# Patient Record
Sex: FEMALE | Race: BLACK OR AFRICAN AMERICAN | NOT HISPANIC OR LATINO | Employment: OTHER
[De-identification: names, ages, dates, MRNs, and addresses within clinical notes are randomized per-mention and may not be internally consistent; named-entity substitution may affect disease eponyms.]

---

## 2018-03-21 ENCOUNTER — SURGERY (OUTPATIENT)
Age: 68
End: 2018-03-21

## 2018-03-21 ENCOUNTER — HOSPITAL ENCOUNTER (OUTPATIENT)
Facility: OTHER | Age: 68
Discharge: HOME | End: 2018-03-21
Attending: INTERNAL MEDICINE | Admitting: INTERNAL MEDICINE
Payer: MEDICARE

## 2018-03-21 VITALS
BODY MASS INDEX: 34.38 KG/M2 | HEART RATE: 66 BPM | SYSTOLIC BLOOD PRESSURE: 152 MMHG | TEMPERATURE: 98 F | HEIGHT: 63 IN | DIASTOLIC BLOOD PRESSURE: 85 MMHG | RESPIRATION RATE: 16 BRPM | WEIGHT: 194 LBS | OXYGEN SATURATION: 100 %

## 2018-03-21 DIAGNOSIS — T82.590A MECHANICAL COMPLICATION OF ARTERIOVENOUS FISTULA SURGICALLY CREATED: ICD-10-CM

## 2018-03-21 DIAGNOSIS — Z49.01 ENCOUNTER FOR FITTING AND ADJUSTMENT OF DIALYSIS CATHETER: Primary | ICD-10-CM

## 2018-03-21 PROCEDURE — 25000003 PHARM REV CODE 250: Performed by: INTERNAL MEDICINE

## 2018-03-21 PROCEDURE — 63600175 PHARM REV CODE 636 W HCPCS

## 2018-03-21 PROCEDURE — 63600175 PHARM REV CODE 636 W HCPCS: Performed by: INTERNAL MEDICINE

## 2018-03-21 PROCEDURE — 27201059 HC S ENCORE INFLATION DEVICE: Performed by: INTERNAL MEDICINE

## 2018-03-21 PROCEDURE — 25000003 PHARM REV CODE 250

## 2018-03-21 PROCEDURE — 36581 REPLACE TUNNELED CV CATH: CPT | Performed by: INTERNAL MEDICINE

## 2018-03-21 PROCEDURE — 25500020 PHARM REV CODE 255

## 2018-03-21 PROCEDURE — C1752 CATH,HEMODIALYSIS,SHORT-TERM: HCPCS | Performed by: INTERNAL MEDICINE

## 2018-03-21 PROCEDURE — C1769 GUIDE WIRE: HCPCS | Performed by: INTERNAL MEDICINE

## 2018-03-21 PROCEDURE — C1725 CATH, TRANSLUMIN NON-LASER: HCPCS | Performed by: INTERNAL MEDICINE

## 2018-03-21 RX ORDER — HEPARIN SODIUM 1000 [USP'U]/ML
INJECTION, SOLUTION INTRAVENOUS; SUBCUTANEOUS
Status: DISCONTINUED | OUTPATIENT
Start: 2018-03-21 | End: 2018-03-21 | Stop reason: HOSPADM

## 2018-03-21 RX ORDER — LOSARTAN POTASSIUM 100 MG/1
100 TABLET ORAL DAILY
Status: ON HOLD | COMMUNITY
End: 2018-06-11 | Stop reason: HOSPADM

## 2018-03-21 RX ORDER — CLONIDINE 0.1 MG/24H
1 PATCH, EXTENDED RELEASE TRANSDERMAL
COMMUNITY
End: 2018-06-20

## 2018-03-21 RX ORDER — HEPARIN SOD,PORCINE/0.9 % NACL 1000/500ML
INTRAVENOUS SOLUTION INTRAVENOUS
Status: DISCONTINUED | OUTPATIENT
Start: 2018-03-21 | End: 2018-03-21 | Stop reason: HOSPADM

## 2018-03-21 RX ORDER — AMLODIPINE BESYLATE 10 MG/1
10 TABLET ORAL DAILY
COMMUNITY
End: 2018-06-20

## 2018-03-21 RX ORDER — OMEPRAZOLE 20 MG/1
20 CAPSULE, DELAYED RELEASE ORAL DAILY
Status: ON HOLD | COMMUNITY
End: 2018-06-11 | Stop reason: HOSPADM

## 2018-03-21 RX ORDER — MUPIROCIN 20 MG/G
OINTMENT TOPICAL
Status: DISCONTINUED | OUTPATIENT
Start: 2018-03-21 | End: 2018-03-21 | Stop reason: HOSPADM

## 2018-03-21 RX ORDER — LIDOCAINE HYDROCHLORIDE 10 MG/ML
1 INJECTION, SOLUTION EPIDURAL; INFILTRATION; INTRACAUDAL; PERINEURAL ONCE
Status: COMPLETED | OUTPATIENT
Start: 2018-03-21 | End: 2018-03-21

## 2018-03-21 RX ORDER — DOCUSATE SODIUM 100 MG/1
100 CAPSULE, LIQUID FILLED ORAL 2 TIMES DAILY PRN
Status: ON HOLD | COMMUNITY
End: 2019-04-09 | Stop reason: HOSPADM

## 2018-03-21 RX ORDER — CALCIUM ACETATE 667 MG/1
1334 CAPSULE ORAL
Status: ON HOLD | COMMUNITY
End: 2019-04-15 | Stop reason: HOSPADM

## 2018-03-21 RX ORDER — ZOLPIDEM TARTRATE 10 MG/1
10 TABLET ORAL NIGHTLY
COMMUNITY
End: 2019-04-16 | Stop reason: ALTCHOICE

## 2018-03-21 RX ORDER — CYCLOBENZAPRINE HCL 10 MG
10 TABLET ORAL 3 TIMES DAILY PRN
Status: ON HOLD | COMMUNITY
End: 2019-04-15 | Stop reason: HOSPADM

## 2018-03-21 RX ORDER — CODEINE PHOSPHATE AND GUAIFENESIN 10; 100 MG/5ML; MG/5ML
10 SOLUTION ORAL EVERY 6 HOURS PRN
COMMUNITY
End: 2018-06-20

## 2018-03-21 RX ADMIN — HEPARIN SODIUM IN SODIUM CHLORIDE 500 ML: 200 INJECTION INTRAVENOUS at 12:03

## 2018-03-21 RX ADMIN — HEPARIN SODIUM 4000 UNITS: 1000 INJECTION, SOLUTION INTRAVENOUS; SUBCUTANEOUS at 12:03

## 2018-03-21 RX ADMIN — LIDOCAINE HYDROCHLORIDE 16 ML: 10 INJECTION, SOLUTION EPIDURAL; INFILTRATION; INTRACAUDAL; PERINEURAL at 12:03

## 2018-03-21 NOTE — H&P
[facility]  History & Physical    Subjective:      Chief Complaint/Reason for Admission: Catheter rewire    Paige Summers is a 67 y.o. female with ESRD (TTS at [facility] with Dr. Carias) who presents today with clotted R internal jugular tunneled dialysis catheter.  She states they made multiple attempts on the last two dialysis sessions to maintain good blood flow rates during HD, but they were often unsuccessful.    Past Medical History:   Diagnosis Date    Hypertension      Past Surgical History:   Procedure Laterality Date    HYSTERECTOMY       History reviewed. No pertinent family history.  Social History   Substance Use Topics    Smoking status: Never Smoker    Smokeless tobacco: Never Used    Alcohol use No       PTA Medications   Medication Sig    amLODIPine (NORVASC) 10 MG tablet Take 10 mg by mouth once daily.    calcium acetate (PHOSLO) 667 mg capsule Take 1,334 mg by mouth 3 (three) times daily with meals.    cloNIDine 0.1 mg/24 hr td ptwk (CATAPRES) 0.1 mg/24 hr Place 1 patch onto the skin every 7 days. Every Sat    docusate sodium (COLACE) 100 MG capsule Take 100 mg by mouth 2 (two) times daily as needed for Constipation.    guaifenesin-codeine 100-10 mg/5 ml (TUSSI-ORGANIDIN NR)  mg/5 mL syrup Take 10 mLs by mouth every 6 (six) hours as needed for Cough.    linaclotide (LINZESS) 72 mcg Cap Take by mouth. Take one po qd before 1st meal of the day on empty stomach    losartan (COZAAR) 100 MG tablet Take 100 mg by mouth once daily.    omeprazole (PRILOSEC) 20 MG capsule Take 20 mg by mouth once daily. One po bod 30 minutes before meals    zolpidem (AMBIEN) 10 mg Tab Take 10 mg by mouth nightly as needed.    cyclobenzaprine (FLEXERIL) 10 MG tablet Take 10 mg by mouth 3 (three) times daily as needed for Muscle spasms.     Review of patient's allergies indicates:  No Known Allergies     Review of Systems   Constitutional: Negative.    Respiratory: Negative.     Cardiovascular: Negative.        Objective:      Vital Signs (Most Recent)  Temp: 98 °F (36.7 °C) (03/21/18 1127)  Pulse: 70 (03/21/18 1127)  Resp: 18 (03/21/18 1127)  BP: 139/79 (03/21/18 1127)  SpO2: 98 % (03/21/18 1127)    Vital Signs Range (Last 24H):  Temp:  [98 °F (36.7 °C)]   Pulse:  [70]   Resp:  [18]   BP: (139)/(79)   SpO2:  [98 %]     Physical Exam   Constitutional: She is oriented to person, place, and time. She appears well-developed and well-nourished. No distress.   HENT:   Head: Normocephalic and atraumatic.   Neck: Neck supple.   Cardiovascular:   R internal jugular tunneled dialysis catheter, clean and non-erythematous with no drainage   Pulmonary/Chest: Effort normal. No respiratory distress.   Neurological: She is alert and oriented to person, place, and time.   Skin: Skin is warm and dry. She is not diaphoretic.   Psychiatric: She has a normal mood and affect. Her behavior is normal.       Assessment:      Active Hospital Problems    Diagnosis  POA    Mechanical complication of arteriovenous fistula surgically created [T82.590A]  Yes      Resolved Hospital Problems    Diagnosis Date Resolved POA   No resolved problems to display.       Plan:      TDC rewire today.

## 2018-03-21 NOTE — BRIEF OP NOTE
[facility]  Brief Operative Note     SUMMARY     Surgery Date: 3/21/2018     Surgeon(s) and Role:     * Yuriy Gibson MD - Primary    Assisting Surgeon: None    Pre-op Diagnosis:  Mechanical complication of dialysis catheter [T82.49XA]    Post-op Diagnosis:  Post-Op Diagnosis Codes:     * Mechanical complication of dialysis catheter [T82.49XA]    Procedure(s) (LRB):  PERMCATH REWIRE- TUNNELED CATH REWIRE (N/A)    Anesthesia: Local    Description of the findings of the procedure: Patient was prepped and draped in a sterile fashion.  This was a successful rewire of her R internal jugular tunneled dialysis catheter with obliteration of a fibrin sheath performed in the SVC.  Patient tolerated the procedure well and no immediate complications noted.    Findings/Key Components: Patient was prepped and draped in a sterile fashion.  This was a successful rewire of her R internal jugular tunneled dialysis catheter with obliteration of a fibrin sheath performed in the SVC.  Patient tolerated the procedure well and no immediate complications noted.      Estimated Blood Loss: 10 mL         Specimens:   Specimen (12h ago through future)    None          Discharge Note    SUMMARY     Admit Date: 3/21/2018    Discharge Date and Time:  03/21/2018 1:04 PM    Hospital Course (synopsis of major diagnoses, care, treatment, and services provided during the course of the hospital stay): Patient was prepped and draped in a sterile fashion.  This was a successful rewire of her R internal jugular tunneled dialysis catheter with obliteration of a fibrin sheath performed in the SVC.  Patient tolerated the procedure well and no immediate complications noted.       Final Diagnosis: Post-Op Diagnosis Codes:     * Mechanical complication of dialysis catheter [T82.49XA]    Disposition: Home or Self Care    Follow Up/Patient Instructions:     Medications:  Reconciled Home Medications:   Current Discharge Medication List       CONTINUE these medications which have NOT CHANGED    Details   amLODIPine (NORVASC) 10 MG tablet Take 10 mg by mouth once daily.      calcium acetate (PHOSLO) 667 mg capsule Take 1,334 mg by mouth 3 (three) times daily with meals.      cloNIDine 0.1 mg/24 hr td ptwk (CATAPRES) 0.1 mg/24 hr Place 1 patch onto the skin every 7 days. Every Sat      docusate sodium (COLACE) 100 MG capsule Take 100 mg by mouth 2 (two) times daily as needed for Constipation.      guaifenesin-codeine 100-10 mg/5 ml (TUSSI-ORGANIDIN NR)  mg/5 mL syrup Take 10 mLs by mouth every 6 (six) hours as needed for Cough.      linaclotide (LINZESS) 72 mcg Cap Take by mouth. Take one po qd before 1st meal of the day on empty stomach      losartan (COZAAR) 100 MG tablet Take 100 mg by mouth once daily.      omeprazole (PRILOSEC) 20 MG capsule Take 20 mg by mouth once daily. One po bod 30 minutes before meals      zolpidem (AMBIEN) 10 mg Tab Take 10 mg by mouth nightly as needed.      cyclobenzaprine (FLEXERIL) 10 MG tablet Take 10 mg by mouth 3 (three) times daily as needed for Muscle spasms.             Discharge Procedure Orders  Activity as tolerated     Sponge bath only until clinic visit     Call MD for:  temperature >100.4     Call MD for:  severe uncontrolled pain     Call MD for:  redness, tenderness, or signs of infection (pain, swelling, redness, odor or green/yellow discharge around incision site)     Call MD for:   Order Comments: Bleeding from the catheter site.       Follow-up Information     [facility] In 1 day.    Why:  for dialysis  Contact information:  [address] [place] [address] [phone]

## 2018-03-21 NOTE — PROCEDURES
[facility] Interventional Nephrology Service    Date Of Procedure: 03/21/2018    Procedure: Tunneled Dialysis Central Catheter Exchange    Indication: Nonfunctioning dialysis catheter    Primary Surgeon: Yuriy Gibson MD  Assist: none    Referring Provider: Dr. Carias and [facility]    Blood Loss: 10 mL  Contrast Used: 9 mL    Procedure in Detail:  Patient prepped and draped in usual sterile fashion.  1% lidocaine was used for local sedation to anesthetize soft tissues.  Blunt hemostat was used to dissect exit site and fibrin sheath around catheter and its cuff.  Catheter (24 cm) location was visualized under fluoroscopy and appeared to be in a good position.  Once the catheter and the cuff were freed, a guidewire was inserted through distal port into the central venous circulation (CVC).  This was done under fluoroscopic guidance.  Once wire was positioned to inferior vena cava, the old catheter was pulled while maintaining hemostasis as well as position of the wire in the CVC under fluoroscopy.  Before pulling catheter, 3 mL of contrast was injected to evaluate for a fibrin sheath.  A sheath was noted, so using a 10mm by 40mm [place] PTA balloon and endeflator, the sheath was obliterated after 2 attempts.  Post PTA film did not reveal any extravascular extravasation of contrast and resolution of the fibrin sheath.  A new 23 cm catheter was then placed over the guidewire and was felt to be too long, so this was exchanged for a 19 cm catheter, which was then confirmed in correct positioning under fluoroscopy.  Both ports flushed well.  The catheter was packed with 1000 units/mL heparin.  Suture was used to secure the catheter at the exit site and to anchor to patient's skin.      A total of 3 sutures were placed    Patient tolerated the procedure well and there were no complications      Yuriy Gibson MD  [phone]

## 2018-04-23 ENCOUNTER — HOSPITAL ENCOUNTER (OUTPATIENT)
Facility: OTHER | Age: 68
Discharge: HOME | End: 2018-04-23
Attending: INTERNAL MEDICINE | Admitting: INTERNAL MEDICINE
Payer: MEDICARE

## 2018-04-23 ENCOUNTER — SURGERY (OUTPATIENT)
Age: 68
End: 2018-04-23

## 2018-04-23 PROCEDURE — 36589 REMOVAL TUNNELED CV CATH: CPT | Performed by: INTERNAL MEDICINE

## 2018-04-23 NOTE — NURSING
Upon arrival to procedure area, hemodialysis cath noted to be completely dislodged. No bleeding noted. Pressure held for 10 minutes. Dr. Gibson at bedside. Cath intact. Pressure dsg placed and pt discharged from facility.

## 2018-04-23 NOTE — PROCEDURES
[facility] Interventional Nephrology Note    04/23/2018 at 12:30PM    The patient was referred here for removal of her R internal jugular tunneled dialysis catheter.  She recently had an LUE upper arm straight arm AVG and they have been using it, have accessed it twice without any problems or complications.      While the nurse was preparing the patient for our tunneled catheter removal, the catheter became dislodged on its own and came out.  She held pressure for 10 minutes, and no bleeding was noted.  Upon my evaluating the patient, the exit site looked clean without any bleeding, the venotomy site was closed and had no evidence of swelling or hematoma.  The exit site was dressed and she was notified to contact us if any changes were noted.      Yuriy Gibson MD

## 2018-05-17 ENCOUNTER — TELEPHONE (OUTPATIENT)
Dept: TRANSPLANT | Facility: CLINIC | Age: 68
End: 2018-05-17

## 2018-06-04 DIAGNOSIS — Z76.82 ORGAN TRANSPLANT CANDIDATE: ICD-10-CM

## 2018-06-11 ENCOUNTER — HOSPITAL ENCOUNTER (OUTPATIENT)
Facility: OTHER | Age: 68
Discharge: HOME | End: 2018-06-11
Attending: INTERNAL MEDICINE | Admitting: INTERNAL MEDICINE
Payer: MEDICARE

## 2018-06-11 VITALS
OXYGEN SATURATION: 100 % | BODY MASS INDEX: 34.73 KG/M2 | TEMPERATURE: 98 F | HEART RATE: 73 BPM | HEIGHT: 63 IN | RESPIRATION RATE: 16 BRPM | DIASTOLIC BLOOD PRESSURE: 98 MMHG | SYSTOLIC BLOOD PRESSURE: 166 MMHG | WEIGHT: 196 LBS

## 2018-06-11 DIAGNOSIS — T82.590A MECHANICAL COMPLICATION OF ARTERIOVENOUS FISTULA SURGICALLY CREATED: ICD-10-CM

## 2018-06-11 DIAGNOSIS — T82.868A THROMBOSIS OF KIDNEY DIALYSIS ARTERIOVENOUS GRAFT, INITIAL ENCOUNTER: Primary | ICD-10-CM

## 2018-06-11 PROCEDURE — 25000003 PHARM REV CODE 250: Mod: TXP

## 2018-06-11 PROCEDURE — 27201224 HC CATH ANGIOGRAM: Mod: NTX | Performed by: INTERNAL MEDICINE

## 2018-06-11 PROCEDURE — 25500020 PHARM REV CODE 255: Mod: NTX

## 2018-06-11 PROCEDURE — C1769 GUIDE WIRE: HCPCS | Mod: TXP | Performed by: INTERNAL MEDICINE

## 2018-06-11 PROCEDURE — C1757 CATH, THROMBECTOMY/EMBOLECT: HCPCS | Mod: NTX | Performed by: INTERNAL MEDICINE

## 2018-06-11 PROCEDURE — 25000003 PHARM REV CODE 250: Mod: NTX | Performed by: INTERNAL MEDICINE

## 2018-06-11 PROCEDURE — 36905 THRMBC/NFS DIALYSIS CIRCUIT: CPT | Mod: TXP | Performed by: INTERNAL MEDICINE

## 2018-06-11 PROCEDURE — 99152 MOD SED SAME PHYS/QHP 5/>YRS: CPT | Mod: TXP | Performed by: INTERNAL MEDICINE

## 2018-06-11 PROCEDURE — 27201059 HC S ENCORE INFLATION DEVICE: Mod: TXP | Performed by: INTERNAL MEDICINE

## 2018-06-11 PROCEDURE — 99153 MOD SED SAME PHYS/QHP EA: CPT | Mod: NTX | Performed by: INTERNAL MEDICINE

## 2018-06-11 PROCEDURE — 63600175 PHARM REV CODE 636 W HCPCS: Mod: JG,NTX

## 2018-06-11 PROCEDURE — C1894 INTRO/SHEATH, NON-LASER: HCPCS | Mod: TXP | Performed by: INTERNAL MEDICINE

## 2018-06-11 PROCEDURE — C1725 CATH, TRANSLUMIN NON-LASER: HCPCS | Mod: TXP | Performed by: INTERNAL MEDICINE

## 2018-06-11 PROCEDURE — 63600175 PHARM REV CODE 636 W HCPCS: Mod: NTX | Performed by: INTERNAL MEDICINE

## 2018-06-11 RX ORDER — CLOPIDOGREL BISULFATE 75 MG/1
75 TABLET ORAL EVERY OTHER DAY
Status: ON HOLD | COMMUNITY
End: 2019-04-15 | Stop reason: HOSPADM

## 2018-06-11 RX ORDER — GABAPENTIN 100 MG/1
100 CAPSULE ORAL NIGHTLY
Status: ON HOLD | COMMUNITY
End: 2018-09-06

## 2018-06-11 RX ORDER — HEPARIN SODIUM 1000 [USP'U]/ML
INJECTION, SOLUTION INTRAVENOUS; SUBCUTANEOUS
Status: DISCONTINUED | OUTPATIENT
Start: 2018-06-11 | End: 2018-06-11 | Stop reason: HOSPADM

## 2018-06-11 RX ORDER — MIDAZOLAM HYDROCHLORIDE 1 MG/ML
INJECTION, SOLUTION INTRAMUSCULAR; INTRAVENOUS
Status: DISCONTINUED | OUTPATIENT
Start: 2018-06-11 | End: 2018-06-11 | Stop reason: HOSPADM

## 2018-06-11 RX ORDER — OXYCODONE AND ACETAMINOPHEN 5; 325 MG/1; MG/1
1 TABLET ORAL ONCE
Status: COMPLETED | OUTPATIENT
Start: 2018-06-11 | End: 2018-06-11

## 2018-06-11 RX ORDER — LIDOCAINE HYDROCHLORIDE 10 MG/ML
INJECTION INFILTRATION; PERINEURAL
Status: DISCONTINUED | OUTPATIENT
Start: 2018-06-11 | End: 2018-06-11 | Stop reason: HOSPADM

## 2018-06-11 RX ORDER — CARVEDILOL 6.25 MG/1
6.25 TABLET ORAL 2 TIMES DAILY WITH MEALS
Status: ON HOLD | COMMUNITY
End: 2019-04-15 | Stop reason: HOSPADM

## 2018-06-11 RX ORDER — HYDROXYZINE PAMOATE 25 MG/1
25 CAPSULE ORAL 3 TIMES DAILY
Status: ON HOLD | COMMUNITY
End: 2019-04-15 | Stop reason: HOSPADM

## 2018-06-11 RX ORDER — FENTANYL CITRATE 50 UG/ML
INJECTION, SOLUTION INTRAMUSCULAR; INTRAVENOUS
Status: DISCONTINUED | OUTPATIENT
Start: 2018-06-11 | End: 2018-06-11 | Stop reason: HOSPADM

## 2018-06-11 RX ORDER — HEPARIN SOD,PORCINE/0.9 % NACL 1000/500ML
INTRAVENOUS SOLUTION INTRAVENOUS
Status: DISCONTINUED | OUTPATIENT
Start: 2018-06-11 | End: 2018-06-11 | Stop reason: HOSPADM

## 2018-06-11 RX ADMIN — OXYCODONE AND ACETAMINOPHEN 1 TABLET: 5; 325 TABLET ORAL at 05:06

## 2018-06-11 NOTE — PROCEDURES
[facility] Interventional Nephrology Procedure Report    Date of Procedure:  06/11/2018 3:01 PM     :  Yuriy Gibson MD  Assistant: Irineo Devlin MD     Indication for Procedure:  Thrombosed LUE straight arm AVG      Referring Provider:  [facility] and Dr. Carias     Procedures Performed:  1.  Access cannulation  2.  Second cannulation  3.  Venous administration of TPA  4.  Venous angioplasty  5.  Percutaneous thrombectomy  6.  Arteriogram  7.  Moderate conscious sedation     Medications Administered:  1.  2 mg of tPA IV  2.  Heparin 3000 units IV  3.  Versed 1 mg IV  4.  Fentanyl 50 mcg IV     Blood Loss:  Approximately 20 mL     Contrast Used:  Approximately 15 mL     Procedure in Detail:    After informed consent was obtained, the patient was prepped and draped in the usual sterile fashion.  Her LUE straight arm AV graft was cannulated in the venous facing direction with a micropuncture set.  The microwire was confirmed to be in correct location under fluoroscopy and a 4-Mosotho microsheath was established.  A glidewire was advanced easily to the central circulation and the microsheath was removed.  A 6-Mosotho sheath was established.  Then, an over-the-wire Berenstein guiding catheter was advanced to the central circulation and a central venogram was performed, which revealed patent central vasculature.  After the patient was evaluated with the physician, moderate sedation was then administered.  A pullback venogram was performed, which revealed stenosis beginning at the distal portion of the graft, at the level of the venous anastomosis.  So, the wire was reinserted and the Berenstein catheter was removed.  Then, 2 mg of TPA was injected into the venous facing sheath with pressure was held at the arterial anastomosis.  Following this, a 7 x 80 mm conquest PTA balloon was advanced to the distal/venous anastomosis of the graft and inflated to full effacement with waist seen on the  balloon prior to full effacement.  Angioplasty was repeated back within the graft to macerate the thrombus all the while holding pressure at the arterial anastomosis.       The balloon was then removed and a second cannulation was made in the arterial facing direction with the micropuncture set.  Again, the microwire was confirmed to be in the correct location under fluoroscopy.  Microsheath was established and a glidewire was advanced across the arterial anastomosis and up into the brachial artery and then the micro sheath was removed and a 6-Hungarian sheath was established.  Then using an over-the-wire Obie catheter, we readvanced across the arterial anastomosis, inflated and a pullback maneuver was performed with significant thrombus aspirated through the arterial facing sheath.  This procedure was repeated approximately 2 times after which there was noted to be moderate pulsatility of the AV fistula.  We then took the Obie catheter on the venous facing wire and swept towards the central circulation to sweep out any residual thrombus and there was improved pulsatility flow felt up the AV fistula.  So at this point, the Obie catheter was advanced across the arterial anastomosis and an arteriogram was performed.  At least 6 cm of brachial artery was seen passing down into the forearm and passed the bifurcation into the radial and ulnar arteries and no distal emboli were seen.       The arterial facing guidewire and sheath were then removed and hemostasis was achieved using a #2 Ethilon suture.  We then administered 5000 units of IV heparin and a repeat fistulogram was performed, which revealed brisk flow through the lower portion of the AV fistula without residual significant stenosis seen.  Repeat angiogram following the angioplasty revealed improvement in the outflow stenosis and no extravasation of contrast seen.  Hemostasis was achieved using a #2 Ethilon suture.        IMPRESSION AND PLAN:  This is a  successful percutaneous thrombectomy using mechanical and pharmacologic thrombolysis.  We will have the patient return in 1 week for suture removal and a followup ultrasound.        Yuriy Gibson MD  [phone]

## 2018-06-11 NOTE — DISCHARGE INSTRUCTIONS
After the procedure:    · Your arm and hand will be checked to make sure blood is flowing through the fistula properly. The feeling of blood rushing through the fistula is called a thrill. It is somewhat similar to the purring of a cat. Youll be taught how to check for this feeling each day to make sure there are no problems with your fistula.   · Watch for bleeding-If bleeding occurs place light pressure on the area and call your physician.    Recovering at Home  Once at home, follow all of the instructions youve been given. Be sure to:  · Take all medications as directed.  · Care for your incision as instructed.  · Check for signs of infection at the incision site   · Avoid heavy lifting and strenuous activities as directed.  · Monitor and care for your fistula as instructed    PLEASE FOLLOW ANY ADDITIONAL INSTRUCTIONS GIVEN TO YOU  BY DR OVERTON!

## 2018-06-11 NOTE — BRIEF OP NOTE
[facility]  Brief Operative Note     SUMMARY     Surgery Date: 6/11/2018     Surgeon(s) and Role:     * Yuriy Gibson MD - Primary    Assisting Surgeon: Irineo Devlin MD    Pre-op Diagnosis:  ESRD (end stage renal disease) on dialysis [N18.6, Z99.2]  Complication of vascular access for dialysis, subsequent encounter [T82.9XXD]    Post-op Diagnosis:  Post-Op Diagnosis Codes:     * ESRD (end stage renal disease) on dialysis [N18.6, Z99.2]     * Complication of vascular access for dialysis, subsequent encounter [T82.9XXD]    Procedure(s) (LRB):  THROMBECTOMY, HEMODIALYSIS GRAFT OR FISTULA (Left)    Anesthesia: 1% lidocaine, 1 mg versed, 50 mcg fentanyl    Description of the findings of the procedure: Patient was prepped and draped in a sterile fashion.  This was a successful declot of her LUE straight arm AV graft with angioplasty performed in the outflow/venous anastomosis portion of the graft.  Patient tolerated the procedure well and no immediate complications noted.    Findings/Key Components: Patient was prepped and draped in a sterile fashion.  This was a successful declot of her LUE straight arm AV graft with angioplasty performed in the outflow/venous anastomosis portion of the graft.  Patient tolerated the procedure well and no immediate complications noted.      Estimated Blood Loss: 20 mL         Specimens:   Specimen (12h ago through future)    None          Discharge Note    SUMMARY     Admit Date: 6/11/2018    Discharge Date and Time:  06/11/2018 2:59 PM    Hospital Course (synopsis of major diagnoses, care, treatment, and services provided during the course of the hospital stay): Patient was prepped and draped in a sterile fashion.  This was a successful declot of her LUE straight arm AV graft with angioplasty performed in the outflow/venous anastomosis portion of the graft.  Patient tolerated the procedure well and no immediate complications noted.       Final Diagnosis: Post-Op  Diagnosis Codes:     * ESRD (end stage renal disease) on dialysis [N18.6, Z99.2]     * Complication of vascular access for dialysis, subsequent encounter [T82.9XXD]    Disposition: Home or Self Care    Follow Up/Patient Instructions:     Medications:  Reconciled Home Medications:      Medication List      CONTINUE taking these medications    amLODIPine 10 MG tablet  Commonly known as:  NORVASC  Take 10 mg by mouth once daily.     calcium acetate 667 mg capsule  Commonly known as:  PHOSLO  Take 1,334 mg by mouth 3 (three) times daily with meals.     carvedilol 6.25 MG tablet  Commonly known as:  COREG  Take 6.25 mg by mouth 2 (two) times daily with meals.     cloNIDine 0.1 mg/24 hr td ptwk 0.1 mg/24 hr  Commonly known as:  CATAPRES  Place 1 patch onto the skin every 7 days. Every Sat     clopidogrel 75 mg tablet  Commonly known as:  PLAVIX  Take 75 mg by mouth once daily.     cyclobenzaprine 10 MG tablet  Commonly known as:  FLEXERIL  Take 10 mg by mouth 3 (three) times daily as needed for Muscle spasms.     docusate sodium 100 MG capsule  Commonly known as:  COLACE  Take 100 mg by mouth 2 (two) times daily as needed for Constipation.     gabapentin 100 MG capsule  Commonly known as:  NEURONTIN  Take 100 mg by mouth every evening.     guaifenesin-codeine 100-10 mg/5 ml  mg/5 mL syrup  Commonly known as:  TUSSI-ORGANIDIN NR  Take 10 mLs by mouth every 6 (six) hours as needed for Cough.     hydrOXYzine pamoate 25 MG Cap  Commonly known as:  VISTARIL  Take 25 mg by mouth 3 (three) times daily.     LINZESS 72 mcg Cap  Generic drug:  linaclotide  Take by mouth. Take one po qd before 1st meal of the day on empty stomach     VIRT-CAPS 1 mg Cap  Generic drug:  vitamin renal formula (B-complex-vitamin c-folic acid)  Take 1 capsule by mouth once daily.     zolpidem 10 mg Tab  Commonly known as:  AMBIEN  Take 10 mg by mouth nightly as needed.        STOP taking these medications    losartan 100 MG tablet  Commonly known  as:  COZAAR     omeprazole 20 MG capsule  Commonly known as:  PRILOSEC            Discharge Procedure Orders  Activity as tolerated     Lifting restrictions   Order Comments: No heavy lifting for 48 hours.     Call MD for:  temperature >100.4     Call MD for:  severe uncontrolled pain     Call MD for:  redness, tenderness, or signs of infection (pain, swelling, redness, odor or green/yellow discharge around incision site)     Call MD for:   Order Comments: Bleeding from the access site.       Follow-up Information     [facility] In 1 day.    Why:  for dialysis  Contact information:  [address] [place] [address] [phone]

## 2018-06-11 NOTE — PLAN OF CARE
Paige Andre Summers has met all discharge criteria from Phase II. Vital Signs are stable, ambulating  without difficulty.Pain is now under control and tolerable for the pt. Pain score 4 at this time.  Discharge instructions given, patient verbalized understanding. Discharged from facility via wheelchair in stable condition.

## 2018-06-11 NOTE — H&P
[facility]  History & Physical    Subjective:      Chief Complaint/Reason for Admission: Here for declot    Paige Summers is a 67 y.o. female with ESRD (TTS at [facility] with Dr. Carias) who presents today with clotted LUE AVG.  She had good dialysis on Tue and Thur of last week, with no signs of imminent thrombosis.  Saturday, she showed up and her graft was evaluated by the nurse who noted no flow.  Of note, this graft was placed approximately 6 months ago.    Past Medical History:   Diagnosis Date    Chronic renal failure 01/10/2018    Hypertension      Past Surgical History:   Procedure Laterality Date    DIALYSIS FISTULA CREATION Left 02/2018    HYSTERECTOMY       History reviewed. No pertinent family history.  Social History   Substance Use Topics    Smoking status: Never Smoker    Smokeless tobacco: Never Used    Alcohol use No       PTA Medications   Medication Sig    amLODIPine (NORVASC) 10 MG tablet Take 10 mg by mouth once daily.    calcium acetate (PHOSLO) 667 mg capsule Take 1,334 mg by mouth 3 (three) times daily with meals.    carvedilol (COREG) 6.25 MG tablet Take 6.25 mg by mouth 2 (two) times daily with meals.    clopidogrel (PLAVIX) 75 mg tablet Take 75 mg by mouth once daily.    cyclobenzaprine (FLEXERIL) 10 MG tablet Take 10 mg by mouth 3 (three) times daily as needed for Muscle spasms.    docusate sodium (COLACE) 100 MG capsule Take 100 mg by mouth 2 (two) times daily as needed for Constipation.    gabapentin (NEURONTIN) 100 MG capsule Take 100 mg by mouth every evening.    hydrOXYzine pamoate (VISTARIL) 25 MG Cap Take 25 mg by mouth 3 (three) times daily.    linaclotide (LINZESS) 72 mcg Cap Take by mouth. Take one po qd before 1st meal of the day on empty stomach    vitamin renal formula, B-complex-vitamin c-folic acid, (VIRT-CAPS) 1 mg Cap Take 1 capsule by mouth once daily.    zolpidem (AMBIEN) 10 mg Tab Take 10 mg by mouth nightly as needed.     cloNIDine 0.1 mg/24 hr td ptwk (CATAPRES) 0.1 mg/24 hr Place 1 patch onto the skin every 7 days. Every Sat    guaifenesin-codeine 100-10 mg/5 ml (TUSSI-ORGANIDIN NR)  mg/5 mL syrup Take 10 mLs by mouth every 6 (six) hours as needed for Cough.    losartan (COZAAR) 100 MG tablet Take 100 mg by mouth once daily.    omeprazole (PRILOSEC) 20 MG capsule Take 20 mg by mouth once daily. One po bod 30 minutes before meals     Review of patient's allergies indicates:  No Known Allergies     Review of Systems   Constitutional: Negative.    Respiratory: Negative.    Cardiovascular: Negative.        Objective:      Vital Signs (Most Recent)       Vital Signs Range (Last 24H):       Physical Exam   Constitutional: She is oriented to person, place, and time. She appears well-developed and well-nourished. No distress.   HENT:   Head: Normocephalic and atraumatic.   Eyes: EOM are normal.   Neck: Neck supple.   Pulmonary/Chest: Effort normal. No respiratory distress.   Musculoskeletal:   LUE straight arm AVG with no thrill.   Neurological: She is alert and oriented to person, place, and time.   Skin: Skin is warm and dry. She is not diaphoretic.   Psychiatric: She has a normal mood and affect. Her behavior is normal.       Assessment:      Active Hospital Problems    Diagnosis  POA    Mechanical complication of arteriovenous fistula surgically created [T82.590A]  Yes      Resolved Hospital Problems    Diagnosis Date Resolved POA   No resolved problems to display.       Plan:      Declot today.  Risks explained, consent signed.

## 2018-06-11 NOTE — OR NURSING
Small amount  of bleeding noted from L upper arm.  Pressure held, light pressure dressing applied

## 2018-06-11 NOTE — OR NURSING
No further bleeding from upper arm site. Pt c/o pain in upper arm 6/10.  Dr Gibson notified, order for Percocet received.

## 2018-06-13 ENCOUNTER — SURGERY (OUTPATIENT)
Age: 68
End: 2018-06-13

## 2018-06-13 ENCOUNTER — HOSPITAL ENCOUNTER (OUTPATIENT)
Facility: OTHER | Age: 68
Discharge: HOME | End: 2018-06-13
Attending: INTERNAL MEDICINE | Admitting: INTERNAL MEDICINE
Payer: MEDICARE

## 2018-06-13 VITALS
SYSTOLIC BLOOD PRESSURE: 154 MMHG | DIASTOLIC BLOOD PRESSURE: 69 MMHG | BODY MASS INDEX: 34.73 KG/M2 | OXYGEN SATURATION: 100 % | HEART RATE: 82 BPM | TEMPERATURE: 98 F | RESPIRATION RATE: 18 BRPM | WEIGHT: 196 LBS | HEIGHT: 63 IN

## 2018-06-13 DIAGNOSIS — T82.599A MECHANICAL COMPLICATION OF VASCULAR DEVICE: ICD-10-CM

## 2018-06-13 DIAGNOSIS — T82.868D THROMBOSIS OF KIDNEY DIALYSIS ARTERIOVENOUS GRAFT, SUBSEQUENT ENCOUNTER: Primary | ICD-10-CM

## 2018-06-13 PROBLEM — T82.868A THROMBOSIS OF RENAL DIALYSIS ARTERIOVENOUS GRAFT: Status: RESOLVED | Noted: 2018-06-11 | Resolved: 2018-06-13

## 2018-06-13 PROCEDURE — C1769 GUIDE WIRE: HCPCS | Mod: TXP | Performed by: INTERNAL MEDICINE

## 2018-06-13 PROCEDURE — C1725 CATH, TRANSLUMIN NON-LASER: HCPCS | Mod: NTX | Performed by: INTERNAL MEDICINE

## 2018-06-13 PROCEDURE — C1757 CATH, THROMBECTOMY/EMBOLECT: HCPCS | Mod: NTX | Performed by: INTERNAL MEDICINE

## 2018-06-13 PROCEDURE — 27201224 HC CATH ANGIOGRAM: Mod: TXP | Performed by: INTERNAL MEDICINE

## 2018-06-13 PROCEDURE — 27201059 HC S ENCORE INFLATION DEVICE: Mod: TXP | Performed by: INTERNAL MEDICINE

## 2018-06-13 PROCEDURE — C2623 CATH, TRANSLUMIN, DRUG-COAT: HCPCS | Mod: TXP | Performed by: INTERNAL MEDICINE

## 2018-06-13 PROCEDURE — C1725 CATH, TRANSLUMIN NON-LASER: HCPCS | Mod: TXP | Performed by: INTERNAL MEDICINE

## 2018-06-13 PROCEDURE — 36905 THRMBC/NFS DIALYSIS CIRCUIT: CPT | Mod: NTX | Performed by: INTERNAL MEDICINE

## 2018-06-13 PROCEDURE — 25000003 PHARM REV CODE 250: Mod: TXP | Performed by: INTERNAL MEDICINE

## 2018-06-13 PROCEDURE — 63600175 PHARM REV CODE 636 W HCPCS: Mod: TXP

## 2018-06-13 PROCEDURE — C1894 INTRO/SHEATH, NON-LASER: HCPCS | Mod: TXP | Performed by: INTERNAL MEDICINE

## 2018-06-13 PROCEDURE — 99153 MOD SED SAME PHYS/QHP EA: CPT | Mod: TXP | Performed by: INTERNAL MEDICINE

## 2018-06-13 PROCEDURE — 63600175 PHARM REV CODE 636 W HCPCS: Mod: TXP | Performed by: INTERNAL MEDICINE

## 2018-06-13 PROCEDURE — 25500020 PHARM REV CODE 255: Mod: TXP

## 2018-06-13 PROCEDURE — 99152 MOD SED SAME PHYS/QHP 5/>YRS: CPT | Mod: TXP | Performed by: INTERNAL MEDICINE

## 2018-06-13 PROCEDURE — 25000003 PHARM REV CODE 250: Mod: TXP

## 2018-06-13 RX ORDER — HEPARIN SOD,PORCINE/0.9 % NACL 1000/500ML
INTRAVENOUS SOLUTION INTRAVENOUS
Status: DISCONTINUED | OUTPATIENT
Start: 2018-06-13 | End: 2018-06-13 | Stop reason: HOSPADM

## 2018-06-13 RX ORDER — ALPRAZOLAM 0.5 MG/1
TABLET, ORALLY DISINTEGRATING ORAL
Status: DISCONTINUED | OUTPATIENT
Start: 2018-06-13 | End: 2018-06-13 | Stop reason: HOSPADM

## 2018-06-13 RX ORDER — FENTANYL CITRATE 50 UG/ML
INJECTION, SOLUTION INTRAMUSCULAR; INTRAVENOUS
Status: DISCONTINUED | OUTPATIENT
Start: 2018-06-13 | End: 2018-06-13 | Stop reason: HOSPADM

## 2018-06-13 RX ORDER — MIDAZOLAM HYDROCHLORIDE 1 MG/ML
INJECTION, SOLUTION INTRAMUSCULAR; INTRAVENOUS
Status: DISCONTINUED | OUTPATIENT
Start: 2018-06-13 | End: 2018-06-13 | Stop reason: HOSPADM

## 2018-06-13 RX ORDER — HEPARIN SODIUM 1000 [USP'U]/ML
INJECTION, SOLUTION INTRAVENOUS; SUBCUTANEOUS
Status: DISCONTINUED | OUTPATIENT
Start: 2018-06-13 | End: 2018-06-13 | Stop reason: HOSPADM

## 2018-06-13 RX ORDER — LIDOCAINE HYDROCHLORIDE 10 MG/ML
INJECTION INFILTRATION; PERINEURAL
Status: DISCONTINUED | OUTPATIENT
Start: 2018-06-13 | End: 2018-06-13 | Stop reason: HOSPADM

## 2018-06-13 RX ADMIN — FENTANYL CITRATE 50 MCG: 50 INJECTION, SOLUTION INTRAMUSCULAR; INTRAVENOUS at 10:06

## 2018-06-13 RX ADMIN — HEPARIN SODIUM 4000 UNITS: 1000 INJECTION, SOLUTION INTRAVENOUS; SUBCUTANEOUS at 10:06

## 2018-06-13 RX ADMIN — ALTEPLASE 2 MG: 2.2 INJECTION, POWDER, LYOPHILIZED, FOR SOLUTION INTRAVENOUS at 10:06

## 2018-06-13 RX ADMIN — MIDAZOLAM 1 MG: 1 INJECTION INTRAMUSCULAR; INTRAVENOUS at 09:06

## 2018-06-13 RX ADMIN — FENTANYL CITRATE 50 MCG: 50 INJECTION, SOLUTION INTRAMUSCULAR; INTRAVENOUS at 09:06

## 2018-06-13 RX ADMIN — MIDAZOLAM 1 MG: 1 INJECTION INTRAMUSCULAR; INTRAVENOUS at 10:06

## 2018-06-13 RX ADMIN — Medication 500 ML: at 09:06

## 2018-06-13 RX ADMIN — ALPRAZOLAM 0.5 MG: 0.5 TABLET, ORALLY DISINTEGRATING ORAL at 09:06

## 2018-06-13 RX ADMIN — LIDOCAINE HYDROCHLORIDE 4 ML: 10 INJECTION INFILTRATION; PERINEURAL at 10:06

## 2018-06-13 NOTE — BRIEF OP NOTE
[facility]  Brief Operative Note     SUMMARY     Surgery Date: 6/13/2018     Surgeon(s) and Role:     * Yuriy Gibson MD - Primary    Assisting Surgeon: Irineo Devlin MD    Pre-op Diagnosis:  ESRD (end stage renal disease) on dialysis [N18.6, Z99.2]  Complication of vascular access for dialysis, subsequent encounter [T82.9XXD]    Post-op Diagnosis:  Post-Op Diagnosis Codes:     * ESRD (end stage renal disease) on dialysis [N18.6, Z99.2]     * Complication of vascular access for dialysis, subsequent encounter [T82.9XXD]    Procedure(s) (LRB):  THROMBECTOMY, HEMODIALYSIS GRAFT OR FISTULA (Left)    Anesthesia: 1% lidocaine, 2 mg versed, 100 mcg fentanyl    Description of the findings of the procedure: Patient was prepped and draped in a sterile fashion.  This was a successful declot of her LUE straight arm AV graft with angioplasty performed in the arterial anastomosis and the venous/outflow anastomosis portion of the fistula, including DCB.  Patient tolerated the procedure well and no immediate complications noted.    Findings/Key Components: Patient was prepped and draped in a sterile fashion.  This was a successful declot of her LUE straight arm AV graft with angioplasty performed in the arterial anastomosis and the venous/outflow anastomosis portion of the fistula, including DCB.  Patient tolerated the procedure well and no immediate complications noted.      Estimated Blood Loss: 20 mL         Specimens:   Specimen (12h ago through future)    None          Discharge Note    SUMMARY     Admit Date: 6/13/2018    Discharge Date and Time:  06/13/2018 10:55 AM    Hospital Course (synopsis of major diagnoses, care, treatment, and services provided during the course of the hospital stay): Patient was prepped and draped in a sterile fashion.  This was a successful declot of her LUE straight arm AV graft with angioplasty performed in the arterial anastomosis and the venous/outflow anastomosis  portion of the fistula, including DCB.  Patient tolerated the procedure well and no immediate complications noted.       Final Diagnosis: Post-Op Diagnosis Codes:     * ESRD (end stage renal disease) on dialysis [N18.6, Z99.2]     * Complication of vascular access for dialysis, subsequent encounter [T82.9XXD]    Disposition: Home or Self Care    Follow Up/Patient Instructions:     Medications:  Reconciled Home Medications:      Medication List      CONTINUE taking these medications    amLODIPine 10 MG tablet  Commonly known as:  NORVASC  Take 10 mg by mouth once daily.     calcium acetate 667 mg capsule  Commonly known as:  PHOSLO  Take 1,334 mg by mouth 3 (three) times daily with meals.     carvedilol 6.25 MG tablet  Commonly known as:  COREG  Take 6.25 mg by mouth 2 (two) times daily with meals.     cloNIDine 0.1 mg/24 hr td ptwk 0.1 mg/24 hr  Commonly known as:  CATAPRES  Place 1 patch onto the skin every 7 days. Every Sat     clopidogrel 75 mg tablet  Commonly known as:  PLAVIX  Take 75 mg by mouth once daily.     cyclobenzaprine 10 MG tablet  Commonly known as:  FLEXERIL  Take 10 mg by mouth 3 (three) times daily as needed for Muscle spasms.     docusate sodium 100 MG capsule  Commonly known as:  COLACE  Take 100 mg by mouth 2 (two) times daily as needed for Constipation.     gabapentin 100 MG capsule  Commonly known as:  NEURONTIN  Take 100 mg by mouth every evening.     guaifenesin-codeine 100-10 mg/5 ml  mg/5 mL syrup  Commonly known as:  TUSSI-ORGANIDIN NR  Take 10 mLs by mouth every 6 (six) hours as needed for Cough.     hydrOXYzine pamoate 25 MG Cap  Commonly known as:  VISTARIL  Take 25 mg by mouth 3 (three) times daily.     LINZESS 72 mcg Cap  Generic drug:  linaclotide  Take by mouth. Take one po qd before 1st meal of the day on empty stomach     VIRT-CAPS 1 mg Cap  Generic drug:  vitamin renal formula (B-complex-vitamin c-folic acid)  Take 1 capsule by mouth once daily.     zolpidem 10 mg  Tab  Commonly known as:  AMBIEN  Take 10 mg by mouth nightly as needed.            Discharge Procedure Orders  Activity as tolerated     Lifting restrictions   Order Comments: No heavy lifting for 48 hours.     Call MD for:  temperature >100.4     Call MD for:  severe uncontrolled pain     Call MD for:  redness, tenderness, or signs of infection (pain, swelling, redness, odor or green/yellow discharge around incision site)     Call MD for:   Order Comments: Bleeding from the access site.       Follow-up Information     [facility].    Why:  for dialysis  Contact information:  [address] [place] [address] [phone]

## 2018-06-13 NOTE — H&P
[facility]  History & Physical    Subjective:      Chief Complaint/Reason for Admission: Here for declot    Paige Summers is a 67 y.o. female with ESRD (TTS at [facility] with Dr. Carias) who presents today for declot after she presented to her dialysis unit yesterday with clotted access.  We performed thrombectomy on Monday with success, she left with a great thrill, and was relatively easy to reopen.      Past Medical History:   Diagnosis Date    Chronic renal failure 01/10/2018    Hypertension      Past Surgical History:   Procedure Laterality Date    DIALYSIS FISTULA CREATION Left 02/2018    HYSTERECTOMY      THROMBECTOMY OF HEMODIALYSIS ACCESS SITE Left 6/11/2018    Procedure: THROMBECTOMY, HEMODIALYSIS GRAFT OR FISTULA;  Surgeon: Yuriy Gibson MD;  Location: [facility] [place] CATH LAB;  Service: Nephrology;  Laterality: Left;     History reviewed. No pertinent family history.  Social History   Substance Use Topics    Smoking status: Never Smoker    Smokeless tobacco: Never Used    Alcohol use No       PTA Medications   Medication Sig    amLODIPine (NORVASC) 10 MG tablet Take 10 mg by mouth once daily.    calcium acetate (PHOSLO) 667 mg capsule Take 1,334 mg by mouth 3 (three) times daily with meals.    carvedilol (COREG) 6.25 MG tablet Take 6.25 mg by mouth 2 (two) times daily with meals.    cloNIDine 0.1 mg/24 hr td ptwk (CATAPRES) 0.1 mg/24 hr Place 1 patch onto the skin every 7 days. Every Sat    clopidogrel (PLAVIX) 75 mg tablet Take 75 mg by mouth once daily.    cyclobenzaprine (FLEXERIL) 10 MG tablet Take 10 mg by mouth 3 (three) times daily as needed for Muscle spasms.    docusate sodium (COLACE) 100 MG capsule Take 100 mg by mouth 2 (two) times daily as needed for Constipation.    gabapentin (NEURONTIN) 100 MG capsule Take 100 mg by mouth every evening.    hydrOXYzine pamoate (VISTARIL) 25 MG Cap Take 25 mg by mouth 3 (three) times daily.    linaclotide (LINZESS) 72  mcg Cap Take by mouth. Take one po qd before 1st meal of the day on empty stomach    vitamin renal formula, B-complex-vitamin c-folic acid, (VIRT-CAPS) 1 mg Cap Take 1 capsule by mouth once daily.    zolpidem (AMBIEN) 10 mg Tab Take 10 mg by mouth nightly as needed.    guaifenesin-codeine 100-10 mg/5 ml (TUSSI-ORGANIDIN NR)  mg/5 mL syrup Take 10 mLs by mouth every 6 (six) hours as needed for Cough.     Review of patient's allergies indicates:  No Known Allergies     Review of Systems   Constitutional: Negative.    Respiratory:        + orthopnea   Cardiovascular: Negative.        Objective:      Vital Signs (Most Recent)  Temp: 98.2 °F (36.8 °C) (06/13/18 0817)  Pulse: 86 (06/13/18 0817)  Resp: 18 (06/13/18 0817)  BP: (!) 165/97 (06/13/18 0817)  SpO2: 100 % (06/13/18 0817)    Vital Signs Range (Last 24H):  Temp:  [98.2 °F (36.8 °C)]   Pulse:  [86]   Resp:  [18]   BP: (165)/(97)   SpO2:  [100 %]     Physical Exam   Constitutional: She is oriented to person, place, and time. She appears well-developed and well-nourished. No distress.   HENT:   Head: Normocephalic.   Eyes: EOM are normal.   Neck: Neck supple.   Pulmonary/Chest: Effort normal. No respiratory distress.   Musculoskeletal:   LUE straight arm AVG with no thrill or bruit.  Two sutures still in place from thrombectomy on Monday.   Neurological: She is alert and oriented to person, place, and time.   Skin: Skin is warm and dry. She is not diaphoretic.   Psychiatric: She has a normal mood and affect. Her behavior is normal.       Assessment:      Active Hospital Problems    Diagnosis  POA    Mechanical complication of vascular device [T82.966W]  Yes      Resolved Hospital Problems    Diagnosis Date Resolved POA   No resolved problems to display.       Plan:      Declot today, risks explained, consent signed.

## 2018-06-13 NOTE — PROCEDURES
hospitals Interventional Nephrology Procedure Report    Date of Procedure:  06/13/2018 10:56 AM     :  Yuriy Gibson MD  Assistant: Irineo Devlin MD     Indication for Procedure:  Thrombosed LUE straight arm AVG      [facility] Provider[facility]  [facility]     Procedures Performed:  1.  Access cannulation  2.  Second cannulation  3.  Venous administration of TPA  4.  Venous angioplasty  5.  Percutaneous thrombectomy  6.  Arteriogram  7.  Moderate conscious sedation     Medications Administered:  1.  2 mg of tPA IV  2.  Heparin 5000 units IV  3.  Versed 2 mg IV  4.  Fentanyl 100 mcg IV     Blood Loss:  Approximately 20 mL     Contrast Used:  Approximately 42 mL     Procedure in Detail:    After informed consent was obtained, the patient was prepped and draped in the usual sterile fashion.  Her LUE straight arm AV graft was cannulated in the venous facing direction with a micropuncture set.  The microwire was confirmed to be in correct location under fluoroscopy and a 4-Gibraltarian microsheath was established.  A glidewire was advanced easily to the central circulation and the microsheath was removed.  A 6-Gibraltarian sheath was established.  Then, an over-the-wire Berenstein guiding catheter was advanced to the central circulation and a central venogram was performed, which revealed patent central vasculature.  After the patient was evaluated with the physician, moderate sedation was then administered.  A pullback venogram was performed, which revealed stenosis beginning at the outflow portion of the graft, near the venous anastomosis.  So, the wire was reinserted and the Berenstein catheter was removed.  Then, 2 mg of TPA was injected into the venous facing sheath with pressure was held at the arterial anastomosis.  Following this, a 7 x 40 mm conquest PTA balloon was advanced to the region just past the venous anastomosis and inflated to full effacement with waist seen on the balloon  prior to full effacement.  Angioplasty was repeated back within the graft to macerate the thrombus all the while holding pressure at the arterial anastomosis.       The balloon was then removed and a second cannulation was made in the arterial facing direction with the micropuncture set.  Again, the microwire was confirmed to be in the correct location under fluoroscopy.  Microsheath was established and a glidewire was advanced across the arterial anastomosis and down into the brachial artery and then the micro sheath was removed and a 6-Zambian sheath was established.  Then using an over-the-wire Obie catheter, we readvanced across the arterial anastomosis, inflated and a pullback maneuver was performed with significant thrombus aspirated through the arterial facing sheath.  This procedure was repeated approximately 1 time after which there was noted to be moderate pulsatility of the AV graft.  We then took the Obie catheter on the venous facing wire and swept towards the central circulation to sweep out any residual thrombus and there was even improved pulsatility flow felt up the AV graft.  At this point, we reevaluated the patient again as she was having some pain and an additional 1 mg of Versed and 50 mcg of fentanyl were administered.   So at this point, the Obie catheter was advanced across the arterial anastomosis and an arteriogram was performed.  At least 6 cm of brachial artery was seen passing down into the forearm and passed the bifurcation into the radial and ulnar arteries and no distal emboli were seen.  However, there was a likely stenosis (approximately 50%) at the arterial anastomosis, so we took a 5 x 40 mm ultraverse pta balloon and advanced it across the arterial anastomosis and inflated to full effacement with waist seen prior to full effacement.  A repeat reflux venogram showed improvement in the anastomosis stenosis with no extravasation.      The arterial facing guidewire and  sheath were then removed and hemostasis was achieved using a #2 Ethilon suture.  A repeat fistulogram was performed, which revealed residual stenosis at the venous anastomosis, so we decided to focus on this, as it was felt to be the culprit lesion that led to such rapid rethrombosis.  We took a 8 x 40 mm conquest pta balloon and advanced to the venous anastomosis, and inflated to full effacement, with again waist seen prior to full effacement.  Given the rapidity of recoil, we opted to use a 8 x 60 mm lutonix drug-coated balloon, advanced to the venous anastomosis, and inflated the balloon to full effacement with waist seen on the balloon prior to full effacement.  This was held fully effaced for 3 minutes, after which repeat angiogram following the angioplasty revealed improvement in the outflow stenosis and no extravasation of contrast seen.  We then administered 4000 units of IV heparin and hemostasis was achieved using a #2 Ethilon suture once the venous facing sheath was removed.        IMPRESSION AND PLAN:  This is a successful percutaneous thrombectomy using mechanical and pharmacologic thrombolysis.  We will have the patient return in 1 week for suture removal and a followup ultrasound.        Yuriy Gibson MD  [phone]

## 2018-06-13 NOTE — OR NURSING
Patient w/ hard and tender area on of fistula left upper arm. Dr. Gibson  Assessed patient states its OK. Gave patient prescription for percocet.

## 2018-06-18 ENCOUNTER — SURGERY (OUTPATIENT)
Age: 68
End: 2018-06-18

## 2018-06-20 ENCOUNTER — HOSPITAL ENCOUNTER (OUTPATIENT)
Dept: RADIOLOGY | Facility: HOSPITAL | Age: 68
Discharge: HOME | End: 2018-06-20
Attending: NURSE PRACTITIONER
Payer: MEDICARE

## 2018-06-20 ENCOUNTER — DOCUMENTATION ONLY (OUTPATIENT)
Dept: TRANSPLANT | Facility: CLINIC | Age: 68
End: 2018-06-20

## 2018-06-20 ENCOUNTER — OFFICE VISIT (OUTPATIENT)
Dept: TRANSPLANT | Facility: CLINIC | Age: 68
End: 2018-06-20
Payer: MEDICARE

## 2018-06-20 ENCOUNTER — OFFICE VISIT (OUTPATIENT)
Dept: CARDIOLOGY | Facility: CLINIC | Age: 68
End: 2018-06-20
Payer: MEDICARE

## 2018-06-20 VITALS
HEIGHT: 62 IN | HEART RATE: 100 BPM | WEIGHT: 184.94 LBS | DIASTOLIC BLOOD PRESSURE: 72 MMHG | SYSTOLIC BLOOD PRESSURE: 149 MMHG | BODY MASS INDEX: 34.03 KG/M2

## 2018-06-20 VITALS
SYSTOLIC BLOOD PRESSURE: 129 MMHG | DIASTOLIC BLOOD PRESSURE: 71 MMHG | HEART RATE: 92 BPM | RESPIRATION RATE: 18 BRPM | OXYGEN SATURATION: 99 % | WEIGHT: 181.88 LBS | TEMPERATURE: 98 F | BODY MASS INDEX: 32.23 KG/M2 | HEIGHT: 63 IN

## 2018-06-20 DIAGNOSIS — I12.0 BENIGN HYPERTENSION WITH ESRD (END-STAGE RENAL DISEASE): ICD-10-CM

## 2018-06-20 DIAGNOSIS — E66.9 CLASS 1 OBESITY WITH SERIOUS COMORBIDITY AND BODY MASS INDEX (BMI) OF 32.0 TO 32.9 IN ADULT, UNSPECIFIED OBESITY TYPE: ICD-10-CM

## 2018-06-20 DIAGNOSIS — Z99.2 ESRD ON HEMODIALYSIS: ICD-10-CM

## 2018-06-20 DIAGNOSIS — Z76.82 ORGAN TRANSPLANT CANDIDATE: ICD-10-CM

## 2018-06-20 DIAGNOSIS — N18.6 BENIGN HYPERTENSION WITH ESRD (END-STAGE RENAL DISEASE): ICD-10-CM

## 2018-06-20 DIAGNOSIS — Z79.01 CURRENT USE OF LONG TERM ANTICOAGULATION: ICD-10-CM

## 2018-06-20 DIAGNOSIS — Z01.818 PRE-TRANSPLANT EVALUATION FOR CHRONIC KIDNEY DISEASE: Primary | ICD-10-CM

## 2018-06-20 DIAGNOSIS — N18.6 ANEMIA IN ESRD (END-STAGE RENAL DISEASE): ICD-10-CM

## 2018-06-20 DIAGNOSIS — D63.1 ANEMIA IN ESRD (END-STAGE RENAL DISEASE): ICD-10-CM

## 2018-06-20 DIAGNOSIS — Z01.818 PREOPERATIVE CLEARANCE: ICD-10-CM

## 2018-06-20 DIAGNOSIS — N18.6 ESRD ON HEMODIALYSIS: ICD-10-CM

## 2018-06-20 PROCEDURE — 99999 PR PBB SHADOW E&M-EST. PATIENT-LVL IV: CPT | Mod: PBBFAC,TXP,, | Performed by: INTERNAL MEDICINE

## 2018-06-20 PROCEDURE — 99214 OFFICE O/P EST MOD 30 MIN: CPT | Mod: PBBFAC,TXP,25 | Performed by: INTERNAL MEDICINE

## 2018-06-20 PROCEDURE — 71046 X-RAY EXAM CHEST 2 VIEWS: CPT | Mod: 26,TXP,, | Performed by: RADIOLOGY

## 2018-06-20 PROCEDURE — 76770 US EXAM ABDO BACK WALL COMP: CPT | Mod: TC,TXP

## 2018-06-20 PROCEDURE — 99204 OFFICE O/P NEW MOD 45 MIN: CPT | Mod: S$PBB,TXP,, | Performed by: INTERNAL MEDICINE

## 2018-06-20 PROCEDURE — 99204 OFFICE O/P NEW MOD 45 MIN: CPT | Mod: S$PBB,TXP,, | Performed by: NURSE PRACTITIONER

## 2018-06-20 PROCEDURE — 99999 PR PBB SHADOW E&M-EST. PATIENT-LVL IV: CPT | Mod: PBBFAC,TXP,,

## 2018-06-20 PROCEDURE — 76770 US EXAM ABDO BACK WALL COMP: CPT | Mod: 26,TXP,, | Performed by: RADIOLOGY

## 2018-06-20 PROCEDURE — 99205 OFFICE O/P NEW HI 60 MIN: CPT | Mod: S$PBB,TXP,, | Performed by: NURSE PRACTITIONER

## 2018-06-20 PROCEDURE — 71046 X-RAY EXAM CHEST 2 VIEWS: CPT | Mod: TC,FY,TXP

## 2018-06-20 PROCEDURE — 99214 OFFICE O/P EST MOD 30 MIN: CPT | Mod: PBBFAC,25,27,TXP

## 2018-06-20 PROCEDURE — 72170 X-RAY EXAM OF PELVIS: CPT | Mod: 26,TXP,, | Performed by: RADIOLOGY

## 2018-06-20 PROCEDURE — 72170 X-RAY EXAM OF PELVIS: CPT | Mod: TC,FY,TXP

## 2018-06-20 RX ORDER — OXYCODONE AND ACETAMINOPHEN 10; 325 MG/1; MG/1
1 TABLET ORAL EVERY 6 HOURS PRN
Status: ON HOLD | COMMUNITY
End: 2018-09-06

## 2018-06-20 RX ORDER — CLONIDINE 0.1 MG/24H
1 PATCH, EXTENDED RELEASE TRANSDERMAL
Status: ON HOLD | COMMUNITY
End: 2019-04-15 | Stop reason: HOSPADM

## 2018-06-20 RX ORDER — AMLODIPINE AND VALSARTAN 10; 320 MG/1; MG/1
1 TABLET ORAL DAILY
COMMUNITY
End: 2019-03-26 | Stop reason: ALTCHOICE

## 2018-06-20 RX ORDER — PANTOPRAZOLE SODIUM 40 MG/1
40 TABLET, DELAYED RELEASE ORAL DAILY
COMMUNITY
End: 2019-01-11

## 2018-06-20 RX ORDER — OXYCODONE AND ACETAMINOPHEN 5; 325 MG/1; MG/1
TABLET ORAL
Refills: 0 | Status: ON HOLD | COMMUNITY
Start: 2018-06-14 | End: 2018-09-06

## 2018-06-20 NOTE — PROGRESS NOTES
INITIAL PATIENT EDUCATION NOTE    Ms. Paige Summers was seen in pre-kidney transplant clinic for evaluation for kidney, kidney/pancreas or pancreas only transplant.  The patient attended a group education session that discussed/reviewed the following aspects of transplantation: evaluation and selection committee process, UNOS waitlist management/multiple listings, types of organs offered (KDPI < 85%, KDPI > 85%, PHS increased risk, DCD), financial aspects, surgical procedures, dietary instruction pre- and post-transplant, health maintenance pre- and post-transplant, post-transplant hospitalization and outpatient follow-up, potential to participate in a research protocol, and medication management and side effects.  A question and answer session was provided after the presentation.    The patient was seen by all members of the multi-disciplinary team to include: Nephrologist/PA, Surgeon, , Transplant Coordinator, , Pharmacist and Dietician (if applicable).    The patient reviewed and signed all consents for evaluation which were witnessed and sent to scanning into the EPIC chart.    The patient was given an education book and plan for further evaluation based on her individual assessment.      The patient was encouraged to call with any questions or concerns.

## 2018-06-20 NOTE — LETTER
June 20, 2018      Celestino Fitzgerald MD  [address] [address]           [facility]  [address]  [facility] [address]  Phone: [phone]          Patient: Paige Summers   MR Number: [id number]   YOB: 1950   Date of Visit: 6/20/2018       Dear Dr. Celestino Fitzgerald:    Thank you for referring Paige Summers to me for evaluation. Attached you will find relevant portions of my assessment and plan of care.    If you have questions, please do not hesitate to call me. I look forward to following Paige Summers along with you.    Sincerely,    Domenico Boss MD    Enclosure  CC:  No Recipients    If you would like to receive this communication electronically, please contact [email or web address] or [phone] to request more information on [employer] access.    For providers and/or their staff who would like to refer a patient to [facility], please contact us through our one-stop-shop provider referral line, [facility] , at [phone].    If you feel you have received this communication in error or would no longer like to receive these types of communications, please e-mail [email or web address]

## 2018-06-20 NOTE — ASSESSMENT & PLAN NOTE
Patient has no hx of nor symptoms suggestive of myocardial ischemia, heart failure, arrhythmia and CVA. Pt is not diabetic. Functional capacity is above 4 METS.    The estimated risk for perioperative adverse cardiac outcome is low (0.9%)    Echo and Nuclear Stress Tests pending. I may modify my assessment if the results are positive.

## 2018-06-20 NOTE — PROGRESS NOTES
Cardiology Progress Note:    Patient ID:  Paige Summers is a 67 y.o. female who presents for preoperative evaluation prior to kidney transplant    History of Present Illness:  Patient has no hx of nor symptoms suggestive of myocardial ischemia, heart failure, arrhythmia and CVA.    Echo and Nuclear Stress Test ordered    Past Medical History Includes: hypertension since 1968; ESRD    Social History Includes:  No cigarettes and no alcohol     Full Medication List Includes:  Outpatient Encounter Prescriptions as of 6/20/2018   Medication Sig Dispense Refill    amlodipine-valsartan (EXFORGE)  mg per tablet Take 1 tablet by mouth once daily.      calcium acetate (PHOSLO) 667 mg capsule Take 1,334 mg by mouth 3 (three) times daily with meals.      carvedilol (COREG) 6.25 MG tablet Take 6.25 mg by mouth 2 (two) times daily with meals.      cloNIDine 0.1 mg/24 hr td ptwk (CATAPRES) 0.1 mg/24 hr Place 1 patch onto the skin every 7 days.      clopidogrel (PLAVIX) 75 mg tablet Take 75 mg by mouth every other day.       cyclobenzaprine (FLEXERIL) 10 MG tablet Take 10 mg by mouth 3 (three) times daily as needed for Muscle spasms.      docusate sodium (COLACE) 100 MG capsule Take 100 mg by mouth 2 (two) times daily as needed for Constipation.      gabapentin (NEURONTIN) 100 MG capsule Take 100 mg by mouth every evening.      hydrOXYzine pamoate (VISTARIL) 25 MG Cap Take 25 mg by mouth 3 (three) times daily.      linaclotide (LINZESS) 145 mcg Cap capsule Take 145 mcg by mouth once daily. Take one po qd before 1st meal of the day on empty stomach       oxyCODONE-acetaminophen (PERCOCET)  mg per tablet Take 1 tablet by mouth every 6 (six) hours as needed for Pain.      pantoprazole (PROTONIX) 40 MG tablet Take 40 mg by mouth once daily.      vitamin renal formula, B-complex-vitamin c-folic acid, (VIRT-CAPS) 1 mg Cap Take 1 capsule by mouth once daily.      zolpidem (AMBIEN) 10 mg Tab Take 10 mg by  mouth nightly as needed.      oxyCODONE-acetaminophen (PERCOCET) 5-325 mg per tablet TK 1 T PO Q 6 H PRN P  0    [DISCONTINUED] amLODIPine (NORVASC) 10 MG tablet Take 10 mg by mouth once daily.      [DISCONTINUED] cloNIDine 0.1 mg/24 hr td ptwk (CATAPRES) 0.1 mg/24 hr Place 1 patch onto the skin every 7 days. Every Sat      [DISCONTINUED] guaifenesin-codeine 100-10 mg/5 ml (TUSSI-ORGANIDIN NR)  mg/5 mL syrup Take 10 mLs by mouth every 6 (six) hours as needed for Cough.       No facility-administered encounter medications on file as of 6/20/2018.        Review of Systems:  Review of Systems   Constitution: Negative for decreased appetite, diaphoresis, fever, weakness, malaise/fatigue, weight gain and weight loss.   HENT: Negative for congestion, ear discharge, ear pain and nosebleeds.    Eyes: Negative for blurred vision, double vision and visual disturbance.   Cardiovascular: Negative for chest pain, claudication, cyanosis, dyspnea on exertion, irregular heartbeat, leg swelling, near-syncope, orthopnea, palpitations, paroxysmal nocturnal dyspnea and syncope.   Respiratory: Negative for cough, hemoptysis, shortness of breath, sleep disturbances due to breathing, snoring, sputum production and wheezing.    Endocrine: Negative for polydipsia, polyphagia and polyuria.   Hematologic/Lymphatic: Negative for adenopathy and bleeding problem. Does not bruise/bleed easily.   Skin: Negative for color change, nail changes, poor wound healing and rash.   Musculoskeletal: Negative for muscle cramps and muscle weakness.   Gastrointestinal: Negative for abdominal pain, anorexia, change in bowel habit, hematochezia, nausea and vomiting.   Genitourinary: Negative for dysuria, frequency, hematuria, menorrhagia and missed menses.   Neurological: Negative for brief paralysis, difficulty with concentration, excessive daytime sleepiness, dizziness, focal weakness, headaches, light-headedness, loss of balance, seizures and  "vertigo.   Psychiatric/Behavioral: Negative for altered mental status and depression.   Allergic/Immunologic: Negative for persistent infections.       Physical Exam:  BP (!) 149/72 (BP Location: Left arm, Patient Position: Sitting, BP Method: Large (Automatic))   Pulse 100   Ht 5' 2" (1.575 m)   Wt 83.9 kg (184 lb 15.5 oz)   BMI 33.83 kg/m²   Physical Exam   Cardiovascular:   Pulses:       Carotid pulses are 2+ on the right side, and 2+ on the left side.       Radial pulses are 2+ on the right side, and 1+ on the left side.        Dorsalis pedis pulses are 2+ on the right side, and 2+ on the left side.        Posterior tibial pulses are 2+ on the right side, and 2+ on the left side.        Blood Tests:  Lab Results   Component Value Date     06/20/2018    K 4.1 06/20/2018    CL 95 06/20/2018    CO2 34 (H) 06/20/2018    BUN 33 (H) 06/20/2018    CREATININE 7.1 (H) 06/20/2018    GLU 86 06/20/2018    AST 16 06/20/2018    ALT 9 (L) 06/20/2018    ALBUMIN 3.7 06/20/2018    PROT 7.8 06/20/2018    BILITOT 0.4 06/20/2018    WBC 9.53 06/20/2018    HGB 10.9 (L) 06/20/2018    HCT 34.2 (L) 06/20/2018    MCV 93 06/20/2018     06/20/2018    INR 1.0 06/20/2018       Lab Results   Component Value Date    CHOL 139 06/20/2018    HDL 44 06/20/2018    TRIG 77 06/20/2018       Lab Results   Component Value Date    LDLCALC 79.6 06/20/2018       Urine Tests:  No results found for: COLORU, APPEARANCEUA, PHUR, SPECGRAV, PROTEINUA, GLUCUA, KETONESU, BILIRUBINUA, OCCULTUA, NITRITE, UROBILINOGEN, LEUKOCYTESUR, PROTEINURINE, CREATRANDUR, UTPCR    I have reviewed the following:     Details / Date    []   Labs     []   Imaging     []   Cardiology Procedures     []   Other      Assessment and Plan:     1. Preoperative clearance         Preoperative clearance  Patient has no hx of nor symptoms suggestive of myocardial ischemia, heart failure, arrhythmia and CVA. Pt is not diabetic. Functional capacity is above 4 METS.    The " estimated risk for perioperative adverse cardiac outcome is low (0.9%)    Echo and Nuclear Stress Tests pending. I may modify my assessment if the results are positive.      No Follow-up on file.        Domenico Boss MD

## 2018-06-20 NOTE — PROGRESS NOTES
Transplant Surgery  Kidney Transplant Recipient Evaluation    Referring Physician: Jd Carias  Current Nephrologist: Jd Carias    Subjective:     Reason for Visit: evaluate transplant candidacy    History of Present Illness: Paige Summers is a 67 y.o. year old female undergoing transplant evaluation.    Dialysis History: Paige is on hemodialysis.      Transplant History: N/A    Etiology of Renal Disease: Hypertensive Nephrosclerosis (based on medical records from referral).    Review of Systems   Constitutional: Negative for activity change and appetite change.   Respiratory: Negative for chest tightness and shortness of breath.    Cardiovascular: Negative for leg swelling.   Gastrointestinal: Negative for abdominal distention.   All other systems reviewed and are negative.      Objective:     Physical Exam:  Constitutional:   Vitals reviewed: yes   Well-nourished and well-groomed: yes  Eyes:   Sclerae icteric: no   Extraocular movements intact: yes  GI:    Bowel sounds normal: yes   Tenderness: no    If yes, quadrant/location: not applicable   Palpable masses: no    If yes, quadrant/location: not applicable   Hepatosplenomegaly: no   Ascites: no   Hernia: no    If yes, type/location: not applicable   Surgical scars: yes    If yes, type/location: Pfannenstiel  Resp:   Effort normal: yes   Breath sounds normal: yes    CV:   Regular rate and rhythm: yes   Heart sounds normal: yes   Femoral pulses normal: yes   Extremities edematous: no  Skin:   Rashes or lesions present: no    If yes, describe:not applicable   Jaundice:: no    Musculoskeletal:   Gait normal: yes   Strength normal: yes  Psych:   Oriented to person, place, and time: yes   Affect and mood normal: yes    Additional comments: not applicable    Counseling: We provided Paige Summers with a group education session today.  We discussed kidney transplantation at length with her, including risks, potential complications, and alternatives in the  management of her renal failure.  The discussion included complications related to anesthesia, bleeding, infection, primary nonfunction, and ATN.  I discussed the typical postoperative course, length of hospitalization, the need for long-term immunosuppression, and the need for long-term routine follow-up.  I discussed living-donor and -donor transplantation and the relative advantages and disadvantages of each.  I also discussed average waiting times for both living donation and  donation.  I discussed national and center-specific survival rates.  I also mentioned the potential benefit of multicenter listing to candidates listed with centers within more than one organ procurement organization.  All questions were answered.    Final determination of transplant candidacy will be made once evaluation is complete and reviewed by the Kidney & Kidney/Pancreas Selection Committee.         Transplant Surgery - Candidacy   Assessment/Plan:   Paige Summers has end stage renal disease (ESRD) on dialysis. I see no surgical contraindication to placing a kidney transplant. Based on available information, Paige Summers is a suitable kidney transplant candidate.     Fan Gil MD

## 2018-06-20 NOTE — PROGRESS NOTES
Transplant Nephrology  Kidney Transplant Recipient Evaluation    Referring Physician: Jd Carias  Current Nephrologist: Jd Carias    Subjective:   CC:  Initial evaluation of kidney transplant candidacy.    HPI:  Ms. Summers is a 67 y.o. year old Black or  female who has presented to be evaluated as a potential kidney transplant recipient.  She has ESRD secondary to HTN.  Patient is currently on hemodialysis started on 2018. Patient is dialyzing on TTS schedule.  Patient reports that she is tolerating dialysis well..Dialyzes for 3 hours and 25 minutes.  She has a LUE AV fistula for dialysis access.     Previous Transplant: no    Past Medical and Surgical History: Ms. Summers  has a past medical history of Anemia; Anxiety; Chronic renal failure; Depression; Gout; H pylori ulcer; Hyperlipidemia; Hypertension; and Obesity.  She has a past surgical history that includes Hysterectomy; Dialysis fistula creation (Left, 2018); thrombectomy of hemodialysis access site (Left, 2018); thrombectomy of hemodialysis access site (Left, 2018); and diagnostic ultrasound (Left, 2018).    Past Social and Family History: Ms. Summers reports that she has never smoked. She has never used smokeless tobacco. She reports that she does not drink alcohol or use drugs. Her family history includes Heart disease in her brother; Heart failure in her brother and brother; Hypertension in her sister.    HTN since she was pregnant with her first child ~   A0    Plavix -on anticoagulation d/t clotting problems with the dialysis fistula  Reports having 6 procedures d/t clotting     Kidney BX--no    H. Pylori --completed antibiotic tx in 2018  currently taking  Protonix 40 mg QD    Reports getting numbness to her hands bilaterally and is taking gabapentin     Fx assessment  Does not exercise but can do all ADLs independently. She has no problems with house work.      Donors--may have a  "donor      Past Medical History:   Diagnosis Date    Anemia     Anxiety     Chronic renal failure 01/10/2018    Depression     Gout     H pylori ulcer     Hyperlipidemia     Hypertension     Obesity        Review of Systems   Constitutional: Positive for fatigue. Negative for activity change, appetite change, chills, fever and unexpected weight change.   HENT: Negative for congestion, facial swelling, postnasal drip, rhinorrhea, sinus pressure, sore throat and trouble swallowing.    Eyes: Positive for visual disturbance. Negative for pain and redness.   Respiratory: Negative for cough, chest tightness, shortness of breath and wheezing.    Cardiovascular: Positive for leg swelling. Negative for chest pain and palpitations.   Gastrointestinal: Positive for constipation. Negative for abdominal pain, diarrhea, nausea and vomiting.        GERD   Genitourinary: Positive for decreased urine volume. Negative for dysuria, flank pain and urgency.        Urinates ~ 3x per day , a small amount      Musculoskeletal: Positive for arthralgias. Negative for gait problem, neck pain and neck stiffness.        Bilateral knee   Skin: Negative for rash.   Allergic/Immunologic: Negative for environmental allergies, food allergies and immunocompromised state.   Neurological: Negative for dizziness, weakness, light-headedness and headaches.   Hematological: Bruises/bleeds easily.        Plavix   Psychiatric/Behavioral: Positive for sleep disturbance. Negative for agitation and confusion. The patient is not nervous/anxious.         Depression--was on Cymbalta , but is no longer on this med. Reports feeling good.   She takes hydroxyzine for anxiety at night.        Objective:   Blood pressure 129/71, pulse 92, temperature 98.1 °F (36.7 °C), temperature source Oral, resp. rate 18, height 5' 2.8" (1.595 m), weight 82.5 kg (181 lb 14.1 oz), SpO2 99 %.body mass index is 32.43 kg/m².    Physical Exam   Constitutional: She is oriented to " person, place, and time. She appears well-developed and well-nourished.   HENT:   Head: Normocephalic.   Mouth/Throat: Oropharynx is clear and moist. No oropharyngeal exudate.   Eyes: Conjunctivae and EOM are normal. Pupils are equal, round, and reactive to light. No scleral icterus.   Neck: Normal range of motion. Neck supple.   Cardiovascular: Normal rate, regular rhythm and normal heart sounds.    Pulmonary/Chest: Effort normal and breath sounds normal.   Abdominal: Soft. Normal appearance and bowel sounds are normal. She exhibits no distension and no mass. There is no splenomegaly or hepatomegaly. There is no tenderness. There is no rebound, no guarding, no CVA tenderness, no tenderness at McBurney's point and negative Smith's sign.   Musculoskeletal: Normal range of motion. She exhibits no edema.        Arms:  Lymphadenopathy:     She has no cervical adenopathy.   Neurological: She is alert and oriented to person, place, and time. She exhibits normal muscle tone. Coordination normal.   Skin: Skin is warm and dry.   Psychiatric: She has a normal mood and affect. Her behavior is normal.   Vitals reviewed.      Labs:  Lab Results   Component Value Date    WBC 9.53 06/20/2018    HGB 10.9 (L) 06/20/2018    HCT 34.2 (L) 06/20/2018     06/20/2018    K 4.1 06/20/2018    CL 95 06/20/2018    CO2 34 (H) 06/20/2018    BUN 33 (H) 06/20/2018    CREATININE 7.1 (H) 06/20/2018    EGFRNONAA 5.4 (A) 06/20/2018    CALCIUM 10.7 (H) 06/20/2018    PHOS 4.6 (H) 06/20/2018    ALBUMIN 3.7 06/20/2018    AST 16 06/20/2018    ALT 9 (L) 06/20/2018    .0 (H) 06/20/2018       No results found for: PREALBUMIN, BILIRUBINUA, GGT, AMYLASE, LIPASE, PROTEINUA, NITRITE, RBCUA, WBCUA    No results found for: HLAABCTYPE    Labs were reviewed with the patient.    Assessment:     1. Pre-transplant evaluation for chronic kidney disease    2. ESRD on hemodialysis    3. Benign hypertension with ESRD (end-stage renal disease)    4. Anemia in  ESRD (end-stage renal disease)    5. Current use of long term anticoagulation--plavix    6. Class 1 obesity with serious comorbidity and body mass index (BMI) of 32.0 to 32.9 in adult, unspecified obesity type        Plan:       Kidney allocation scheme was also discussed with the patient.  Patient was advised that the average wait time for a kidney in [place] is 3-5 years     Kidney donor profile index (KPDI) and estimated post-transplant survival scores (EPTS) were reviewed. The benefits and risks of accepting a kidney with KDPI > 85% were explained to the patient. Patient verbalized understanding and consented to accept a kidney with KDPI > 85%       The side effects of immunosuppressants were reviewed that include but are not limited to the risk of infection, the risks of cancer (skin and lymphoma being most common), the risk of diabetes associated with prednisone use, acceleration of heart disease and diarrhea( this being more common post transplant).     Reviewed the hospital stay.  Reviewed the post transplant follow up.  Reviewed the importance of maintaining a good weight.  Reviewed the importance of controlling BP.  Reviewed the importance of following the renal diet.      Transplant Candidacy:   Based on available information, Ms. Summers is a suitable kidney transplant candidate.  Final determination of transplant candidacy will be made once workup is complete and reviewed by the selection committee.    Tamanna Kemp, MAMADOU       UNOS Patient Status  Functional Status: 60% - Requires occasional assistance but is able to care for needs  Physical Capacity: No Limitations

## 2018-06-20 NOTE — PROGRESS NOTES
PHARM.D. PRE-TRANSPLANT NOTE:    This patient's medication therapy was evaluated as part of her pre-transplant evaluation.    The following pharmacologic concerns were noted: Patient currently on zolpidem, cyclobenzarine and oxycodone/apap for recent surgery for declotting. Patient also on clopidigrel for clotting of fistula.      Current Outpatient Prescriptions   Medication Sig Dispense Refill    amlodipine-valsartan (EXFORGE)  mg per tablet Take 1 tablet by mouth once daily.      calcium acetate (PHOSLO) 667 mg capsule Take 1,334 mg by mouth 3 (three) times daily with meals.      carvedilol (COREG) 6.25 MG tablet Take 6.25 mg by mouth 2 (two) times daily with meals.      clopidogrel (PLAVIX) 75 mg tablet Take 75 mg by mouth every other day.       cyclobenzaprine (FLEXERIL) 10 MG tablet Take 10 mg by mouth 3 (three) times daily as needed for Muscle spasms.      docusate sodium (COLACE) 100 MG capsule Take 100 mg by mouth 2 (two) times daily as needed for Constipation.      gabapentin (NEURONTIN) 100 MG capsule Take 100 mg by mouth every evening.      hydrOXYzine pamoate (VISTARIL) 25 MG Cap Take 25 mg by mouth 3 (three) times daily.      linaclotide (LINZESS) 145 mcg Cap capsule Take 145 mcg by mouth once daily. Take one po qd before 1st meal of the day on empty stomach       oxyCODONE-acetaminophen (PERCOCET)  mg per tablet Take 1 tablet by mouth every 6 (six) hours as needed for Pain.      vitamin renal formula, B-complex-vitamin c-folic acid, (VIRT-CAPS) 1 mg Cap Take 1 capsule by mouth once daily.      zolpidem (AMBIEN) 10 mg Tab Take 10 mg by mouth nightly as needed.       No current facility-administered medications for this visit.          Currently the patient is responsible for preparing / administering this patient's medications on a daily basis.  I am available for consultation and can be contacted, as needed by the other members of the Kidney Transplant team.

## 2018-06-20 NOTE — NURSING
Reviewed pt transplant labs.  Pt to continue to follow-up with dialysis unit dietitians recommendations.      Tamanna Junior MS RD LDN

## 2018-06-20 NOTE — PROGRESS NOTES
Pre Transplant Infectious Diseases Consult  Kidney Transplant Recipient Evaluation    Requesting Physician: Tamanna Kemp    Reason for Visit:  No chief complaint on file.    History of Present Illness  Paige Summers is a 67 y.o. year old Black or  female with advanced Kidney disease currently being evaluated for Kidney transplant.  Patient currently on HD.  Tolerating well, though makes her anxious.   Patient denies any recent fever, chills, or infective illnesses.    History of pneumonia in January.       1) Do you have a history of:   YES NO   Diabetes      [] [x]     Diabetic Foot Infection/Bone Infection  []        [x]     Surgical Removal of Spleen   []  [x]                  2) Have you had recurrent infections involving:             YES NO  Sinus infections  []         [x]   Sore Throat   []         [x]                 Prostate Infections  []         [x]              Bladder Infections  []         [x]                     Kidney Infections  []         [x]                               Intestinal Infections  []         [x]      Skin Infections   []         [x]       Reproductive Infections          []  [x]   Periodontal Disease  []         [x]  Top teeth pulled       3)Have you ever had: YES     NO UNKNOWN      Chicken Pox   []         []  [x]   Shingles   []         [x]  []   Orolabial Herpes             []  [x]  []   Genital Herpes  []         [x]  []   Cytomegalovirus  []         [x]  []   Yaniv-Barr Virus  []         [x]  []   Genital Warts   []         [x]  []   Hepatitis A   []         [x]  []   Hepatitis B   []         [x]  []   Hepatitis C   []         [x]  []   Syphilis   []         [x]  []   Gonorrhea   []         [x]  []   Pelvic Inflammatory   Disease   []         [x]  []   Chlamydia Infection  []         [x]  []   Intestinal parasites   or worms   []         [x]  []   Fungal Infections  []         [x]  []   Blood Infections  []         [x]  []       Comment:     Recently treated for  H pylori     4) Have you ever been exposed   YES NO  To someone with tuberculosis?  []   [x]   If yes, what treatment did you receive:     5) What states have you lived in? [place], [place]     6) What countries have you visited for more than 2 weeks?  No foreign travel                       YES NO  7) Did you have any associated infections?  []  []  N/a       8) Are you planning to travel outside the    []  [x]   [place] after your transplant?    9) Household                   YES NO  Do you have pets living in your house?    [x]         []   If yes, describe: fully vaccinated     Do you spend time or live on a farm or     []         [x]   have livestock or other farm animals?  If yes, which ones:    Do you have a fish tank?          []  [x]       Do you have a litter box?      []         [x]     Do you fish or hunt?       []         [x]     Do you clean or skin fish or animals?    []         [x]     Do you consume raw or undercooked    []         [x]   meat, fish, or shellfish?      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    [x]         []   13)Do you hike, camp, or spend     time in wooded areas?   []         [x]        14) The patient's immunization history was reviewed.    Have you ever received:  YES NO UNKNOWN DATES   Routine Childhood vaccines  [x]         []  []      Influenza vaccine   []  [x]  []    Pneumovax    [x]  []  [x] [facility] 2/2018    Tetanus-diptheria   [x]         []  [] 2005   Hepatitis A vaccine series       []  []  [x] Links shows one dose 2005   Hepatitis B vaccine series         [x]  []  [] started series in dialysis.    Meningitis vaccine   []         [x]  []    Varicella vaccine   []         [x]  []        Based on the patients immunization history and serologies, immunizations were ordered:         Ordered  Not  Ordered  Influenza Vaccine     []    [x]   Hepatitis A series at 0,  6 months   [x]    []   Hepatitis B seriesat 0, 1, and 6 months  []    [x]   Hepatitis B High Dose 0,1, and 6 months  []    [x]   Prevnar      []    [x]   Pneumovax      []    [x]    TDap       [x]    []    Zoster       []    [x]   Menactra      []    [x]            The patient was encouraged to contact us about any problems that may develop after immunization and possible side effects were reviewed.      Previous Transplant: no    Etiology of Kidney Disease: HTN    Allergies: Patient has no known allergies.    There is no immunization history on file for this patient.  Past Medical History:   Diagnosis Date    Chronic renal failure 01/10/2018    Hypertension      Past Surgical History:   Procedure Laterality Date    DIAGNOSTIC ULTRASOUND Left 6/18/2018    Procedure: ULTRASOUND, DIAGNOSTIC;  Surgeon: Yuriy Gibson MD;  Location: [facility] CATH LAB;  Service: Nephrology;  Laterality: Left;    DIALYSIS FISTULA CREATION Left 02/2018    HYSTERECTOMY      THROMBECTOMY OF HEMODIALYSIS ACCESS SITE Left 6/11/2018    Procedure: THROMBECTOMY, HEMODIALYSIS GRAFT OR FISTULA;  Surgeon: Yuriy Gibson MD;  Location: [facility] CATH LAB;  Service: Nephrology;  Laterality: Left;    THROMBECTOMY OF HEMODIALYSIS ACCESS SITE Left 6/13/2018    Procedure: THROMBECTOMY, HEMODIALYSIS GRAFT OR FISTULA;  Surgeon: Yuriy Gibson MD;  Location: [facility] CATH LAB;  Service: Nephrology;  Laterality: Left;      Social History     Social History    Marital status:      Spouse name: N/A    Number of children: N/A    Years of education: N/A     Occupational History    Not on file.     Social History Main Topics    Smoking status: Never Smoker    Smokeless tobacco: Never Used    Alcohol use No    Drug use: No    Sexual activity: Not on file     Other Topics Concern    Not on file     Social History Narrative    No narrative on file       Review of Systems   Constitution:  Positive for decreased appetite, weakness, malaise/fatigue and weight loss. Negative for chills, fever, night sweats and weight gain.   HENT: Negative for congestion, ear pain, hearing loss, hoarse voice, sore throat and tinnitus.    Eyes: Positive for redness and visual disturbance (reading glasses ). Negative for blurred vision.   Cardiovascular: Negative for chest pain, leg swelling and palpitations.   Respiratory: Negative for cough, hemoptysis, shortness of breath, sputum production and wheezing.    Endocrine: Negative for cold intolerance and heat intolerance.   Hematologic/Lymphatic: Negative for adenopathy. Bruises/bleeds easily.   Skin: Positive for dry skin and itching. Negative for rash and suspicious lesions.   Musculoskeletal: Positive for joint pain. Negative for back pain, myalgias and neck pain.   Gastrointestinal: Positive for abdominal pain (recently treated for H Pylori, stomach upset from iron? ) and constipation. Negative for diarrhea, heartburn, nausea and vomiting.   Genitourinary: Negative for dysuria, flank pain, frequency, hematuria, hesitancy and urgency.        Decreased urine volume      Neurological: Negative for dizziness, headaches, numbness (bilateral  hands since starting dialysis ) and paresthesias.   Psychiatric/Behavioral: Negative for depression and memory loss. The patient has insomnia and is nervous/anxious.    Allergic/Immunologic: Negative for persistent infections.     Physical Exam   Constitutional: She is oriented to person, place, and time. She appears well-developed and well-nourished. No distress.       HENT:   Head: Normocephalic and atraumatic.   Mouth/Throat: Uvula is midline, oropharynx is clear and moist and mucous membranes are normal. She does not have dentures. No oral lesions. Abnormal dentition. No dental abscesses, lacerations or dental caries.   All upper teeth removed.      Eyes: EOM are normal. Pupils are equal, round, and reactive to light. No scleral  icterus.   Cardiovascular: Normal rate, regular rhythm and normal heart sounds.  Exam reveals no gallop and no friction rub.    No murmur heard.  Pulmonary/Chest: Effort normal and breath sounds normal. No respiratory distress. She has no wheezes. She has no rales.   Abdominal: Soft. Bowel sounds are normal. She exhibits no distension and no mass. There is no hepatosplenomegaly. There is no tenderness. There is no rebound and no guarding.   Musculoskeletal: She exhibits no edema.   Lymphadenopathy:        Head (right side): No submental, no submandibular, no tonsillar, no preauricular, no posterior auricular and no occipital adenopathy present.        Head (left side): No submental, no submandibular, no tonsillar, no preauricular, no posterior auricular and no occipital adenopathy present.     She has no cervical adenopathy.     She has no axillary adenopathy.        Right: No inguinal and no supraclavicular adenopathy present.        Left: No inguinal and no supraclavicular adenopathy present.   Neurological: She is alert and oriented to person, place, and time.   Skin: Skin is warm, dry and intact. No rash noted.   Psychiatric: She has a normal mood and affect. Her behavior is normal.   Vitals reviewed.    Diagnostics: No results found for: RPR  No results found for: CMVANTIBODIE  No results found for: HIV1X2  No results found for: HTLVIIIANTIB  No results found for: HEPBSAG  No results found for: HEPBCAB  No results found for: HEPCAB  No results found for: TOXOIGG  No components found for: TOXOIGGINTER  No results found for: RLQ3CEN  No results found for: EYC0NKT  No results found for: VARICELLAZOS  No results found for: VARICELLAINT  No results found for: STRONGANTIGG  No results found for: EPSTEINBARRV  No results found for: HEPBSAB  No results found for: QUANTIFERON  No results found for: HEPAIGM  No results found for: PPD           Transplant Infectious Diseases - Candidacy    Assessment/Plan:     Transplant  Candidacy: Based on available information, there are no identified significant barriers to transplantation from an infectious disease standpoint pending acceptable serologies.        1.  Vaccines today: Tdap, Hepatitis A.  ? Pneumovax at [facility] on 2/2018.  Recommend Prevnar 2/2019.   Recommend Shingrix vaccine series when available. The patient was encouraged to contact us about any problems that may develop after immunization and possible side effects were reviewed.   2.  Quantiferon Gold is pending.  If positive, please consult ID. If  Indeterminate, please draw T spot.  If T spot positive, please consult ID.    3.  RPR, strongyloides, HIV pending.  If positive, please consult ID  4.  Varicella IgG pending.  Please consult ID for vaccine recommendations if negative.       Final determination of transplant candidacy will be made once evaluation is complete and reviewed by the Transplant Selection Committee.    Hermila Zazueta, APRN, ANP         Counseling:I discussed with Paige the risk for increased susceptibility to infections following transplantation including increased risk for infection right after transplant and if rejection should occur.  The patient has been counseled on the importance of vaccinations including but not limited to a yearly flu vaccine.  Specific guidance has been provided to the patient regarding the patients occupation, hobbies and activities to avoid future infectious complications including but not limited to avoiding undercooked meats and seafood, proper hygiene, and contact with animals.

## 2018-06-22 ENCOUNTER — CLINICAL SUPPORT (OUTPATIENT)
Dept: INFECTIOUS DISEASES | Facility: CLINIC | Age: 68
End: 2018-06-22
Payer: MEDICARE

## 2018-06-22 ENCOUNTER — TELEPHONE (OUTPATIENT)
Dept: TRANSPLANT | Facility: CLINIC | Age: 68
End: 2018-06-22

## 2018-06-22 PROCEDURE — 90715 TDAP VACCINE 7 YRS/> IM: CPT | Mod: PBBFAC,TXP

## 2018-06-22 PROCEDURE — 90471 IMMUNIZATION ADMIN: CPT | Mod: PBBFAC,TXP

## 2018-06-22 PROCEDURE — 90472 IMMUNIZATION ADMIN EACH ADD: CPT | Mod: PBBFAC,TXP

## 2018-06-22 NOTE — PROGRESS NOTES
Pt received the Hepatitis A and Tdap vaccinations. Pt tolerated the injections well. Pt left the unit in NAD.

## 2018-06-22 NOTE — PROGRESS NOTES
Transplant Recipient Adult Psychosocial Assessment    Paige Summers  [address] [address]    Telephone Information:   Mobile [phone]   Home  [phone] (home)  Work  There is no work phone number on file.  E-mail  No e-mail address on record    Sex: female  YOB: 1950  Age: 67 y.o.    Encounter Date: 2018  [place] Citizen: yes  Primary Language: English   Needed: no    Emergency Contact:  Name: Mayela Beckham  Relationship: daughter  Address: [address] [place] [address] [place]; [place] [address]; [phone]  Phone Numbers:  n/a (home), n/a (work), [phone] (mobile)    Family/Social Support:   Number of dependents/: none  Marital history:  twice;  from first ,  second; two adult dtrs living, two children .  A son was murdered at age 23 and eldest dtr  of an anurysm.    Other family dynamics: Pt's parents are .  She has one sister who lives in [place], [place].  Pt provides care to Ismael boogie, 6 y/o [phone].      Household Composition:  Pt's 7 y/o great grndtr lives with her:  Fidelbenignodalton JESSICA Beckham.      Do you and your caregivers have access to reliable transportation? yes  PRIMARY CAREGIVER: Lisa Burns will be primary caregiver, phone number [phone]. Lisa is a friend who lives in [place].  She plans to catch the first [employer] out of [place] when pt is called and can be in town within 24 hrs.  Lisa came from [place] to accompany pt to her appointments today.     provided in-depth information to patient and caregiver regarding pre- and post-transplant caregiver role.   strongly encourages patient and caregiver to have concrete plan regarding post-transplant care giving, including back-up caregiver(s) to ensure care giving needs are met as needed.    Patient and Caregiver states understanding all aspects of caregiver role/commitment and is able/willing/committed to  being caregiver to the fullest extent necessary.    Patient and Caregiver verbalizes understanding of the education provided today and caregiver responsibilities.         remains available. Patient and Caregiver agree to contact  in a timely manner if concerns arise.      Able to take time off work without financial concerns: yes.     Additional Significant Others who will Assist with Transplant:  Name: Avril Trinidad  Age: 55  City: [place]: [place]  Relationship: friend  Does person drive? yes    Name: Marlene Mir [phone]  Age: 69  City: [place] State: [place]  Relationship: /friend  Does person drive? yes     Name: Maxine Sun  [phone] c; [phone] w  Age: not provided  City: [facility] State: [place]  Relationship: neice  Does person drive? yes    Name: Paige Powell [phone]  Age: not provided  City: [place] State: [place]  Relationship: sister  Does person drive? yes    Damaris Powell [phone]  Lisbet Rouseer [phone]  Maddi Trinidad [phone]  Lilly Cowart [phone]  Brittneyarianna Carterry [phone]  Christy Lopez (grndt) [phone]    Living Will: yes  Healthcare Power of : yes  Advance Directives on file: <<no information> per medical record.  Verbally reviewed LW/HCPA information.   provided patient with copy of LW/HCPA documents and provided education on completion of forms.    Living Donors: No. Education and resource information given to patient.    Highest Education Level: High School (9-12) or GED  Reading Ability: 12th grade  Reports difficulty with: N/A wears glasses  Learns Best By:  multisensory     Status: no  VA Benefits: no     Working for Income: No  If no, reason not working: Disability  Patient is disabled.  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    Spouse/Significant Other Employment: friend Lisa is retired    Disabled: yes: date disability began: 2008, due to:  Arthritis, HTN and gout.    Monthly Income:   Disability: $1083.00  Able to afford all costs now and if transplanted, including medications: yes  Patient and Caregiver verbalizes understanding of personal responsibilities related to transplant costs and the importance of having a financial plan to ensure that patients transplant costs are fully covered.       provided fundraising information/education. Patient and Caregiververbalizes understanding.   remains available.    Insurance:   Payor/Plan Subscr  Sex Relation Sub. Ins. ID Effective Group Num   1. [place] - [place]* BECKY DORSEY* 1950 Female  [id number]                                    [address]     Primary Insurance (for UNOS reporting): Public Insurance - Medicare FFS (Fee For Service)  Secondary Insurance (for UNOS reporting): None; encouraged pt to consider supplemental insurance  Patient and Caregiver verbalizes clear understanding that patient may experience difficulty obtaining and/or be denied insurance coverage post-surgery. This includes and is not limited to disability insurance, life insurance, health insurance, burial insurance, long term care insurance, and other insurances.      Patient and Caregiver also reports understanding that future health concerns related to or unrelated to transplantation may not be covered by patient's insurance.  Resources and information provided and reviewed.     Patient and Caregiver provides verbal permission to release any necessary information to outside resources for patient care and discharge planning.  Resources and information provided are reviewed.      Dialysis Adherence: Patient reports good compliance with dialysis.  Dialysis compliance report request letter sent to unit.     Infusion Service: patient utilizing? no  Home Health: patient utilizing? yes  Nurse weekly for vitals and check in  DME: yes walker, cane and bp cuff  Pulmonary/Cardiac Rehab: none    ADLS:  Pt states ability to independently accomplish all adls.    Adherence:   Pt states current and expected compliance with all healthcare recommendations..  Adherence education and counseling provided.     Per History Section:  Past Medical History:   Diagnosis Date    Anemia     Anxiety     Chronic renal failure 01/10/2018    Depression     GERD (gastroesophageal reflux disease)     Gout     H pylori ulcer     Hyperlipidemia     Hypertension     Obesity      Social History   Substance Use Topics    Smoking status: Never Smoker    Smokeless tobacco: Never Used    Alcohol use No     History   Drug Use No     History   Sexual Activity    Sexual activity: Not on file       Per Today's Psychosocial:  Tobacco: none, patient denies any use.  Alcohol: none, patient denies any use.  Illicit Drugs/Non-prescribed Medications: none, patient denies any use.    Patient and Caregiver states clear understanding of the potential impact of substance use as it relates to transplant candidacy and is aware of possible random substance screening.  Substance abstinence/cessation counseling, education and resources provided and reviewed.     Arrests/DWI/Treatment/Rehab: patient denies    Psychiatric History:    Mental Health: pt denies mental health concerns  Psychiatrist/Counselor: none  Medications:  none  Suicide/Homicide Issues: Pt denies current or past suicidal/homicidal ideation.   Safety at home: Pt denies any home safety concerns.    Knowledge: Patient and Caregiver states having clear understanding and realistic expectations regarding the potential risks and potential benefits of organ transplantation and organ donation and agrees to discuss with health care team members and support system members, as well as to utilize available resources and express questions and/or concerns in order to further facilitate the pt informed decision-making.  Resources and information provided and reviewed.    Patient and  Caregiver is aware of [facility]'s affiliation and/or partnership with agencies in home health care, LTAC, SNF, [facility], and other hospitals and clinics.    Understanding: Patient and Caregiver reports having a clear understanding of the many lifetime commitments involved with being a transplant recipient, including costs, compliance, medications, lab work, procedures, appointments, concrete and financial planning, preparedness, timely and appropriate communication of concerns, abstinence (ETOH, tobacco, illicit non-prescribed drugs), adherence to all health care team recommendations, support system and caregiver involvement, appropriate and timely resource utilization and follow-through, mental health counseling as needed/recommended, and patient and caregiver responsibilities.  Social Service Handbook, resources and detailed educational information provided and reviewed.  Educational information provided.    Patient and Caregiver also reports current and expected compliance with health care regime and states having a clear understanding of the importance of compliance.      Patient and Caregiver reports a clear understanding that risks and benefits may be involved with organ transplantation and with organ donation.       Patient and Caregiver also reports clear understanding that psychosocial risk factors may affect patient, and include but are not limited to feelings of depression, generalized anxiety, anxiety regarding dependence on others, post traumatic stress disorder, feelings of guilt and other emotional and/or mental concerns, and/or exacerbation of existing mental health concerns.  Detailed resources provided and discussed.      Patient and Caregiver agrees to access appropriate resources in a timely manner as needed and/or as recommended, and to communicate concerns appropriately.  Patient and Caregiver also reports a clear understanding of treatment options available.     Patient and Caregiver received  education in a group setting.   reviewed education, provided additional information, and answered questions.    Feelings or Concerns: Pt stated her main concern is financial.    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  She prays.    Goals: Get off dialysis.  Patient referred to Vocational Rehabilitation.    Interview Behavior: Patient and Caregiver presents as alert and oriented x 4, pleasant, good eye contact, well groomed, recall good, concentration/judgement good, average intelligence, calm, communicative, cooperative and asking and answering questions appropriately. She was accompanied by her friend, Lisa who presented as supportive of pt's pursuit of transplant.       Transplant Social Work - Candidacy  Assessment/Plan:     Psychosocial Suitability: Patient presents as a suitable candidate for kidney transplant at this time. Based on psychosocial risk factors, patient presents as medium risk, due to concerns about finances, lack of Medicare supplement.    Recommendations/Additional Comments: strongly encouraged fundraising.  Pt lives locally so will return to her own home post transplant.     Ksenia Stephens LCSW

## 2018-06-22 NOTE — TELEPHONE ENCOUNTER
Compliance check:  Dialysis unit reports in the past three months pt has had 1 no shows (access issue), 0 AMAs , labs , no concerns, transportation no concerns and family support no concerns.    Reported by Bianka, via fax back sheet

## 2018-07-03 ENCOUNTER — TELEPHONE (OUTPATIENT)
Dept: TRANSPLANT | Facility: CLINIC | Age: 68
End: 2018-07-03

## 2018-07-03 NOTE — TELEPHONE ENCOUNTER
Nutritional assessment from dialysis unit received and reviewed--no nutritional changes to plan needed at this time.  Pt to continue to follow-up with renal dietitians recommendations.      Tamanna Junior MS RD LDN

## 2018-07-09 ENCOUNTER — SURGERY (OUTPATIENT)
Age: 68
End: 2018-07-09

## 2018-07-11 ENCOUNTER — CLINICAL SUPPORT (OUTPATIENT)
Dept: CARDIOLOGY | Facility: CLINIC | Age: 68
End: 2018-07-11
Attending: NURSE PRACTITIONER
Payer: MEDICARE

## 2018-07-11 ENCOUNTER — HOSPITAL ENCOUNTER (OUTPATIENT)
Dept: CARDIOLOGY | Facility: CLINIC | Age: 68
Discharge: HOME | End: 2018-07-11
Attending: NURSE PRACTITIONER
Payer: MEDICARE

## 2018-07-11 DIAGNOSIS — I51.7 CARDIOMEGALY: ICD-10-CM

## 2018-07-11 DIAGNOSIS — Z76.82 ORGAN TRANSPLANT CANDIDATE: ICD-10-CM

## 2018-07-11 LAB
DIASTOLIC DYSFUNCTION: NO
ESTIMATED PA SYSTOLIC PRESSURE: 15.25
RETIRED EF AND QEF - SEE NOTES: 60 (ref 55–65)
TRICUSPID VALVE REGURGITATION: NORMAL

## 2018-07-11 PROCEDURE — 93017 CV STRESS TEST TRACING ONLY: CPT | Mod: PBBFAC,TXP | Performed by: INTERNAL MEDICINE

## 2018-07-11 PROCEDURE — 93018 CV STRESS TEST I&R ONLY: CPT | Mod: S$PBB,TXP,, | Performed by: INTERNAL MEDICINE

## 2018-07-11 PROCEDURE — 78452 HT MUSCLE IMAGE SPECT MULT: CPT | Mod: 26,S$PBB,TXP, | Performed by: INTERNAL MEDICINE

## 2018-07-11 PROCEDURE — 93306 TTE W/DOPPLER COMPLETE: CPT | Mod: PBBFAC,TXP | Performed by: INTERNAL MEDICINE

## 2018-07-11 PROCEDURE — 93016 CV STRESS TEST SUPVJ ONLY: CPT | Mod: S$PBB,TXP,, | Performed by: INTERNAL MEDICINE

## 2018-07-16 DIAGNOSIS — Z01.818 PRE-TRANSPLANT EVALUATION FOR KIDNEY TRANSPLANT: Primary | ICD-10-CM

## 2018-07-16 DIAGNOSIS — N28.89 RENAL MASS: ICD-10-CM

## 2018-07-17 DIAGNOSIS — Z76.82 ORGAN TRANSPLANT CANDIDATE: Primary | ICD-10-CM

## 2018-07-20 ENCOUNTER — LAB VISIT (OUTPATIENT)
Dept: LAB | Facility: HOSPITAL | Age: 68
End: 2018-07-20
Payer: MEDICARE

## 2018-07-20 DIAGNOSIS — Z76.82 ORGAN TRANSPLANT CANDIDATE: Primary | ICD-10-CM

## 2018-07-20 DIAGNOSIS — Z76.82 ORGAN TRANSPLANT CANDIDATE: ICD-10-CM

## 2018-07-20 LAB
B CELL RESULTS - XM ALLO: NEGATIVE
B MCS AVERAGE - XM ALLO: 33.5
DONOR MRN: NORMAL
FXMAL TESTING DATE: NORMAL
HLA FLOW CROSSMATCH (ALLO) INTERPRETATION: NORMAL
SERUM COLLECTION DT - XM ALLO: NORMAL
T CELL RESULTS - XM ALLO: NEGATIVE
T MCS AVERAGE - XM ALLO: 15

## 2018-07-20 PROCEDURE — 86825 HLA X-MATH NON-CYTOTOXIC: CPT | Mod: 91,PO,TXP

## 2018-07-20 PROCEDURE — 86825 HLA X-MATH NON-CYTOTOXIC: CPT | Mod: PO,TXP

## 2018-08-06 ENCOUNTER — SURGERY (OUTPATIENT)
Age: 68
End: 2018-08-06

## 2018-08-08 ENCOUNTER — HOSPITAL ENCOUNTER (OUTPATIENT)
Dept: RADIOLOGY | Facility: HOSPITAL | Age: 68
Discharge: HOME | End: 2018-08-08
Attending: NURSE PRACTITIONER
Payer: MEDICARE

## 2018-08-08 ENCOUNTER — OFFICE VISIT (OUTPATIENT)
Dept: OBSTETRICS AND GYNECOLOGY | Facility: CLINIC | Age: 68
End: 2018-08-08
Payer: MEDICARE

## 2018-08-08 VITALS
HEIGHT: 62 IN | BODY MASS INDEX: 33.07 KG/M2 | DIASTOLIC BLOOD PRESSURE: 80 MMHG | WEIGHT: 179.69 LBS | SYSTOLIC BLOOD PRESSURE: 150 MMHG

## 2018-08-08 DIAGNOSIS — Z01.419 ENCOUNTER FOR GYNECOLOGICAL EXAMINATION WITHOUT ABNORMAL FINDING: Primary | ICD-10-CM

## 2018-08-08 DIAGNOSIS — Z76.82 ORGAN TRANSPLANT CANDIDATE: ICD-10-CM

## 2018-08-08 DIAGNOSIS — N28.89 RENAL MASS: ICD-10-CM

## 2018-08-08 DIAGNOSIS — Z01.818 PRE-TRANSPLANT EVALUATION FOR KIDNEY TRANSPLANT: ICD-10-CM

## 2018-08-08 DIAGNOSIS — Z12.31 SCREENING MAMMOGRAM, ENCOUNTER FOR: ICD-10-CM

## 2018-08-08 PROCEDURE — G0101 CA SCREEN;PELVIC/BREAST EXAM: HCPCS | Mod: S$PBB,,, | Performed by: OBSTETRICS & GYNECOLOGY

## 2018-08-08 PROCEDURE — 99214 OFFICE O/P EST MOD 30 MIN: CPT | Mod: PBBFAC,25,TXP | Performed by: OBSTETRICS & GYNECOLOGY

## 2018-08-08 PROCEDURE — 74178 CT ABD&PLV WO CNTR FLWD CNTR: CPT | Mod: TC,TXP

## 2018-08-08 PROCEDURE — 77067 SCR MAMMO BI INCL CAD: CPT | Mod: 26,TXP,, | Performed by: RADIOLOGY

## 2018-08-08 PROCEDURE — G0101 CA SCREEN;PELVIC/BREAST EXAM: HCPCS | Mod: PBBFAC,TXP | Performed by: OBSTETRICS & GYNECOLOGY

## 2018-08-08 PROCEDURE — 74178 CT ABD&PLV WO CNTR FLWD CNTR: CPT | Mod: 26,TXP,, | Performed by: RADIOLOGY

## 2018-08-08 PROCEDURE — 25500020 PHARM REV CODE 255: Mod: TXP | Performed by: NURSE PRACTITIONER

## 2018-08-08 PROCEDURE — 77067 SCR MAMMO BI INCL CAD: CPT | Mod: TC,TXP

## 2018-08-08 PROCEDURE — 99999 PR PBB SHADOW E&M-EST. PATIENT-LVL IV: CPT | Mod: PBBFAC,,, | Performed by: OBSTETRICS & GYNECOLOGY

## 2018-08-08 RX ORDER — ASPIRIN 325 MG
TABLET, DELAYED RELEASE (ENTERIC COATED) ORAL
Refills: 4 | Status: ON HOLD | COMMUNITY
Start: 2018-07-13 | End: 2018-09-06

## 2018-08-08 RX ORDER — DICLOFENAC SODIUM 10 MG/G
GEL TOPICAL
Refills: 3 | Status: ON HOLD | COMMUNITY
Start: 2018-07-10 | End: 2019-04-15 | Stop reason: HOSPADM

## 2018-08-08 RX ORDER — METRONIDAZOLE 500 MG/1
TABLET ORAL
Refills: 0 | Status: ON HOLD | COMMUNITY
Start: 2018-07-26 | End: 2018-12-13 | Stop reason: CLARIF

## 2018-08-08 RX ORDER — FLUCONAZOLE 150 MG/1
TABLET ORAL
Refills: 2 | Status: ON HOLD | COMMUNITY
Start: 2018-07-13 | End: 2018-12-13 | Stop reason: CLARIF

## 2018-08-08 RX ORDER — CIPROFLOXACIN 500 MG/1
TABLET ORAL
Refills: 0 | Status: ON HOLD | COMMUNITY
Start: 2018-07-26 | End: 2018-09-06 | Stop reason: ALTCHOICE

## 2018-08-08 RX ADMIN — IOHEXOL 100 ML: 350 INJECTION, SOLUTION INTRAVENOUS at 01:08

## 2018-08-08 NOTE — LETTER
August 8, 2018      Mickie Love MD  [address] [facility] [address]  [facility] [address]           [facility] 5th Floor  [address]  [facility] [address]  Phone: [phone]          Patient: Paige Summers   MR Number: [id number]   YOB: 1950   Date of Visit: 8/8/2018       Dear Dr. Mickie Love:    Thank you for referring Paige Summers to me for evaluation. Attached you will find relevant portions of my assessment and plan of care.    If you have questions, please do not hesitate to call me. I look forward to following Paige Summers along with you.    Sincerely,    Sophy Nuñez MD    Enclosure  CC:  No Recipients    If you would like to receive this communication electronically, please contact [email or web address] or [phone] to request more information on [employer] access.    For providers and/or their staff who would like to refer a patient to [facility], please contact us through our one-stop-shop provider referral line, [facility], at [phone].    If you feel you have received this communication in error or would no longer like to receive these types of communications, please e-mail [email or web address]

## 2018-09-06 ENCOUNTER — HOSPITAL ENCOUNTER (OUTPATIENT)
Facility: OTHER | Age: 68
Discharge: HOME | End: 2018-09-06
Attending: INTERNAL MEDICINE | Admitting: INTERNAL MEDICINE
Payer: MEDICARE

## 2018-09-06 VITALS
DIASTOLIC BLOOD PRESSURE: 71 MMHG | OXYGEN SATURATION: 96 % | HEART RATE: 85 BPM | BODY MASS INDEX: 33.26 KG/M2 | WEIGHT: 180.75 LBS | RESPIRATION RATE: 16 BRPM | HEIGHT: 62 IN | TEMPERATURE: 99 F | SYSTOLIC BLOOD PRESSURE: 142 MMHG

## 2018-09-06 DIAGNOSIS — T82.868A THROMBOSIS OF KIDNEY DIALYSIS ARTERIOVENOUS GRAFT, INITIAL ENCOUNTER: Primary | ICD-10-CM

## 2018-09-06 DIAGNOSIS — T82.599A MECHANICAL COMPLICATION OF VASCULAR DEVICE: ICD-10-CM

## 2018-09-06 PROCEDURE — 63600175 PHARM REV CODE 636 W HCPCS: Mod: NTX

## 2018-09-06 PROCEDURE — 36904 THRMBC/NFS DIALYSIS CIRCUIT: CPT | Performed by: INTERNAL MEDICINE

## 2018-09-06 PROCEDURE — 63600175 PHARM REV CODE 636 W HCPCS: Mod: JG,TXP | Performed by: INTERNAL MEDICINE

## 2018-09-06 PROCEDURE — 25000003 PHARM REV CODE 250: Mod: TXP

## 2018-09-06 PROCEDURE — C1757 CATH, THROMBECTOMY/EMBOLECT: HCPCS | Mod: NTX | Performed by: INTERNAL MEDICINE

## 2018-09-06 PROCEDURE — C1725 CATH, TRANSLUMIN NON-LASER: HCPCS | Mod: TXP | Performed by: INTERNAL MEDICINE

## 2018-09-06 PROCEDURE — 25500020 PHARM REV CODE 255: Mod: TXP

## 2018-09-06 PROCEDURE — 36905 THRMBC/NFS DIALYSIS CIRCUIT: CPT | Mod: TXP | Performed by: INTERNAL MEDICINE

## 2018-09-06 PROCEDURE — 27201059 HC S ENCORE INFLATION DEVICE: Mod: TXP | Performed by: INTERNAL MEDICINE

## 2018-09-06 PROCEDURE — C1894 INTRO/SHEATH, NON-LASER: HCPCS | Mod: NTX | Performed by: INTERNAL MEDICINE

## 2018-09-06 PROCEDURE — 99152 MOD SED SAME PHYS/QHP 5/>YRS: CPT | Mod: NTX | Performed by: INTERNAL MEDICINE

## 2018-09-06 PROCEDURE — 27201224 HC CATH ANGIOGRAM: Mod: NTX | Performed by: INTERNAL MEDICINE

## 2018-09-06 PROCEDURE — 25000003 PHARM REV CODE 250: Mod: NTX | Performed by: INTERNAL MEDICINE

## 2018-09-06 PROCEDURE — C1769 GUIDE WIRE: HCPCS | Mod: TXP | Performed by: INTERNAL MEDICINE

## 2018-09-06 RX ORDER — FENTANYL CITRATE 50 UG/ML
INJECTION, SOLUTION INTRAMUSCULAR; INTRAVENOUS
Status: DISCONTINUED | OUTPATIENT
Start: 2018-09-06 | End: 2018-09-06 | Stop reason: HOSPADM

## 2018-09-06 RX ORDER — HEPARIN SOD,PORCINE/0.9 % NACL 1000/500ML
INTRAVENOUS SOLUTION INTRAVENOUS
Status: DISCONTINUED | OUTPATIENT
Start: 2018-09-06 | End: 2018-09-06 | Stop reason: HOSPADM

## 2018-09-06 RX ORDER — MIDAZOLAM HYDROCHLORIDE 1 MG/ML
INJECTION, SOLUTION INTRAMUSCULAR; INTRAVENOUS
Status: DISCONTINUED | OUTPATIENT
Start: 2018-09-06 | End: 2018-09-06 | Stop reason: HOSPADM

## 2018-09-06 RX ORDER — LIDOCAINE HYDROCHLORIDE 10 MG/ML
1 INJECTION, SOLUTION EPIDURAL; INFILTRATION; INTRACAUDAL; PERINEURAL ONCE
Status: COMPLETED | OUTPATIENT
Start: 2018-09-06 | End: 2018-09-06

## 2018-09-06 RX ORDER — HEPARIN SODIUM 1000 [USP'U]/ML
INJECTION, SOLUTION INTRAVENOUS; SUBCUTANEOUS
Status: DISCONTINUED | OUTPATIENT
Start: 2018-09-06 | End: 2018-09-06 | Stop reason: HOSPADM

## 2018-09-06 NOTE — H&P
[facility]  History & Physical    Subjective:      Chief Complaint/Reason for Admission: Here for declot    Paige Summers is a 67 y.o. female with ESRD (TTS at [facility] with Dr. Carias) who presents today for thrombectomy after she showed up to dialysis earlier today with no thrill.  Of note, she had 2 thrombectomies done back to back in June, but had had no recent issues with her access, as far as she knows.    Past Medical History:   Diagnosis Date    Anemia     Anxiety     Chronic renal failure 01/10/2018    Depression     GERD (gastroesophageal reflux disease)     Gout     H pylori ulcer     Hyperlipidemia     Hypertension     Obesity      Past Surgical History:   Procedure Laterality Date    DIALYSIS FISTULA CREATION Left 02/2018    HYSTERECTOMY  1971    TVH     Family History   Problem Relation Age of Onset    Alcohol abuse Mother     No Known Problems Father     Hypertension Sister     Arthritis Sister     Heart disease Brother     Heart failure Brother     Heart failure Brother     Diabetes Cousin     Breast cancer Neg Hx     Colon cancer Neg Hx     Ovarian cancer Neg Hx      Social History     Tobacco Use    Smoking status: Never Smoker    Smokeless tobacco: Never Used   Substance Use Topics    Alcohol use: No    Drug use: No       PTA Medications   Medication Sig    amlodipine-valsartan (EXFORGE)  mg per tablet Take 1 tablet by mouth once daily.    calcium acetate (PHOSLO) 667 mg capsule Take 1,334 mg by mouth 3 (three) times daily with meals.    carvedilol (COREG) 6.25 MG tablet Take 6.25 mg by mouth 2 (two) times daily with meals.    clopidogrel (PLAVIX) 75 mg tablet Take 75 mg by mouth every other day.     cyclobenzaprine (FLEXERIL) 10 MG tablet Take 10 mg by mouth 3 (three) times daily as needed for Muscle spasms.    docusate sodium (COLACE) 100 MG capsule Take 100 mg by mouth 2 (two) times daily as needed for Constipation.     hydrOXYzine pamoate (VISTARIL) 25 MG Cap Take 25 mg by mouth 3 (three) times daily.    linaclotide (LINZESS) 145 mcg Cap capsule Take 145 mcg by mouth once daily. Take one po qd before 1st meal of the day on empty stomach     pantoprazole (PROTONIX) 40 MG tablet Take 40 mg by mouth once daily.    VOLTAREN 1 % Gel APPLY 2 GRAMS TO AFFECTED AREA QID    zolpidem (AMBIEN) 10 mg Tab Take 10 mg by mouth nightly as needed.    cloNIDine 0.1 mg/24 hr td ptwk (CATAPRES) 0.1 mg/24 hr Place 1 patch onto the skin every 7 days.    fluconazole (DIFLUCAN) 150 MG Tab     metroNIDAZOLE (FLAGYL) 500 MG tablet TK 1 T PO  BID     Review of patient's allergies indicates:  No Known Allergies     Review of Systems   Constitutional: Negative.    Respiratory: Negative.    Cardiovascular: Negative.        Objective:      Vital Signs (Most Recent)  Temp: 98.4 °F (36.9 °C) (09/06/18 1135)  Pulse: 91 (09/06/18 1135)  Resp: 16 (09/06/18 1135)  BP: (!) 155/103 (09/06/18 1135)  SpO2: 95 % (09/06/18 1135)    Vital Signs Range (Last 24H):  Temp:  [98.4 °F (36.9 °C)]   Pulse:  [91]   Resp:  [16]   BP: (155)/(103)   SpO2:  [95 %]     Physical Exam   Constitutional: She is oriented to person, place, and time. She appears well-developed and well-nourished. No distress.   HENT:   Head: Normocephalic and atraumatic.   Eyes: EOM are normal.   Neck: Neck supple.   Pulmonary/Chest: Effort normal. No respiratory distress.   Musculoskeletal:   LUE straight arm AVG with no thrill.   Neurological: She is alert and oriented to person, place, and time.   Skin: Skin is warm and dry. She is not diaphoretic.   Psychiatric: She has a normal mood and affect. Her behavior is normal.       Assessment:      Active Hospital Problems    Diagnosis  POA    Mechanical complication of vascular device [T82.229Q]  Yes      Resolved Hospital Problems   No resolved problems to display.       Plan:      Declot today.  Risks explained, consent signed.

## 2018-09-06 NOTE — PLAN OF CARE
Patient prefers to have grand daughter Christy [phone] or grandson Rehan Trinidad [phone] present for discharge teaching.    Patient states that she drove herself here but will not have an issue finding transportation home.

## 2018-09-06 NOTE — PROCEDURES
[facility] Interventional Nephrology Procedure Report    Date of Procedure:  09/06/2018 1:35 PM     :  Yuriy Gibson MD  Assistant: none     Indication for Procedure:  Thrombosed LUE straight arm AVG      Referring Provider:  [facility] and Dr. Carias     Procedures Performed:  1.  Access cannulation  2.  Second cannulation  3.  Venous administration of TPA  4.  Venous angioplasty  5.  Percutaneous thrombectomy  6.  Arteriogram  7.  Moderate conscious sedation     Medications Administered:  1.  2 mg of tPA IV  2.  Heparin 5000 units IV  3.  Versed 1 mg IV  4.  Fentanyl 50 mcg IV     Blood Loss:  Approximately 15 mL     Contrast Used:  Approximately 24 mL     Procedure in Detail:    After informed consent was obtained, the patient was prepped and draped in the usual sterile fashion.  Her LUE straight arm AV graft was cannulated in the venous facing direction with a micropuncture set.  The microwire was confirmed to be in correct location under fluoroscopy and a 4-Cypriot microsheath was established.  A glidewire was advanced easily to the central circulation and the microsheath was removed.  A 6-Cypriot sheath was established.  Then, an over-the-wire Berenstein guiding catheter was advanced to the central circulation and a central venogram was performed, which revealed patent central vasculature.  After the patient was evaluated with the physician, moderate sedation was then administered.  A pullback venogram was performed, which revealed stenosis beginning at the distal end of the venous anastomosis/outflow portion of the AVG.  So, the wire was reinserted and the Berenstein catheter was removed.  Then, 2 mg of TPA was injected through the berenstein catheter from the stenosis back to the venous-facing sheath.  Following this, a 7 x 40 mm conquest PTA balloon was advanced to the outflow stenosis and inflated to full effacement with waist seen on the balloon prior to full effacement.   Angioplasty was repeated back within the graft to macerate the thrombus all the while holding pressure at the arterial anastomosis.       The balloon was then removed and a second cannulation was made in the arterial facing direction with the micropuncture set.  Again, the microwire was confirmed to be in the correct location under fluoroscopy.  Microsheath was established and a glidewire was advanced across the arterial anastomosis and up into the brachial artery and then the micro sheath was removed and a 6-English sheath was established.  Then using an over-the-wire Obie catheter, we readvanced across the arterial anastomosis, inflated and a pullback maneuver was performed with significant thrombus aspirated through the arterial facing sheath.  This procedure was repeated approximately 1 time after which there was noted to be mild pulsatility of the AV graft.  We then took the Obie catheter on the venous facing wire and swept towards the central circulation to sweep out any residual thrombus and there was improved pulsatility flow felt up the AV graft.  So at this point, the Obie catheter was advanced across the arterial anastomosis and an arteriogram was performed.  At least 6 cm of brachial artery was seen passing down into the forearm and passed the bifurcation into the radial and ulnar arteries and no distal emboli were seen.  There was uptake of contrast up the graft with no juxta-anastomosis stenosis.     The arterial facing guidewire and sheath were then removed and hemostasis was achieved using a #2 Ethilon suture.  We then administered 5000 units of IV heparin and a repeat fistulogram was performed, which revealed brisk flow through the lower portion of the AV fistula without residual significant stenosis seen.  Repeat angiogram following the angioplasty revealed improvement in the outflow stenosis and no extravasation of contrast seen.  Hemostasis was achieved using a #2 Ethilon suture.         IMPRESSION AND PLAN:  This is a successful percutaneous thrombectomy using mechanical and pharmacologic thrombolysis.  We will have the patient return in 1 week for suture removal and a followup ultrasound.         Yuriy Gibson MD  [phone]

## 2018-09-06 NOTE — BRIEF OP NOTE
[facility]  Brief Operative Note     SUMMARY     Surgery Date: 9/6/2018     Surgeon(s) and Role:     * Yuriy Gibson MD - Primary    Assisting Surgeon: None    Pre-op Diagnosis:  ESRD (end stage renal disease) [N18.6]    Post-op Diagnosis:  Post-Op Diagnosis Codes:     * ESRD (end stage renal disease) [N18.6]    Procedure(s) (LRB):  THROMBECTOMY, HEMODIALYSIS GRAFT OR FISTULA (N/A)    Anesthesia: 1% lidocaine, 1 mg versed, 50 mcg fentanyl    Description of the findings of the procedure: Patient was prepped and draped in a sterile fashion.  This was a successful declot of her LUE straight arm AV graft with angioplasty performed in the outflow/venous outflow portion of the graft.  Patient tolerated the procedure well and no immediate complications noted.    Findings/Key Components:  Patient was prepped and draped in a sterile fashion.  This was a successful declot of her LUE straight arm AV graft with angioplasty performed in the outflow/venous outflow portion of the graft.  Patient tolerated the procedure well and no immediate complications noted.      Estimated Blood Loss: 15 mL         Specimens:   Specimen (12h ago, onward)    None          Discharge Note    SUMMARY     Admit Date: 9/6/2018    Discharge Date and Time:  09/06/2018 1:29 PM    Hospital Course (synopsis of major diagnoses, care, treatment, and services provided during the course of the hospital stay):  Patient was prepped and draped in a sterile fashion.  This was a successful declot of her LUE straight arm AV graft with angioplasty performed in the outflow/venous outflow portion of the graft.  Patient tolerated the procedure well and no immediate complications noted.       Final Diagnosis: Post-Op Diagnosis Codes:     * ESRD (end stage renal disease) [N18.6]    Disposition: Home or Self Care    Follow Up/Patient Instructions:     Medications:  Reconciled Home Medications:      Medication List      CONTINUE taking these  medications    amlodipine-valsartan  mg per tablet  Commonly known as:  EXFORGE  Take 1 tablet by mouth once daily.     calcium acetate 667 mg capsule  Commonly known as:  PHOSLO  Take 1,334 mg by mouth 3 (three) times daily with meals.     carvedilol 6.25 MG tablet  Commonly known as:  COREG  Take 6.25 mg by mouth 2 (two) times daily with meals.     cloNIDine 0.1 mg/24 hr td ptwk 0.1 mg/24 hr  Commonly known as:  CATAPRES  Place 1 patch onto the skin every 7 days.     clopidogrel 75 mg tablet  Commonly known as:  PLAVIX  Take 75 mg by mouth every other day.     cyclobenzaprine 10 MG tablet  Commonly known as:  FLEXERIL  Take 10 mg by mouth 3 (three) times daily as needed for Muscle spasms.     docusate sodium 100 MG capsule  Commonly known as:  COLACE  Take 100 mg by mouth 2 (two) times daily as needed for Constipation.     fluconazole 150 MG Tab  Commonly known as:  DIFLUCAN     hydrOXYzine pamoate 25 MG Cap  Commonly known as:  VISTARIL  Take 25 mg by mouth 3 (three) times daily.     LINZESS 145 mcg Cap capsule  Generic drug:  linaclotide  Take 145 mcg by mouth once daily. Take one po qd before 1st meal of the day on empty stomach     metroNIDAZOLE 500 MG tablet  Commonly known as:  FLAGYL  TK 1 T PO  BID     pantoprazole 40 MG tablet  Commonly known as:  PROTONIX  Take 40 mg by mouth once daily.     VOLTAREN 1 % Gel  Generic drug:  diclofenac sodium  APPLY 2 GRAMS TO AFFECTED AREA QID     zolpidem 10 mg Tab  Commonly known as:  AMBIEN  Take 10 mg by mouth nightly as needed.          Discharge Procedure Orders   Lifting restrictions   Order Comments: No heavy lifting for 24 hours.     Call MD for:  temperature >100.4     Call MD for:  severe uncontrolled pain     Call MD for:  redness, tenderness, or signs of infection (pain, swelling, redness, odor or green/yellow discharge around incision site)     Call MD for:   Order Comments: Bleeding from the access site.     Activity as tolerated     Follow-up  Information     [facility]. Go in 1 day.    Why:  at 615 am  Contact information:  [address] [place] [id number] [phone]           [facility].    Why:  for dialysis  Contact information:  [address] [place] [id number] [phone]

## 2018-09-06 NOTE — DISCHARGE INSTRUCTIONS
After the procedure:    · Your arm and hand will be checked to make sure blood is flowing through the fistula properly. The feeling of blood rushing through the fistula is called a thrill. It is somewhat similar to the purring of a cat. Youll be taught how to check for this feeling each day to make sure there are no problems with your fistula.   · Watch for bleeding-If bleeding occurs place light pressure on the area and call your physician.    Recovering at Home  Once at home, follow all of the instructions youve been given. Be sure to:  · Take all medications as directed.  · Care for your incision as instructed.  · Check for signs of infection at the incision site (see below).  · Avoid heavy lifting and strenuous activities as directed.  · Monitor and care for your fistula as instructed      Anesthesia: Monitored Anesthesia Care (MAC)    Anesthesia Safety  · Have an adult family member or friend drive you home after the procedure.  · For the first 24 hours after your surgery:  ¨ Do not drive or use heavy equipment.  ¨ Do not make important decisions or sign documents.  ¨ Avoid alcohol.  ¨ Have someone stay with you, if possible. They can watch for problems and help keep you safe.    PLEASE FOLLOW ANY OTHER INSTRUCTIONS PROVIDED TO YOU BY DR. OVERTON!

## 2018-09-12 ENCOUNTER — HOSPITAL ENCOUNTER (OUTPATIENT)
Facility: OTHER | Age: 68
Discharge: HOME | End: 2018-09-12
Attending: INTERNAL MEDICINE | Admitting: INTERNAL MEDICINE
Payer: MEDICARE

## 2018-09-12 VITALS
OXYGEN SATURATION: 96 % | WEIGHT: 178 LBS | TEMPERATURE: 98 F | DIASTOLIC BLOOD PRESSURE: 83 MMHG | BODY MASS INDEX: 32.76 KG/M2 | HEART RATE: 76 BPM | HEIGHT: 62 IN | SYSTOLIC BLOOD PRESSURE: 160 MMHG | RESPIRATION RATE: 16 BRPM

## 2018-09-12 DIAGNOSIS — T82.868D THROMBOSIS OF KIDNEY DIALYSIS ARTERIOVENOUS GRAFT, SUBSEQUENT ENCOUNTER: Primary | ICD-10-CM

## 2018-09-12 DIAGNOSIS — T82.599A MECHANICAL COMPLICATION OF VASCULAR DEVICE: ICD-10-CM

## 2018-09-12 PROBLEM — T82.868A THROMBOSIS OF RENAL DIALYSIS ARTERIOVENOUS GRAFT: Status: RESOLVED | Noted: 2018-06-11 | Resolved: 2018-09-12

## 2018-09-12 PROCEDURE — 25000003 PHARM REV CODE 250: Mod: TXP

## 2018-09-12 PROCEDURE — 99153 MOD SED SAME PHYS/QHP EA: CPT | Mod: TXP | Performed by: INTERNAL MEDICINE

## 2018-09-12 PROCEDURE — 27201224 HC CATH ANGIOGRAM: Mod: NTX | Performed by: INTERNAL MEDICINE

## 2018-09-12 PROCEDURE — 27201059 HC S ENCORE INFLATION DEVICE: Mod: TXP | Performed by: INTERNAL MEDICINE

## 2018-09-12 PROCEDURE — C1725 CATH, TRANSLUMIN NON-LASER: HCPCS | Mod: NTX | Performed by: INTERNAL MEDICINE

## 2018-09-12 PROCEDURE — C1894 INTRO/SHEATH, NON-LASER: HCPCS | Mod: TXP | Performed by: INTERNAL MEDICINE

## 2018-09-12 PROCEDURE — 36905 THRMBC/NFS DIALYSIS CIRCUIT: CPT | Mod: TXP | Performed by: INTERNAL MEDICINE

## 2018-09-12 PROCEDURE — 99152 MOD SED SAME PHYS/QHP 5/>YRS: CPT | Mod: TXP | Performed by: INTERNAL MEDICINE

## 2018-09-12 PROCEDURE — 63600175 PHARM REV CODE 636 W HCPCS: Mod: NTX | Performed by: INTERNAL MEDICINE

## 2018-09-12 PROCEDURE — C1769 GUIDE WIRE: HCPCS | Mod: TXP | Performed by: INTERNAL MEDICINE

## 2018-09-12 PROCEDURE — C2623 CATH, TRANSLUMIN, DRUG-COAT: HCPCS | Mod: NTX | Performed by: INTERNAL MEDICINE

## 2018-09-12 PROCEDURE — 63600175 PHARM REV CODE 636 W HCPCS: Mod: TXP

## 2018-09-12 PROCEDURE — 25000003 PHARM REV CODE 250: Mod: NTX | Performed by: INTERNAL MEDICINE

## 2018-09-12 PROCEDURE — C1757 CATH, THROMBECTOMY/EMBOLECT: HCPCS | Mod: TXP | Performed by: INTERNAL MEDICINE

## 2018-09-12 PROCEDURE — 25500020 PHARM REV CODE 255: Mod: TXP

## 2018-09-12 RX ORDER — FENTANYL CITRATE 50 UG/ML
INJECTION, SOLUTION INTRAMUSCULAR; INTRAVENOUS
Status: DISCONTINUED | OUTPATIENT
Start: 2018-09-12 | End: 2018-09-12 | Stop reason: HOSPADM

## 2018-09-12 RX ORDER — SODIUM CHLORIDE 0.9 % (FLUSH) 0.9 %
5 SYRINGE (ML) INJECTION
Status: DISCONTINUED | OUTPATIENT
Start: 2018-09-12 | End: 2018-09-12 | Stop reason: HOSPADM

## 2018-09-12 RX ORDER — HEPARIN SOD,PORCINE/0.9 % NACL 1000/500ML
INTRAVENOUS SOLUTION INTRAVENOUS
Status: DISCONTINUED | OUTPATIENT
Start: 2018-09-12 | End: 2018-09-12 | Stop reason: HOSPADM

## 2018-09-12 RX ORDER — HEPARIN SODIUM 1000 [USP'U]/ML
INJECTION, SOLUTION INTRAVENOUS; SUBCUTANEOUS
Status: DISCONTINUED | OUTPATIENT
Start: 2018-09-12 | End: 2018-09-12 | Stop reason: HOSPADM

## 2018-09-12 RX ORDER — LIDOCAINE HYDROCHLORIDE 10 MG/ML
1 INJECTION, SOLUTION EPIDURAL; INFILTRATION; INTRACAUDAL; PERINEURAL ONCE
Status: COMPLETED | OUTPATIENT
Start: 2018-09-12 | End: 2018-09-12

## 2018-09-12 RX ORDER — MIDAZOLAM HYDROCHLORIDE 1 MG/ML
INJECTION, SOLUTION INTRAMUSCULAR; INTRAVENOUS
Status: DISCONTINUED | OUTPATIENT
Start: 2018-09-12 | End: 2018-09-12 | Stop reason: HOSPADM

## 2018-09-12 NOTE — PROCEDURES
\[facility]\"" Interventional Nephrology Procedure Report    Date of Procedure:  09/12/2018 11:50 AM     :  Yuriy Gibson MD  Assistant: none     Indication for Procedure:  Thrombosed LUE straight arm AVG      Referring Provider:  [facility] and Dr. Carias     Procedures Performed:  1.  Access cannulation  2.  Second cannulation  3.  Venous administration of TPA  4.  Venous angioplasty  5.  Percutaneous thrombectomy  6.  Arteriogram  7.  Moderate conscious sedation     Medications Administered:  1.  2 mg of tPA IV  2.  Heparin 5000 units IV  3.  Versed 1 mg IV  4.  Fentanyl 50 mcg IV     Blood Loss:  Approximately 15 mL     Contrast Used:  Approximately 30 mL     Procedure in Detail:    After informed consent was obtained, the patient was prepped and draped in the usual sterile fashion.  Her LUE straight arm AV graft was cannulated in the venous facing direction with a micropuncture set.  The microwire was confirmed to be in correct location under fluoroscopy and a 4-Portuguese microsheath was established.  A glidewire was advanced easily to the central circulation and the microsheath was removed.  A 6-Portuguese sheath was established.  Then, an over-the-wire Berenstein guiding catheter was advanced to the central circulation and a central venogram was performed, which revealed patent central vasculature.  After the patient was evaluated with the physician, moderate sedation was then administered.  A pullback venogram was performed, which revealed stenosis beginning at the outflow portion of the graft, just distal to the venous anastomosis.  So, the wire was reinserted and the Berenstein catheter was removed.  Then, 2 mg of TPA was infused within the graft, as pullback of Berenstein was performed.  Following this, a 7 x 80 mm conquest PTA balloon was advanced to the outflow stenosis and inflated to full effacement with waist seen on the balloon prior to full effacement.  Angioplasty was repeated back  within the graft to macerate the thrombus all the while holding pressure at the arterial anastomosis.       The balloon was then removed and a second cannulation was made in the arterial facing direction with the micropuncture set.  Again, the microwire was confirmed to be in the correct location under fluoroscopy.  Microsheath was established and a glidewire was advanced across the arterial anastomosis and up into the brachial artery and then the micro sheath was removed and a 6-Maori sheath was established.  Then using an over-the-wire Obie catheter, we readvanced across the arterial anastomosis, inflated and a pullback maneuver was performed with significant thrombus aspirated through the arterial facing sheath.  This procedure was repeated approximately 1 time after which there was noted to be moderate pulsatility of the AV graft.  So at this point, the Obie catheter was advanced across the arterial anastomosis and an arteriogram was performed.  At least 6 cm of brachial artery was seen passing down into the forearm and passed the bifurcation into the radial and ulnar arteries and no distal emboli were seen.  There was good uptake of contrast within the graft with no juxta-arterial anastomosis stenosis.      We then took the same 7 x 80 mm conquest pta balloon and placed it on the venous facing wire and inflated to half inflation and swept towards the central circulation to sweep out any residual thrombus and there was improved pulsatility flow felt up the AV graft.      The arterial facing guidewire and sheath were then removed and hemostasis was achieved using a #2 Ethilon suture.  A repeat fistulogram was performed, which revealed brisk flow through the lower portion of the AV fistula with residual significant stenosis seen in the same outflow portion of the graft, distal to the venous anastomosis, so a 8 x 80 mm conquest pta balloon was advanced to this region and was inflated to full effacement with  waist seen prior to full effacement.  This was held fully effaced for 3 minutes, after which repeat venogram showed much improvement in the stenosis with mild residual stenosis.  So we then took a 8 x 60 mm lutonix drug-coated balloon, advanced it to the same outflow stenosis and inflated to nominal pressure (with minimal waist seen) and held this inflated for 3 minutes.  Repeat angiogram following the angioplasty revealed improvement in the outflow stenosis and no extravasation of contrast seen.  We then administered 5000 units of IV heparin and hemostasis was achieved using a #2 Ethilon suture.        IMPRESSION AND PLAN:  This is a successful percutaneous thrombectomy using mechanical and pharmacologic thrombolysis as well as angioplasty (including DCB).  We will have the patient return in 1 week for suture removal and a followup ultrasound.  I would recommend not allowing her to become hypotensive during dialysis as this may have been one of the causes of her thrombosing.  I would also advise expert cannulators for now.  If there is evidence of stenosis seen on next ultrasound, may consider following up with fistulogram to consider stent placement.       Yuriy Gibson MD  [phone]

## 2018-09-12 NOTE — PLAN OF CARE
Paige Andre Summers has met all discharge criteria from Phase II. Vital Signs are stable, ambulating  without difficulty. Discharge instructions given, patient verbalized understanding. Discharged from facility via wheelchair in stable condition.

## 2018-09-12 NOTE — BRIEF OP NOTE
[facility]  Brief Operative Note     SUMMARY     Surgery Date: 9/12/2018     Surgeon(s) and Role:     * Yuriy Gibson MD - Primary    Assisting Surgeon: None    Pre-op Diagnosis:  ESRD (end stage renal disease) [N18.6]  Complication of vascular access for dialysis, subsequent encounter [T82.9XXD]    Post-op Diagnosis:  Post-Op Diagnosis Codes:     * ESRD (end stage renal disease) [N18.6]     * Complication of vascular access for dialysis, subsequent encounter [T82.9XXD]    Procedure(s) (LRB):  THROMBECTOMY, HEMODIALYSIS GRAFT OR FISTULA (Left)    Anesthesia: 1% lidocaine, 1 mg versed, 50 mcg fentanyl    Description of the findings of the procedure: Patient was prepped and draped in a sterile fashion.  This was a successful declot of her LUE straight arm AV graft with angioplasty (and DCB) performed in the outflow portion of the graft, past the venous anastomosis.  Patient tolerated the procedure well and no immediate complications noted.    Findings/Key Components: Patient was prepped and draped in a sterile fashion.  This was a successful declot of her LUE straight arm AV graft with angioplasty (and DCB) performed in the outflow portion of the graft, past the venous anastomosis.  Patient tolerated the procedure well and no immediate complications noted.      Estimated Blood Loss: 15 mL         Specimens:   Specimen (12h ago, onward)    None          Discharge Note    SUMMARY     Admit Date: 9/12/2018    Discharge Date and Time:  09/12/2018 11:49 AM    Hospital Course (synopsis of major diagnoses, care, treatment, and services provided during the course of the hospital stay): Patient was prepped and draped in a sterile fashion.  This was a successful declot of her LUE straight arm AV graft with angioplasty (and DCB) performed in the outflow portion of the graft, past the venous anastomosis.  Patient tolerated the procedure well and no immediate complications noted.       Final Diagnosis: Post-Op  Diagnosis Codes:     * ESRD (end stage renal disease) [N18.6]     * Complication of vascular access for dialysis, subsequent encounter [T82.9XXD]    Disposition: Home or Self Care    Follow Up/Patient Instructions:     Medications:  Reconciled Home Medications:      Medication List      CONTINUE taking these medications    amlodipine-valsartan  mg per tablet  Commonly known as:  EXFORGE  Take 1 tablet by mouth once daily.     calcium acetate 667 mg capsule  Commonly known as:  PHOSLO  Take 1,334 mg by mouth 3 (three) times daily with meals.     carvedilol 6.25 MG tablet  Commonly known as:  COREG  Take 6.25 mg by mouth 2 (two) times daily with meals.     cloNIDine 0.1 mg/24 hr td ptwk 0.1 mg/24 hr  Commonly known as:  CATAPRES  Place 1 patch onto the skin every 7 days.     clopidogrel 75 mg tablet  Commonly known as:  PLAVIX  Take 75 mg by mouth every other day.     cyclobenzaprine 10 MG tablet  Commonly known as:  FLEXERIL  Take 10 mg by mouth 3 (three) times daily as needed for Muscle spasms.     docusate sodium 100 MG capsule  Commonly known as:  COLACE  Take 100 mg by mouth 2 (two) times daily as needed for Constipation.     fluconazole 150 MG Tab  Commonly known as:  DIFLUCAN     hydrOXYzine pamoate 25 MG Cap  Commonly known as:  VISTARIL  Take 25 mg by mouth 3 (three) times daily.     LINZESS 145 mcg Cap capsule  Generic drug:  linaclotide  Take 145 mcg by mouth once daily. Take one po qd before 1st meal of the day on empty stomach     metroNIDAZOLE 500 MG tablet  Commonly known as:  FLAGYL  TK 1 T PO  BID     pantoprazole 40 MG tablet  Commonly known as:  PROTONIX  Take 40 mg by mouth once daily.     VOLTAREN 1 % Gel  Generic drug:  diclofenac sodium  APPLY 2 GRAMS TO AFFECTED AREA QID     zolpidem 10 mg Tab  Commonly known as:  AMBIEN  Take 10 mg by mouth nightly as needed.          Discharge Procedure Orders   Lifting restrictions   Order Comments: No heavy lifting for 48 hours.     Call MD for:   temperature >100.4     Call MD for:  severe uncontrolled pain     Call MD for:   Order Comments: Bleeding from the access site.     Call MD for:  redness, tenderness, or signs of infection (pain, swelling, redness, odor or green/yellow discharge around incision site)     Activity as tolerated     Follow-up Information     [facility].    Why:  for dialysis  Contact information:  [address] [place] [id number] [phone]

## 2018-09-28 ENCOUNTER — COMMITTEE REVIEW (OUTPATIENT)
Dept: TRANSPLANT | Facility: CLINIC | Age: 68
End: 2018-09-28

## 2018-09-28 DIAGNOSIS — Z76.82 ORGAN TRANSPLANT CANDIDATE: Primary | ICD-10-CM

## 2018-09-28 NOTE — COMMITTEE REVIEW
Native Organ Dx: Hypertensive Nephrosclerosis      SELECTION COMMITTEE NOTE    Pagie Summers was presented at selection committee on 9/28/2018.  Patient met selection criteria for kidney transplant related to ESRD due to   Hypertensive Nephrosclerosis.  No absolute contraindications to transplant at this time. Patient will be placed on the cadaveric wait list pending urology referral for hyperechoic midleft kidney lesion, repeat anticoagulation labs and final financial approval from insurance company.  Patient will return to clinic for routine appointment in 1 year(s). Patient does not meet criteria for High KDPI kidney offer due to weight greater than 75 kgs.      Patient informed of transplant committee's decision. All questions were answered and patient verbalized understanding.     Note written by Eliane Roberson    ===============================================          I was present at the meeting and attest to the decision of the committee.    Celestino Fitzgerald  09/28/2018

## 2018-09-28 NOTE — LETTER
September 28, 2018    Jd Carias MD  [address]   [address]  [facility] [address]       Dear Dr. Carias:    Patient: Paige Summers   MR Number: [id number]   YOB: 1950     Your patient, Paige Summers, was recently discussed at the [facility] Selection Committee.  I am happy to inform you that Paige has been approved for transplantation pending urology referral for left kidney lesion and repeat labs.   She has met selection criteria for a kidney transplant related to ESRD secondary to primary diagnosis of Hypertensive Nephrosclerosis. Your patient will be placed on the cadaveric wait list pending final financial approval from insurance company.     We appreciate your confidence in allowing us to participate in your patients care.      If you have any questions or concerns, please do not hesitate to contact me.    Sincerely,      Mickie Ravi MD  Medical Director, Kidney & Kidney/Pancreas Transplantation    CSC/lc    Cc: Ms. Paige Summers         [facility]

## 2018-10-17 ENCOUNTER — OFFICE VISIT (OUTPATIENT)
Dept: UROLOGY | Facility: CLINIC | Age: 68
End: 2018-10-17
Payer: MEDICARE

## 2018-10-17 VITALS
DIASTOLIC BLOOD PRESSURE: 78 MMHG | SYSTOLIC BLOOD PRESSURE: 126 MMHG | BODY MASS INDEX: 33.06 KG/M2 | WEIGHT: 180.75 LBS | HEART RATE: 100 BPM

## 2018-10-17 DIAGNOSIS — N28.9 KIDNEY LESION: Primary | ICD-10-CM

## 2018-10-17 PROCEDURE — 99213 OFFICE O/P EST LOW 20 MIN: CPT | Mod: PBBFAC,TXP | Performed by: NURSE PRACTITIONER

## 2018-10-17 PROCEDURE — 99203 OFFICE O/P NEW LOW 30 MIN: CPT | Mod: S$PBB,TXP,, | Performed by: NURSE PRACTITIONER

## 2018-10-17 PROCEDURE — 99999 PR PBB SHADOW E&M-EST. PATIENT-LVL III: CPT | Mod: PBBFAC,TXP,, | Performed by: NURSE PRACTITIONER

## 2018-10-17 NOTE — PROGRESS NOTES
Subjective:       Patient ID: Paige Summers is a 67 y.o. female.    Chief Complaint: Other (kidney lesion. awaiting kidney transplant)      HPI: Paige Summers is a 67 y.o. Black or  female who presents today for evaluation and management of kidney lesion. This is her initial clinic visit.    Pt is undergoing kidney transplant evaluation. Pt has ESRD d/t HTN and on HD (T, Th, Sat) since January. She has LUE AVG.     She had renal US 6/20/18 that resulted hyperechoic midleft kidney lesion measuring 1.2 x 1.3 x 1.1 cm.  CT abd pelvis w wo contrast was completed 8/8/18 and resulted the kidneys are atrophic and demonstrate cortical thinning without evidence of renal stones or hydronephrosis.  Previously described mass in the mid left kidney too small to fully characterize on today's study.  She was referred to Urology for clearance.    She reports she voids 2 x per day. Denies gross hematuria, dysuria or flank pain. Denies recurrent UTI's, last UTI was many years ago. She is on fluid restriction of 32 oz per day. Denies f/c/n/v. Denies urinary complaints.    Review of patient's allergies indicates:  No Known Allergies    Current Outpatient Medications   Medication Sig Dispense Refill    amlodipine-valsartan (EXFORGE)  mg per tablet Take 1 tablet by mouth once daily.      calcium acetate (PHOSLO) 667 mg capsule Take 1,334 mg by mouth 3 (three) times daily with meals.      carvedilol (COREG) 6.25 MG tablet Take 6.25 mg by mouth 2 (two) times daily with meals.      cloNIDine 0.1 mg/24 hr td ptwk (CATAPRES) 0.1 mg/24 hr Place 1 patch onto the skin every 7 days.      clopidogrel (PLAVIX) 75 mg tablet Take 75 mg by mouth every other day.       cyclobenzaprine (FLEXERIL) 10 MG tablet Take 10 mg by mouth 3 (three) times daily as needed for Muscle spasms.      docusate sodium (COLACE) 100 MG capsule Take 100 mg by mouth 2 (two) times daily as needed for Constipation.      fluconazole  (DIFLUCAN) 150 MG Tab   2    hydrOXYzine pamoate (VISTARIL) 25 MG Cap Take 25 mg by mouth 3 (three) times daily.      linaclotide (LINZESS) 145 mcg Cap capsule Take 145 mcg by mouth once daily. Take one po qd before 1st meal of the day on empty stomach       metroNIDAZOLE (FLAGYL) 500 MG tablet TK 1 T PO  BID  0    pantoprazole (PROTONIX) 40 MG tablet Take 40 mg by mouth once daily.      VOLTAREN 1 % Gel APPLY 2 GRAMS TO AFFECTED AREA QID  3    zolpidem (AMBIEN) 10 mg Tab Take 10 mg by mouth nightly as needed.       No current facility-administered medications for this visit.        Past Medical History:   Diagnosis Date    Anemia     Anxiety     Chronic renal failure 01/10/2018    Depression     GERD (gastroesophageal reflux disease)     Gout     H pylori ulcer     Hyperlipidemia     Hypertension     Obesity        Past Surgical History:   Procedure Laterality Date    DIAGNOSTIC ULTRASOUND Left 6/18/2018    Procedure: ULTRASOUND, DIAGNOSTIC;  Surgeon: Yuriy Gibson MD;  Location: [facility], [place], operated by [facility] CATH LAB;  Service: Nephrology;  Laterality: Left;    DIAGNOSTIC ULTRASOUND Left 7/9/2018    Procedure: ULTRASOUND, DIAGNOSTIC;  Surgeon: Yuriy Gibson MD;  Location: [facility], [place], operated by [facility] CATH LAB;  Service: Nephrology;  Laterality: Left;    DIAGNOSTIC ULTRASOUND Left 8/6/2018    Procedure: ULTRASOUND, DIAGNOSTIC;  Surgeon: Yuriy Gibson MD;  Location: [facility], [place], operated by [facility] CATH LAB;  Service: Nephrology;  Laterality: Left;    DIAGNOSTIC ULTRASOUND Left 8/20/2018    Procedure: ULTRASOUND, DIAGNOSTIC;  Surgeon: Yuriy Gibson MD;  Location: [facility] [place], operated by [facility] CATH LAB;  Service: Nephrology;  Laterality: Left;    DIAGNOSTIC ULTRASOUND Left 9/19/2018    Procedure: ULTRASOUND, DIAGNOSTIC;  Surgeon: Yuriy Gibson MD;  Location: [facility] [place], operated by [facility] CATH LAB;  Service: Nephrology;  Laterality: Left;  suture removal. coming for 10AM    DIAGNOSTIC ULTRASOUND Left 10/10/2018    Procedure: ULTRASOUND, DIAGNOSTIC;  Surgeon: Yuriy Gibson MD;  Location: [facility] [place], operated by [facility] CATH LAB;  Service:  Nephrology;  Laterality: Left;    DIALYSIS FISTULA CREATION Left 02/2018    [facility]ST[facility]CT[facility]Y  1971    [facility]    PERMCATH REMOVAL-TUNNELED CVC REMOVAL N/A 4/23/2018    Performed [facility] C[facility]    PERMCATH REWIRE- TUNNELED CATH REWIRE N/A 3/21/2018    Performed by [facility] at [facility] CATH LAB    THROMBECTOMY OF HEMODIALYSIS ACCESS SITE Left 6/11/2018    Procedure: THROMBECTOMY, HEMODIALYSIS GRAFT OR FISTULA;  Surgeon: Yuriy Gibson MD;  Location: [facility] CATH LAB;  Service: Nephrology;  Laterality: Left;    THROMBECTOMY OF HEMODIALYSIS ACCESS SITE Left 6/13/2018    Procedure: THROMBECTOMY, HEMODIALYSIS GRAFT OR FISTULA;  Surgeon: Yuriy Gibson MD;  Location: [facility] CATH LAB;  Service: Nephrology;  Laterality: Left;    THROMBECTOMY OF HEMODIALYSIS ACCESS SITE N/A 9/6/2018    Procedure: THROMBECTOMY, HEMODIALYSIS GRAFT OR FISTULA;  Surgeon: Yuriy Gibson MD;  Location: [facility] CATH LAB;  Service: Nephrology;  Laterality: N/A;  PT coming in for noon says shes NPO    THROMBECTOMY OF HEMODIALYSIS ACCESS SITE Left 9/12/2018    Procedure: THROMBECTOMY, HEMODIALYSIS GRAFT OR FISTULA;  Surgeon: Yuriy Gibson MD;  Location: [facility] CATH LAB;  Service: Nephrology;  Laterality: Left;    THROMBECTOMY, HEMODIALYSIS GRAFT OR FISTULA Left 9/12/2018    Performed by Y[facility]y Gibson [facility] at [facility] CATH LAB    THROMBECTOMY, HEMODIALYSIS GRAFT OR FISTULA N/A 9/6/2018    Performed by Y[facility] Gibson [facility] at [facility] CATH LAB    THROMBECTOMY, HEMODIALYSIS GRAFT OR FISTULA Left 6/13/2018    Performed by [facility] Gibson [facility] at [facility] CATH LAB    THROMBECTOMY, HEMODIALYSIS GRAFT OR FISTULA Left 6/11/2018    Performed by [facility] Gibson [facility] at [facility] CATH LAB    ULTRASOUND, DIAGNOSTIC Left 10/10/2018    Performed by Y[facility] Gibson [facility] at [facility] CATH LAB    ULTRASOUND, DIAGNOSTIC Left 9/19/2018    Performed by Y[facility]y Gibson [facility] at [facility] CATH LAB    ULTRASOUND, DIAGNOSTIC Left 8/20/2018    Performed by [facility] Gibson [facility] at [facility] CATH LAB     ULTRASOUND, DIAGNOSTIC Left 8/6/2018    [facility] at [facility] CATH LAB    ULTRASOUND, DIAGNOSTIC Left 7/9/2018    Performed by [facility] Gibson [facility] at [facility] CATH LAB    ULTRASOUND, DIAGNOSTIC Left 6/18/2018    Performed by [facility] Gibson [facility] at [facility] CATH LAB       Family History   Problem Relation Age of Onset    Alcohol abuse Mother     No Known Problems Father     Hypertension Sister     Arthritis Sister     Heart disease Brother     Heart failure Brother     Heart failure Brother     Diabetes Cousin     Breast cancer Neg Hx     Colon cancer Neg Hx     Ovarian cancer Neg Hx        Review of Systems   Constitutional: Negative for chills, diaphoresis and fever.   HENT: Negative for congestion.    Eyes: Negative for discharge.   Respiratory: Negative for chest tightness and shortness of breath.    Cardiovascular: Negative for chest pain and palpitations.   Gastrointestinal: Negative for nausea and vomiting.   Genitourinary: Positive for decreased urine volume. Negative for dysuria, flank pain, hematuria and urgency.        See HPI   Musculoskeletal: Negative for gait problem.   Skin: Negative for rash.   Allergic/Immunologic: Negative for immunocompromised state.   Neurological: Negative for seizures and headaches.   Hematological: Negative for adenopathy.   Psychiatric/Behavioral: Negative for confusion.         All other systems were reviewed and were negative.    Objective:     Vitals:    10/17/18 1101   BP: 126/78   Pulse: 100        Physical Exam   Nursing note and vitals reviewed.  Constitutional: She is oriented to person, place, and time. She appears well-developed and well-nourished. No distress.   HENT:   Head: Normocephalic.   Eyes: Right eye exhibits no discharge. Left eye exhibits no discharge.   Neck: Normal range of motion.   Cardiovascular: Normal rate and regular rhythm.    Pulmonary/Chest: Effort normal. No respiratory distress.   Abdominal: Soft. She exhibits no distension.  There is no CVA tenderness.   Musculoskeletal: Normal range of motion.   Neurological: She is alert and oriented to person, place, and time.   Skin: Skin is warm and dry.     LUE AVG   Psychiatric: She has a normal mood and affect. Her behavior is normal. Judgment and thought content normal.         Lab Results   Component Value Date    CREATININE 7.1 (H) 06/20/2018     Lab Results   Component Value Date    EGFRNONAA 5.4 (A) 06/20/2018     Lab Results   Component Value Date    ESTGFRAFRICA 6.3 (A) 06/20/2018     Unable to void in clinic    Assessment:       1. Kidney lesion        Plan:     Paige was seen today for other.    Diagnoses and all orders for this visit:    Kidney lesion    -Message sent to Dr. Holland to review CT abd pelvis and renal US. Imaging reviewed by Dr. Holland.  Pt cleared for transplant.  RTC as needed     I spent 35 minutes with the patient of which more than half was spent in coordinating the patient's care as well as in direct consultation with the patient in regards to our treatment and plan.

## 2018-10-17 NOTE — LETTER
October 17, 2018      Tamanna Kemp NP  [address]  [facility]  1st Floor Clinic  [facility] [address]           [facility] - Urology 4th Floor  [address] [facility] [address]  Phone: [phone]          Patient: Paige Summers   MR Number: [id number]   YOB: 1950   Date of Visit: 10/17/2018       Dear Tamanna Kemp:    Thank you for referring Paige Summers to me for evaluation. Attached you will find relevant portions of my assessment and plan of care.    If you have questions, please do not hesitate to call me. I look forward to following Paige Summers along with you.    Sincerely,    Jess Velez NP    Enclosure  CC:  No Recipients    If you would like to receive this communication electronically, please contact [email or web address] or [phone] to request more information on [employer] access.    For providers and/or their staff who would like to refer a patient to [facility], please contact us through our one-stop-shop provider referral line, [facility], at [phone].    If you feel you have received this communication in error or would no longer like to receive these types of communications, please e-mail [email or web address]

## 2018-10-31 ENCOUNTER — OFFICE VISIT (OUTPATIENT)
Dept: NEPHROLOGY | Facility: CLINIC | Age: 68
End: 2018-10-31
Payer: MEDICARE

## 2018-10-31 VITALS
HEIGHT: 62 IN | WEIGHT: 179.88 LBS | DIASTOLIC BLOOD PRESSURE: 75 MMHG | RESPIRATION RATE: 16 BRPM | OXYGEN SATURATION: 97 % | SYSTOLIC BLOOD PRESSURE: 134 MMHG | HEART RATE: 98 BPM | TEMPERATURE: 99 F | BODY MASS INDEX: 33.1 KG/M2

## 2018-10-31 DIAGNOSIS — N18.6 ESRD (END STAGE RENAL DISEASE): ICD-10-CM

## 2018-10-31 PROCEDURE — 99213 OFFICE O/P EST LOW 20 MIN: CPT | Mod: PBBFAC

## 2018-10-31 PROCEDURE — 99999 PR PBB SHADOW E&M-EST. PATIENT-LVL III: CPT | Mod: PBBFAC,,,

## 2018-10-31 RX ORDER — ACETAMINOPHEN AND CODEINE PHOSPHATE 300; 30 MG/1; MG/1
TABLET ORAL
Refills: 0 | Status: ON HOLD | COMMUNITY
Start: 2018-09-13 | End: 2018-12-13 | Stop reason: CLARIF

## 2018-10-31 NOTE — PROGRESS NOTES
U Interventional Nephrology Follow-up Exam     Date:  10/31/2018  2:03 PM     HPI:  67 year old woman with ESRD (TTS at [facility] with Dr. Carias), well known to the service, has required extensive interventions in the recent past to maintain patency of her LUE straight arm AV graft.  Last underwent declot on 9/12/18, during which we performed angioplasty (including DCB) of the outflow/venous anastomosis.  Here for follow up exam and ultrasound.  She has had no recent issues with prolonged bleeding, difficulty in cannulation or increased frequency of pressure alarms while on dialysis.  She has also had no recent significant hypotension while on dialysis or at home.       Exam:   Vitals:    10/31/18 1330   BP: 134/75   Pulse: 98   Resp: 16   Temp: 98.6 °F (37 °C)     ACCESS:  LUE straight arm AV graft with soft thrill and minimal pulsatility, appropriate pulse augmentation and partial collapse with arm elevation.       Ultrasound:  - arterial anastomosis patent without stenosis, anastomosis measured at 4 mm  - mid-AVG patent with no stenosis seen, vessel diameter measured at 6 mm  - AVF outflow tract patent with no stenosis, the venous anastomosis is measured at 4.5 mm  - BFV approximately 700-1000 ml/min     Impression/Plan:  Patent AV graft following recent thrombectomy with venous angioplasty (with DCB).  Plan follow up exam in 4 weeks.  Please do refer her sooner if she develops signs of stenosis or impending thrombosis.  She was also given a Rx for fish oil, 4g by mouth daily.        Yuriy Gibson MD

## 2018-11-09 ENCOUNTER — HOSPITAL ENCOUNTER (OUTPATIENT)
Facility: OTHER | Age: 68
Discharge: HOME | End: 2018-11-09
Attending: INTERNAL MEDICINE | Admitting: INTERNAL MEDICINE
Payer: MEDICARE

## 2018-11-09 VITALS
SYSTOLIC BLOOD PRESSURE: 128 MMHG | TEMPERATURE: 98 F | RESPIRATION RATE: 18 BRPM | BODY MASS INDEX: 32.94 KG/M2 | HEART RATE: 90 BPM | WEIGHT: 179 LBS | OXYGEN SATURATION: 97 % | HEIGHT: 62 IN | DIASTOLIC BLOOD PRESSURE: 70 MMHG

## 2018-11-09 DIAGNOSIS — T82.868A THROMBOSIS OF KIDNEY DIALYSIS ARTERIOVENOUS GRAFT, INITIAL ENCOUNTER: Primary | ICD-10-CM

## 2018-11-09 DIAGNOSIS — T82.49XA CLOTTED DIALYSIS ACCESS, INITIAL ENCOUNTER: ICD-10-CM

## 2018-11-09 DIAGNOSIS — N18.6 ESRD (END STAGE RENAL DISEASE): ICD-10-CM

## 2018-11-09 PROCEDURE — 27201423 OPTIME MED/SURG SUP & DEVICES STERILE SUPPLY: Mod: NTX | Performed by: INTERNAL MEDICINE

## 2018-11-09 PROCEDURE — C1769 GUIDE WIRE: HCPCS | Mod: NTX | Performed by: INTERNAL MEDICINE

## 2018-11-09 PROCEDURE — 36905 THRMBC/NFS DIALYSIS CIRCUIT: CPT | Mod: NTX | Performed by: INTERNAL MEDICINE

## 2018-11-09 PROCEDURE — 99152 MOD SED SAME PHYS/QHP 5/>YRS: CPT | Mod: TXP | Performed by: INTERNAL MEDICINE

## 2018-11-09 PROCEDURE — 25000003 PHARM REV CODE 250: Mod: NTX | Performed by: INTERNAL MEDICINE

## 2018-11-09 PROCEDURE — 25500020 PHARM REV CODE 255: Mod: NTX | Performed by: INTERNAL MEDICINE

## 2018-11-09 PROCEDURE — 36904 THRMBC/NFS DIALYSIS CIRCUIT: CPT | Mod: TXP | Performed by: INTERNAL MEDICINE

## 2018-11-09 PROCEDURE — C1894 INTRO/SHEATH, NON-LASER: HCPCS | Mod: NTX | Performed by: INTERNAL MEDICINE

## 2018-11-09 PROCEDURE — 63600175 PHARM REV CODE 636 W HCPCS: Mod: TXP | Performed by: INTERNAL MEDICINE

## 2018-11-09 PROCEDURE — 99153 MOD SED SAME PHYS/QHP EA: CPT | Mod: TXP | Performed by: INTERNAL MEDICINE

## 2018-11-09 PROCEDURE — C1725 CATH, TRANSLUMIN NON-LASER: HCPCS | Mod: TXP | Performed by: INTERNAL MEDICINE

## 2018-11-09 PROCEDURE — C1757 CATH, THROMBECTOMY/EMBOLECT: HCPCS | Mod: NTX | Performed by: INTERNAL MEDICINE

## 2018-11-09 RX ORDER — LIDOCAINE HYDROCHLORIDE 10 MG/ML
1 INJECTION, SOLUTION EPIDURAL; INFILTRATION; INTRACAUDAL; PERINEURAL ONCE
Status: COMPLETED | OUTPATIENT
Start: 2018-11-09 | End: 2018-11-09

## 2018-11-09 RX ORDER — SODIUM CHLORIDE 0.9 % (FLUSH) 0.9 %
5 SYRINGE (ML) INJECTION
Status: DISCONTINUED | OUTPATIENT
Start: 2018-11-09 | End: 2018-11-09 | Stop reason: HOSPADM

## 2018-11-09 RX ORDER — HEPARIN SOD,PORCINE/0.9 % NACL 1000/500ML
INTRAVENOUS SOLUTION INTRAVENOUS
Status: DISCONTINUED | OUTPATIENT
Start: 2018-11-09 | End: 2018-11-09 | Stop reason: HOSPADM

## 2018-11-09 RX ORDER — OXYCODONE AND ACETAMINOPHEN 5; 325 MG/1; MG/1
1 TABLET ORAL ONCE
Status: COMPLETED | OUTPATIENT
Start: 2018-11-09 | End: 2018-11-09

## 2018-11-09 RX ORDER — HEPARIN SODIUM 1000 [USP'U]/ML
INJECTION, SOLUTION INTRAVENOUS; SUBCUTANEOUS
Status: DISCONTINUED | OUTPATIENT
Start: 2018-11-09 | End: 2018-11-09 | Stop reason: HOSPADM

## 2018-11-09 RX ORDER — AMOXICILLIN 500 MG
2 CAPSULE ORAL
Status: ON HOLD | COMMUNITY
End: 2019-04-15 | Stop reason: HOSPADM

## 2018-11-09 RX ORDER — FENTANYL CITRATE 50 UG/ML
INJECTION, SOLUTION INTRAMUSCULAR; INTRAVENOUS
Status: DISCONTINUED | OUTPATIENT
Start: 2018-11-09 | End: 2018-11-09 | Stop reason: HOSPADM

## 2018-11-09 RX ORDER — MIDAZOLAM HYDROCHLORIDE 1 MG/ML
INJECTION, SOLUTION INTRAMUSCULAR; INTRAVENOUS
Status: DISCONTINUED | OUTPATIENT
Start: 2018-11-09 | End: 2018-11-09 | Stop reason: HOSPADM

## 2018-11-09 RX ADMIN — OXYCODONE AND ACETAMINOPHEN 1 TABLET: 5; 325 TABLET ORAL at 01:11

## 2018-11-09 NOTE — Clinical Note
29 ml injected throughout the case. 21 mL total wasted during the case. 50 mL total used in the case.

## 2018-11-09 NOTE — PROCEDURES
[facility] Interventional Nephrology Procedure Report    Date of Procedure:  11/09/2018 12:41 PM     :  Yuriy Gibson MD  Assistant: Nirmal Kurtz MD     Indication for Procedure:  Thrombosed LUE straight arm AVG      Referring Provider:  [facility] and Dr. Carias     Procedures Performed:  1.  Access cannulation  2.  Second cannulation  3.  Venous administration of TPA  4.  Venous angioplasty  5.  Percutaneous thrombectomy  6.  Arteriogram  7.  Moderate conscious sedation     Medications Administered:  1.  2 mg of tPA IV  2.  Heparin 5000 units IV  3.  Versed 2 mg IV  4.  Fentanyl 100 mcg IV     Blood Loss:  Approximately 25 mL     Contrast Used:  Approximately 29 mL     Procedure in Detail:    After informed consent was obtained, the patient was prepped and draped in the usual sterile fashion.  Her LUE straight arm AV graft was cannulated in the venous facing direction with a micropuncture set.  The microwire was confirmed to be in correct location under fluoroscopy and a 4-Amharic microsheath was established.  A glidewire was advanced easily to the central circulation and the microsheath was removed.  A 6-Amharic sheath was established.  Then, an over-the-wire Berenstein guiding catheter was advanced to the central circulation and a central venogram was performed, which revealed patent central vasculature.  After the patient was evaluated with the physician, moderate sedation was then administered.  A pullback venogram was performed, which revealed stenosis beginning at the outflow portion of the graft, distal to the venous anastomosis.  So, the wire was reinserted and the Berenstein catheter was removed.  Then, 2 mg of TPA was injected into the venous facing sheath with pressure was held at the arterial anastomosis.  Following this, a 7 x 80 mm conquest PTA balloon was advanced to the outflow stenosis and inflated to full effacement with significant waist seen on the balloon prior to  full effacement.  Angioplasty was repeated back within the graft to macerate the thrombus all the while holding pressure at the arterial anastomosis.       The balloon was then removed and a second cannulation was made in the arterial facing direction with the micropuncture set.  Again, the microwire was confirmed to be in the correct location under fluoroscopy.  Microsheath was established and a glidewire was advanced across the arterial anastomosis and up into the brachial artery and then the micro sheath was removed and a 6-Jamaican sheath was established.  Then using an over-the-wire Obie catheter, we readvanced across the arterial anastomosis, inflated and a pullback maneuver was performed with significant thrombus aspirated through the arterial facing sheath.  This procedure was repeated approximately 2 times after which there was noted to be moderate pulsatility of the AV graft.  We then took the Obie catheter on the venous facing wire and swept towards the central circulation to sweep out any residual thrombus and there was improved pulsatility flow felt up the AV graft.  At this point, we reevaluated the patient again as she was having some pain and an additional 1 mg of Versed and 50 mcg of fentanyl were administered.   So at this point, the Obie catheter was advanced across the arterial anastomosis and an arteriogram was performed.  At least 6 cm of brachial artery was seen passing down into the forearm no distal emboli were seen.       The arterial facing guidewire and sheath were then removed and hemostasis was achieved using a #2 Ethilon suture.  We then administered 5000 units of IV heparin and a repeat fistulogram was performed, which revealed brisk flow through the lower portion of the AV fistula with residual stenosis seen at the venous anastomosis and outflow, so we again took the same 7 x 80 mm conquest balloon and advanced to this region and inflated to full effacement with waist seen  prior to full effacement.  This was held fully effaced for 2 minutes, after which repeat angiogram following the angioplasty revealed improvement in the outflow stenosis and no extravasation of contrast seen.  Hemostasis was achieved using a #2 Ethilon suture.        IMPRESSION AND PLAN:  This is a successful percutaneous thrombectomy using mechanical and pharmacologic thrombolysis.  We will have the patient return in 1 week for suture removal and a followup ultrasound.  We will, at that time, consider follow up fistulogram to determine whether her outflow would require stenting, given the frequency in which her access thrombosis.         Yuriy Gibson MD  [phone]

## 2018-11-09 NOTE — H&P
[facility]  History & Physical    Subjective:      Chief Complaint/Reason for Admission: Here for declot    Paige Summers is a 67 y.o. female with ESRD (TTS at [facility] with Dr. Carias) who presents today for declot after she recently noted clotted access yesterday when she presented to the dialysis unit.  Of note, she had prolonged bleeding after her dialysis session on Tuesday.  She last had thrombectomy approximately 2 months ago, and has been doing fine since then.  She denies recent hypotensive episodes.  I saw her about a week and a half ago and measured her venous anastomosis at 4.5 mm.       Past Medical History:   Diagnosis Date    Anemia     Anxiety     Chronic renal failure 01/10/2018    Depression     GERD (gastroesophageal reflux disease)     Gout     H pylori ulcer     Hyperlipidemia     Hypertension     Obesity      Past Surgical History:   Procedure Laterality Date    DIAGNOSTIC ULTRASOUND Left 6/18/2018    Procedure: ULTRASOUND, DIAGNOSTIC;  Surgeon: Yuriy Gibson MD;  Location: [facility] [place] CATH LAB;  Service: Nephrology;  Laterality: Left;    DIAGNOSTIC ULTRASOUND Left 7/9/2018    Procedure: ULTRASOUND, DIAGNOSTIC;  Surgeon: Yuriy Gibson MD;  Location: [facility] [place] CATH LAB;  Service: Nephrology;  Laterality: Left;    DIAGNOSTIC ULTRASOUND Left 8/6/2018    Procedure: ULTRASOUND, DIAGNOSTIC;  Surgeon: Yuriy Gibson MD;  Location: [facility] [place] CATH LAB;  Service: Nephrology;  Laterality: Left;    DIAGNOSTIC ULTRASOUND Left 8/20/2018    Procedure: ULTRASOUND, DIAGNOSTIC;  Surgeon: Yuriy Gibson MD;  Location: [facility] [place] CATH LAB;  Service: Nephrology;  Laterality: Left;    DIAGNOSTIC ULTRASOUND Left 9/19/2018    Procedure: ULTRASOUND, DIAGNOSTIC;  Surgeon: Yuriy Gibson MD;  Location: [facility] [place] CATH LAB;  Service: Nephrology;  Laterality: Left;  suture removal. coming for 10AM    DIAGNOSTIC ULTRASOUND Left 10/10/2018    Procedure: ULTRASOUND, DIAGNOSTIC;  Surgeon: Yuriy Gibson MD;   Location: [facility] CATH LAB;  Service: Nephrology;  Laterality: Left;    DIALYSIS FISTULA CREATION Left 02/2018    [facility]ST[facility]CT[facility]Y  1971    [facility]    PERMCATH REMOVAL-TUNNELED CVC REMOVAL N/A 4/23/2018    Performed by Yuriy Gibson MD at [facility] CATH LAB    PERMCATH REWIRE- TUNNELED CATH REWIRE N/A 3/21/2018    Performed by Yuriy Gibson MD at [facility] CATH LAB    THROMBECTOMY OF HEMODIALYSIS ACCESS SITE Left 6/11/2018    Procedure: THROMBECTOMY, HEMODIALYSIS GRAFT OR FISTULA;  Surgeon: Yuriy Gibson MD;  Location: [facility] CATH LAB;  Service: Nephrology;  Laterality: Left;    THROMBECTOMY OF HEMODIALYSIS ACCESS SITE Left 6/13/2018    Procedure: THROMBECTOMY, HEMODIALYSIS GRAFT OR FISTULA;  Surgeon: Yuriy Gibson MD;  Location: [facility] CATH LAB;  Service: Nephrology;  Laterality: Left;    THROMBECTOMY OF HEMODIALYSIS ACCESS SITE N/A 9/6/2018    Procedure: THROMBECTOMY, HEMODIALYSIS GRAFT OR FISTULA;  Surgeon: Yuriy Gibson MD;  Location: [facility] CATH LAB;  Service: Nephrology;  Laterality: N/A;  PT coming in for noon says shes NPO    THROMBECTOMY OF HEMODIALYSIS ACCESS SITE Left 9/12/2018    Procedure: THROMBECTOMY, HEMODIALYSIS GRAFT OR FISTULA;  Surgeon: Yuriy Gibson MD;  Location: [facility] CATH LAB;  Service: Nephrology;  Laterality: Left;    THROMBECTOMY, HEMODIALYSIS GRAFT OR FISTULA Left 9/12/2018    Performed by Yuriy Gibson MD at [facility] CATH LAB    THROMBECTOMY, HEMODIALYSIS GRAFT OR FISTULA N/A 9/6/2018    Performed by Yuriy Gibson MD at [facility] CATH LAB    THROMBECTOMY, HEMODIALYSIS GRAFT OR FISTULA Left 6/13/2018    Performed by Yuriy Gibson MD at [facility] CATH LAB    THROMBECTOMY, HEMODIALYSIS GRAFT OR FISTULA Left 6/11/2018    Performed by Yuriy Gibson MD at [facility] CATH LAB    ULTRASOUND, DIAGNOSTIC Left 10/10/2018    Performed by Yuriy Gibson MD at [facility] CATH LAB    ULTRASOUND, DIAGNOSTIC Left 9/19/2018    Performed by Yuriy Gibson MD at [facility] CATH LAB    ULTRASOUND, DIAGNOSTIC Left 8/20/2018    Performed by  [facility]y Gibson MD at [facility] CATH LAB    ULTRASOUND, DIAGNOSTIC Left 8/6/2018    Performed by Yuriy Gibson MD at [facility] CATH LAB    ULTRASOUND, DIAGNOSTIC Left 7/9/2018    Performed by [facility] Gibson MD at [facility] CATH LAB    ULTRASOUND, DIAGNOSTIC Left 6/18/2018    Performed by Yuriy Gibson MD at [facility] CATH LAB     Family History   Problem Relation Age of Onset    Alcohol abuse Mother     No Known Problems Father     Hypertension Sister     Arthritis Sister     Heart disease Brother     Heart failure Brother     Heart failure Brother     Diabetes Cousin     Breast cancer Neg Hx     Colon cancer Neg Hx     Ovarian cancer Neg Hx      Social History     Tobacco Use    Smoking status: Never Smoker    Smokeless tobacco: Never Used   Substance Use Topics    Alcohol use: No    Drug use: No       PTA Medications   Medication Sig    amlodipine-valsartan (EXFORGE)  mg per tablet Take 1 tablet by mouth once daily.    calcium acetate (PHOSLO) 667 mg capsule Take 1,334 mg by mouth 3 (three) times daily with meals.    carvedilol (COREG) 6.25 MG tablet Take 6.25 mg by mouth 2 (two) times daily with meals.    cyclobenzaprine (FLEXERIL) 10 MG tablet Take 10 mg by mouth 3 (three) times daily as needed for Muscle spasms.    docusate sodium (COLACE) 100 MG capsule Take 100 mg by mouth 2 (two) times daily as needed for Constipation.    fish oil-omega-3 fatty acids 300-1,000 mg capsule Take 2 capsules by mouth 2 (two) times daily before meals.    fluconazole (DIFLUCAN) 150 MG Tab     hydrOXYzine pamoate (VISTARIL) 25 MG Cap Take 25 mg by mouth 3 (three) times daily.    linaclotide (LINZESS) 145 mcg Cap capsule Take 145 mcg by mouth once daily. Take one po qd before 1st meal of the day on empty stomach     pantoprazole (PROTONIX) 40 MG tablet Take 40 mg by mouth once daily.    VOLTAREN 1 % Gel APPLY 2 GRAMS TO AFFECTED AREA QID    zolpidem (AMBIEN) 10 mg Tab Take 10 mg by mouth nightly as needed.     acetaminophen-codeine 300-30mg (TYLENOL #3) 300-30 mg Tab TK 1 T PO  Q 6 TO 8 H PRN FOR POST OP PAIN    cloNIDine 0.1 mg/24 hr td ptwk (CATAPRES) 0.1 mg/24 hr Place 1 patch onto the skin every 7 days.    clopidogrel (PLAVIX) 75 mg tablet Take 75 mg by mouth every other day.     metroNIDAZOLE (FLAGYL) 500 MG tablet TK 1 T PO  BID     Review of patient's allergies indicates:  No Known Allergies     Review of Systems   Constitutional: Negative.    Respiratory: Negative.    Cardiovascular: Negative.        Objective:      Vital Signs (Most Recent)  Temp: 97.9 °F (36.6 °C) (11/09/18 0909)  Pulse: 81 (11/09/18 0909)  Resp: 16 (11/09/18 0909)  BP: 137/76 (11/09/18 0909)  SpO2: 97 % (11/09/18 0909)    Vital Signs Range (Last 24H):  Temp:  [97.9 °F (36.6 °C)]   Pulse:  [81]   Resp:  [16]   BP: (137)/(76)   SpO2:  [97 %]     Physical Exam   Constitutional: She is oriented to person, place, and time. She appears well-developed and well-nourished. No distress.   HENT:   Head: Normocephalic and atraumatic.   Eyes: EOM are normal.   Neck: Neck supple.   Pulmonary/Chest: Effort normal. No respiratory distress.   Musculoskeletal:   LUE straight arm AVG with no thrill.   Neurological: She is alert and oriented to person, place, and time.   Skin: Skin is warm and dry. She is not diaphoretic.   Psychiatric: She has a normal mood and affect. Her behavior is normal.       Assessment:      Active Hospital Problems    Diagnosis  POA    ESRD (end stage renal disease) [N18.6]  Yes      Resolved Hospital Problems   No resolved problems to display.       Plan:      Declot today.  Risks explained, consent signed.

## 2018-11-09 NOTE — BRIEF OP NOTE
[facility], [place], operated by [facility]  Brief Operative Note     SUMMARY     Surgery Date: 11/9/2018     Surgeon(s) and Role:     * Yuriy Gibson MD - Primary    Assisting Surgeon: Nirmal Kurtz MD    Pre-op Diagnosis:  ESRD (end stage renal disease) [N18.6]  Clotted dialysis access, initial encounter [T82.49XA]    Post-op Diagnosis:  Post-Op Diagnosis Codes:     * ESRD (end stage renal disease) [N18.6]     * Clotted dialysis access, initial encounter [T82.49XA]    Procedure(s) (LRB):  THROMBECTOMY, HEMODIALYSIS GRAFT OR FISTULA (Left)    Anesthesia: 1% lidocaine, 2 mg versed, 100 mcg fentanyl    Description of the findings of the procedure: Patient was prepped and draped in a sterile fashion.  This was a successful thrombectomy of her LUE straight arm AV graft with angioplasty performed in the venous anastomosis/outflow portion of the graft.  Patient tolerated the procedure well and no immediate complications noted.    Findings/Key Components:  Patient was prepped and draped in a sterile fashion.  This was a successful thrombectomy of her LUE straight arm AV graft with angioplasty performed in the venous anastomosis/outflow portion of the graft.  Patient tolerated the procedure well and no immediate complications noted.      Estimated Blood Loss: 25 mL         Specimens:   Specimen (12h ago, onward)    None          Discharge Note    SUMMARY     Admit Date: 11/9/2018    Discharge Date and Time:  11/09/2018 12:40 PM    Hospital Course (synopsis of major diagnoses, care, treatment, and services provided during the course of the hospital stay):  Patient was prepped and draped in a sterile fashion.  This was a successful thrombectomy of her LUE straight arm AV graft with angioplasty performed in the venous anastomosis/outflow portion of the graft.  Patient tolerated the procedure well and no immediate complications noted.       Final Diagnosis: Post-Op Diagnosis Codes:     * ESRD (end stage renal disease) [N18.6]     *  Clotted dialysis access, initial encounter [T82.49XA]    Disposition: Home or Self Care    Follow Up/Patient Instructions:     Medications:  Reconciled Home Medications:      Medication List      CONTINUE taking these medications    acetaminophen-codeine 300-30mg 300-30 mg Tab  Commonly known as:  TYLENOL #3  TK 1 T PO  Q 6 TO 8 H PRN FOR POST OP PAIN     amlodipine-valsartan  mg per tablet  Commonly known as:  EXFORGE  Take 1 tablet by mouth once daily.     calcium acetate 667 mg capsule  Commonly known as:  PHOSLO  Take 1,334 mg by mouth 3 (three) times daily with meals.     carvedilol 6.25 MG tablet  Commonly known as:  COREG  Take 6.25 mg by mouth 2 (two) times daily with meals.     cloNIDine 0.1 mg/24 hr td ptwk 0.1 mg/24 hr  Commonly known as:  CATAPRES  Place 1 patch onto the skin every 7 days.     clopidogrel 75 mg tablet  Commonly known as:  PLAVIX  Take 75 mg by mouth every other day.     cyclobenzaprine 10 MG tablet  Commonly known as:  FLEXERIL  Take 10 mg by mouth 3 (three) times daily as needed for Muscle spasms.     docusate sodium 100 MG capsule  Commonly known as:  COLACE  Take 100 mg by mouth 2 (two) times daily as needed for Constipation.     fish oil-omega-3 fatty acids 300-1,000 mg capsule  Take 2 capsules by mouth 2 (two) times daily before meals.     fluconazole 150 MG Tab  Commonly known as:  DIFLUCAN     hydrOXYzine pamoate 25 MG Cap  Commonly known as:  VISTARIL  Take 25 mg by mouth 3 (three) times daily.     LINZESS 145 mcg Cap capsule  Generic drug:  linaclotide  Take 145 mcg by mouth once daily. Take one po qd before 1st meal of the day on empty stomach     metroNIDAZOLE 500 MG tablet  Commonly known as:  FLAGYL  TK 1 T PO  BID     pantoprazole 40 MG tablet  Commonly known as:  PROTONIX  Take 40 mg by mouth once daily.     VOLTAREN 1 % Gel  Generic drug:  diclofenac sodium  APPLY 2 GRAMS TO AFFECTED AREA QID     zolpidem 10 mg Tab  Commonly known as:  AMBIEN  Take 10 mg by mouth  nightly as needed.          Discharge Procedure Orders   Lifting restrictions   Order Comments: No heavy lifting for 48 hours.     Call MD for:  temperature >100.4     Call MD for:  severe uncontrolled pain     Call MD for:  redness, tenderness, or signs of infection (pain, swelling, redness, odor or green/yellow discharge around incision site)     Call MD for:   Order Comments: Bleeding from the access site.     Activity as tolerated     Follow-up Information     Loy Patton In 1 day.    Why:  for dialysis  Contact information:  [address] [place] [address] [phone]

## 2018-11-14 ENCOUNTER — OFFICE VISIT (OUTPATIENT)
Dept: NEPHROLOGY | Facility: CLINIC | Age: 68
End: 2018-11-14
Payer: MEDICARE

## 2018-11-14 DIAGNOSIS — T82.590D MECHANICAL COMPLICATION OF ARTERIOVENOUS FISTULA SURGICALLY CREATED, SUBSEQUENT ENCOUNTER: Primary | ICD-10-CM

## 2018-11-14 NOTE — PROGRESS NOTES
\[facility] [place] [facility]\"" Interventional Nephrology Follow-up Exam     Date:   10/10/2018   9:56 AM     HPI:  67 year old woman with ESRD (TTS at [facility] with Dr. Carias), who presented to us with a clotted LUE AVG in 9/2018, moreover, she underwent three declot procedures in the last 4 months.  She is here today for follow up exam and LUE AVG ultrasound.   she does has moderate size hematoma at the upper portion of the LUE AV graft.      Exam:   ACCESS:  LUE straight arm AV graft with soft thrill and moderate pulsatility, appropriate pulse augmentation and partial collapse with arm elevation.      Ultrasound  - arterial anastomosis patent with mild stenosis (transitions from 7-8 mm in diameter)  - mid-AV graft patent with no stenosis seen. Previous vessel diameter noted on old ultrasounds, measured at 7-8 mm through out.   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 Patient presents for LUE AVG U/S.   - no signs or symptoms of re-stenosis  - graft is patent through out  - repeat U/S in 2 months or call us if there has been any changes in her dialysis such as increase bleeding times, failing to reach acceptable Kt/V, increase clots, increase venous/arterial pressures during hemodialysis     Irineo Devlin MD  [facility]   [phone]

## 2018-11-20 ENCOUNTER — TELEPHONE (OUTPATIENT)
Dept: TRANSPLANT | Facility: CLINIC | Age: 68
End: 2018-11-20

## 2018-11-20 DIAGNOSIS — Z76.82 ORGAN TRANSPLANT CANDIDATE: Primary | ICD-10-CM

## 2018-11-20 NOTE — TELEPHONE ENCOUNTER
----- Message from Celestino Fitzgerald MD sent at 11/19/2018  8:36 AM CST -----  Please refer to hematology for clearance. Thanks    J  ----- Message -----  From: Eliane Roberson  Sent: 11/15/2018  10:26 AM  To: Celestino Fitzgerald MD, Filomena Thompson, #    Hey,     This patient was approved pending urology referral for hyperechoic midleft kidney lesion and repeat anticoagulation labs.   Patient cleared from a urological standpoint.   Can one of you review her repeated anticoagulations and let me know if anything else is needed or is it ok to list her.    Eliane

## 2018-12-05 ENCOUNTER — INITIAL CONSULT (OUTPATIENT)
Dept: HEMATOLOGY/ONCOLOGY | Facility: CLINIC | Age: 68
End: 2018-12-05
Payer: MEDICARE

## 2018-12-05 ENCOUNTER — LAB VISIT (OUTPATIENT)
Dept: LAB | Facility: HOSPITAL | Age: 68
End: 2018-12-05
Payer: MEDICARE

## 2018-12-05 VITALS
DIASTOLIC BLOOD PRESSURE: 86 MMHG | BODY MASS INDEX: 32.94 KG/M2 | WEIGHT: 179 LBS | RESPIRATION RATE: 16 BRPM | SYSTOLIC BLOOD PRESSURE: 164 MMHG | HEIGHT: 62 IN | HEART RATE: 89 BPM | TEMPERATURE: 98 F | OXYGEN SATURATION: 100 %

## 2018-12-05 DIAGNOSIS — R76.0 ANTIPHOSPHOLIPID ANTIBODY POSITIVE: ICD-10-CM

## 2018-12-05 DIAGNOSIS — Z76.82 ORGAN TRANSPLANT CANDIDATE: ICD-10-CM

## 2018-12-05 DIAGNOSIS — T82.868D THROMBOSIS OF ARTERIOVENOUS GRAFT, SUBSEQUENT ENCOUNTER: ICD-10-CM

## 2018-12-05 DIAGNOSIS — N18.6 ESRD (END STAGE RENAL DISEASE): Primary | ICD-10-CM

## 2018-12-05 PROCEDURE — 99213 OFFICE O/P EST LOW 20 MIN: CPT | Mod: PBBFAC,NTX | Performed by: INTERNAL MEDICINE

## 2018-12-05 PROCEDURE — 99204 OFFICE O/P NEW MOD 45 MIN: CPT | Mod: S$PBB,GC,, | Performed by: INTERNAL MEDICINE

## 2018-12-05 PROCEDURE — 99999 PR PBB SHADOW E&M-EST. PATIENT-LVL III: CPT | Mod: PBBFAC,,, | Performed by: INTERNAL MEDICINE

## 2018-12-05 PROCEDURE — 99001 SPECIMEN HANDLING PT-LAB: CPT | Mod: PO,TXP

## 2018-12-05 RX ORDER — HYDROCORTISONE 1 %
CREAM (GRAM) TOPICAL
Refills: 0 | Status: ON HOLD | COMMUNITY
Start: 2018-11-26 | End: 2018-12-13 | Stop reason: CLARIF

## 2018-12-05 RX ORDER — CIPROFLOXACIN AND DEXAMETHASONE 3; 1 MG/ML; MG/ML
SUSPENSION/ DROPS AURICULAR (OTIC)
Refills: 1 | Status: ON HOLD | COMMUNITY
Start: 2018-11-26 | End: 2018-12-13 | Stop reason: CLARIF

## 2018-12-05 RX ORDER — GLUCOSAM/CHONDRO/HERB 149/HYAL 750-100 MG
TABLET ORAL
Refills: 5 | Status: ON HOLD | COMMUNITY
Start: 2018-11-16 | End: 2018-12-13 | Stop reason: CLARIF

## 2018-12-05 NOTE — PROGRESS NOTES
Subjective:       Patient ID: Paige Summers is a 67 y.o. female.    Chief Complaint: No chief complaint on file.    HPI     Paige Summers is a 67 y.o. lady with ESRD (T, Th, S HD) and essential hypertension who presents to Hematology clinic per referral from Nephrology for hypercoagulability testing.  Patient states she was initiated on HD starting on 1/2018 and has been getting HD through her L graft throughout this year.  During this time, she noted that she had required 8 separate occurrences where she needed to have her AV graft de-clotted.  She had a PT and aPTT performed on 6/2018, of which were normal. She had further testing for hypercoagulable states, where she was positive for her hexagonal phospholipid neutralization testing on 8/2018 and low antithrombin levels; all other tests were normal at the time.  Additionally, she was started on Plavix, but still had recurrent clotting events in her graft.   She had repeat testing on 10/2018 which also revealed similar testing.  She otherwise denies any history of other clotting events prior to having HD, specifically no history of deep vein thromboses, superficial thromboses, or pulmonary embolisms. She denies having any miscarriages, noting giving 4 term births.  Cannot recall any family history of clotting disorders, but does not believe that there are any.      Notes she was diagnosed with ESRD secondary to hypertensive disease.  Initially diagnosed with hypertension in 1968 after her first child, has been adherent to therapy since.  Denies any history of diabetes.  Noted to have recent colonoscopy on 2016 with noted 10 year follow up.  Has had recent mammogram in 2018 with no evidence.  Denied smoking and EtOH use.      Review of Systems   Constitutional: Negative for chills, fatigue and fever.   HENT: Positive for ear pain. Negative for facial swelling, hearing loss, sinus pressure and sinus pain.    Eyes: Negative for redness and visual  "disturbance.   Respiratory: Negative for cough, shortness of breath and wheezing.    Cardiovascular: Negative for chest pain, palpitations and leg swelling.   Gastrointestinal: Negative for abdominal distention, abdominal pain, constipation, diarrhea, nausea and vomiting.   Endocrine: Negative for polydipsia and polyphagia.   Genitourinary: Negative for dysuria and frequency.   Musculoskeletal: Negative for arthralgias and myalgias.   Skin: Negative for pallor and rash.   Neurological: Positive for numbness. Negative for weakness, light-headedness and headaches.   Hematological: Negative for adenopathy.   Psychiatric/Behavioral: Negative for behavioral problems and confusion.       Objective:       Vitals:    12/05/18 0927   BP: (!) 164/86   BP Location: Right arm   Patient Position: Sitting   Pulse: 89   Resp: 16   Temp: 98.1 °F (36.7 °C)   SpO2: 100%   Weight: 81.2 kg (179 lb 0.2 oz)   Height: 5' 2" (1.575 m)       Physical Exam   Constitutional: She appears well-developed and well-nourished. No distress.   Pleasant, in no apparent distress   HENT:   Head: Normocephalic and atraumatic.   Right Ear: External ear normal.   R external ear with scab, no adenopathy noted   Eyes: Pupils are equal, round, and reactive to light.   Neck: Normal range of motion.   Cardiovascular: Normal rate, regular rhythm, normal heart sounds and intact distal pulses.   No murmur heard.  Pulses equal on upper extremities    Pulmonary/Chest: Effort normal and breath sounds normal. No respiratory distress. She has no wheezes. She has no rales.   Abdominal: Soft. Bowel sounds are normal. She exhibits no distension. There is no tenderness.   Musculoskeletal: Normal range of motion. She exhibits no edema or tenderness.   Noted blister on L 3rd finger.  Distal tip of 3rd finger also with ulceration.     Lymphadenopathy:     She has no cervical adenopathy.   Neurological: She is alert. No cranial nerve deficit. Coordination normal.   Skin: Skin " is warm and dry. No rash noted. She is not diaphoretic. No erythema.   Psychiatric: She has a normal mood and affect. Her behavior is normal.   Vitals reviewed.      Assessment:       1. Antiphospholipid antibody positive        Plan:       Diagnoses and all orders for this visit:    Antiphospholipid antibody positive  Paige Summers is a 67 y.o. with ESRD 2/2 to hypertensive disease who presents for evaluation for possible hypercoagulable process given underlying graft thromboses.  Overall, patient without a history of any clotting events prior to initiation of HD.  Otherwise, patient's PT and aPTT were normal when initially tested.  Noted hypercoagulable work up was negative, but Hexagonal Phospholipid Neutralization test was positive, which could be explained as the patient has had heparin products during hemodialysis.  Overall, patient unlikely to have an underlying process causing graft thrombosis.  She is otherwise not at an increased risk for clotting events for transplant.      We thank you for allowing us to participate in the care of Ms. Summers.  She can return to Hematology clinic as needed.    Mir Etienne MD  Internal Medicine PGY-3  [phone]    Attending addendum:    I have seen and examined Ms. Summers with Dr. Etienne. I agree with his documentation above.    Ms. Summers is 67 and has ESRD (secondary to HTN) and is being considered for renal transplantation. She has had recurren AV graft thrombosis, and she is referred for evaluation of thrombotic risk factors in light of a positive hexagonal phospholipid neutralization test.     Ms. Summers has no other history of venous thromboembolic disease or early uterine demise. She has no other laboratory criteria that would be considered positive for antiphospholipid syndrome. It appears unlikely that she may have this disorder. There are no other risk factors identified that would elevate her thrombotic risk.    Therefore, I suspect that she does not  have an elevated risk of thrombosis as it pertains to renal transplantation. I would advise she be treated with guideline-appropriate VTE prophylaxis throughout the duration of any hospitalization.     Macario Rivera MD  Hematology/Oncology and Stem Cell Transplant

## 2018-12-05 NOTE — LETTER
December 5, 2018      Tamanna Kemp NP  [address]  [facility]  1st Floor Clinic  [facility] [address]           [facility]  [address]  [facility] [address]  Phone: [phone]          Patient: Paige Summers   MR Number: [id number]   YOB: 1950   Date of Visit: 12/5/2018       Dear Tamanna Kemp:    Thank you for referring Paige Summers to me for evaluation. Attached you will find relevant portions of my assessment and plan of care.    If you have questions, please do not hesitate to call me. I look forward to following Paige Summers along with you.    Sincerely,    Macario Rivera MD    Enclosure  CC:  No Recipients    If you would like to receive this communication electronically, please contact [email or web address] or [phone] to request more information on [employer] access.    For providers and/or their staff who would like to refer a patient to [facility], please contact us through our one-stop-shop provider referral line, [facility], at [phone].    If you feel you have received this communication in error or would no longer like to receive these types of communications, please e-mail [email or web address]

## 2018-12-08 LAB
HLA FREEZE AND HOLD INTERPRETATION: NORMAL
HLAFH COLLECTION DATE: NORMAL
HPRA INTERPRETATION: NORMAL

## 2018-12-13 ENCOUNTER — HOSPITAL ENCOUNTER (OUTPATIENT)
Facility: OTHER | Age: 68
Discharge: HOME | End: 2018-12-13
Attending: INTERNAL MEDICINE | Admitting: INTERNAL MEDICINE
Payer: MEDICARE

## 2018-12-13 VITALS
DIASTOLIC BLOOD PRESSURE: 92 MMHG | OXYGEN SATURATION: 98 % | HEART RATE: 85 BPM | WEIGHT: 176.38 LBS | BODY MASS INDEX: 31.25 KG/M2 | TEMPERATURE: 98 F | RESPIRATION RATE: 18 BRPM | HEIGHT: 63 IN | SYSTOLIC BLOOD PRESSURE: 184 MMHG

## 2018-12-13 DIAGNOSIS — T82.49XA CLOTTED DIALYSIS ACCESS: ICD-10-CM

## 2018-12-13 DIAGNOSIS — T82.49XA CLOTTED DIALYSIS ACCESS, INITIAL ENCOUNTER: ICD-10-CM

## 2018-12-13 DIAGNOSIS — N18.6 ESRD (END STAGE RENAL DISEASE): ICD-10-CM

## 2018-12-13 PROCEDURE — 27201423 OPTIME MED/SURG SUP & DEVICES STERILE SUPPLY: Mod: TXP | Performed by: INTERNAL MEDICINE

## 2018-12-13 PROCEDURE — C1757 CATH, THROMBECTOMY/EMBOLECT: HCPCS | Mod: NTX | Performed by: INTERNAL MEDICINE

## 2018-12-13 PROCEDURE — 25000003 PHARM REV CODE 250: Mod: TXP | Performed by: INTERNAL MEDICINE

## 2018-12-13 PROCEDURE — C1769 GUIDE WIRE: HCPCS | Mod: NTX | Performed by: INTERNAL MEDICINE

## 2018-12-13 PROCEDURE — 36905 THRMBC/NFS DIALYSIS CIRCUIT: CPT | Mod: NTX | Performed by: INTERNAL MEDICINE

## 2018-12-13 PROCEDURE — 25500020 PHARM REV CODE 255: Mod: NTX | Performed by: INTERNAL MEDICINE

## 2018-12-13 PROCEDURE — 99153 MOD SED SAME PHYS/QHP EA: CPT | Mod: NTX | Performed by: INTERNAL MEDICINE

## 2018-12-13 PROCEDURE — 63600175 PHARM REV CODE 636 W HCPCS: Mod: NTX | Performed by: INTERNAL MEDICINE

## 2018-12-13 PROCEDURE — C1725 CATH, TRANSLUMIN NON-LASER: HCPCS | Mod: NTX | Performed by: INTERNAL MEDICINE

## 2018-12-13 PROCEDURE — C1894 INTRO/SHEATH, NON-LASER: HCPCS | Mod: NTX | Performed by: INTERNAL MEDICINE

## 2018-12-13 PROCEDURE — 99152 MOD SED SAME PHYS/QHP 5/>YRS: CPT | Mod: TXP | Performed by: INTERNAL MEDICINE

## 2018-12-13 RX ORDER — MIDAZOLAM HYDROCHLORIDE 1 MG/ML
INJECTION, SOLUTION INTRAMUSCULAR; INTRAVENOUS
Status: DISCONTINUED | OUTPATIENT
Start: 2018-12-13 | End: 2018-12-13 | Stop reason: HOSPADM

## 2018-12-13 RX ORDER — ACETAMINOPHEN AND CODEINE PHOSPHATE 300; 30 MG/1; MG/1
1 TABLET ORAL EVERY 6 HOURS PRN
Qty: 10 TABLET
Start: 2018-12-13 | End: 2019-01-11

## 2018-12-13 RX ORDER — FENTANYL CITRATE 50 UG/ML
INJECTION, SOLUTION INTRAMUSCULAR; INTRAVENOUS
Status: DISCONTINUED | OUTPATIENT
Start: 2018-12-13 | End: 2018-12-13 | Stop reason: HOSPADM

## 2018-12-13 RX ORDER — LIDOCAINE HYDROCHLORIDE 10 MG/ML
INJECTION INFILTRATION; PERINEURAL
Status: DISCONTINUED | OUTPATIENT
Start: 2018-12-13 | End: 2018-12-13 | Stop reason: HOSPADM

## 2018-12-13 RX ORDER — HEPARIN SOD,PORCINE/0.9 % NACL 1000/500ML
INTRAVENOUS SOLUTION INTRAVENOUS
Status: DISCONTINUED | OUTPATIENT
Start: 2018-12-13 | End: 2018-12-13 | Stop reason: HOSPADM

## 2018-12-13 RX ORDER — HEPARIN SODIUM 1000 [USP'U]/ML
INJECTION, SOLUTION INTRAVENOUS; SUBCUTANEOUS
Status: DISCONTINUED | OUTPATIENT
Start: 2018-12-13 | End: 2018-12-13 | Stop reason: HOSPADM

## 2018-12-13 NOTE — H&P
[facility]  History & Physical    Subjective:      Chief Complaint/Reason for Admission: declot    Paige Summers is a 67 y.o. female. ESRD on HD TTS at [facility] with Dr. Carias who presents with clotted access.  Patient reports she had this LUE AVG placed in January, and it has been clotted 8 times since then.  Most recent declot was 11/2018.  She had two declots in 9/2018.  She went to HD on Tuesday without issues. She reports no hypotensive epsiodes.  She takes omega 3s, and takes clopidogrel on her non dialysis days.     Past Medical History:   Diagnosis Date    Anemia     Anxiety     Chronic renal failure 01/10/2018    Depression     GERD (gastroesophageal reflux disease)     Gout     H pylori ulcer     Hyperlipidemia     Hypertension     Obesity      Past Surgical History:   Procedure Laterality Date    DIAGNOSTIC ULTRASOUND Left 6/18/2018    Procedure: ULTRASOUND, DIAGNOSTIC;  Surgeon: Yuriy Gibson MD;  Location: [facility], [place] CATH LAB;  Service: Nephrology;  Laterality: Left;    DIAGNOSTIC ULTRASOUND Left 7/9/2018    Procedure: ULTRASOUND, DIAGNOSTIC;  Surgeon: Yuriy Gibson MD;  Location: [facility], [place] CATH LAB;  Service: Nephrology;  Laterality: Left;    DIAGNOSTIC ULTRASOUND Left 8/6/2018    Procedure: ULTRASOUND, DIAGNOSTIC;  Surgeon: Yuriy Gibson MD;  Location: [facility], [place] CATH LAB;  Service: Nephrology;  Laterality: Left;    DIAGNOSTIC ULTRASOUND Left 8/20/2018    Procedure: ULTRASOUND, DIAGNOSTIC;  Surgeon: Yuriy Gibson MD;  Location: [facility], [place] CATH LAB;  Service: Nephrology;  Laterality: Left;    DIAGNOSTIC ULTRASOUND Left 9/19/2018    Procedure: ULTRASOUND, DIAGNOSTIC;  Surgeon: Yuriy Gibson MD;  Location: [facility], [place] CATH LAB;  Service: Nephrology;  Laterality: Left;  suture removal. coming for 10AM    DIAGNOSTIC ULTRASOUND Left 10/10/2018    Procedure: ULTRASOUND, DIAGNOSTIC;  Surgeon: Yuriy Gibson MD;  Location: [facility], [place] CATH LAB;  Service: Nephrology;  Laterality: Left;     DIALYSIS FISTULA CREATION Left 02/2018    [facility]Y  [facility]    [facility]    PERMCATH REMOVAL-TUNNELED CVC REMOVAL N/A 4/23/2018    Performed by Y[facility]y Gibson MD at [facility] CATH LAB    PERMCATH REWIRE- TUNNELED CATH REWIRE N/A 3/21/2018    Performed by Y[facility]y Gibson MD at [facility] CATH LAB    THROMBECTOMY OF HEMODIALYSIS ACCESS SITE Left 6/11/2018    Procedure: THROMBECTOMY, HEMODIALYSIS GRAFT OR FISTULA;  Surgeon: Yuriy Gibson MD;  Location: [facility] CATH LAB;  Service: Nephrology;  Laterality: Left;    THROMBECTOMY OF HEMODIALYSIS ACCESS SITE Left 6/13/2018    Procedure: THROMBECTOMY, HEMODIALYSIS GRAFT OR FISTULA;  Surgeon: Yuriy Gibson MD;  Location: [facility] CATH LAB;  Service: Nephrology;  Laterality: Left;    THROMBECTOMY OF HEMODIALYSIS ACCESS SITE N/A 9/6/2018    Procedure: THROMBECTOMY, HEMODIALYSIS GRAFT OR FISTULA;  Surgeon: Yuriy Gibson MD;  Location: [facility] CATH LAB;  Service: Nephrology;  Laterality: N/A;  PT coming in for noon says shes NPO    THROMBECTOMY OF HEMODIALYSIS ACCESS SITE Left 9/12/2018    Procedure: THROMBECTOMY, HEMODIALYSIS GRAFT OR FISTULA;  Surgeon: Yuriy Gibson MD;  Location: [facility] CATH LAB;  Service: Nephrology;  Laterality: Left;    THROMBECTOMY OF HEMODIALYSIS ACCESS SITE Left 11/9/2018    Procedure: THROMBECTOMY, HEMODIALYSIS GRAFT OR FISTULA;  Surgeon: Yuriy Gibson MD;  Location: [facility] CATH LAB;  Service: Nephrology;  Laterality: Left;    THROMBECTOMY, HEMODIALYSIS GRAFT OR FISTULA Left 11/9/2018    Performed by Yuriy Gibson MD at [facility] CATH LAB    THROMBECTOMY, HEMODIALYSIS GRAFT OR FISTULA Left 9/12/2018    Performed by Yuriy Gibson MD at [facility] CATH LAB    THROMBECTOMY, HEMODIALYSIS GRAFT OR FISTULA N/A 9/6/2018    Performed by Yuriy Gibson MD at [facility] CATH LAB    THROMBECTOMY, HEMODIALYSIS GRAFT OR FISTULA Left 6/13/2018    Performed by Yuriy Gibson MD at [facility] CATH LAB    THROMBECTOMY, HEMODIALYSIS GRAFT OR FISTULA Left 6/11/2018    Performed by Yuriy Gibson MD at  [facility] CATH LAB    ULTRASOUND, DIAGNOSTIC Left 10/10/2018    Performed by Yuriy Gibson MD at [facility] CATH LAB    ULTRASOUND, DIAGNOSTIC Left 9/19/2018    Performed by Yuriy Gibson MD at [facility] CATH LAB    ULTRASOUND, DIAGNOSTIC Left 8/20/2018    Performed by Yuriy Gibson MD at [facility] CATH LAB    ULTRASOUND, DIAGNOSTIC Left 8/6/2018    Performed by Yuriy Gibson MD at [facility] CATH LAB    ULTRASOUND, DIAGNOSTIC Left 7/9/2018    Performed by Yuriy Gibson MD at [facility] CATH LAB    ULTRASOUND, DIAGNOSTIC Left 6/18/2018    Performed by Yuriy Gibson MD at [facility] CATH LAB     Family History   Problem Relation Age of Onset    Alcohol abuse Mother     No Known Problems Father     Hypertension Sister     Arthritis Sister     Heart disease Brother     Heart failure Brother     Heart failure Brother     Diabetes Cousin     Breast cancer Neg Hx     Colon cancer Neg Hx     Ovarian cancer Neg Hx      Social History     Tobacco Use    Smoking status: Never Smoker    Smokeless tobacco: Never Used   Substance Use Topics    Alcohol use: No    Drug use: No       PTA Medications   Medication Sig    calcium acetate (PHOSLO) 667 mg capsule Take 1,334 mg by mouth 3 (three) times daily with meals.    carvedilol (COREG) 6.25 MG tablet Take 6.25 mg by mouth 2 (two) times daily with meals.    clopidogrel (PLAVIX) 75 mg tablet Take 75 mg by mouth every other day.     cyclobenzaprine (FLEXERIL) 10 MG tablet Take 10 mg by mouth 3 (three) times daily as needed for Muscle spasms.    docusate sodium (COLACE) 100 MG capsule Take 100 mg by mouth 2 (two) times daily as needed for Constipation.    fish oil-omega-3 fatty acids 300-1,000 mg capsule Take 2 capsules by mouth 2 (two) times daily before meals.    hydrOXYzine pamoate (VISTARIL) 25 MG Cap Take 25 mg by mouth 3 (three) times daily.    linaclotide (LINZESS) 145 mcg Cap capsule Take 145 mcg by mouth once daily. Take one po qd before 1st meal of the day on empty stomach      pantoprazole (PROTONIX) 40 MG tablet Take 40 mg by mouth once daily.    VOLTAREN 1 % Gel APPLY 2 GRAMS TO AFFECTED AREA QID    zolpidem (AMBIEN) 10 mg Tab Take 10 mg by mouth nightly as needed.    amlodipine-valsartan (EXFORGE)  mg per tablet Take 1 tablet by mouth once daily.    cloNIDine 0.1 mg/24 hr td ptwk (CATAPRES) 0.1 mg/24 hr Place 1 patch onto the skin every 7 days.     Review of patient's allergies indicates:  No Known Allergies     Review of Systems   Constitutional: Negative for chills and fever.   Respiratory: Negative for shortness of breath.    Cardiovascular: Negative for chest pain.       Objective:      Vital Signs (Most Recent)  Temp: 98.5 °F (36.9 °C) (12/13/18 0948)  Pulse: 79 (12/13/18 0948)  Resp: 18 (12/13/18 0948)  BP: (!) 164/91 (12/13/18 0948)  SpO2: (!) 94 % (12/13/18 0948)    Vital Signs Range (Last 24H):  Temp:  [98.5 °F (36.9 °C)]   Pulse:  [79]   Resp:  [18]   BP: (164)/(91)   SpO2:  [94 %]     Physical Exam   Constitutional: She is oriented to person, place, and time. She appears well-developed and well-nourished. No distress.   HENT:   Head: Normocephalic and atraumatic.   Cardiovascular: Normal rate.   Pulmonary/Chest: Effort normal. No respiratory distress.   Musculoskeletal:   LUE upper arm straight AVG - no thrill or bruit noted.  2+ radial pulse present   Neurological: She is alert and oriented to person, place, and time.   Psychiatric: She has a normal mood and affect. Her behavior is normal.         Assessment:      Active Hospital Problems    Diagnosis  POA    ESRD (end stage renal disease) [N18.6]  Yes      Resolved Hospital Problems   No resolved problems to display.   Thrombosis of AVG    Plan:    Percutaneous thrombectomy.  Risks and benefits explained.  Consent signed.    Javed Lopez MD  [facility]

## 2018-12-13 NOTE — BRIEF OP NOTE
[facility]  Brief Operative Note     SUMMARY     Surgery Date: 12/13/2018     Surgeon(s) and Role:     * Javed Lopez MD - Primary    Assisting Surgeon: None    Pre-op Diagnosis:  ESRD (end stage renal disease) [N18.6]  Clotted dialysis access [T82.49XA]    Post-op Diagnosis:  Post-Op Diagnosis Codes:     * ESRD (end stage renal disease) [N18.6]     * Clotted dialysis access [T82.49XA]    Procedure(s) (LRB):  THROMBECTOMY, HEMODIALYSIS GRAFT OR FISTULA (Left)    Anesthesia: 1% Lidocaine, Versed, Fentanyl    Description of the findings of the procedure: sterile prep and drape of left upper arm straight AV graft.  Successful percutaneous thrombectomy.  No immediate complications. Please see procedure report for details.     Findings/Key Components: sterile prep and drape of left upper arm straight AV graft.  Successful percutaneous thrombectomy.  No immediate complications. Please see procedure report for details.     Estimated Blood Loss: < 20 mL         Specimens:   Specimen (12h ago, onward)    None          Discharge Note    SUMMARY     Admit Date: 12/13/2018    Discharge Date and Time:  12/13/2018 10:55 AM    Hospital Course (synopsis of major diagnoses, care, treatment, and services provided during the course of the hospital stay): sterile prep and drape of left upper arm straight AV graft.  Successful percutaneous thrombectomy.  No immediate complications. Please see procedure report for details.      Final Diagnosis: Post-Op Diagnosis Codes:     * ESRD (end stage renal disease) [N18.6]     * Clotted dialysis access [T82.49XA]    Disposition: Home or Self Care    Follow Up/Patient Instructions:     Medications:  Reconciled Home Medications:      Medication List      CONTINUE taking these medications    amlodipine-valsartan  mg per tablet  Commonly known as:  EXFORGE  Take 1 tablet by mouth once daily.     calcium acetate 667 mg capsule  Commonly known as:  PHOSLO  Take 1,334 mg by  mouth 3 (three) times daily with meals.     carvedilol 6.25 MG tablet  Commonly known as:  COREG  Take 6.25 mg by mouth 2 (two) times daily with meals.     cloNIDine 0.1 mg/24 hr td ptwk 0.1 mg/24 hr  Commonly known as:  CATAPRES  Place 1 patch onto the skin every 7 days.     clopidogrel 75 mg tablet  Commonly known as:  PLAVIX  Take 75 mg by mouth every other day.     cyclobenzaprine 10 MG tablet  Commonly known as:  FLEXERIL  Take 10 mg by mouth 3 (three) times daily as needed for Muscle spasms.     docusate sodium 100 MG capsule  Commonly known as:  COLACE  Take 100 mg by mouth 2 (two) times daily as needed for Constipation.     fish oil-omega-3 fatty acids 300-1,000 mg capsule  Take 2 capsules by mouth 2 (two) times daily before meals.     hydrOXYzine pamoate 25 MG Cap  Commonly known as:  VISTARIL  Take 25 mg by mouth 3 (three) times daily.     LINZESS 145 mcg Cap capsule  Generic drug:  linaclotide  Take 145 mcg by mouth once daily. Take one po qd before 1st meal of the day on empty stomach     pantoprazole 40 MG tablet  Commonly known as:  PROTONIX  Take 40 mg by mouth once daily.     VOLTAREN 1 % Gel  Generic drug:  diclofenac sodium  APPLY 2 GRAMS TO AFFECTED AREA QID     zolpidem 10 mg Tab  Commonly known as:  AMBIEN  Take 10 mg by mouth nightly as needed.          Discharge Procedure Orders   Lifting restrictions   Scheduling Instructions: No heavy lifting x 48 hours     Call MD for:  redness, tenderness, or signs of infection (pain, swelling, redness, odor or green/yellow discharge around incision site)     Call MD for:  temperature >100.4     Call MD for:  severe uncontrolled pain     Call MD for:   Scheduling Instructions: Bleeding     Activity as tolerated

## 2018-12-13 NOTE — PROCEDURES
Date of Procedure:  12/13/2018     :  Javed Lopez MD     Indication for Procedure:  Thrombosed LUE straight arm AVG      Referring Provider:  [facility] and Dr. Carias     Procedures Performed:  1.  Access cannulation  2.  Second cannulation  3.  Venous administration of TPA  4.  Venous angioplasty  5.  Percutaneous thrombectomy  6.  Arteriogram  7.  Moderate conscious sedation     Medications Administered:  1.  2 mg of tPA IV  2.  Heparin 4000 units IV  3.  Versed 1.5 mg IV  4.  Fentanyl 75 mcg IV     Blood Loss:  Approximately 30 mL     Contrast Used:  Approximately 25 mL     Procedure in Detail:    After informed consent was obtained, the patient was prepped and draped in the usual sterile fashion.  Her LUE straight arm AV graft was cannulated in the venous facing direction with a micropuncture set.  The microwire was confirmed to be in correct location under fluoroscopy and a 4-[place] microsheath was established.  A glidewire was advanced easily to the central circulation and the microsheath was removed.  A 6-[place] sheath was established.  Then, an over-the-wire Berenstein guiding catheter was advanced to the central circulation and a central venogram was performed, which revealed patent central vasculature.  After the patient was evaluated with the physician, moderate sedation was then administered.  A pullback venogram was performed, which revealed stenosis beginning at the outflow portion of the graft, distal to the venous anastomosis.  So, the wire was reinserted and the Berenstein catheter was removed.  Then, 2 mg of TPA was injected into the venous facing sheath with pressure was held at the arterial anastomosis.  Following this, a 7 x 80 mm conquest PTA balloon was advanced to the outflow stenosis and inflated to full effacement with significant waist seen on the balloon prior to full effacement.  Angioplasty was repeated back within the graft to macerate the thrombus  all the while holding pressure at the arterial anastomosis.        The balloon was then removed and a second cannulation was made in the arterial facing direction with the micropuncture set.  Again, the microwire was confirmed to be in the correct location under fluoroscopy.  Microsheath was established and a glidewire was advanced across the arterial anastomosis and up into the brachial artery and then the micro sheath was removed and a 6-[place]an sheath was established.  At this point, there was flow noted within the access. Then using an over-the-wire Eladio catheter, we readvanced across the arterial anastomosis, and performed an arteriogram which showed widely patent arterial anastomosis and brisk uptake of contrast into the AVG, and > 6 cm of the brachial/radial/ulnar arteries were seen and it was patent.         The arterial facing guidewire and sheath were then removed and hemostasis was achieved using a #2 Ethilon suture.  Then, using the eladio, we performed a venous sweep through the outflow of the graft.  A fistulogram was then performed which showed 80% stenosis just beyond the outflow of the graft, and a 60% stenosis at the outflow anastomosis. I then evaluated the patient   I then advanced a 8 mm x 40 mm Conquest PTA balloon to the stenosis just beyond the anastomosis, inflated to full effacement with significant waist seen prior to full effacement.  Once fully effaced, no waist was observed.  We then inflated the balloon at the outflow anastomosis, full effacement with waist seen prior to effacement. No waist was observed.  Follow up fistulogram showed no residual stenosis and no evidence of extravasation.  We then administered 4000 units of IV heparin.  The sheath was removed and Hemostasis was achieved using a #2 Ethilon suture.         IMPRESSION AND PLAN:  This is a successful percutaneous thrombectomy using mechanical and pharmacologic thrombolysis.  We will have the patient return in 1 week for  fistulogram to stent that outflow lesion given the frequency in which her access thrombosis. Please refer sooner if issues with prolonged bleeding or elevated venous pressures.       Javed Lopez MD  [facility]

## 2018-12-14 ENCOUNTER — TELEPHONE (OUTPATIENT)
Dept: TRANSPLANT | Facility: CLINIC | Age: 68
End: 2018-12-14

## 2018-12-19 ENCOUNTER — HOSPITAL ENCOUNTER (OUTPATIENT)
Facility: OTHER | Age: 68
Discharge: HOME | End: 2018-12-19
Attending: INTERNAL MEDICINE | Admitting: INTERNAL MEDICINE
Payer: MEDICARE

## 2018-12-19 VITALS
HEART RATE: 80 BPM | DIASTOLIC BLOOD PRESSURE: 69 MMHG | RESPIRATION RATE: 16 BRPM | TEMPERATURE: 98 F | OXYGEN SATURATION: 98 % | SYSTOLIC BLOOD PRESSURE: 153 MMHG

## 2018-12-19 DIAGNOSIS — N18.6 ESRD (END STAGE RENAL DISEASE) ON DIALYSIS: ICD-10-CM

## 2018-12-19 DIAGNOSIS — N18.6 ESRD (END STAGE RENAL DISEASE): ICD-10-CM

## 2018-12-19 DIAGNOSIS — T82.599A MECHANICAL COMPLICATION OF VASCULAR DEVICE, INITIAL ENCOUNTER: Primary | ICD-10-CM

## 2018-12-19 DIAGNOSIS — Z99.2 ESRD (END STAGE RENAL DISEASE) ON DIALYSIS: ICD-10-CM

## 2018-12-19 DIAGNOSIS — T82.9XXD COMPLICATION OF VASCULAR ACCESS FOR DIALYSIS, SUBSEQUENT ENCOUNTER: ICD-10-CM

## 2018-12-19 PROCEDURE — 63600175 PHARM REV CODE 636 W HCPCS: Mod: TXP | Performed by: INTERNAL MEDICINE

## 2018-12-19 PROCEDURE — 36901 INTRO CATH DIALYSIS CIRCUIT: CPT | Mod: NTX | Performed by: INTERNAL MEDICINE

## 2018-12-19 PROCEDURE — 25500020 PHARM REV CODE 255: Mod: TXP | Performed by: INTERNAL MEDICINE

## 2018-12-19 PROCEDURE — C1894 INTRO/SHEATH, NON-LASER: HCPCS | Mod: TXP | Performed by: INTERNAL MEDICINE

## 2018-12-19 RX ORDER — FENTANYL CITRATE 50 UG/ML
25 INJECTION, SOLUTION INTRAMUSCULAR; INTRAVENOUS ONCE
Status: CANCELLED | OUTPATIENT
Start: 2018-12-19 | End: 2018-12-19

## 2018-12-19 RX ORDER — HEPARIN SOD,PORCINE/0.9 % NACL 1000/500ML
INTRAVENOUS SOLUTION INTRAVENOUS
Status: DISCONTINUED | OUTPATIENT
Start: 2018-12-19 | End: 2018-12-19 | Stop reason: HOSPADM

## 2018-12-19 RX ORDER — MIDAZOLAM HYDROCHLORIDE 2 MG/2ML
1 INJECTION, SOLUTION INTRAMUSCULAR; INTRAVENOUS ONCE
Status: CANCELLED | OUTPATIENT
Start: 2018-12-19 | End: 2018-12-19

## 2018-12-19 NOTE — OR NURSING
There was no sedation used for this procedure.  Patient is awake, alert, and oriented.  VS are WNL.

## 2018-12-19 NOTE — PROCEDURES
[facility] Interventional Nephrology Service     Fistulogram Procedure Report     Date of Procedure:  12/19/2018 10:30 AM     Procedures Performed: fistulogram with no intervention     Indication: high venous pressures     Referring Provider: Dr. Carias     Primary Surgeon: Irineo Devlin MD     Assist: dr. Yuriy Gibson     Medications Administered:   Lidocaine 1%     Procedure Report in Detail:   After informed consent was obtained the patient was prepped and draped in the usual sterile fashion. Her left upper straight  AVG was cannulated in the venous facing direction with a micropuncture system, confirmed in correct placement under fluroscopy, and a fistulogram was performed using iodinated contrast. Fistulogram revealed patent vasculature with no significant stenosis. Central vasculature was then evaluated with contrasted film revealing patent. Reflux arteriogram was not done. The micropuncture sheath was cannulated with a glidewire which was advanced to the central circulation, and the micropuncture sheath was removed. The sheath was pulled, and direct pressure was used to achieve hemostasis. No complications occurred during the procedure.     Estimated blood loss: < 10 ml     Estimated contrast used: 12 ml     Impression/Plan:   This was a successful fistulogram with no intervention performed in the LUE straight AVG. We will have the patient return in 1 week for suture removal and follow up exam. Please do refer the patient back if there are any further signs of stenosis (increased venous pressure alarms, prolonged bleeding or worsening adequacy).     Irineo Devlin MD  [phone]

## 2018-12-19 NOTE — BRIEF OP NOTE
[facility]  Brief Operative Note     SUMMARY     Surgery Date: 12/19/2018     Surgeon(s) and Role:    Irineo Devlin MD - Primary    Assisting Surgeon: Yuriy Gibson MD    Pre-op Diagnosis:  ESRD (end stage renal disease) on dialysis [N18.6, Z99.2]  Complication of vascular access for dialysis, subsequent encounter [T82.9XXD]    Post-op Diagnosis:  Post-Op Diagnosis Codes:     * ESRD (end stage renal disease) on dialysis [N18.6, Z99.2]     * Complication of vascular access for dialysis, subsequent encounter [T82.9XXD]    Procedure(s) (LRB):  FISTULOGRAM (Left)    Anesthesia: RN IV Sedation    Description of the findings of the procedure: The patient was prepped and draped in a sterile fashion.  This was a successful fistulogram of the left upper ext straight AV graft.  No complications occurred during the procedure and the patient was transferred to the post-operative nursing floor for recovery.    Findings/Key Components:  The patient was prepped and draped in a sterile fashion.  This was a successful fistulogram of the left upper ext straight AV graft.  No complications occurred during the procedure and the patient was transferred to the post-operative nursing floor for recovery.      Estimated Blood Loss: * No values recorded between 12/19/2018  9:41 AM and 12/19/2018  9:48 AM *         Specimens:   Specimen (12h ago, onward)    None          Discharge Note    SUMMARY     Admit Date: 12/19/2018    Discharge Date and Time:  12/19/2018 10:29 AM    Hospital Course (synopsis of major diagnoses, care, treatment, and services provided during the course of the hospital stay): The patient was prepped and draped in a sterile fashion.  This was a successful fistulogram of the left upper ext straight AV graft.  No complications occurred during the procedure and the patient was transferred to the post-operative nursing floor for recovery.        Final Diagnosis: Post-Op Diagnosis Codes:     * ESRD (end  stage renal disease) on dialysis [N18.6, Z99.2]     * Complication of vascular access for dialysis, subsequent encounter [T82.9XXD]    Disposition: Home or Self Care    Follow Up/Patient Instructions:     Medications:  Reconciled Home Medications:      Medication List      CONTINUE taking these medications    acetaminophen-codeine 300-30mg 300-30 mg Tab  Commonly known as:  TYLENOL #3  Take 1 tablet by mouth every 6 (six) hours as needed (pain).     amlodipine-valsartan  mg per tablet  Commonly known as:  EXFORGE  Take 1 tablet by mouth once daily.     calcium acetate 667 mg capsule  Commonly known as:  PHOSLO  Take 1,334 mg by mouth 3 (three) times daily with meals.     carvedilol 6.25 MG tablet  Commonly known as:  COREG  Take 6.25 mg by mouth 2 (two) times daily with meals.     cloNIDine 0.1 mg/24 hr td ptwk 0.1 mg/24 hr  Commonly known as:  CATAPRES  Place 1 patch onto the skin every 7 days.     clopidogrel 75 mg tablet  Commonly known as:  PLAVIX  Take 75 mg by mouth every other day.     cyclobenzaprine 10 MG tablet  Commonly known as:  FLEXERIL  Take 10 mg by mouth 3 (three) times daily as needed for Muscle spasms.     docusate sodium 100 MG capsule  Commonly known as:  COLACE  Take 100 mg by mouth 2 (two) times daily as needed for Constipation.     fish oil-omega-3 fatty acids 300-1,000 mg capsule  Take 2 capsules by mouth 2 (two) times daily before meals.     hydrOXYzine pamoate 25 MG Cap  Commonly known as:  VISTARIL  Take 25 mg by mouth 3 (three) times daily.     LINZESS 145 mcg Cap capsule  Generic drug:  linaclotide  Take 145 mcg by mouth once daily. Take one po qd before 1st meal of the day on empty stomach     pantoprazole 40 MG tablet  Commonly known as:  PROTONIX  Take 40 mg by mouth once daily.     VOLTAREN 1 % Gel  Generic drug:  diclofenac sodium  APPLY 2 GRAMS TO AFFECTED AREA QID     zolpidem 10 mg Tab  Commonly known as:  AMBIEN  Take 10 mg by mouth nightly as needed.          Discharge  Procedure Orders   Call MD for:  temperature >100.4     Call MD for:  severe uncontrolled pain     Call MD for:  redness, tenderness, or signs of infection (pain, swelling, redness, odor or green/yellow discharge around incision site)     Call MD for:   Order Comments: bleeding

## 2018-12-19 NOTE — H&P
[facility]  History & Physical    Subjective:      Chief Complaint/Reason for Admission: fistulogram and possible stenting    Paige Summers is a 67 y.o. female. ESRD on HD TTS at [facility] with Dr. Carias who presents with clotted access.  Patient reports she had this LUE AVG placed in January, and it has been clotted 8 times since then.  Most recent declot was 11/2018 and 12/13/18.  Patient underwent a declotting on 12/13/18, but there was concerns about restenosis of venous outflow. Patient is having high venous pressures on HD since the declot. She is here for fistulogram and possible stenting of venous outflow.     Past Medical History:   Diagnosis Date    Anemia     Anxiety     Chronic renal failure 01/10/2018    Depression     GERD (gastroesophageal reflux disease)     Gout     H pylori ulcer     Hyperlipidemia     Hypertension     Obesity      Past Surgical History:   Procedure Laterality Date    DIAGNOSTIC ULTRASOUND Left 6/18/2018    Procedure: ULTRASOUND, DIAGNOSTIC;  Surgeon: Yuriy Gibson MD;  Location: [facility] CATH LAB;  Service: Nephrology;  Laterality: Left;    DIAGNOSTIC ULTRASOUND Left 7/9/2018    Procedure: ULTRASOUND, DIAGNOSTIC;  Surgeon: Yuriy Gibson MD;  Location: [facility] CATH LAB;  Service: Nephrology;  Laterality: Left;    DIAGNOSTIC ULTRASOUND Left 8/6/2018    Procedure: ULTRASOUND, DIAGNOSTIC;  Surgeon: Yuriy Gibson MD;  Location: [facility] CATH LAB;  Service: Nephrology;  Laterality: Left;    DIAGNOSTIC ULTRASOUND Left 8/20/2018    Procedure: ULTRASOUND, DIAGNOSTIC;  Surgeon: Yuriy Gibson MD;  Location: [facility] CATH LAB;  Service: Nephrology;  Laterality: Left;    DIAGNOSTIC ULTRASOUND Left 9/19/2018    Procedure: ULTRASOUND, DIAGNOSTIC;  Surgeon: Yuriy Gibson MD;  Location: [facility] CATH LAB;  Service: Nephrology;  Laterality: Left;  suture removal. coming for 10AM    DIAGNOSTIC ULTRASOUND Left 10/10/2018    Procedure: ULTRASOUND, DIAGNOSTIC;  Surgeon:  Yuriy Gibson MD;  Location: [facility] CATH LAB;  Service: Nephrology;  Laterality: Left;    DIALYSIS FISTULA CREATION Left 02/2018    HYSTERECTOMY  1971    TVH    PERMCATH REMOVAL-TUNNELED CVC REMOVAL N/A 4/23/2018    Performed by Yuriy Gibson MD at [facility] CATH LAB    PERMCATH REWIRE- TUNNELED CATH REWIRE N/A 3/21/2018    Performed by Y[facility]y Gibson MD at [facility] CATH LAB    THROMBECTOMY OF HEMODIALYSIS ACCESS SITE Left 6/11/2018    Procedure: THROMBECTOMY, HEMODIALYSIS GRAFT OR FISTULA;  Surgeon: Yuriy Gibson MD;  Location: [facility] CATH LAB;  Service: Nephrology;  Laterality: Left;    THROMBECTOMY OF HEMODIALYSIS ACCESS SITE Left 6/13/2018    Procedure: THROMBECTOMY, HEMODIALYSIS GRAFT OR FISTULA;  Surgeon: Yuriy Gibson MD;  Location: [facility] CATH LAB;  Service: Nephrology;  Laterality: Left;    THROMBECTOMY OF HEMODIALYSIS ACCESS SITE N/A 9/6/2018    Procedure: THROMBECTOMY, HEMODIALYSIS GRAFT OR FISTULA;  Surgeon: Yuriy Gibson MD;  Location: [facility] CATH LAB;  Service: Nephrology;  Laterality: N/A;  PT coming in for noon says shes NPO    THROMBECTOMY OF HEMODIALYSIS ACCESS SITE Left 9/12/2018    Procedure: THROMBECTOMY, HEMODIALYSIS GRAFT OR FISTULA;  Surgeon: Yuriy Gibson MD;  Location: [facility] CATH LAB;  Service: Nephrology;  Laterality: Left;    THROMBECTOMY OF HEMODIALYSIS ACCESS SITE Left 11/9/2018    Procedure: THROMBECTOMY, HEMODIALYSIS GRAFT OR FISTULA;  Surgeon: Yuriy Gibson MD;  Location: [facility] CATH LAB;  Service: Nephrology;  Laterality: Left;    THROMBECTOMY OF HEMODIALYSIS ACCESS SITE Left 12/13/2018    Procedure: THROMBECTOMY, HEMODIALYSIS GRAFT OR FISTULA;  Surgeon: Javed Lopez MD;  Location: [facility] CATH LAB;  Service: Nephrology;  Laterality: Left;    THROMBECTOMY, HEMODIALYSIS GRAFT OR FISTULA Left 12/13/2018    Performed by Javed Lopez MD at [facility] CATH LAB    THROMBECTOMY, HEMODIALYSIS GRAFT OR FISTULA Left 11/9/2018    Performed by [facility] Gibson MD at [facility] CATH LAB    THROMBECTOMY,  HEMODIALYSIS GRAFT OR FISTULA Left 9/12/2018    [facility] at [facility] CATH LAB    THROMBECTOMY, HEMODIALYSIS GRAFT OR FISTULA N/A 9/6/2018    Performed by Y[facility]y Gibson MD at [facility] CATH LAB    THROMBECTOMY, HEMODIALYSIS GRAFT OR FISTULA Left 6/13/2018    Performed by Yuriy Gibson MD at [facility] CATH LAB    THROMBECTOMY, HEMODIALYSIS GRAFT OR FISTULA Left 6/11/2018    Performed by Y[facility]y Gibson MD at [facility] CATH LAB    ULTRASOUND, DIAGNOSTIC Left 10/10/2018    Performed by Y[facility]y Gibson MD at [facility] CATH LAB    ULTRASOUND, DIAGNOSTIC Left 9/19/2018    Performed by Yuriy Gibson MD at [facility] CATH LAB    ULTRASOUND, DIAGNOSTIC Left 8/20/2018    Performed by Y[facility]y Gibson MD at [facility] CATH LAB    ULTRASOUND, DIAGNOSTIC Left 8/6/2018    Performed by [facility]y Gibson MD at [facility] CATH LAB    ULTRASOUND, DIAGNOSTIC Left 7/9/2018    Performed by [facility]y Gibson MD at [facility] CATH LAB    ULTRASOUND, DIAGNOSTIC Left 6/18/2018    Performed by Yuiry Gibson MD at [facility] CATH LAB     Family History   Problem Relation Age of Onset    Alcohol abuse Mother     No Known Problems Father     Hypertension Sister     Arthritis Sister     Heart disease Brother     Heart failure Brother     Heart failure Brother     Diabetes Cousin     Breast cancer Neg Hx     Colon cancer Neg Hx     Ovarian cancer Neg Hx      Social History     Tobacco Use    Smoking status: Never Smoker    Smokeless tobacco: Never Used   Substance Use Topics    Alcohol use: No    Drug use: No       PTA Medications   Medication Sig    acetaminophen-codeine 300-30mg (TYLENOL #3) 300-30 mg Tab Take 1 tablet by mouth every 6 (six) hours as needed (pain).    amlodipine-valsartan (EXFORGE)  mg per tablet Take 1 tablet by mouth once daily.    calcium acetate (PHOSLO) 667 mg capsule Take 1,334 mg by mouth 3 (three) times daily with meals.    carvedilol (COREG) 6.25 MG tablet Take 6.25 mg by mouth 2 (two) times daily with meals.    cloNIDine  0.1 mg/24 hr td ptwk (CATAPRES) 0.1 mg/24 hr Place 1 patch onto the skin every 7 days.    clopidogrel (PLAVIX) 75 mg tablet Take 75 mg by mouth every other day.     cyclobenzaprine (FLEXERIL) 10 MG tablet Take 10 mg by mouth 3 (three) times daily as needed for Muscle spasms.    docusate sodium (COLACE) 100 MG capsule Take 100 mg by mouth 2 (two) times daily as needed for Constipation.    fish oil-omega-3 fatty acids 300-1,000 mg capsule Take 2 capsules by mouth 2 (two) times daily before meals.    hydrOXYzine pamoate (VISTARIL) 25 MG Cap Take 25 mg by mouth 3 (three) times daily.    linaclotide (LINZESS) 145 mcg Cap capsule Take 145 mcg by mouth once daily. Take one po qd before 1st meal of the day on empty stomach     pantoprazole (PROTONIX) 40 MG tablet Take 40 mg by mouth once daily.    VOLTAREN 1 % Gel APPLY 2 GRAMS TO AFFECTED AREA QID    zolpidem (AMBIEN) 10 mg Tab Take 10 mg by mouth nightly as needed.     Review of patient's allergies indicates:  No Known Allergies     Review of Systems   Constitutional: Negative for chills and fever.   Respiratory: Negative for shortness of breath.    Cardiovascular: Negative for chest pain.       Objective:      Vital Signs (Most Recent)  Temp: 98.3 °F (36.8 °C) (12/19/18 0848)  Pulse: 84 (12/19/18 0848)  Resp: 16 (12/19/18 0848)  BP: 121/64 (12/19/18 0848)  SpO2: 95 % (12/19/18 0848)    Vital Signs Range (Last 24H):  Temp:  [98.3 °F (36.8 °C)]   Pulse:  [84]   Resp:  [16]   BP: (121)/(64)   SpO2:  [95 %]     Physical Exam   Constitutional: She is oriented to person, place, and time. She appears well-developed and well-nourished. No distress.   HENT:   Head: Normocephalic and atraumatic.   Cardiovascular: Normal rate.   Pulmonary/Chest: Effort normal. No respiratory distress.   Musculoskeletal:   LUE AVG + thrill and mild pulsatile    Neurological: She is alert and oriented to person, place, and time.   Psychiatric: She has a normal mood and affect. Her behavior  is normal.         Assessment:      There are no hospital problems to display for this patient.  high venous pressure on HD possibility of recurrent outflow stenosis    Plan:    Plan for fistulogram.  Risks and benefits explained.  Consent signed.    Irineo Devlin MD  [facility]

## 2019-01-02 ENCOUNTER — TELEPHONE (OUTPATIENT)
Dept: TRANSPLANT | Facility: CLINIC | Age: 69
End: 2019-01-02

## 2019-01-02 DIAGNOSIS — Z76.82 AWAITING TRANSPLANTATION OF KIDNEY: Primary | ICD-10-CM

## 2019-01-02 NOTE — LETTER
2019     Paige Summers  [address] [address]    Dear Paige Summers:  MRN: [id number]    Congratulations! On 2019, you were placed on  the waiting list for a  donor transplant.    Your candidacy for kidney transplant is based on the following criteria: ESRD due to Hypertension.    Your transplant coordinator while on the waiting list is Cash Sun RN. They can be reached at [phone] or [phone] with any questions.      What to do now?    ? Ask your living donors to call and begin testing   Give your donors our phone number, [phone]   Make sure your donors have your name and date of birth when they call   You will get transplanted much faster if you have a living donor    ? Have your blood sent to our Transplant Lab every month   If you are on dialysis - our Transplant Lab will work with your dialysis unit to send your blood every month   If you are not on dialysis   - If you live near an [facility] lab, we will schedule you to have blood drawn every month  - If you do not live near an [facility] lab, you will be sent blood kits in the mail. You will need to take a kit to your local lab or doctor to have your blood drawn every month and mail to the Transplant Lab.     ? Call us with ANY CHANGES   Phone numbers - we must be able to reach you anytime of the day or night when a kidney is available   Address   Insurance coverage   Dialysis unit or kidney doctor   Vel: if you have surgery, stay in the hospital, have to get blood, or have an infection    ? Review your Kidney/Kidney-Pancreas Transplant Guide    This will give you detailed information about what happens when  - you are on the waiting list   - you are called when a kidney is available     The [facility] has a transplant surgeon and physician available 365  days a year, 24 hours a day to coordinate organ acceptance, procurement, surgical placement and to  address  urgent patient care issues.  You will be notified in writing of any changes to our Transplant  Centers staffing plan that would impact your ability to receive a transplant.     Attached is a letter from the [employer] for Organ Sharing (UNOS). It describes the services and  information offered to patients by UNOS and the Organ Procurement and Transplant Network. We look  forward to working with you while on the waiting list.      Congratulations,     Your Transplant Partner   [facility]    [facility] [place] [address]   [phone]      /lc    Cc: MD Loy Sewell                                                    OPTN/UNOS: Your Resource for Organ Transplant Information        If you have a question regarding your own medical care, you always should call your transplant center first. However, for general organ transplant-related information, you can call the [employer] Organ Sharing (UNOS) toll-free patient services line at 1-641.320.8790.    Anyone, including potential transplant candidates, recipients, family members/friends, living donors, and/or donor family members can call this number to:    · talk about organ donation, living donation, how transplant and donation work, the donation process, transplant policies, and transplant/donor information;  · get a free patient information kit with helpful booklets, waiting list and transplant information, and a list of all transplant centers;  · ask questions about the Organ Procurement and Transplantation Network (OPTN) web site (www.optn.transplant.hrsa.gov); the UNOS Web site (www.unos.org); or the UNOS web site for living donors and transplant recipients (www.transplantliving.org);  · learn how UNOS and the OPTN can help you;  · talk about any concerns that you may have with a transplant center and how they perform    UNOS is a not-for-profit organization that provides all of the  administrative services for the national OPTN under federal contract to the Health Resources and Services Administration (HRSA), an agency under the U.S. Department of Health and Human Services (HHS).     UNOS and OPTN responsibilities include:    · writing educational material for patients, the public and professionals;  · helping to make people aware of the need for donated organs and tissue;  · writing organ transplant policy with help from doctors, nurses, transplant patients/candidates, donor families, living donors, and the public;  · coordinating the organ matching and placement process;  · collecting information about every organ transplant and donation that occurs in the [place].    Remember, you should contact your transplant center directly if you have questions or concerns about your own medical care including medical records, work-up progress and test reports. [facility] is not your transplant center, and staff at [facility] will not be able to transfer you to your transplant center, so keep your transplant centers phone number handy. But, while you research your transplant needs and learn as much as you can about transplantation and donation, we welcome your call to our toll-free patient services line at [phone].

## 2019-01-02 NOTE — TELEPHONE ENCOUNTER
"  KIDNEY WAIT LISTING NOTE    Date of Financial clearance to list: 18    SSN/[facility]:     Organ: Kidney  Name:       Paige Summers   : 1950          Gender:     female    MRN#: [id number]                                 State of Permanent Residence:  [address] [place] [address]  Ethnicity: /Black   Race:      Black or     CLINICAL INFORMATION   Candidate Medical Urgency Status: Active  Number of Previous Kidney Transplants: 0  Number of Previous Solid Organ Transplants: 0  Did you enter number of previous kidney or other solid organ transplants? yes  Is this Candidate a Prior Living Donor: no  (If yes, please generate letter to UN[employer] with patient's date of donation, recipient SSN, signed by Surgical Director after patient is listed in order to receive priority points).      ABO  ABO Blood Group:   B POS     ABO Confirmation: (THESE DATES MUST BE PRIOR TO THE LIST DATE AND SUPPORTED BY SEPARATE LAB REPORTS)    Internal Results    Lab Results   Component Value Date    GROUPTRH B POS 2018    GROUPTRH B POS 2018     No results found for: ABO    External Results    ABO Date 1:     ABO Date 2  Are either of these ABO results based on External Labs? no  (If Yes, STOP and go to source document in Media Tab for verification).    VITALS  Height:  5' 2"  Weight:  181  (Use height from Transplant clinic visits only).  Did you enter height/weight? Yes    HLA    Class I:  Lab Results   Component Value Date    NYXH7LA 29 2018    AYNY8RK 36 2018    FWPM0PK 45 2018    UJLJ0MN 53 2018    MAUWX9QI 6 2018    TFMGL0XU 4 2018    FXRVH3NO 4 2018    IHYFU0WZ 6 2018       Class II:  Lab Results   Component Value Date    QPERYO03EG 4 2018    RDITEF96BC 11 2018    UPUTTO390FK 52 2018    IKZKXH6116 53 2018    KLNYN8AK 7 2018    VPYUX7MU 6 2018       Tested for HLA Antibodies: Yes, no " "antibodies detected     If result is "Positive" antibodies are detected     If result is "Negative or questionable" no antibodies detected    Lab Results   Component Value Date    CIPRAS Negative 06/20/2018    CIIPRAS Positive 06/20/2018       DIALYSIS INFORMATION  Is patient Pre-Dialysis: No     Report GFR being used as the criteria for placement on the kidney list. If not, leave blank  GFR < or = 20 ml/min? n/a  If Yes, Specify value  ___   ml/min     Initial date GFR became 20 or less:   Is GFR obtained from an Outside lab Result? n/a  (If YES verify with source document scanned into media)    If patient on Dialysis:    Is candidate currently on dialysis for ESRD? Yes  If Yes,  Date Chronic Dialysis Started:   1/12/2018  (verify with source document in Media Tab)   Dialysis Unit Name: [facility]  [address] [place] [address]                        Physician Name:  Dr. Mickie Boudreaux  NPI#: [id number]    DIABETES INFORMATION  Primary Native Kidney Diagnosis: Hypertensive Nephrosclerosis  C-Peptide Value - No results found for: CPEPTIDE  Current Diabetes Status: None    FOR NON-KIDNEY DEPARTMENT USE ONLY:  Additional Organs Registered? none    Maximum Acceptable Number of HLA Mismatches  ABDR:     6      (0-6)               AB:               (0-4)  ADR:   _____  (0-4)              BDR: _____ (0-4)  A:        _____  (0-2)              B:      _____ (0-2)          DR: ______ (0-2)    Will Recipient Accept?   Accept HBcAB Positive Organ:            Yes  Accept HBV OFE Positive Organ:        no  Accept HCV Antibody Positive Organ: no   Accept HCV OFE Positive Organ: no  Accept KDPI > 85 Donor ?: No                        Local: No                           Import: No    ### NURSE TO VERIFY CONSENT AND MAKE ANY NECESSARY CHANGES NEEDED IN UNET AT THE TIME OF VERIFICATION ###    Unacceptible Antigens  If yes, list     Lab Results   Component Value Date    IG6GYZC Negative 10/17/2018    CIIAB Negative " 10/17/2018       ### DO NOT LIST IF ANTIGEN VALUE WEAK ###

## 2019-01-09 DIAGNOSIS — Z76.82 ORGAN TRANSPLANT CANDIDATE: Primary | ICD-10-CM

## 2019-01-11 ENCOUNTER — OFFICE VISIT (OUTPATIENT)
Dept: TRANSPLANT | Facility: CLINIC | Age: 69
End: 2019-01-11
Payer: MEDICARE

## 2019-01-11 ENCOUNTER — LAB VISIT (OUTPATIENT)
Dept: LAB | Facility: HOSPITAL | Age: 69
End: 2019-01-11
Payer: MEDICARE

## 2019-01-11 VITALS
TEMPERATURE: 98 F | DIASTOLIC BLOOD PRESSURE: 100 MMHG | RESPIRATION RATE: 18 BRPM | HEART RATE: 82 BPM | SYSTOLIC BLOOD PRESSURE: 147 MMHG | OXYGEN SATURATION: 100 % | BODY MASS INDEX: 32.11 KG/M2 | WEIGHT: 181.19 LBS | HEIGHT: 63 IN

## 2019-01-11 DIAGNOSIS — N25.81 SECONDARY HYPERPARATHYROIDISM OF RENAL ORIGIN: Chronic | ICD-10-CM

## 2019-01-11 DIAGNOSIS — Z76.82 ORGAN TRANSPLANT CANDIDATE: ICD-10-CM

## 2019-01-11 DIAGNOSIS — N18.9 ANEMIA OF RENAL DISEASE: Chronic | ICD-10-CM

## 2019-01-11 DIAGNOSIS — Z99.2 ESRD (END STAGE RENAL DISEASE) ON DIALYSIS: Primary | ICD-10-CM

## 2019-01-11 DIAGNOSIS — Z76.82 AWAITING ORGAN TRANSPLANT STATUS: ICD-10-CM

## 2019-01-11 DIAGNOSIS — I10 ESSENTIAL HYPERTENSION: Chronic | ICD-10-CM

## 2019-01-11 DIAGNOSIS — N18.6 ESRD (END STAGE RENAL DISEASE) ON DIALYSIS: Primary | ICD-10-CM

## 2019-01-11 DIAGNOSIS — R76.0 ANTIPHOSPHOLIPID ANTIBODY POSITIVE: ICD-10-CM

## 2019-01-11 DIAGNOSIS — D63.1 ANEMIA OF RENAL DISEASE: Chronic | ICD-10-CM

## 2019-01-11 PROCEDURE — 99215 OFFICE O/P EST HI 40 MIN: CPT | Mod: S$PBB,TXP,, | Performed by: INTERNAL MEDICINE

## 2019-01-11 PROCEDURE — 36415 COLL VENOUS BLD VENIPUNCTURE: CPT | Mod: TXP

## 2019-01-11 PROCEDURE — 99999 PR PBB SHADOW E&M-EST. PATIENT-LVL IV: CPT | Mod: PBBFAC,TXP,, | Performed by: INTERNAL MEDICINE

## 2019-01-11 PROCEDURE — 86886 COOMBS TEST INDIRECT TITER: CPT | Mod: TXP

## 2019-01-11 PROCEDURE — 99999 PR PBB SHADOW E&M-EST. PATIENT-LVL IV: ICD-10-PCS | Mod: PBBFAC,TXP,, | Performed by: INTERNAL MEDICINE

## 2019-01-11 PROCEDURE — 99215 PR OFFICE/OUTPT VISIT, EST, LEVL V, 40-54 MIN: ICD-10-PCS | Mod: S$PBB,TXP,, | Performed by: INTERNAL MEDICINE

## 2019-01-11 PROCEDURE — 99214 OFFICE O/P EST MOD 30 MIN: CPT | Mod: PBBFAC,25,TXP | Performed by: INTERNAL MEDICINE

## 2019-01-11 NOTE — LETTER
January 14, 2019        Jd Carias  [address]  [address]  [facility] [address]  Phone: [phone]  Fax: [phone]             [facility]  [address]  [facility] [address]  Phone: [phone]   Patient: Paige Summers   MR Number: [id number]   YOB: 1950   Date of Visit: 1/11/2019       Dear Dr. Jd Carias    Thank you for referring Paige Summers to me for evaluation. Attached you will find relevant portions of my assessment and plan of care.    If you have questions, please do not hesitate to call me. I look forward to following Paige Summers along with you.    Sincerely,    Razia Flores MD    Enclosure    If you would like to receive this communication electronically, please contact [email or web address] or [phone] to request [facility] [employer] access.    [facility] is a tool which provides read-only access to select patient information with whom you have a relationship. Its easy to use and provides real time access to review your patients record including encounter summaries, notes, results, and demographic information.    If you feel you have received this communication in error or would no longer like to receive these types of communications, please e-mail [email or web address]

## 2019-01-11 NOTE — LETTER
Date: 1/11/2019          Listed Patient      To: Dialysis Unit  and Charge RN From: Wendy Saunders, SW    RE: Paige Summers, 1950, [id number]     At [facility], we conduct adherence checks as an important part of transplant care. Initial and listed patient assessments are not complete without adherence information.        Please complete the following information:     Current Dry Weight: ___________         Most Recent Pre-Treatment Weight: ___________ /date: _________                    Data from the last 3 months:  (data from last 3 months preferred):    Number of AMAs with dates, time, and reasons: ____________________________________________________    ______________________________________________________________________________________________    ______________________________________________________________________________________________    Number of No-Shows with dates and reasons: ______________________________________________________      ______________________________________________________________________________________________    Last intact PTH:  ___________/date: __________      Any concerns with Labs:  YES / NO      If yes, please explain:  ___________________________________________________________________________    ______________________________________________________________________________________________    Any concerns with Caregivers:  YES / NO    If yes, please explain:  ___________________________________________________________________________    ______________________________________________________________________________________________     Any concerns with Transportation:  YES / NO    If yes, please explain:  ___________________________________________________________________________    ______________________________________________________________________________________________    Any Psychiatric and/or Psychosocial  concerns:  YES / NO     If yes, please explain: ___________________________________________________________________________    ______________________________________________________________________________________________      PLEASE RETURN TO: FAX: [phone]     Thank you for collaborating with us in the care of this patient.           [address]  ?  [place] [address]  ?  phone [phone]  ?  fax [phone]  ?  [email or web address]  Confidentiality notice: The accompanying facsimile is intended solely for the use of the recipient designated above. Document(s) transmitted herewith may contain information that is confidential and privileged. Delivery, distribution or dissemination of this communication other than to the intended recipient is strictly prohibited. If you have received this facsimile in error, please notify us immediately by telephone.

## 2019-01-11 NOTE — PROGRESS NOTES
PHARM.D. PRE-TRANSPLANT NOTE:    This patient's medication therapy was evaluated as part of her pre-transplant evaluation.    The following pharmacologic concerns were noted:   - Tylenol #3 and Flexeril   - Fish oil - increased risk of bleeding  - Plavix (for clotting of fistula)  - Clonidine patch  (will need to confirm time of last placement if still taking at time of transplant)     This patient's medication profile was reviewed for contraindications for DAA Hepatitis C therapy:    [x]  No current inducers of CYP 3A4 or PGP  [x]  No amiodarone on this patient's EMR profile in the last 24 months  [x]  No past or current atrial fibrillation on this patient's EMR profile       Current Outpatient Medications   Medication Sig Dispense Refill    acetaminophen-codeine 300-30mg (TYLENOL #3) 300-30 mg Tab Take 1 tablet by mouth every 6 (six) hours as needed (pain). 10 tablet     amlodipine-valsartan (EXFORGE)  mg per tablet Take 1 tablet by mouth once daily.      calcium acetate (PHOSLO) 667 mg capsule Take 1,334 mg by mouth 3 (three) times daily with meals.      carvedilol (COREG) 6.25 MG tablet Take 6.25 mg by mouth 2 (two) times daily with meals.      cloNIDine 0.1 mg/24 hr td ptwk (CATAPRES) 0.1 mg/24 hr Place 1 patch onto the skin every 7 days.      clopidogrel (PLAVIX) 75 mg tablet Take 75 mg by mouth every other day.       cyclobenzaprine (FLEXERIL) 10 MG tablet Take 10 mg by mouth 3 (three) times daily as needed for Muscle spasms.      docusate sodium (COLACE) 100 MG capsule Take 100 mg by mouth 2 (two) times daily as needed for Constipation.      fish oil-omega-3 fatty acids 300-1,000 mg capsule Take 2 capsules by mouth 2 (two) times daily before meals.      hydrOXYzine pamoate (VISTARIL) 25 MG Cap Take 25 mg by mouth 3 (three) times daily.      linaclotide (LINZESS) 145 mcg Cap capsule Take 145 mcg by mouth once daily. Take one po qd before 1st meal of the day on empty stomach        pantoprazole (PROTONIX) 40 MG tablet Take 40 mg by mouth once daily.      VOLTAREN 1 % Gel APPLY 2 GRAMS TO AFFECTED AREA QID  3    zolpidem (AMBIEN) 10 mg Tab Take 10 mg by mouth nightly as needed.       No current facility-administered medications for this visit.          Currently she is responsible for preparing / administering this patient's medications on a daily basis.  I am available for consultation and can be contacted, as needed by the other members of the Kidney Transplant team.

## 2019-01-11 NOTE — PROGRESS NOTES
Kidney Transplant Recipient Reevalulation    Referring Physician: Jd Carias  Current Nephrologist: Jd Carias  Waitlist Status: active  Dialysis Start Date: 1/12/2018    Subjective:     CC:  Six month reassessment of kidney transplant candidacy.    HPI:  Ms. Summers is a 68 y.o. year old Black or  female with ESRD presumed secondary to HTN.  She has been on the wait list for a kidney transplant at [facility] since 1/12/2018. Patient is currently on hemodialysis started on 1/12/18. Patient is dialyzing on TTS schedule.  Patient reports that she is tolerating dialysis well.. She has a LUE AV fistula. Patient was last seen in initial RR clinic on 6/20/18. She was approved pending in selection committee on 9/28/18 for urology clearance for hyperechoic kidney lesion and repeat anti-coagulation studies. She was seen and cleared by urology on 10/17/18.  She had repeat anti-coagulation studies and referred to hem-onc who saw the patient on 12/5/18. 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 Hem-onc cleared patient and felt that she did not have an increased risk of kidney allograft thrombosis. She is on Plavix for recurrent dialysis thrombosis (she recalls 6 times).      NM stress negative from 7/11/18. TTE from 7/11/18 with LVEF 60-65% and PA pressure of 15 mmHg.     She denies any recent hospitalizations or ED visits.    She doesn't really exercise but she will walk around [facility] once a week. She lives with this her friend and 7 yo great grand-baby. She does household chores - cooking, cleaning, laundry. With the activity that she does she denies any chest pain or claudication but does have knee pain. She admits to SOB occasionally but states she walked from parking garage to clinic without any issues. She denies having any falls. Doesn't use a cane, walker, or scooter. Patient was able to walk with this writer from clinic to the 2nd  floor using the stairwell closest to the renal transplant clinic and back to clinic exam room without any MEMBRENO, chest pain, or claudication. She used the railing to hold onto going up and down the stairs.     She is accompanied to visit by her friend    Review of Systems   Constitutional: +fatigue; Denies fever/chills, night sweats  EENT: +vision problems - needs to go see eye doctor; Denies hearing problems, trouble swallowing.   Respiratory: +MEMBRENO as mentioned above; Denies orthopnea, wheezing, hemoptysis, denies known TB exposure or history of positive TB skin test  Cardiovascular: Denies chest pain, palpitations, history of MI, history of stroke, history of DVT  Gastrointestinal: +chronic constipation; Denies abdominal pain, nausea/vomiting/diarrhea/constipation. Denies history of GI bleeding or ulcers.   Genitourinary: +urinates 2-3 times a day; +used to have issues with stress incontinence but states after she started dialysis she hasn't had issues with this; Denies history of kidney stones, recurrent UTI's, history of urinary obstruction, gross hematuria, dysuria, urinary frequency  Musculoskeletal: +knee pain (R>L); +hand pain  Skin: Denies history of skin cancer, denies rash, ulcerations  Heme/onc: Denies any history of cancer, denies history of coagulopathies or bleeding disorders  Endocrine: Denies thyroid disease, unintentional weight loss/weight gain  Neurological: +left hand steal symptoms; +occasional dizziness/lightheadedness with hypotension at the end of dialysis - states her BP is low in dialysis maybe a couple times a month; +numbness and tingling in bilateral hands (L>R); Denies tremors, seizures, headaches  Psychiatric: +takes Benadryl for anxiety before dialysis; Denies depression. Denies hallucinations or delusions.    Potential donors: she has an approved donor     Medications:  Current Outpatient Medications   Medication Sig Dispense Refill    amlodipine-valsartan (EXFORGE)  mg per  "tablet Take 1 tablet by mouth once daily.      calcium acetate (PHOSLO) 667 mg capsule Take 1,334 mg by mouth 3 (three) times daily with meals.      carvedilol (COREG) 6.25 MG tablet Take 6.25 mg by mouth 2 (two) times daily with meals.      cloNIDine 0.1 mg/24 hr td ptwk (CATAPRES) 0.1 mg/24 hr Place 1 patch onto the skin every 7 days.      clopidogrel (PLAVIX) 75 mg tablet Take 75 mg by mouth every other day.       cyclobenzaprine (FLEXERIL) 10 MG tablet Take 10 mg by mouth 3 (three) times daily as needed for Muscle spasms.      docusate sodium (COLACE) 100 MG capsule Take 100 mg by mouth 2 (two) times daily as needed for Constipation.      fish oil-omega-3 fatty acids 300-1,000 mg capsule Take 2 capsules by mouth 2 (two) times daily before meals.      hydrOXYzine pamoate (VISTARIL) 25 MG Cap Take 25 mg by mouth 3 (three) times daily.      linaclotide (LINZESS) 145 mcg Cap capsule Take 145 mcg by mouth once daily. Take one po qd before 1st meal of the day on empty stomach       VOLTAREN 1 % Gel APPLY 2 GRAMS TO AFFECTED AREA QID  3    zolpidem (AMBIEN) 10 mg Tab Take 10 mg by mouth nightly as needed.       No current facility-administered medications for this visit.          Objective:   body mass index is 31.91 kg/m².   BP (!) 147/100 (BP Location: Right arm, Patient Position: Sitting, BP Method: Medium (Automatic))   Pulse 82   Temp 98.1 °F (36.7 °C) (Oral)   Resp 18   Ht 5' 3.19" (1.605 m)   Wt 82.2 kg (181 lb 3.5 oz)   LMP 08/08/1971   SpO2 100%   BMI 31.91 kg/m²       Physical Exam   General: No acute distress, well groomed, alert and oriented x 3  HEENT: Normocephalic, atraumatic, PERRLA, sclera anicteric, conjunctiva/corneas clear, EOM's intact bilaterally, external inspection of ears and nose normal, moist mucous membranes, no oral ulcerations/lesions; edentulous  Neck: Supple, symmetrical, trachea midline, no masses, no carotid bruits, no JVD, thyroid is normal without nodules or " enlargement   Respiratory: Clear to auscultation bilaterally, respirations unlabored, no rales/rhonchi/wheezing   Cardiovacular: Regular rate and rhythm, S1, S2 normal, no murmurs, no rubs or gallops   Gastrointestinal: Soft, non-tender, bowel sounds normal, no masses, no palpable organomegaly  Extremities: No clubbing or cyanosis of upper extremities bilaterally, no pedal edema bilaterally; +2 bilateral radial pulses  Skin: warm and dry; no rash on exposed skin  Lymph nodes: Cervical and supraclavicular nodes normal   Neurologic: No focal neurologic deficits.   Musculoskeletal: moves all extremities without difficulty, FROM, 5/5 strength, ambulates without an assistive device  Psychiatric: Normal mood and affect. Responds appropriately to questions.  Access: LUE AVF +thrill/bruit     Labs:  Lab Results   Component Value Date    WBC 9.53 06/20/2018    HGB 10.9 (L) 06/20/2018    HCT 34.2 (L) 06/20/2018     06/20/2018    K 4.1 06/20/2018    CL 95 06/20/2018    CO2 34 (H) 06/20/2018    BUN 33 (H) 06/20/2018    CREATININE 7.1 (H) 06/20/2018    EGFRNONAA 5.4 (A) 06/20/2018    CALCIUM 10.7 (H) 06/20/2018    PHOS 4.6 (H) 06/20/2018    ALBUMIN 3.7 06/20/2018    AST 16 06/20/2018    ALT 9 (L) 06/20/2018    .0 (H) 06/20/2018       No results found for: PREALBUMIN, BILIRUBINUA, GGT, AMYLASE, LIPASE, PROTEINUA, NITRITE, RBCUA, WBCUA    No results found for: HLAABCTYPE    Lab Results   Component Value Date    CPRA 0 10/17/2018    CPRA 0 10/17/2018    CPRA 0 10/17/2018    QX8OTDB Negative 10/17/2018    CIIAB Negative 10/17/2018       Labs were reviewed with the patient.    Pre-transplant Workup:   Reviewed with the patient.    Assessment:     1. ESRD (end stage renal disease) on dialysis    2. Antiphospholipid antibody positive    3. Essential hypertension    4. Awaiting organ transplant status    5. Anemia of renal disease    6. Secondary hyperparathyroidism of renal origin        Plan:     Transplant Candidacy:    Ms. Summers is a suitable kidney transplant candidate.  Meets center eligibility for accepting HCV+ donor offer - yes.  Patient educated on HCV+ donors. Paige is willing to accept HCV+ donor offer -  yes   Patient is a candidate for KDPI > 85 kidney donor offer - no.  She remains in overall stable health, and will remain active on the transplant list.    Patient consented for A2 donor --> B recipient kidney donation.     Exercise: reminded Paige of the importance of regular exercise for weight management, blood sugar and blood pressure management.  I also explained exercise has been shown to improve cardiovascular health, energy level, and sleep hygiene.  Lastly, I advised her that cardiovascular complications are leading cause of death and regular exercise can help lower this risk.      Razia Flores MD       Follow-up:   In addition to the tests noted in the plan, Ms. Summers will continue to have reevaluation as per the standing pre-kidney transplant protocol:  1. Monthly blood for PRA  2. Annual return to clinic, except HIV positive, > 65 years of age, or pancreas transplant candidates who will be scheduled to see transplant every 6 months while in pre-transplant phase  3. Annual re-testing: CXR, EKG, yearly mammograms for women over 40 and PSA for males over 40, cardiology follow-up as recommended by initial cardiology pre-transplant evaluation  4. Renal ultrasound every 2 years  5. Baseline colonoscopy after age 50 and repeated as recommended    UNOS Patient Status  Functional Status: 70% - Cares for self: unable to carry on normal activity or active work  Physical Capacity: No Limitations

## 2019-01-11 NOTE — PROGRESS NOTES
Transplant Recipient Adult Psychosocial Assessment(Update)    Paige Summers  [address] [facility] [address]  Telephone Information:   Mobile [phone]   Home  [phone] (home)  Work  There is no work phone number on file.  E-mail  No e-mail address on record    Sex: female  YOB: 1950  Age: 68 y.o.    Encounter Date: 2019  [place] Citizen: yes  Primary Language: English   Needed: no    Emergency Contact:  Name: Mayela Beckham  Relationship: daughter  Address: [address] [place] [address]; [place] [id number]; [phone]  Phone Numbers:  n/a (home), n/a (work), [phone] (mobile)    Family/Social Support:   Number of dependents/: none  Marital history:  twice;  from first ,  second; two adult dtrs living, two children .  A son was murdered at age 23 and eldest dtr  of an anurysm.    Other family dynamics: Pt's parents are .  She has one sister who lives in [place], [place].  Pt provides care to judy hassanandtrIsmael, 7 y/o [phone].      Household Composition:  Pt's 7 y/o great grndtr lives with her:  Fideldago Beckham.      Do you and your caregivers have access to reliable transportation? yes  PRIMARY CAREGIVER: Lisa Burns will be primary caregiver, phone number [phone]. Lisa is a friend who lives in [place], but is staying with her locally because the pt is scheduled to have a living donor surgery. She is here with her today.  She plans to catch the first [employer] out of [place] when pt is called and can be in town within 24 hrs.  Lisa came from [place] to accompany pt to her appointments today.     provided in-depth information to patient and caregiver regarding pre- and post-transplant caregiver role.   strongly encourages patient and caregiver to have concrete plan regarding post-transplant care giving, including back-up caregiver(s) to ensure care giving needs are met as  needed.    Patient and Caregiver states understanding all aspects of caregiver role/commitment and is able/willing/committed to being caregiver to the fullest extent necessary.    Patient and Caregiver verbalizes understanding of the education provided today and caregiver responsibilities.         remains available. Patient and Caregiver agree to contact  in a timely manner if concerns arise.      Able to take time off work without financial concerns: yes.     Additional Significant Others who will Assist with Transplant:  Name: Avril Trinidad  Age: 55  City: [place] State: [place]  Relationship: friend  Does person drive? yes    Name: Marlene Mir [phone]  Age: 69  City: [place] State: [place]  Relationship: /friend  Does person drive? yes     Name: Maxine Sun  [phone] c; [phone]   Age: not provided  City: [facility] State: [place]  Relationship: neice  Does person drive? yes    Name: Paige Powell [phone]  Age: not provided  City: [place] State: [place]  Relationship: sister  Does person drive? yes    Damaris Andre [phone]  Lisbet Trinidad [phone]  Maddi Trinidad [phone]  Lilly Cowart [phone]  Brittney Gonzalez [phone]  Christy Lopez ([facility]) [phone]    Living Will: yes  Healthcare Power of : yes  Advance Directives on file: <Received per medical record.  Verbally reviewed LW/HCPA information.   provided patient with copy of LW/HCPA documents and provided education on completion of forms.    Living Donors: Yes.  Name: Samir Lopez. Surgery was confirmed, but due to donor needing surgery herself the donor surgery got pushed back and will be rescheduled when the donor is cleared.     Highest Education Level: High School (9-12) or GED  Reading Ability: 12th grade  Reports difficulty with: N/A wears glasses  Learns Best By:  multisensory     Status: no  VA Benefits: no     Working for Income: No  If no, reason not working:  Disability  Patient is disabled.  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    Spouse/Significant Other Employment: friend Lisa is retired    Disabled: yes: date disability began: , due to: Arthritis, HTN and gout.    Monthly Income:  SS Disability: $1083.00  Able to afford all costs now and if transplanted, including medications: yes SW consulted with Emma, , about concerns for pt's ability to afford meds with only Medicare ABD. Kalani reports that pt's has Medicaid and her meds will be between $0-8 dollars.   Patient and Caregiver verbalizes understanding of personal responsibilities related to transplant costs and the importance of having a financial plan to ensure that patients transplant costs are fully covered.       provided fundraising information/education. Patient and Caregiververbalizes understanding.   remains available.    Insurance:   Payor/Plan Subscr  Sex Relation Sub. Ins. ID Effective Group Num   1. [place] - [place]* BECKY DORSEY* 1950 Female  [id number]                                    [address]   2. [place] BECKY [place]ORSEY* 1950 Female  [id number]* 3/1/15                                    [address] [address]     Primary Insurance (for UNOS reporting): Public Insurance - Medicare FFS (Fee For Service)  Secondary Insurance (for UNOS reporting): Public Insurance - Medicaid;   Patient and Caregiver verbalizes clear understanding that patient may experience difficulty obtaining and/or be denied insurance coverage post-surgery. This includes and is not limited to disability insurance, life insurance, health insurance, burial insurance, long term care insurance, and other insurances.      Patient and Caregiver also reports understanding that future health concerns related to or unrelated to transplantation may not be covered by patient's insurance.  Resources and information  provided and reviewed.     Patient and Caregiver provides verbal permission to release any necessary information to outside resources for patient care and discharge planning.  Resources and information provided are reviewed.      Dialysis Adherence: Patient reports good compliance with dialysis.  Dialysis compliance report request letter sent to unit.     Infusion Service: patient utilizing? no  Home Health: patient utilizing? yes  Nurse weekly for vitals and check in  DME: yes walker, cane and bp cuff  Pulmonary/Cardiac Rehab: none   ADLS:  Pt states ability to independently accomplish all adls.    Adherence:   Pt states current and expected compliance with all healthcare recommendations..  Adherence education and counseling provided.     Per History Section:  Past Medical History:   Diagnosis Date    Anemia     Anemia of renal disease     Anxiety     Chronic renal failure 01/10/2018    Depression     Essential hypertension     GERD (gastroesophageal reflux disease)     Gout     H pylori ulcer     Hyperlipidemia     Hypertension     Obesity     Secondary hyperparathyroidism of renal origin      Social History     Tobacco Use    Smoking status: Never Smoker    Smokeless tobacco: Never Used   Substance Use Topics    Alcohol use: No     Social History     Substance and Sexual Activity   Drug Use No     Social History     Substance and Sexual Activity   Sexual Activity No    Partners: Male    Comment: Celibate since 2010       Per Today's Psychosocial:  Tobacco: none, patient denies any use.  Alcohol: none, patient denies any use.  Illicit Drugs/Non-prescribed Medications: none, patient denies any use.    Patient and Caregiver states clear understanding of the potential impact of substance use as it relates to transplant candidacy and is aware of possible random substance screening.  Substance abstinence/cessation counseling, education and resources provided and reviewed.      Arrests/DWI/Treatment/Rehab: patient denies    Psychiatric History:    Mental Health: pt denies mental health concerns  Psychiatrist/Counselor: none  Medications:  none  Suicide/Homicide Issues: Pt denies current or past suicidal/homicidal ideation.   Safety at home: Pt denies any home safety concerns.    Knowledge: Patient and Caregiver states having clear understanding and realistic expectations regarding the potential risks and potential benefits of organ transplantation and organ donation and agrees to discuss with health care team members and support system members, as well as to utilize available resources and express questions and/or concerns in order to further facilitate the pt informed decision-making.  Resources and information provided and reviewed.    Patient and Caregiver is aware of [facility] affiliation and/or partnership with agencies in home health care, LTAC, SNF, [facility], and other hospitals and clinics.    Understanding: Patient and Caregiver reports having a clear understanding of the many lifetime commitments involved with being a transplant recipient, including costs, compliance, medications, lab work, procedures, appointments, concrete and financial planning, preparedness, timely and appropriate communication of concerns, abstinence (ETOH, tobacco, illicit non-prescribed drugs), adherence to all health care team recommendations, support system and caregiver involvement, appropriate and timely resource utilization and follow-through, mental health counseling as needed/recommended, and patient and caregiver responsibilities.  Social Service Handbook, resources and detailed educational information provided and reviewed.  Educational information provided.    Patient and Caregiver also reports current and expected compliance with health care regime and states having a clear understanding of the importance of compliance.      Patient and Caregiver reports a clear understanding that risks and  benefits may be involved with organ transplantation and with organ donation.       Patient and Caregiver also reports clear understanding that psychosocial risk factors may affect patient, and include but are not limited to feelings of depression, generalized anxiety, anxiety regarding dependence on others, post traumatic stress disorder, feelings of guilt and other emotional and/or mental concerns, and/or exacerbation of existing mental health concerns.  Detailed resources provided and discussed.      Patient and Caregiver agrees to access appropriate resources in a timely manner as needed and/or as recommended, and to communicate concerns appropriately.  Patient and Caregiver also reports a clear understanding of treatment options available.     Patient and Caregiver received education in a group setting.   reviewed education, provided additional information, and answered questions.    Feelings or Concerns: Pt stated her main concern is financial.    Coping: pt stated she is a homebody; likes spending time with great grndtr and going to [employer].  She prays.    Goals: Get off dialysis.  Patient referred to Vocational Rehabilitation.    Interview Behavior: Patient and Caregiver presents as alert and oriented x 4, pleasant, good eye contact, well groomed, recall good, concentration/judgement good, average intelligence, calm, communicative, cooperative and asking and answering questions appropriately. She was accompanied by her friend, Lisa who presented as supportive of pt's pursuit of transplant.       Transplant Social Work - Candidacy  Assessment/Plan:     Psychosocial Suitability: Patient presents as a suitable candidate for kidney transplant at this time. Based on psychosocial risk factors, patient presents as low risk, due to financial concerns resolved. Pt now has Medicaid. Strong caregiver plan and lack of psychosocial concerns at this time..    Recommendations/Additional Comments: SW recommends  that pt conduct fundraising to assist pt with pay for cost of medication, food,gas, and other transplant related expenses. SW recommends that pt remain free of all tobacco,ETOH, and substance use. SW remains available to assist with any concerns that may arise as pt navigates through the transplant process.        Wendy Saunders LMSW

## 2019-01-12 LAB — BLD GP AB SCN TITR SERPL: 1 {TITER}

## 2019-01-14 ENCOUNTER — TELEPHONE (OUTPATIENT)
Dept: TRANSPLANT | Facility: CLINIC | Age: 69
End: 2019-01-14

## 2019-01-14 NOTE — PROGRESS NOTES
LISTED PATIENT EDUCATION NOTE    Ms. Paige Summers was seen in LISTED kidney transplant clinic for evaluation for kidney, kidney/pancreas or pancreas only transplant.  The patient attended a group education session that discussed/reviewed the following aspects of transplantation: evaluation and selection committee process, UNOS waitlist management/multiple listings, types of organs offered (KDPI < 85%, KDPI > 85%, PHS increased risk, DCD, HCV+), financial aspects, surgical procedures, dietary instruction pre- and post-transplant, health maintenance pre- and post-transplant, post-transplant hospitalization and outpatient follow-up, potential to participate in a research protocol, and medication management and side effects.  A question and answer session was provided after the presentation.    The patient was seen by all members of the multi-disciplinary team to include: Nephrologist/PA, Surgeon, , Transplant Coordinator, , Pharmacist and Dietician (if applicable).    The patient reviewed and signed all consents for evaluation which were witnessed and sent to scanning into the EPIC chart.    The patient was given an education book and plan for further evaluation based on her individual assessment.      The patient was encouraged to call with any questions or concerns.

## 2019-01-14 NOTE — PROGRESS NOTES
Patient's chart reviewed today. Patient due for follow-up in July 2019. Appointments will be scheduled per protocol. HLA sample current, in lab, and being received on a regular basis.

## 2019-01-14 NOTE — TELEPHONE ENCOUNTER
Adherence check:  SW received adherence check via fax from [facility]. In the last 3 months, pt has had 0AMAs, 0 no-shows and does not sign off early. No concerns with labs listed. Last PTH was 404 on 12/18/18. No concerns with caregivers, transportation or psychosocial concerns listed. SW remains available.

## 2019-01-17 ENCOUNTER — TELEPHONE (OUTPATIENT)
Dept: TRANSPLANT | Facility: CLINIC | Age: 69
End: 2019-01-17

## 2019-01-18 NOTE — TELEPHONE ENCOUNTER
ON-CALL NOTE    UNOS# [id number]    Notified by Dalton Romo, , that Paige Summers on list for cadaveric kidney.  Spoke with patient and identified no acute medical issues in telephone assessment. Protocol script read to patient regarding HCV+ donor offer. Patient verbalized understanding, all questions answered, patient declines organ offer.  Current sample of blood is available for crossmatch.  Patient reports no sensitizing event since last blood sample for PRA received.    Notified by Dalton Romo that crossmatch is negative.  Patient notified of test results and verbalized understanding.

## 2019-02-08 ENCOUNTER — TELEPHONE (OUTPATIENT)
Dept: TRANSPLANT | Facility: CLINIC | Age: 69
End: 2019-02-08

## 2019-02-08 NOTE — TELEPHONE ENCOUNTER
Requested labs from [facility]. Spoke w/pt who denies recent hospitalizations, ED visits, illness, SOB, open wounds, etc.

## 2019-02-19 ENCOUNTER — HOSPITAL ENCOUNTER (OUTPATIENT)
Facility: OTHER | Age: 69
Discharge: HOME | End: 2019-02-19
Attending: INTERNAL MEDICINE | Admitting: INTERNAL MEDICINE
Payer: MEDICARE

## 2019-02-19 VITALS
WEIGHT: 180.75 LBS | DIASTOLIC BLOOD PRESSURE: 80 MMHG | OXYGEN SATURATION: 96 % | RESPIRATION RATE: 16 BRPM | SYSTOLIC BLOOD PRESSURE: 149 MMHG | TEMPERATURE: 98 F | HEIGHT: 63 IN | HEART RATE: 80 BPM | BODY MASS INDEX: 32.03 KG/M2

## 2019-02-19 DIAGNOSIS — T82.868A THROMBOSIS OF KIDNEY DIALYSIS ARTERIOVENOUS GRAFT, INITIAL ENCOUNTER: Primary | ICD-10-CM

## 2019-02-19 DIAGNOSIS — Z99.2 ESRD (END STAGE RENAL DISEASE) ON DIALYSIS: ICD-10-CM

## 2019-02-19 DIAGNOSIS — N18.6 ESRD (END STAGE RENAL DISEASE) ON DIALYSIS: ICD-10-CM

## 2019-02-19 DIAGNOSIS — T82.9XXD COMPLICATION OF VASCULAR ACCESS FOR DIALYSIS, SUBSEQUENT ENCOUNTER: ICD-10-CM

## 2019-02-19 PROCEDURE — 25500020 PHARM REV CODE 255: Mod: NTX | Performed by: INTERNAL MEDICINE

## 2019-02-19 PROCEDURE — 36905 THRMBC/NFS DIALYSIS CIRCUIT: CPT | Performed by: INTERNAL MEDICINE

## 2019-02-19 PROCEDURE — C1757 CATH, THROMBECTOMY/EMBOLECT: HCPCS | Mod: NTX | Performed by: INTERNAL MEDICINE

## 2019-02-19 PROCEDURE — C1894 INTRO/SHEATH, NON-LASER: HCPCS | Mod: NTX | Performed by: INTERNAL MEDICINE

## 2019-02-19 PROCEDURE — 36904 THRMBC/NFS DIALYSIS CIRCUIT: CPT | Mod: TXP | Performed by: INTERNAL MEDICINE

## 2019-02-19 PROCEDURE — 25000003 PHARM REV CODE 250: Mod: TXP | Performed by: INTERNAL MEDICINE

## 2019-02-19 PROCEDURE — C1769 GUIDE WIRE: HCPCS | Mod: NTX | Performed by: INTERNAL MEDICINE

## 2019-02-19 PROCEDURE — C1725 CATH, TRANSLUMIN NON-LASER: HCPCS | Mod: TXP | Performed by: INTERNAL MEDICINE

## 2019-02-19 PROCEDURE — 63600175 PHARM REV CODE 636 W HCPCS: Mod: JG,TXP | Performed by: INTERNAL MEDICINE

## 2019-02-19 RX ORDER — LIDOCAINE HYDROCHLORIDE 10 MG/ML
INJECTION INFILTRATION; PERINEURAL
Status: DISCONTINUED | OUTPATIENT
Start: 2019-02-19 | End: 2019-02-19 | Stop reason: HOSPADM

## 2019-02-19 RX ORDER — SODIUM CHLORIDE 0.9 % (FLUSH) 0.9 %
5 SYRINGE (ML) INJECTION
Status: DISCONTINUED | OUTPATIENT
Start: 2019-02-19 | End: 2019-02-19 | Stop reason: HOSPADM

## 2019-02-19 RX ORDER — FENTANYL CITRATE 50 UG/ML
INJECTION, SOLUTION INTRAMUSCULAR; INTRAVENOUS
Status: DISCONTINUED | OUTPATIENT
Start: 2019-02-19 | End: 2019-02-19 | Stop reason: HOSPADM

## 2019-02-19 RX ORDER — HEPARIN SOD,PORCINE/0.9 % NACL 1000/500ML
INTRAVENOUS SOLUTION INTRAVENOUS
Status: DISCONTINUED | OUTPATIENT
Start: 2019-02-19 | End: 2019-02-19 | Stop reason: HOSPADM

## 2019-02-19 RX ORDER — LIDOCAINE HYDROCHLORIDE 10 MG/ML
1 INJECTION, SOLUTION EPIDURAL; INFILTRATION; INTRACAUDAL; PERINEURAL ONCE
Status: DISCONTINUED | OUTPATIENT
Start: 2019-02-19 | End: 2019-02-19 | Stop reason: HOSPADM

## 2019-02-19 RX ORDER — HEPARIN SODIUM 1000 [USP'U]/ML
INJECTION, SOLUTION INTRAVENOUS; SUBCUTANEOUS
Status: DISCONTINUED | OUTPATIENT
Start: 2019-02-19 | End: 2019-02-19 | Stop reason: HOSPADM

## 2019-02-19 RX ORDER — MUPIROCIN 20 MG/G
OINTMENT TOPICAL
Status: DISCONTINUED | OUTPATIENT
Start: 2019-02-19 | End: 2019-02-19

## 2019-02-19 RX ORDER — MIDAZOLAM HYDROCHLORIDE 1 MG/ML
INJECTION, SOLUTION INTRAMUSCULAR; INTRAVENOUS
Status: DISCONTINUED | OUTPATIENT
Start: 2019-02-19 | End: 2019-02-19 | Stop reason: HOSPADM

## 2019-02-19 NOTE — H&P
[facility] Cath Lab [address] [address]  History & Physical    Subjective:      Chief Complaint/Reason for Admission: Here for declot    Paige Summers is a 68 y.o. female with ESRD (TTS at [facility] with Dr. Carias) who presents today for declot after her unit noted no flow this morning.  She did fine most recently, and dialyzed on Saturday without issue.  Of note, her last thrombectomy was 2 months ago, one month prior to that and 2 months prior to that.  We did a post-thrombectomy fistulogram to evaluate for stent placement, but determined at that time that she had no focal re-stenosis so it was not felt to be necessary.        Past Medical History:   Diagnosis Date    Anemia     Anemia of renal disease     Anxiety     Chronic renal failure 01/10/2018    Depression     Essential hypertension     GERD (gastroesophageal reflux disease)     Gout     H pylori ulcer     Hyperlipidemia     Hypertension     Obesity     Secondary hyperparathyroidism of renal origin      Past Surgical History:   Procedure Laterality Date    DIALYSIS FISTULA CREATION Left 02/2018    FISTULOGRAM Left 12/19/2018    [facility]formed [facility] CATH LAB    HYSTERECTOMY  1971    TVH    PERMCATH REMOVAL-TUNNELED CVC REMOVAL N/A 4/23/2018    Performed by [facility]y Gibson [facility] at [facility] CATH LAB    PERMCATH REWIRE- TUNNELED CATH REWIRE N/A 3/21/2018    Per[facility] by [facility] at [facility] CATH LAB    THROMBECTOMY, HEMODIALYSIS GRAFT OR FISTULA Left 12/13/2018    Performed by [facility] at [facility] CATH LAB    THROMBECTOMY, HEMODIALYSIS GRAFT OR FISTULA Left 11/9/2018    [facility] by [facility] at [facility] CATH LAB    THROMBECTOMY, HEMODIALYSIS GRAFT OR FISTULA Left 9/12/2018    Per[facility] by [facility] at [facility] CATH LAB    THROMBECTOMY, HEMODIALYSIS GRAFT OR FISTULA N/A 9/6/2018    Per[facility] by [facility] at [facility] CATH LAB    THROMBECTOMY, HEMODIALYSIS GRAFT OR FISTULA Left 6/13/2018     [facility] CATH LAB    THROMBECTOMY, HEMODIALYSIS GRAFT OR FISTULA Left 6/11/2018    [facility] by [facility] CATH LAB    ULTRASOUND, DIAGNOSTIC Left 10/10/2018    [facility] CATH LAB    ULTRASOUND, DIAGNOSTIC Left 9/19/2018    Performed by [facility] CATH LAB    ULTRASOUND, DIAGNOSTIC Left 8/20/2018    Performed by [facility] Gibson [facility] at [facility] CATH LAB    ULTRASOUND, DIAGNOSTIC Left 8/6/2018    Performed by [facility] at [facility] CATH LAB    ULTRASOUND, DIAGNOSTIC Left 7/9/2018    Performed by [facility] CATH LAB    ULTRASOUND, DIAGNOSTIC Left 6/18/2018    Performed by [facility] at [facility] CATH LAB     Family History   Problem Relation Age of Onset    Alcohol abuse Mother     No Known Problems Father     Hypertension Sister     Arthritis Sister     Heart disease Brother     Heart failure Brother     Heart failure Brother     Diabetes Cousin     Breast cancer Neg Hx     Colon cancer Neg Hx     Ovarian cancer Neg Hx      Social History     Tobacco Use    Smoking status: Never Smoker    Smokeless tobacco: Never Used   Substance Use Topics    Alcohol use: No    Drug use: No       PTA Medications   Medication Sig    amlodipine-valsartan (EXFORGE)  mg per tablet Take 1 tablet by mouth once daily.    calcium acetate (PHOSLO) 667 mg capsule Take 1,334 mg by mouth 3 (three) times daily with meals.    carvedilol (COREG) 6.25 MG tablet Take 6.25 mg by mouth 2 (two) times daily with meals.    cloNIDine 0.1 mg/24 hr td ptwk (CATAPRES) 0.1 mg/24 hr Place 1 patch onto the skin every 7 days.    clopidogrel (PLAVIX) 75 mg tablet Take 75 mg by mouth every other day.     cyclobenzaprine (FLEXERIL) 10 MG tablet Take 10 mg by mouth 3 (three) times daily as needed for Muscle spasms.    docusate sodium (COLACE) 100 MG capsule Take 100 mg by mouth 2 (two) times daily as needed for Constipation.     fish oil-omega-3 fatty acids 300-1,000 mg capsule Take 2 capsules by mouth 2 (two) times daily before meals.    hydrOXYzine pamoate (VISTARIL) 25 MG Cap Take 25 mg by mouth 3 (three) times daily.    linaclotide (LINZESS) 145 mcg Cap capsule Take 145 mcg by mouth once daily. Take one po qd before 1st meal of the day on empty stomach     zolpidem (AMBIEN) 10 mg Tab Take 10 mg by mouth nightly as needed.    VOLTAREN 1 % Gel APPLY 2 GRAMS TO AFFECTED AREA QID     Review of patient's allergies indicates:  No Known Allergies     Review of Systems   Constitutional: Negative.    Respiratory: Negative.    Cardiovascular: Negative.        Objective:      Vital Signs (Most Recent)  Temp: 98.1 °F (36.7 °C) (02/19/19 0958)  Pulse: 76 (02/19/19 0958)  Resp: 16 (02/19/19 0958)  BP: 125/68 (02/19/19 0958)  SpO2: 98 % (02/19/19 0958)    Vital Signs Range (Last 24H):  Temp:  [98.1 °F (36.7 °C)]   Pulse:  [76]   Resp:  [16]   BP: (125)/(68)   SpO2:  [98 %]     Physical Exam   Constitutional: She is oriented to person, place, and time. She appears well-developed and well-nourished. No distress.   HENT:   Head: Normocephalic and atraumatic.   Eyes: EOM are normal.   Neck: Neck supple.   Pulmonary/Chest: Effort normal. No respiratory distress.   Musculoskeletal:   LUE straight arm AVG with no thrill   Neurological: She is alert and oriented to person, place, and time.   Skin: Skin is warm and dry. She is not diaphoretic.   Psychiatric: She has a normal mood and affect. Her behavior is normal.       Assessment:      Active Hospital Problems    Diagnosis  POA    ESRD (end stage renal disease) on dialysis [N18.6, Z99.2]  Not Applicable      Resolved Hospital Problems   No resolved problems to display.       Plan:      Declot today.  Risks explained, consent signed.

## 2019-02-19 NOTE — BRIEF OP NOTE
[facility] Cath Lab [facility]  Brief Operative Note     SUMMARY     Surgery Date: 2/19/2019     Surgeon(s) and Role:     * Yuriy Gibson MD - Primary    Assisting Surgeon: Nirmal Kurtz MD    Pre-op Diagnosis:  ESRD (end stage renal disease) on dialysis [N18.6, Z99.2]  Complication of vascular access for dialysis, subsequent encounter [T82.9XXD]    Post-op Diagnosis:  Post-Op Diagnosis Codes:     * ESRD (end stage renal disease) on dialysis [N18.6, Z99.2]     * Complication of vascular access for dialysis, subsequent encounter [T82.9XXD]    Procedure(s) (LRB):  declot (Left)    Anesthesia: 1% lidocaine, 1 mg versed, 50 mcg fentanyl    Description of the findings of the procedure: Patient was prepped and draped in a sterile fashion.  This was a successful declot of her LUE straight arm AV graft with angioplasty performed in the outflow/venous anastomosis portion of the graft.  Patient tolerated the procedure well and no immediate complications noted.      Findings/Key Components: Patient was prepped and draped in a sterile fashion.  This was a successful declot of her LUE straight arm AV graft with angioplasty performed in the outflow/venous anastomosis portion of the graft.  Patient tolerated the procedure well and no immediate complications noted.      Estimated Blood Loss: 20 mL         Specimens:   Specimen (12h ago, onward)    None          Discharge Note    SUMMARY     Admit Date: 2/19/2019    Discharge Date and Time:  02/19/2019 12:26 PM    Hospital Course (synopsis of major diagnoses, care, treatment, and services provided during the course of the hospital stay): Patient was prepped and draped in a sterile fashion.  This was a successful declot of her LUE straight arm AV graft with angioplasty performed in the outflow/venous anastomosis portion of the graft.  Patient tolerated the procedure well and no immediate complications noted.       Final Diagnosis: Post-Op Diagnosis Codes:     * ESRD (end  stage renal disease) on dialysis [N18.6, Z99.2]     * Complication of vascular access for dialysis, subsequent encounter [T82.9XXD]    Disposition: Home or Self Care    Follow Up/Patient Instructions:     Medications:  Reconciled Home Medications:      Medication List      CONTINUE taking these medications    amlodipine-valsartan  mg per tablet  Commonly known as:  EXFORGE  Take 1 tablet by mouth once daily.     calcium acetate 667 mg capsule  Commonly known as:  PHOSLO  Take 1,334 mg by mouth 3 (three) times daily with meals.     carvedilol 6.25 MG tablet  Commonly known as:  COREG  Take 6.25 mg by mouth 2 (two) times daily with meals.     cloNIDine 0.1 mg/24 hr td ptwk 0.1 mg/24 hr  Commonly known as:  CATAPRES  Place 1 patch onto the skin every 7 days.     clopidogrel 75 mg tablet  Commonly known as:  PLAVIX  Take 75 mg by mouth every other day.     cyclobenzaprine 10 MG tablet  Commonly known as:  FLEXERIL  Take 10 mg by mouth 3 (three) times daily as needed for Muscle spasms.     docusate sodium 100 MG capsule  Commonly known as:  COLACE  Take 100 mg by mouth 2 (two) times daily as needed for Constipation.     fish oil-omega-3 fatty acids 300-1,000 mg capsule  Take 2 capsules by mouth 2 (two) times daily before meals.     hydrOXYzine pamoate 25 MG Cap  Commonly known as:  VISTARIL  Take 25 mg by mouth 3 (three) times daily.     LINZESS 145 mcg Cap capsule  Generic drug:  linaclotide  Take 145 mcg by mouth once daily. Take one po qd before 1st meal of the day on empty stomach     VOLTAREN 1 % Gel  Generic drug:  diclofenac sodium  APPLY 2 GRAMS TO AFFECTED AREA QID     zolpidem 10 mg Tab  Commonly known as:  AMBIEN  Take 10 mg by mouth nightly as needed.          Discharge Procedure Orders   Lifting restrictions   Order Comments: No heavy lifting for 48 hours.     Call MD for:  temperature >100.4     Call MD for:  severe uncontrolled pain     Call MD for:  redness, tenderness, or signs of infection (pain,  swelling, redness, odor or green/yellow discharge around incision site)     Call MD for:   Order Comments: Bleeding from the access site.     Activity as tolerated     Follow-up Information     [facility] In 1 day.    Why:  For dialysis  Contact information:  [address] [place] [address] [phone]

## 2019-02-19 NOTE — PROCEDURES
[facility] Interventional Nephrology Procedure Report    Date of Procedure:  02/19/2019 12:28 PM     :  Yuriy Gibson MD  Assistant: Nirmal Kurtz MD     Indication for Procedure:  Thrombosed LUE straight arm AVG      Referring Provider:  [facility] Dr. [facility]     Procedures Performed:  1.  Access cannulation  2.  Second cannulation  3.  Venous administration of TPA  4.  Venous angioplasty  5.  Percutaneous thrombectomy  6.  Arteriogram  7.  Moderate conscious sedation     Medications Administered:  1.  2 mg of tPA IV  2.  Heparin 5000 units IV  3.  Versed 1 mg IV  4.  Fentanyl 50 mcg IV     Blood Loss:  Approximately 20 mL     Contrast Used:  Approximately 27 mL     7 x 80 ultraverse  7 x 40 conquest outflow    Procedure in Detail:    After informed consent was obtained, the patient was prepped and draped in the usual sterile fashion.  Her LUE straight arm AV graft was cannulated in the venous facing direction with a micropuncture set.  The microwire was confirmed to be in correct location under fluoroscopy and a 4-Telugu microsheath was established.  A glidewire was advanced easily to the central circulation and the microsheath was removed.  A 6-Telugu sheath was established.  Then, an over-the-wire Berenstein guiding catheter was advanced to the central circulation and a central venogram was performed, which revealed patent central vasculature.  After the patient was evaluated with the physician, moderate sedation was then administered.  A pullback venogram was performed, which revealed stenosis beginning at the outflow portion of the AV graft, distal to the venous anastomosis.  So, the wire was reinserted and the Berenstein catheter was removed.  Then, 2 mg of TPA was injected into the venous facing sheath with pressure was held at the arterial anastomosis.  Following this, a 7 x 80 mm ultraverse PTA balloon was advanced to the outflow stenosis and inflated to full effacement  with waist seen on the balloon prior to full effacement.  Angioplasty was repeated back within the graft to macerate the thrombus all the while holding pressure at the arterial anastomosis.       The balloon was then removed and a second cannulation was made in the arterial facing direction with the micropuncture set.  Again, the microwire was confirmed to be in the correct location under fluoroscopy.  Microsheath was established and a glidewire was advanced across the arterial anastomosis and up into the brachial artery and then the micro sheath was removed and a 6-Dutch sheath was established.  Then using an over-the-wire Obie catheter, we readvanced across the arterial anastomosis, inflated and a pullback maneuver was performed with significant thrombus aspirated through the arterial facing sheath.  This procedure was repeated approximately 1 time after which there was noted to be moderate pulsatility of the AV fistula.  We did not feel the need to perform a venous sweep since there was such good flow of blood through the graft.  So at this point, the Obie catheter was advanced across the arterial anastomosis and an arteriogram was performed, which revealed no stenosis at the anastomosis, good flow of contrast through the graft with no residual clots.  At least 6 cm of brachial artery was seen passing down into the forearm and passed the bifurcation into the radial and ulnar arteries and no distal emboli were seen.       The arterial facing guidewire and sheath were then removed and hemostasis was achieved using a #2 Ethilon suture.  We then administered 5000 units of IV heparin and a repeat fistulogram was performed, which revealed brisk flow through the lower portion of the AV fistula with a residual significant stenosis seen at the outflow, distal to the venous anastomosis.  So we then took a 7 x 40 mm conquest balloon to the outflow stenosis and inflated to full effacement with waist seen prior to full  effacement.  Repeat angiogram following the angioplasty revealed improvement in the outflow stenosis and no extravasation of contrast seen.  Hemostasis was achieved using a #2 Ethilon suture.        IMPRESSION AND PLAN:  This is a successful percutaneous thrombectomy using mechanical and pharmacologic thrombolysis.  We will have the patient return in 1 week for suture removal and a followup ultrasound.  At that time, if there is concern for recoil or evidence of residual stenosis, we will need to consider rescheduling her for follow up fistulogram to place a stent at the outflow stenosis that keeps recurring and leads to thrombosis of her graft.  This had been deferred last month due to follow up fistulogram showing wide-open access with no focal stenoses.       Yuriy Gibson MD  [phone]

## 2019-02-19 NOTE — DISCHARGE INSTRUCTIONS
Anesthesia: Monitored Anesthesia Care (MAC)      What is monitored anesthesia care?  MAC keeps you very drowsy during surgery. You may be awake, but you will likely not remember much. And you wont feel pain. With MAC, medicines are given through an IV line into a vein in your arm or hand. A local anesthetic will usually be injected into the skin and muscle around the surgical site to numb it. The anesthesia provider monitors you during the procedure. He or she checks your heart rate and rhythm, blood pressure, and blood oxygen level.  Anesthesia tools and medicines that may be near you during your procedure  You will likely have:  · A pulse oximeter on the end of your finger. This measures your blood oxygen level.  · Electrocardiography leads (electrodes) on your chest. These record your heart rate and rhythm.  · Medicines given through an IV. These relax you and prevent pain. You may be awake or sleep lightly. If you have local anesthetic, it is injected directly into your skin.  · A facemask to give you oxygen, if needed.  Risks and possible complications  MAC has some risks. These include:  · Breathing problems  · Nausea and vomiting  · Allergic reaction to the anesthetic    Anesthesia safety  Tips for anesthesia safety include the following:   · Follow all instructions you are given for how long not to eat or drink before your procedure.  · Be sure your healthcare provider knows what medicines you take, especially any anti-inflammatory medicine or blood thinners. This includes aspirin and any other over-the-counter medicines, herbs, and supplements.  · Have an adult family member or friend drive you home after the procedure.  · For the first 24 hours after your surgery:  ¨ Do not drive or use heavy equipment.  ¨ Do not make important decisions or sign documents.  ¨ Avoid alcohol.  ¨ Have someone stay with you, if possible. They can watch for problems and help keep you safe.  Date Last Reviewed: 12/1/2016  ©  8744-6272 [employer]. [address] [place] [address], [place] [address]. All rights reserved. This information is not intended as a substitute for professional medical care. Always follow your healthcare professional's instructions.      FOLLOW ANY OTHER INSTRUCTIONS GIVEN TO YOU BY DR. OVERTON!!!

## 2019-02-27 ENCOUNTER — OFFICE VISIT (OUTPATIENT)
Dept: NEPHROLOGY | Facility: CLINIC | Age: 69
End: 2019-02-27
Payer: MEDICARE

## 2019-02-27 ENCOUNTER — TELEPHONE (OUTPATIENT)
Dept: TRANSPLANT | Facility: CLINIC | Age: 69
End: 2019-02-27

## 2019-02-27 VITALS
HEART RATE: 88 BPM | OXYGEN SATURATION: 100 % | DIASTOLIC BLOOD PRESSURE: 92 MMHG | SYSTOLIC BLOOD PRESSURE: 141 MMHG | BODY MASS INDEX: 32.73 KG/M2 | WEIGHT: 184.75 LBS | RESPIRATION RATE: 18 BRPM | TEMPERATURE: 98 F

## 2019-02-27 DIAGNOSIS — Z99.2 ESRD (END STAGE RENAL DISEASE) ON DIALYSIS: ICD-10-CM

## 2019-02-27 DIAGNOSIS — N18.6 ESRD (END STAGE RENAL DISEASE) ON DIALYSIS: ICD-10-CM

## 2019-02-27 DIAGNOSIS — T82.599D: ICD-10-CM

## 2019-02-27 PROCEDURE — 99213 PR OFFICE/OUTPT VISIT, EST, LEVL III, 20-29 MIN: ICD-10-PCS | Mod: S$PBB,,, | Performed by: INTERNAL MEDICINE

## 2019-02-27 PROCEDURE — 99999 PR PBB SHADOW E&M-EST. PATIENT-LVL III: CPT | Mod: PBBFAC,,,

## 2019-02-27 PROCEDURE — 99999 PR PBB SHADOW E&M-EST. PATIENT-LVL III: ICD-10-PCS | Mod: PBBFAC,,,

## 2019-02-27 PROCEDURE — 99213 OFFICE O/P EST LOW 20 MIN: CPT | Mod: S$PBB,,, | Performed by: INTERNAL MEDICINE

## 2019-02-27 PROCEDURE — 99213 OFFICE O/P EST LOW 20 MIN: CPT | Mod: PBBFAC

## 2019-02-27 NOTE — TELEPHONE ENCOUNTER
----- Message from Fredo Griffiths MD sent at 2/27/2019 10:10 AM CST -----  Ct scan ok   ----- Message -----  From: Vitaly Juarez RN  Sent: 2/14/2019  11:43 AM  To: Fredo Griffiths MD, Pawel Hamilton Jr., RN, #    Per review on 2/12/19:    68 year old AA female with ESRD 2/2 HTN.  Last reviewed on 12/14/19    1.  Patient denies any issues  2.  Last PTH acceptable

## 2019-02-27 NOTE — PROGRESS NOTES
[facility] Interventional Nephrology Follow-up Exam    Date:   02/27/2019 2:59 PM    HPI:  68 year old woman with ESRD (TTS at [facility] with Jv Lazaro), well known to the service, required extensive interventions in the past to maintain patency of her LUE straight arm AV graft.  Last underwent declot on 2/19/19.  Here for follow up exam, ultrasound and suture removal.  She has had no recent issues with prolonged bleeding, difficulty in cannulation or increased frequency of pressure alarms while on dialysis.  She plans on getting transplant in April.    Vitals:    02/27/19 1448   BP: (!) 141/92   Pulse: 88   Resp: 18   Temp: 97.9 °F (36.6 °C)       Exam:   ACCESS:  LUE straight arm AV graft with soft thrill and minimal pulsatility, appropriate pulse augmentation and partial collapse with arm elevation.  Two sutures removed without any complication.    Ultrasound:  - arterial anastomosis patent without stenosis, although there is some shadowing under her scar near the anastomosis (difficult to see doppler flow in this spot)  - mid-AVF patent with no stenosis seen, vessel diameter measured at 6-7 mm  - AVF outflow tract patent with likely stenosis past the venous anastomosis, although difficult to visualize as it runs deep in the axilla  - BFV approximately 450-670 ml/min    Impression/Plan:  Patent AV graft following recent thrombectomy.  Given her possible stenosis seen on doppler ultrasound and her recurrent thromboses, we will schedule her for a fistulogram in 2 weeks to further evaluate for stenosis and to maintain patency of her graft.      Yuriy Gibson MD  [phone]

## 2019-03-01 ENCOUNTER — TELEPHONE (OUTPATIENT)
Dept: TRANSPLANT | Facility: CLINIC | Age: 69
End: 2019-03-01

## 2019-03-01 NOTE — TELEPHONE ENCOUNTER
Renal Transplant Attending Recipient Chart Review Note:    68 y.o. female with history of ESRD presumed secondary to HTN who has been on HD since 1/12/18; last seen in listed RR clinic on 1/11/19. We last reviewed this patient on 12/12/18 and this review occurred on 2/19/19.     Patient on Plavix for history of recurrent dialysis access thrombosis. She was actually admitted on 2/19/19 for AVF declot as well.     NM stress negative and TTE ok from 7/11/18.     Labs (2/12/19) --> Hb 11.7; ; BUN 59; phos 5.0; calcium 9.8    Labs (1/29/19): WBC 3.6; albumin 3.9; potassium 5.0    Need platelet count    Razia Flores MD  Renal Transplant Attending      ----- Message from Vitaly Juarez RN sent at 2/20/2019  2:17 PM CST -----  Per review on 2/19/19:    68 year old AA female with ESRD presumed 2/2 HTN    1.  Last seen in Listed on 1/11/19  2.  On Plavix for fistula clotting issues  3.  Admitted 2/19/19 for fistula declot

## 2019-03-03 ENCOUNTER — TELEPHONE (OUTPATIENT)
Dept: TRANSPLANT | Facility: CLINIC | Age: 69
End: 2019-03-03

## 2019-03-11 DIAGNOSIS — Z76.82 ORGAN TRANSPLANT CANDIDATE: Primary | ICD-10-CM

## 2019-03-13 ENCOUNTER — TELEPHONE (OUTPATIENT)
Dept: TRANSPLANT | Facility: CLINIC | Age: 69
End: 2019-03-13

## 2019-03-13 ENCOUNTER — HOSPITAL ENCOUNTER (OUTPATIENT)
Facility: OTHER | Age: 69
Discharge: HOME | End: 2019-03-13
Attending: INTERNAL MEDICINE | Admitting: INTERNAL MEDICINE
Payer: MEDICARE

## 2019-03-13 VITALS
HEIGHT: 63 IN | HEART RATE: 89 BPM | BODY MASS INDEX: 32.43 KG/M2 | TEMPERATURE: 98 F | RESPIRATION RATE: 16 BRPM | SYSTOLIC BLOOD PRESSURE: 168 MMHG | WEIGHT: 183 LBS | DIASTOLIC BLOOD PRESSURE: 85 MMHG | OXYGEN SATURATION: 97 %

## 2019-03-13 DIAGNOSIS — T82.599D: Primary | ICD-10-CM

## 2019-03-13 DIAGNOSIS — N18.6 ESRD (END STAGE RENAL DISEASE) ON DIALYSIS: ICD-10-CM

## 2019-03-13 DIAGNOSIS — Z99.2 ESRD (END STAGE RENAL DISEASE) ON DIALYSIS: ICD-10-CM

## 2019-03-13 PROCEDURE — 27201423 OPTIME MED/SURG SUP & DEVICES STERILE SUPPLY: Mod: NTX | Performed by: INTERNAL MEDICINE

## 2019-03-13 PROCEDURE — 25500020 PHARM REV CODE 255: Mod: NTX | Performed by: INTERNAL MEDICINE

## 2019-03-13 PROCEDURE — 36903 INTRO CATH DIALYSIS CIRCUIT: CPT | Mod: NTX | Performed by: INTERNAL MEDICINE

## 2019-03-13 PROCEDURE — C1769 GUIDE WIRE: HCPCS | Mod: TXP | Performed by: INTERNAL MEDICINE

## 2019-03-13 PROCEDURE — 36903 INTRO CATH DIALYSIS CIRCUIT: CPT | Mod: NTX

## 2019-03-13 PROCEDURE — 99152 MOD SED SAME PHYS/QHP 5/>YRS: CPT | Mod: TXP | Performed by: INTERNAL MEDICINE

## 2019-03-13 PROCEDURE — C1725 CATH, TRANSLUMIN NON-LASER: HCPCS | Mod: TXP | Performed by: INTERNAL MEDICINE

## 2019-03-13 PROCEDURE — C1894 INTRO/SHEATH, NON-LASER: HCPCS | Mod: TXP | Performed by: INTERNAL MEDICINE

## 2019-03-13 PROCEDURE — 63600175 PHARM REV CODE 636 W HCPCS: Mod: NTX | Performed by: INTERNAL MEDICINE

## 2019-03-13 PROCEDURE — 99153 MOD SED SAME PHYS/QHP EA: CPT | Mod: NTX | Performed by: INTERNAL MEDICINE

## 2019-03-13 PROCEDURE — C1874 STENT, COATED/COV W/DEL SYS: HCPCS | Mod: NTX | Performed by: INTERNAL MEDICINE

## 2019-03-13 DEVICE — IMPLANTABLE DEVICE: Type: IMPLANTABLE DEVICE | Site: ARM | Status: FUNCTIONAL

## 2019-03-13 RX ORDER — MIDAZOLAM HYDROCHLORIDE 1 MG/ML
INJECTION, SOLUTION INTRAMUSCULAR; INTRAVENOUS
Status: DISCONTINUED | OUTPATIENT
Start: 2019-03-13 | End: 2019-03-13 | Stop reason: HOSPADM

## 2019-03-13 RX ORDER — HEPARIN SOD,PORCINE/0.9 % NACL 1000/500ML
INTRAVENOUS SOLUTION INTRAVENOUS
Status: DISCONTINUED | OUTPATIENT
Start: 2019-03-13 | End: 2019-03-13 | Stop reason: HOSPADM

## 2019-03-13 RX ORDER — CEFAZOLIN SODIUM 1 G/3ML
INJECTION, POWDER, FOR SOLUTION INTRAMUSCULAR; INTRAVENOUS
Status: DISCONTINUED | OUTPATIENT
Start: 2019-03-13 | End: 2019-03-13 | Stop reason: HOSPADM

## 2019-03-13 RX ORDER — AMOXICILLIN 500 MG/1
500 CAPSULE ORAL EVERY 12 HOURS
COMMUNITY
End: 2019-03-26 | Stop reason: ALTCHOICE

## 2019-03-13 RX ORDER — FENTANYL CITRATE 50 UG/ML
INJECTION, SOLUTION INTRAMUSCULAR; INTRAVENOUS
Status: DISCONTINUED | OUTPATIENT
Start: 2019-03-13 | End: 2019-03-13 | Stop reason: HOSPADM

## 2019-03-13 RX ORDER — MIDAZOLAM HYDROCHLORIDE 2 MG/2ML
1 INJECTION, SOLUTION INTRAMUSCULAR; INTRAVENOUS ONCE
Status: DISCONTINUED | OUTPATIENT
Start: 2019-03-13 | End: 2019-03-13 | Stop reason: HOSPADM

## 2019-03-13 RX ORDER — FENTANYL CITRATE 50 UG/ML
25 INJECTION, SOLUTION INTRAMUSCULAR; INTRAVENOUS ONCE
Status: DISCONTINUED | OUTPATIENT
Start: 2019-03-13 | End: 2019-03-13 | Stop reason: HOSPADM

## 2019-03-13 NOTE — OR NURSING
Patient states her blood pressure runs high. 179/102 is not unusual for her, she has not taken her blood p[ressure medicine this am but will take it as soon as she returns home.

## 2019-03-13 NOTE — H&P
[facility] Cath Lab [address]  History & Physical    Subjective:      Chief Complaint/Reason for Admission: Here for fistulogramand possible stent placement    Paige Summers is a 68 y.o. female with ESRD (TTS at [facility] with Dr. Carias) who presents today for a fistulogram with possible stent after she underwent a declot early February requiring multiple angioplasty to venous outflow distal to venous anastmosis.        Past Medical History:   Diagnosis Date    Anemia     Anemia of renal disease     Anxiety     Chronic renal failure 01/10/2018    Depression     Essential hypertension     GERD (gastroesophageal reflux disease)     Gout     H pylori ulcer     Hyperlipidemia     Hypertension     Obesity     Secondary hyperparathyroidism of renal origin      Past Surgical History:   Procedure Laterality Date    declot Left 2/19/2019    Performed by [facility]uriy Gibson [facility] at [facility] CATH LAB    DIALYSIS FISTULA CREATION Left 02/2018    FISTULOGRAM Left 12/19/2018    Performed by Javed Lopez MD at [facility] CATH LAB    HYSTERECTOMY  1971    TVH    PERMCATH REMOVAL-TUNNELED CVC REMOVAL N/A 4/23/2018    Performed by [facility]y Gibson MD at [facility] CATH LAB    PERMCATH REWIRE- TUNNELED CATH REWIRE N/A 3/21/2018    Performed by Yuriy Gibson MD at [facility] CATH LAB    THROMBECTOMY, HEMODIALYSIS GRAFT OR FISTULA Left 12/13/2018    Performed by Javed Lopez MD at [facility] CATH LAB    THROMBECTOMY, HEMODIALYSIS GRAFT OR FISTULA Left 11/9/2018    Performed by [facility]uriy Gibson MD at [facility] CATH LAB    THROMBECTOMY, HEMODIALYSIS GRAFT OR FISTULA Left 9/12/2018    Performed by [facility]y Gibson [facility] at [facility] CATH LAB    THROMBECTOMY, HEMODIALYSIS GRAFT OR FISTULA N/A 9/6/2018    Performed by Y[facility]y Gibson MD at [facility] CATH LAB    THROMBECTOMY, HEMODIALYSIS GRAFT OR FISTULA Left 6/13/2018    Performed by Y[facility]y Gibson MD at [facility] CATH LAB    THROMBECTOMY, HEMODIALYSIS GRAFT OR FISTULA Left 6/11/2018    Performed by  Yuriy Gibson MD at [facility] CATH LAB    ULTRASOUND, DIAGNOSTIC Left 10/10/2018    Performed by Yuriy Gibson MD at [facility] CATH LAB    ULTRASOUND, DIAGNOSTIC Left 9/19/2018    Performed by Y[facility]y Gibson MD at [facility] CATH LAB    ULTRASOUND, DIAGNOSTIC Left 8/20/2018    Performed by Yuriy Gibson MD at [facility] CATH LAB    ULTRASOUND, DIAGNOSTIC Left 8/6/2018    Performed by Yuriy Gibson MD at [facility] CATH LAB    ULTRASOUND, DIAGNOSTIC Left 7/9/2018    Performed by Yuriy Gibson MD at [facility] CATH LAB    ULTRASOUND, DIAGNOSTIC Left 6/18/2018    Performed by Yuriy Gibson MD at [facility] CATH LAB     Family History   Problem Relation Age of Onset    Alcohol abuse Mother     No Known Problems Father     Hypertension Sister     Arthritis Sister     Heart disease Brother     Heart failure Brother     Heart failure Brother     Diabetes Cousin     Breast cancer Neg Hx     Colon cancer Neg Hx     Ovarian cancer Neg Hx      Social History     Tobacco Use    Smoking status: Never Smoker    Smokeless tobacco: Never Used   Substance Use Topics    Alcohol use: No    Drug use: No       PTA Medications   Medication Sig    amlodipine-valsartan (EXFORGE)  mg per tablet Take 1 tablet by mouth once daily.    amoxicillin (AMOXIL) 500 MG capsule Take 500 mg by mouth every 12 (twelve) hours.    calcium acetate (PHOSLO) 667 mg capsule Take 1,334 mg by mouth 3 (three) times daily with meals.    carvedilol (COREG) 6.25 MG tablet Take 6.25 mg by mouth 2 (two) times daily with meals.    cloNIDine 0.1 mg/24 hr td ptwk (CATAPRES) 0.1 mg/24 hr Place 1 patch onto the skin every 7 days.    clopidogrel (PLAVIX) 75 mg tablet Take 75 mg by mouth every other day.     cyclobenzaprine (FLEXERIL) 10 MG tablet Take 10 mg by mouth 3 (three) times daily as needed for Muscle spasms.    docusate sodium (COLACE) 100 MG capsule Take 100 mg by mouth 2 (two) times daily as needed for Constipation.    fish oil-omega-3 fatty acids 300-1,000 mg  capsule Take 2 capsules by mouth 2 (two) times daily before meals.    hydrOXYzine pamoate (VISTARIL) 25 MG Cap Take 25 mg by mouth 3 (three) times daily.    linaclotide (LINZESS) 145 mcg Cap capsule Take 145 mcg by mouth once daily. Take one po qd before 1st meal of the day on empty stomach     VOLTAREN 1 % Gel APPLY 2 GRAMS TO AFFECTED AREA QID    zolpidem (AMBIEN) 10 mg Tab Take 10 mg by mouth nightly as needed.     Review of patient's allergies indicates:  No Known Allergies     Review of Systems   Constitutional: Negative.    Respiratory: Negative.    Cardiovascular: Negative.        Objective:      Vital Signs (Most Recent)  Temp: 98 °F (36.7 °C) (03/13/19 0925)  Pulse: 89 (03/13/19 0925)  Resp: 18 (03/13/19 0925)  BP: (!) 147/77 (03/13/19 0925)  SpO2: 98 % (03/13/19 0925)    Vital Signs Range (Last 24H):  Temp:  [98 °F (36.7 °C)]   Pulse:  [89]   Resp:  [18]   BP: (147)/(77)   SpO2:  [98 %]     Physical Exam   Constitutional: She is oriented to person, place, and time. She appears well-developed and well-nourished. No distress.   HENT:   Head: Normocephalic and atraumatic.   Eyes: EOM are normal.   Neck: Neck supple.   Pulmonary/Chest: Effort normal. No respiratory distress.   Musculoskeletal:   LUE straight arm AVG with great thrill and mild pulsatility    Neurological: She is alert and oriented to person, place, and time.   Skin: Skin is warm and dry. She is not diaphoretic.   Psychiatric: She has a normal mood and affect. Her behavior is normal.       Assessment:      Active Hospital Problems    Diagnosis  POA    ESRD (end stage renal disease) on dialysis [N18.6, Z99.2]  Not Applicable      Resolved Hospital Problems   No resolved problems to display.       Plan:      fistulogram today and possible stent placement to the venous anastomosis

## 2019-03-13 NOTE — DISCHARGE INSTRUCTIONS
After the procedure:    · Your arm and hand will be checked to make sure blood is flowing through the fistula properly. The feeling of blood rushing through the fistula is called a thrill. It is somewhat similar to the purring of a cat. Youll be taught how to check for this feeling each day to make sure there are no problems with your fistula.   · Watch for bleeding-If bleeding occurs place light pressure on the area and call your physician.    Recovering at Home  Once at home, follow all of the instructions youve been given. Be sure to:  · Take all medications as directed.  · Care for your incision as instructed.  · Check for signs of infection at the incision site (see below).  · Avoid heavy lifting and strenuous activities as directed.  · Monitor and care for your fistula as instructed          Anesthesia: Monitored Anesthesia Care (MAC)    Anesthesia Safety  · Have an adult family member or friend drive you home after the procedure.  · For the first 24 hours after your surgery:  ¨ Do not drive or use heavy equipment.  ¨ Do not make important decisions or sign documents.  ¨ Avoid alcohol.  ¨ Have someone stay with you, if possible. They can watch for problems and help keep you safe.  .  PLEASE FOLLOW ANY OTHER INSTRUCTIONS PROVIDED TO YOU BY DR. OVERTON!

## 2019-03-13 NOTE — BRIEF OP NOTE
[facility] Cath Lab [address]  Brief Operative Note     SUMMARY     Surgery Date: 3/13/2019     Surgeon(s) and Role:     * Irineo Devlin MD - Primary    Assisting Surgeon: MD Nirmal Hernandez MD    Pre-op Diagnosis:  ESRD (end stage renal disease) on dialysis [N18.6, Z99.2]    Post-op Diagnosis:  Post-Op Diagnosis Codes:     * ESRD (end stage renal disease) on dialysis [N18.6, Z99.2]    Procedure(s) (LRB):  FISTULOGRAM (Left)    Anesthesia: RN IV Sedation    Description of the findings of the procedure: The patient was prepped and draped in a sterile fashion.  This was a successful fistulogram with a 8 x 80 mm straight stent placement of the right AV graft.  No complications occurred during the procedure and the patient was transferred to the post-operative nursing floor for recovery.      Findings/Key Components: The patient was prepped and draped in a sterile fashion.  This was a successful fistulogram with a 8 x 80 mm straight stent placement of the right AV graft.  No complications occurred during the procedure and the patient was transferred to the post-operative nursing floor for recovery.      Estimated Blood Loss: * No values recorded between 3/13/2019 10:18 AM and 3/13/2019 10:50 AM *         Specimens:   Specimen (12h ago, onward)    None          Discharge Note    SUMMARY     Admit Date: 3/13/2019    Discharge Date and Time:  03/13/2019 11:18 AM    Hospital Course (synopsis of major diagnoses, care, treatment, and services provided during the course of the hospital stay): The patient was prepped and draped in a sterile fashion.  This was a successful fistulogram with a 8 x 80 mm straight stent placement of the right AV graft.  No complications occurred during the procedure and the patient was transferred to the post-operative nursing floor for recovery.       Final Diagnosis: Post-Op Diagnosis Codes:     * ESRD (end stage renal disease) on dialysis [N18.6,  Z99.2]    Disposition: Home or Self Care    Follow Up/Patient Instructions:     Medications:  Reconciled Home Medications:      Medication List      CONTINUE taking these medications    amlodipine-valsartan  mg per tablet  Commonly known as:  EXFORGE  Take 1 tablet by mouth once daily.     amoxicillin 500 MG capsule  Commonly known as:  AMOXIL  Take 500 mg by mouth every 12 (twelve) hours.     calcium acetate 667 mg capsule  Commonly known as:  PHOSLO  Take 1,334 mg by mouth 3 (three) times daily with meals.     carvedilol 6.25 MG tablet  Commonly known as:  COREG  Take 6.25 mg by mouth 2 (two) times daily with meals.     cloNIDine 0.1 mg/24 hr td ptwk 0.1 mg/24 hr  Commonly known as:  CATAPRES  Place 1 patch onto the skin every 7 days.     clopidogrel 75 mg tablet  Commonly known as:  PLAVIX  Take 75 mg by mouth every other day.     cyclobenzaprine 10 MG tablet  Commonly known as:  FLEXERIL  Take 10 mg by mouth 3 (three) times daily as needed for Muscle spasms.     docusate sodium 100 MG capsule  Commonly known as:  COLACE  Take 100 mg by mouth 2 (two) times daily as needed for Constipation.     fish oil-omega-3 fatty acids 300-1,000 mg capsule  Take 2 capsules by mouth 2 (two) times daily before meals.     hydrOXYzine pamoate 25 MG Cap  Commonly known as:  VISTARIL  Take 25 mg by mouth 3 (three) times daily.     LINZESS 145 mcg Cap capsule  Generic drug:  linaclotide  Take 145 mcg by mouth once daily. Take one po qd before 1st meal of the day on empty stomach     VOLTAREN 1 % Gel  Generic drug:  diclofenac sodium  APPLY 2 GRAMS TO AFFECTED AREA QID     zolpidem 10 mg Tab  Commonly known as:  AMBIEN  Take 10 mg by mouth nightly as needed.          Discharge Procedure Orders   Lifting restrictions   Order Comments: 48 hrs     Call MD for:  temperature >100.4     Call MD for:  severe uncontrolled pain     Call MD for:  redness, tenderness, or signs of infection (pain, swelling, redness, odor or green/yellow  discharge around incision site)     Call MD for:   Order Comments: bleeding     Activity as tolerated     Weight bearing restrictions (specify)

## 2019-03-13 NOTE — PLAN OF CARE
Paige Powell Kristopher has met all discharge criteria from Phase II. Vital Signs are stable, ambulating  without difficulty. Discharge instructions given per MELANIE Joel RN. Patient verbalized understanding. Discharged from facility via wheelchair in stable condition.

## 2019-03-13 NOTE — PROCEDURES
\[facility]\"" Interventional Nephrology Service     Fistulogram Procedure Report     Date of Procedure:  03/13/2019 11:20 AM     Procedures Performed: Fistulogram with a straight stent 8 x 80 mm placement     Indication: recurrent outflow venous anastomosis      Referring Provider: Yuriy Gibson MD    Primary Surgeon: Irineo Devlin MD     Assist: MD Nirmal Hernandez MD     Medications Administered:   Lidocaine 1%  Versed 1 mg  Fentanyl 50 mcg     Procedure Report in Detail:   After informed consent was obtained the patient was prepped and draped in the usual sterile fashion. Her left upper Bracheocephalic graft was cannulated in the venous facing direction with a micropuncture system, confirmed in correct placement under fluroscopy, and a fistulogram was performed using iodinated contrast. Fistulogram revealed recurrent venous outflow stenosis >75% distal to venous anastomosis. Central vasculature was then evaluated with contrasted film revealing patent. Reflux arteriogram was not done. The micropuncture sheath was cannulated with a glidewire which was advanced to the central circulation, and the micropuncture sheath was removed. A 8 Fr sheath was inserted over the glidewire in anticipation for stent placement, and the following interventions were performed after the patient was given 1 mg versed and 50 mcg fentanyl: using a 7 mm x 80 mm pta balloon and endeflator, we advanced the balloon to the venous anastomosis and inflated to full effacement with waist seen on balloon prior to full effacement. Follow up film revealed improvement. A straight stent 8 mm x 80 mm was then deployed just distal to the venous anastomosis. A repeat angioplasty using 8mm x 80 mm pta balloon was used to full effacement with waist seen. A repeat fistulogram revealed correct stent deployment site and no complication. The sheath was pulled, a #2 ethilon suture was used to achieve hemostasis. No complications occurred during the  procedure.     Estimated blood loss: 10 ml     Estimated contrast used:  20 ml    Impression/Plan:   This was a successful fistulogram with a straight 8 mm x 80mm stent placement performed in the venous outflow (distal to the venous anastomosis) region of the graft. We will have the patient return in 1 week for suture removal and follow up exam. Please do refer the patient back if there are any further signs of stenosis (increased venous pressure alarms, prolonged bleeding or worsening adequacy).     Irineo Devlin MD  [phone]

## 2019-03-15 ENCOUNTER — LAB VISIT (OUTPATIENT)
Dept: LAB | Facility: HOSPITAL | Age: 69
End: 2019-03-15
Payer: MEDICARE

## 2019-03-15 ENCOUNTER — TELEPHONE (OUTPATIENT)
Dept: TRANSPLANT | Facility: CLINIC | Age: 69
End: 2019-03-15

## 2019-03-15 DIAGNOSIS — Z76.82 ORGAN TRANSPLANT CANDIDATE: ICD-10-CM

## 2019-03-15 DIAGNOSIS — Z76.82 ORGAN TRANSPLANT CANDIDATE: Primary | ICD-10-CM

## 2019-03-15 LAB
BASOPHILS # BLD AUTO: 0.04 K/UL
BASOPHILS NFR BLD: 0.4 %
DIFFERENTIAL METHOD: ABNORMAL
EOSINOPHIL # BLD AUTO: 0.2 K/UL
EOSINOPHIL NFR BLD: 2.2 %
ERYTHROCYTE [DISTWIDTH] IN BLOOD BY AUTOMATED COUNT: 15 %
HCT VFR BLD AUTO: 34.6 %
HGB BLD-MCNC: 11.1 G/DL
IMM GRANULOCYTES # BLD AUTO: 0.03 K/UL
IMM GRANULOCYTES NFR BLD AUTO: 0.3 %
LYMPHOCYTES # BLD AUTO: 2.5 K/UL
LYMPHOCYTES NFR BLD: 25.9 %
MCH RBC QN AUTO: 29 PG
MCHC RBC AUTO-ENTMCNC: 32.1 G/DL
MCV RBC AUTO: 90 FL
MONOCYTES # BLD AUTO: 0.9 K/UL
MONOCYTES NFR BLD: 8.9 %
NEUTROPHILS # BLD AUTO: 6 K/UL
NEUTROPHILS NFR BLD: 62.3 %
NRBC BLD-RTO: 0 /100 WBC
PLATELET # BLD AUTO: 279 K/UL
PMV BLD AUTO: 9.5 FL
PTH-INTACT SERPL-MCNC: 539 PG/ML
RBC # BLD AUTO: 3.83 M/UL
WBC # BLD AUTO: 9.66 K/UL

## 2019-03-15 PROCEDURE — 83970 ASSAY OF PARATHORMONE: CPT | Mod: TXP

## 2019-03-15 PROCEDURE — 85025 COMPLETE CBC W/AUTO DIFF WBC: CPT | Mod: TXP

## 2019-03-15 PROCEDURE — 36415 COLL VENOUS BLD VENIPUNCTURE: CPT | Mod: TXP

## 2019-03-15 NOTE — TELEPHONE ENCOUNTER
Confirmed surgery for 4/8/19 and pre-op on 3/26/19.  Final cxm scheduled for 4/1/19.   Pt on Plavix and Fish oil.  Will hold Fish oil 7-10 days prior to surgery.  Plan for Plavix pending PCP recommendations.  Leave message for Bassam with Dr. Gibson's office for plan.  Bassam returned call stating that they do not put patients on Plavix for stents placed in fistulas.  Denies any hormone use at this time.  Discussed possibility of cancellation due to a busy service.  Denies need for FMLA forms.  Discussed the need to inform team of any changes in medical condition.   All questions answered and patient verbalized understanding.

## 2019-03-19 ENCOUNTER — TELEPHONE (OUTPATIENT)
Dept: TRANSPLANT | Facility: CLINIC | Age: 69
End: 2019-03-19

## 2019-03-19 ENCOUNTER — TELEPHONE (OUTPATIENT)
Dept: PREADMISSION TESTING | Facility: HOSPITAL | Age: 69
End: 2019-03-19

## 2019-03-19 NOTE — TELEPHONE ENCOUNTER
----- Message from Ofelia Rain sent at 3/19/2019 10:00 AM CDT -----  Regarding: Need Pre-Op clearance Appt.  No Need to Call Patient:     Please scheduled to Su's Schedule on 03/07/2019 at 02:00 PM.     Kidney Recipient   Surgery Date: 03/18/2019   Surgeon:      Thanks:   Ofelia     No Need to Call Patient:     [facility] 02:00 PM.     Kidney Recipient   Surgery Date: 04/08/2019   Surgeon:      Thanks:   Ofeila

## 2019-03-19 NOTE — TELEPHONE ENCOUNTER
Discussed plan for Plavix with Dr. Potts. Stated it is ok to hold 7 days prior to surgery.  Called patient to discuss plan to hold.  Verbalized understanding.  All questions answered and patient verbalized understanding.

## 2019-03-20 ENCOUNTER — OFFICE VISIT (OUTPATIENT)
Dept: NEPHROLOGY | Facility: CLINIC | Age: 69
End: 2019-03-20
Payer: MEDICARE

## 2019-03-20 VITALS
HEIGHT: 63 IN | WEIGHT: 186.75 LBS | DIASTOLIC BLOOD PRESSURE: 76 MMHG | BODY MASS INDEX: 33.09 KG/M2 | TEMPERATURE: 97 F | RESPIRATION RATE: 17 BRPM | SYSTOLIC BLOOD PRESSURE: 157 MMHG | HEART RATE: 93 BPM | OXYGEN SATURATION: 95 %

## 2019-03-20 DIAGNOSIS — T82.590D MECHANICAL COMPLICATION OF ARTERIOVENOUS FISTULA SURGICALLY CREATED, SUBSEQUENT ENCOUNTER: Primary | ICD-10-CM

## 2019-03-26 ENCOUNTER — HOSPITAL ENCOUNTER (OUTPATIENT)
Dept: CARDIOLOGY | Facility: CLINIC | Age: 69
Discharge: HOME | End: 2019-03-26
Payer: MEDICARE

## 2019-03-26 ENCOUNTER — OFFICE VISIT (OUTPATIENT)
Dept: TRANSPLANT | Facility: CLINIC | Age: 69
End: 2019-03-26
Payer: MEDICARE

## 2019-03-26 ENCOUNTER — HOSPITAL ENCOUNTER (OUTPATIENT)
Dept: RADIOLOGY | Facility: HOSPITAL | Age: 69
Discharge: HOME | End: 2019-03-26
Attending: INTERNAL MEDICINE
Payer: MEDICARE

## 2019-03-26 ENCOUNTER — ANESTHESIA EVENT (OUTPATIENT)
Dept: SURGERY | Facility: HOSPITAL | Age: 69
DRG: 652 | End: 2019-03-26
Payer: MEDICARE

## 2019-03-26 ENCOUNTER — HOSPITAL ENCOUNTER (OUTPATIENT)
Dept: PREADMISSION TESTING | Facility: HOSPITAL | Age: 69
Discharge: HOME | End: 2019-03-26
Attending: INTERNAL MEDICINE
Payer: MEDICARE

## 2019-03-26 ENCOUNTER — CLINICAL SUPPORT (OUTPATIENT)
Dept: TRANSPLANT | Facility: CLINIC | Age: 69
End: 2019-03-26
Payer: MEDICARE

## 2019-03-26 ENCOUNTER — SOCIAL WORK (OUTPATIENT)
Dept: TRANSPLANT | Facility: CLINIC | Age: 69
End: 2019-03-26
Payer: MEDICARE

## 2019-03-26 VITALS
BODY MASS INDEX: 32.43 KG/M2 | RESPIRATION RATE: 18 BRPM | RESPIRATION RATE: 18 BRPM | DIASTOLIC BLOOD PRESSURE: 81 MMHG | DIASTOLIC BLOOD PRESSURE: 81 MMHG | WEIGHT: 183 LBS | WEIGHT: 183 LBS | OXYGEN SATURATION: 95 % | WEIGHT: 183 LBS | TEMPERATURE: 98 F | BODY MASS INDEX: 32.43 KG/M2 | WEIGHT: 183 LBS | DIASTOLIC BLOOD PRESSURE: 81 MMHG | HEART RATE: 96 BPM | SYSTOLIC BLOOD PRESSURE: 138 MMHG | RESPIRATION RATE: 18 BRPM | HEIGHT: 63 IN | DIASTOLIC BLOOD PRESSURE: 81 MMHG | TEMPERATURE: 98 F | HEART RATE: 96 BPM | TEMPERATURE: 98 F | OXYGEN SATURATION: 95 % | BODY MASS INDEX: 32.43 KG/M2 | HEIGHT: 63 IN | SYSTOLIC BLOOD PRESSURE: 138 MMHG | BODY MASS INDEX: 32.43 KG/M2 | RESPIRATION RATE: 18 BRPM | HEART RATE: 96 BPM | OXYGEN SATURATION: 95 % | SYSTOLIC BLOOD PRESSURE: 138 MMHG | HEIGHT: 63 IN | OXYGEN SATURATION: 95 % | HEART RATE: 96 BPM | SYSTOLIC BLOOD PRESSURE: 138 MMHG | TEMPERATURE: 98 F | HEIGHT: 63 IN

## 2019-03-26 VITALS
DIASTOLIC BLOOD PRESSURE: 74 MMHG | HEIGHT: 63 IN | TEMPERATURE: 98 F | SYSTOLIC BLOOD PRESSURE: 140 MMHG | RESPIRATION RATE: 18 BRPM | HEART RATE: 98 BPM | WEIGHT: 183 LBS | OXYGEN SATURATION: 98 % | BODY MASS INDEX: 32.43 KG/M2

## 2019-03-26 DIAGNOSIS — N18.6 ESRD (END STAGE RENAL DISEASE) ON DIALYSIS: Primary | ICD-10-CM

## 2019-03-26 DIAGNOSIS — Z76.82 ORGAN TRANSPLANT CANDIDATE: ICD-10-CM

## 2019-03-26 DIAGNOSIS — Z99.2 ESRD (END STAGE RENAL DISEASE) ON DIALYSIS: Primary | ICD-10-CM

## 2019-03-26 DIAGNOSIS — N18.9 ANEMIA OF RENAL DISEASE: Chronic | ICD-10-CM

## 2019-03-26 DIAGNOSIS — D63.1 ANEMIA OF RENAL DISEASE: Chronic | ICD-10-CM

## 2019-03-26 DIAGNOSIS — R76.0 ANTIPHOSPHOLIPID ANTIBODY POSITIVE: ICD-10-CM

## 2019-03-26 DIAGNOSIS — I10 ESSENTIAL HYPERTENSION: Chronic | ICD-10-CM

## 2019-03-26 DIAGNOSIS — Z01.818 PRE-OPERATIVE CLEARANCE: ICD-10-CM

## 2019-03-26 DIAGNOSIS — N25.81 SECONDARY HYPERPARATHYROIDISM OF RENAL ORIGIN: Chronic | ICD-10-CM

## 2019-03-26 PROCEDURE — 71046 XR CHEST PA AND LATERAL: ICD-10-PCS | Mod: 26,TXP,, | Performed by: RADIOLOGY

## 2019-03-26 PROCEDURE — 71046 X-RAY EXAM CHEST 2 VIEWS: CPT | Mod: TC,FY,TXP

## 2019-03-26 PROCEDURE — 71046 X-RAY EXAM CHEST 2 VIEWS: CPT | Mod: 26,TXP,, | Performed by: RADIOLOGY

## 2019-03-26 PROCEDURE — 99213 OFFICE O/P EST LOW 20 MIN: CPT | Mod: PBBFAC,25,27,TXP

## 2019-03-26 PROCEDURE — 99999 PR PBB SHADOW E&M-EST. PATIENT-LVL III: CPT | Mod: PBBFAC,TXP,,

## 2019-03-26 PROCEDURE — 99214 OFFICE O/P EST MOD 30 MIN: CPT | Mod: PBBFAC,TXP,25

## 2019-03-26 PROCEDURE — 99999 PR PBB SHADOW E&M-EST. PATIENT-LVL III: ICD-10-PCS | Mod: PBBFAC,TXP,,

## 2019-03-26 PROCEDURE — 99999 PR PBB SHADOW E&M-EST. PATIENT-LVL III: CPT | Mod: PBBFAC,TXP,, | Performed by: INTERNAL MEDICINE

## 2019-03-26 PROCEDURE — 99215 PR OFFICE/OUTPT VISIT, EST, LEVL V, 40-54 MIN: ICD-10-PCS | Mod: S$PBB,TXP,, | Performed by: INTERNAL MEDICINE

## 2019-03-26 PROCEDURE — 99215 OFFICE O/P EST HI 40 MIN: CPT | Mod: S$PBB,TXP,, | Performed by: INTERNAL MEDICINE

## 2019-03-26 PROCEDURE — 93010 EKG 12-LEAD: ICD-10-PCS | Mod: S$PBB,TXP,, | Performed by: INTERNAL MEDICINE

## 2019-03-26 PROCEDURE — 99999 PR PBB SHADOW E&M-EST. PATIENT-LVL IV: CPT | Mod: PBBFAC,TXP,,

## 2019-03-26 PROCEDURE — 99999 PR PBB SHADOW E&M-EST. PATIENT-LVL IV: ICD-10-PCS | Mod: PBBFAC,TXP,,

## 2019-03-26 PROCEDURE — 99999 PR PBB SHADOW E&M-EST. PATIENT-LVL III: ICD-10-PCS | Mod: PBBFAC,TXP,, | Performed by: INTERNAL MEDICINE

## 2019-03-26 PROCEDURE — 93005 ELECTROCARDIOGRAM TRACING: CPT | Mod: PBBFAC,TXP | Performed by: INTERNAL MEDICINE

## 2019-03-26 PROCEDURE — 99499 UNLISTED E&M SERVICE: CPT | Mod: S$PBB,TXP,, | Performed by: TRANSPLANT SURGERY

## 2019-03-26 PROCEDURE — 99213 OFFICE O/P EST LOW 20 MIN: CPT | Mod: PBBFAC,27,TXP,25 | Performed by: INTERNAL MEDICINE

## 2019-03-26 PROCEDURE — 99213 OFFICE O/P EST LOW 20 MIN: CPT | Mod: PBBFAC,27,TXP | Performed by: TRANSPLANT SURGERY

## 2019-03-26 PROCEDURE — 93010 ELECTROCARDIOGRAM REPORT: CPT | Mod: S$PBB,TXP,, | Performed by: INTERNAL MEDICINE

## 2019-03-26 PROCEDURE — 99999 PR PBB SHADOW E&M-EST. PATIENT-LVL III: ICD-10-PCS | Mod: PBBFAC,TXP,, | Performed by: TRANSPLANT SURGERY

## 2019-03-26 PROCEDURE — 99999 PR PBB SHADOW E&M-EST. PATIENT-LVL III: CPT | Mod: PBBFAC,TXP,, | Performed by: TRANSPLANT SURGERY

## 2019-03-26 PROCEDURE — 99499 NO LOS: ICD-10-PCS | Mod: S$PBB,TXP,, | Performed by: TRANSPLANT SURGERY

## 2019-03-26 RX ORDER — GLUCAGON 1 MG
1 KIT INJECTION CONTINUOUS PRN
Status: CANCELLED | OUTPATIENT
Start: 2019-03-26

## 2019-03-26 RX ORDER — SULFAMETHOXAZOLE AND TRIMETHOPRIM 400; 80 MG/1; MG/1
1 TABLET ORAL EVERY MORNING
Status: CANCELLED | OUTPATIENT
Start: 2019-03-26

## 2019-03-26 RX ORDER — MUPIROCIN 20 MG/G
1 OINTMENT TOPICAL 2 TIMES DAILY
Status: CANCELLED | OUTPATIENT
Start: 2019-03-26 | End: 2019-03-31

## 2019-03-26 RX ORDER — IBUPROFEN 200 MG
16 TABLET ORAL
Status: CANCELLED | OUTPATIENT
Start: 2019-03-26

## 2019-03-26 RX ORDER — ACETAMINOPHEN, DIPHENHYDRAMINE HCL, PHENYLEPHRINE HCL 325; 25; 5 MG/1; MG/1; MG/1
10 TABLET ORAL NIGHTLY
Status: ON HOLD | COMMUNITY
End: 2019-04-15 | Stop reason: HOSPADM

## 2019-03-26 RX ORDER — DOCUSATE SODIUM 100 MG/1
100 CAPSULE, LIQUID FILLED ORAL 3 TIMES DAILY
Status: CANCELLED | OUTPATIENT
Start: 2019-03-26

## 2019-03-26 RX ORDER — OXYCODONE AND ACETAMINOPHEN 5; 325 MG/1; MG/1
1 TABLET ORAL EVERY 4 HOURS PRN
Status: CANCELLED | OUTPATIENT
Start: 2019-03-26

## 2019-03-26 RX ORDER — OXYCODONE AND ACETAMINOPHEN 10; 325 MG/1; MG/1
1 TABLET ORAL EVERY 4 HOURS PRN
Status: CANCELLED | OUTPATIENT
Start: 2019-03-26

## 2019-03-26 RX ORDER — ACETAMINOPHEN 650 MG/20.3ML
650 LIQUID ORAL ONCE
Status: CANCELLED | OUTPATIENT
Start: 2019-03-26 | End: 2019-03-26

## 2019-03-26 RX ORDER — SODIUM CHLORIDE 9 MG/ML
INJECTION, SOLUTION INTRAVENOUS CONTINUOUS
Status: CANCELLED | OUTPATIENT
Start: 2019-03-26 | End: 2019-03-28

## 2019-03-26 RX ORDER — MORPHINE SULFATE 1 MG/ML
INJECTION INTRAVENOUS CONTINUOUS
Status: CANCELLED | OUTPATIENT
Start: 2019-03-26

## 2019-03-26 RX ORDER — AMLODIPINE BESYLATE 10 MG/1
10 TABLET ORAL DAILY
COMMUNITY
End: 2019-05-06 | Stop reason: SDUPTHER

## 2019-03-26 RX ORDER — DIPHENHYDRAMINE HYDROCHLORIDE 50 MG/ML
50 INJECTION INTRAMUSCULAR; INTRAVENOUS ONCE
Status: CANCELLED | OUTPATIENT
Start: 2019-03-26 | End: 2019-03-26

## 2019-03-26 RX ORDER — NYSTATIN 100000 [USP'U]/ML
500000 SUSPENSION ORAL
Status: CANCELLED | OUTPATIENT
Start: 2019-03-26

## 2019-03-26 RX ORDER — HEPARIN SODIUM 5000 [USP'U]/ML
5000 INJECTION, SOLUTION INTRAVENOUS; SUBCUTANEOUS EVERY 8 HOURS
Status: CANCELLED | OUTPATIENT
Start: 2019-03-26 | End: 2019-03-27

## 2019-03-26 RX ORDER — ONDANSETRON 2 MG/ML
4 INJECTION INTRAMUSCULAR; INTRAVENOUS EVERY 8 HOURS PRN
Status: CANCELLED | OUTPATIENT
Start: 2019-03-26 | End: 2019-03-27

## 2019-03-26 RX ORDER — IBUPROFEN 200 MG
24 TABLET ORAL
Status: CANCELLED | OUTPATIENT
Start: 2019-03-26

## 2019-03-26 RX ORDER — HEPARIN SODIUM 5000 [USP'U]/ML
5000 INJECTION, SOLUTION INTRAVENOUS; SUBCUTANEOUS
Status: CANCELLED | OUTPATIENT
Start: 2019-03-26

## 2019-03-26 RX ORDER — MYCOPHENOLATE MOFETIL 250 MG/1
500 CAPSULE ORAL 2 TIMES DAILY
Status: CANCELLED | OUTPATIENT
Start: 2019-03-26

## 2019-03-26 RX ORDER — NALOXONE HCL 0.4 MG/ML
0.02 VIAL (ML) INJECTION
Status: CANCELLED | OUTPATIENT
Start: 2019-03-26

## 2019-03-26 RX ORDER — FAMOTIDINE 20 MG/1
20 TABLET, FILM COATED ORAL NIGHTLY
Status: CANCELLED | OUTPATIENT
Start: 2019-03-26

## 2019-03-26 RX ORDER — DEXTROSE MONOHYDRATE AND SODIUM CHLORIDE 5; .45 G/100ML; G/100ML
INJECTION, SOLUTION INTRAVENOUS CONTINUOUS
Status: CANCELLED | OUTPATIENT
Start: 2019-03-26 | End: 2019-03-27

## 2019-03-26 RX ORDER — VALGANCICLOVIR 450 MG/1
450 TABLET, FILM COATED ORAL EVERY MORNING
Status: CANCELLED | OUTPATIENT
Start: 2019-04-05

## 2019-03-26 RX ORDER — BISACODYL 5 MG
10 TABLET, DELAYED RELEASE (ENTERIC COATED) ORAL NIGHTLY
Status: CANCELLED | OUTPATIENT
Start: 2019-03-26

## 2019-03-26 RX ORDER — TACROLIMUS 0.5 MG/1
2 CAPSULE ORAL 2 TIMES DAILY
Status: CANCELLED | OUTPATIENT
Start: 2019-03-26

## 2019-03-26 NOTE — PROGRESS NOTES
Kidney Transplant Recipient Preoperative Evaluation    Subjective:     CC:  Preoperative evaluation of kidney transplant candidacy.    HPI:  Ms. Summers is a 68 y.o. year old Black or  female who has been approved to receive a living unrelated kidney transplant.  She has ESRD secondary to HTN.  Patient is on hemodialysis.  She has presented for her final preoperative medical evaluation.  She denies any recent hospitalizations or ED visits.      Past Medical History:   Diagnosis Date    Anemia     Anemia of renal disease     Anxiety     Chronic renal failure 01/10/2018    Depression     Essential hypertension     GERD (gastroesophageal reflux disease)     Gout     H pylori ulcer     Hyperlipidemia     Hypertension     Obesity     Secondary hyperparathyroidism of renal origin      Past Surgical History:   Procedure Laterality Date    declot Left 2/19/2019    Performed by [facility]y Gibson MD at [facility] CATH LAB    DIALYSIS FISTULA CREATION Left 02/2018    FISTULOGRAM Left 3/13/2019    Performed by Irineo Devlin MD at [facility] CATH LAB    FISTULOGRAM Left 12/19/2018    Performed by Javed Lopez MD at [facility] CATH LAB    HYSTERECTOMY  1971    TVH    PERMCATH REMOVAL-TUNNELED CVC REMOVAL N/A 4/23/2018    Performed by Yuriy Gibson MD at [facility] CATH LAB    PERMCATH REWIRE- TUNNELED CATH REWIRE N/A 3/21/2018    Performed by Yuriy Gibson MD at [facility] CATH LAB    THROMBECTOMY, HEMODIALYSIS GRAFT OR FISTULA Left 12/13/2018    Performed by Javed Lopez MD at [facility] CATH LAB    THROMBECTOMY, HEMODIALYSIS GRAFT OR FISTULA Left 11/9/2018    Performed by Yuriy Gibson MD at [facility] CATH LAB    THROMBECTOMY, HEMODIALYSIS GRAFT OR FISTULA Left 9/12/2018    Performed by Y[facility]y Gibson MD at [facility] CATH LAB    THROMBECTOMY, HEMODIALYSIS GRAFT OR FISTULA N/A 9/6/2018    Performed by Y[facility]y Gibson MD at [facility] CATH LAB    THROMBECTOMY, HEMODIALYSIS GRAFT OR FISTULA Left 6/13/2018    Performed by  [facility]y Gibson MD at [facility] CATH LAB    THROMBECTOMY, HEMODIALYSIS GRAFT OR FISTULA Left 6/11/2018    Performed by Yuriy Gibson MD at [facility] CATH LAB    ULTRASOUND, DIAGNOSTIC Left 10/10/2018    Performed by Yuriy Gibson MD at [facility] CATH LAB    ULTRASOUND, DIAGNOSTIC Left 9/19/2018    Performed by Yuriy Gibson MD at [facility] CATH LAB    ULTRASOUND, DIAGNOSTIC Left 8/20/2018    Performed by Yuriy Gibson MD at [facility] CATH LAB    ULTRASOUND, DIAGNOSTIC Left 8/6/2018    Performed by Yuriy Gibson MD at [facility] CATH LAB    ULTRASOUND, DIAGNOSTIC Left 7/9/2018    Performed by Yuriy Gibson MD at [facility] CATH LAB    ULTRASOUND, DIAGNOSTIC Left 6/18/2018    Performed by Yuriy Gibson MD at [facility] CATH LAB     Review of patient's allergies indicates:  No Known Allergies  Review of Systems   Constitutional: Negative.  Negative for appetite change, chills, fatigue, fever and unexpected weight change.   HENT: Negative for dental problem, facial swelling, mouth sores, nosebleeds, sore throat and voice change.    Eyes: Negative for photophobia, discharge, itching and visual disturbance.   Respiratory: Negative for cough, chest tightness, shortness of breath, wheezing and stridor.    Cardiovascular: Negative for chest pain, palpitations and leg swelling.   Gastrointestinal: Negative for abdominal distention, abdominal pain, blood in stool, constipation, diarrhea, nausea and vomiting.   Genitourinary: Negative for difficulty urinating, dysuria, hematuria, urgency, vaginal bleeding and vaginal discharge.   Musculoskeletal: Negative for arthralgias, back pain, joint swelling and myalgias.   Skin: Negative for color change, rash and wound.   Neurological: Negative for dizziness, tremors, seizures, syncope, speech difficulty, numbness and headaches.   Hematological: Negative for adenopathy. Does not bruise/bleed easily.   Psychiatric/Behavioral: Negative for agitation, confusion, dysphoric mood, hallucinations and sleep disturbance. The  "patient is not nervous/anxious.        Objective:     Blood pressure 138/81, pulse 96, temperature 98 °F (36.7 °C), temperature source Oral, resp. rate 18, height 5' 2.99" (1.6 m), weight 83 kg (182 lb 15.7 oz), last menstrual period 08/08/1971, SpO2 95 %.  Physical Exam   Constitutional: She is oriented to person, place, and time. She appears well-developed and well-nourished.   HENT:   Head: Normocephalic and atraumatic.   Right Ear: External ear normal.   Left Ear: External ear normal.   Nose: Nose normal.   Mouth/Throat: Oropharynx is clear and moist. No oropharyngeal exudate.   Eyes: Pupils are equal, round, and reactive to light. EOM are normal. No scleral icterus.   Neck: Normal range of motion. Neck supple. No JVD present. No tracheal deviation present. No thyromegaly present.   Cardiovascular: Normal rate, regular rhythm, normal heart sounds and intact distal pulses. Exam reveals no gallop and no friction rub.   No murmur heard.  Pulmonary/Chest: Effort normal and breath sounds normal. No respiratory distress. She has no wheezes. She has no rales.   Abdominal: Soft. Bowel sounds are normal. She exhibits no distension and no mass. There is no tenderness.       Musculoskeletal: Normal range of motion. She exhibits no edema or tenderness.   Lymphadenopathy:     She has no cervical adenopathy.   Neurological: She is alert and oriented to person, place, and time. No cranial nerve deficit. Coordination normal.   Skin: Skin is warm and dry. No rash noted. No erythema.   Psychiatric: She has a normal mood and affect. Her behavior is normal. Judgment and thought content normal.       Labs:  3/15/2019: PTH, Intact 539.0 pg/mL* (Ref range: 9.0 - 77.0 pg/mL)  3/26/2019: Prothrombin Time 10.1 sec (Ref range: 9.0 - 12.5 sec)  Labs were reviewed with the patient.    ABO type: B POS    Assessment:     1. ESRD (end stage renal disease) on dialysis        Plan:      Transplant Candidacy:   Based on available information, Ms. " Kristopher remains a high-risk kidney transplant candidate due to obesity and advanced age.  She may proceed with transplant surgery as planned. She is stopping the Plavix several days prior to surgery.  Despite her laboratory finding of positive anti-phospholipid antibody, she is not believed by hematology to have a true hypercoagulable state.    Inocente Rodrigues MD

## 2019-03-26 NOTE — ANESTHESIA PREPROCEDURE EVALUATION
03/26/2019  Paige Summers is a 68 y.o., female who has been approved to receive a living unrelated kidney transplant from a friend.  She has ESRD secondary to HTN.  Patient is on hemodialysis.  .    Anesthesia Evaluation    I have reviewed the Patient Summary Reports.    I have reviewed the Nursing Notes.   I have reviewed the Medications.     Review of Systems  Anesthesia Hx:  Denies Family Hx of Anesthesia complications.   Denies Personal Hx of Anesthesia complications.   Social:  Non-Smoker, No Alcohol Use    Hematology/Oncology:     Oncology Normal    -- Anemia: Anemia of Chronic Kidney Disease  Hematology Comments: Antiphospholipid antibody positive     Cardiovascular:   Denies Pacemaker.  Denies MI.   Denies CABG/stent.   Denies Angina.          Hypertension, Essential Hypertension    Pulmonary:  Pulmonary Normal    Renal/:  Kidney Function/Disease, Chronic Kidney Disease (CKD) , CKD Stage V (ESRD) (GFR <15 or on Dialysis)  Dialysis Dependent, T-TH-S    Hepatic/GI:   Denies Liver Disease. Denies Hepatitis.    Neurological:   Denies CVA. Denies Seizures.  Pain , location of knees    Endocrine:   Denies Diabetes.  Parathyroid Disease, Hyperparathyroidism    Psych:   anxiety depression          Physical Exam  General:  Obesity    Airway/Jaw/Neck:  Airway Findings: Mouth Opening: Normal Tongue: Large  General Airway Assessment: Adult  Mallampati: III  TM Distance: Normal, at least 6 cm  Jaw/Neck Findings:  Neck ROM: Normal ROM      Dental:  Dental Findings: Lower partial dentures, Upper Dentures   Chest/Lungs:  Chest/Lungs Findings: Clear to auscultation, Normal Respiratory Rate     Heart/Vascular:  Heart Findings: Rate: Normal  Rhythm: Regular Rhythm  Sounds: Normal  Vascular Findings: (non-functional left forearm graft)  Dialysis Access: AV Fistula LUE        Mental Status:  Mental Status  Findings:  Cooperative, Alert and Oriented         Anesthesia Plan  Type of Anesthesia, risks & benefits discussed:  Anesthesia Type:  general  Patient's Preference:   Intra-op Monitoring Plan: standard ASA monitors, arterial line and central line  Intra-op Monitoring Plan Comments:   Post Op Pain Control Plan:   Post Op Pain Control Plan Comments:   Induction:   IV  Beta Blocker:  Patient is on a Beta-Blocker and has received one dose within the past 24 hours (No further documentation required).       Informed Consent: Patient understands risks and agrees with Anesthesia plan.  Questions answered. Anesthesia consent signed with patient.  ASA Score: 3     Day of Surgery Review of History & Physical:    H&P update referred to the surgeon.         Ready For Surgery From Anesthesia Perspective.     2D echo 7/11/18  CONCLUSIONS     1 - Concentric hypertrophy.     2 - No wall motion abnormalities.     3 - Mild left atrial enlargement.     4 - Normal left ventricular systolic function (EF 60-65%).     5 - Indeterminate LV diastolic function.     6 - Normal right ventricular systolic function .     7 - The estimated PA systolic pressure is 15 mmHg.     8 - Trivial tricuspid regurgitation.     7/11/18 LEXISCAN stress  Impression: NORMAL MYOCARDIAL PERFUSION  1. The perfusion scan is free of evidence for myocardial ischemia or injury.   2. Resting wall motion is physiologic.   3. Resting LV function is normal.  (normal is >= 51%)  4. The ventricular volumes are normal at rest and stress.   5. The extracardiac distribution of radioactivity is normal.   6. There was no previous study available to compare.

## 2019-03-26 NOTE — DISCHARGE INSTRUCTIONS
Anesthesia: General Anesthesia     You are watched continuously during your procedure by your anesthesia provider.     Youre due to have surgery. During surgery, youll be given medicine called anesthesia or anesthetic. This will keep you comfortable and pain-free. Your anesthesia provider will use general anesthesia.  What is general anesthesia?  General anesthesia puts you into a state like deep sleep. It goes into the bloodstream (IV anesthetics), into the lungs (gas anesthetics), or both. You feel nothing during the procedure. You will not remember it. During the procedure, the anesthesia provider monitors you continuously. He or she checks your heart rate and rhythm, blood pressure, breathing, and blood oxygen.  · IV anesthetics. IV anesthetics are given through an IV line in your arm. Theyre often given first. This is so you are asleep before a gas anesthetic is started. Some kinds of IV anesthetics relieve pain. Others relax you. Your doctor will decide which kind is best in your case.  · Gas anesthetics. Gas anesthetics are breathed into the lungs. They are often used to keep you asleep. They can be given through a facemask or a tube placed in your larynx or trachea (breathing tube).  ? If you have a facemask, your anesthesia provider will most likely place it over your nose and mouth while youre still awake. Youll breathe oxygen through the mask as your IV anesthetic is started. Gas anesthetic may be added through the mask.  ? If you have a tube in the larynx or trachea, it will be inserted into your throat after youre asleep.  Anesthesia tools and medicines  You will likely have:  · IV anesthetics. These are put into an IV line into your bloodstream.  · Gas anesthetics. You breathe these anesthetics into your lungs, where they pass into your bloodstream.  · Pulse oximeter. This is a small clip that is attached to the end of your finger. This measures your blood oxygen level.  · Electrocardiography  leads (electrodes). These are small sticky pads that are placed on your chest. They record your heart rate and rhythm.  · Blood pressure cuff. This reads your blood pressure.  Risks and possible complications  General anesthesia has some risks. These include:  · Breathing problems  · Nausea and vomiting  · Sore throat or hoarseness (usually temporary)  · Allergic reaction to the anesthetic  · Irregular heartbeat (rare)  · Cardiac arrest (rare)   Anesthesia safety  · Follow all instructions you are given for how long not to eat or drink before your procedure.  · Be sure your doctor knows what medicines and drugs you take. This includes over-the-counter medicines, herbs, supplements, alcohol or other drugs. You will be asked when those were last taken.  · Have an adult family member or friend drive you home after the procedure.  · For the first 24 hours after your surgery:  ? Do not drive or use heavy equipment.  ? Do not make important decisions or sign legal documents. If important decisions or signing legal documents is necessary during the first 24 hours after surgery, have a trusted family member or spouse act on your behalf.  ? Avoid alcohol.  ? Have a responsible adult stay with you. He or she can watch for problems and help keep you safe.  Date Last Reviewed: 12/1/2016  © 9893-2565 [employer]. [address] [place] [address], [place] [address]. All rights reserved. This information is not intended as a substitute for professional medical care. Always follow your healthcare professional's instructions        1. Stop ALL solid food, gum, candy (including vitamins) 8 hours before surgery/procedure time.  2. Stop all CLOUDY liquids: coffee with creamer, formula, tube feeds, cloudy juices, non-human milk and breast milk with additives, 6 hours prior to surgery/procedure time.  3. Stop plain breast milk 4 hours prior to surgery/procedure time.  4. The patient should be ENCOURAGED to drink carbohydrate-rich  clear liquids (sports drinks, clear juices) until 2 hours prior to surgery/procedure time.  5. CLEAR liquids include only water, black coffee NO creamer, clear oral rehydration drinks, clear sports drinks or clear fruit juices (no orange juice, no pulpy juices, no apple cider). Advise patients if they can read newsprint through the liquid, it qualifies as clear liquid.   6. IF IN DOUBT, drink water instead.   7. NOTHING TO EAT OR DRINK 2 hours before to surgery/procedure time. If you are told to take medication on the morning of surgery, it may be taken with a sip of water.

## 2019-03-26 NOTE — LETTER
March 26, 2019        Jd Carias  [address]  [address]  [facility] [address]  Phone: [phone]  Fax: [phone]             [facility]  [address]  [facility] [address]  Phone: [phone]   Patient: Paige Summers   MR Number: [id number]   YOB: 1950   Date of Visit: 3/26/2019       Dear Dr. Jd Carias    Thank you for referring Paige Summers to me for evaluation. Attached you will find relevant portions of my assessment and plan of care.    If you have questions, please do not hesitate to call me. I look forward to following Paige Summers along with you.    Sincerely,    Razia Flores MD    Enclosure    If you would like to receive this communication electronically, please contact [email or web address] or [phone] to request [employer] access.    [employer] is a tool which provides read-only access to select patient information with whom you have a relationship. Its easy to use and provides real time access to review your patients record including encounter summaries, notes, results, and demographic information.    If you feel you have received this communication in error or would no longer like to receive these types of communications, please e-mail [email or web address]

## 2019-03-26 NOTE — PROGRESS NOTES
Met with Paige Summers in the clinic as part of her pre-transplant clearance appointment.    1) Performed a complete medication reconciliation.    2) Obtained copies of insurance cards, patient understood that the Pharm.D. will order medications, and that medications must be available prior to discharge--will use [facility] for first fill then [facility]   3) Discussed medication education that will occur post-transplant     Patient verbalized understanding and had the opportunity to ask questions

## 2019-03-26 NOTE — H&P (VIEW-ONLY)
Kidney Transplant Recipient Preoperative Evaluation    Subjective:     CC:  Preoperative evaluation of kidney transplant candidacy.    HPI:  Ms. Summers is a 68 y.o. year old Black or  female who has been approved to receive a living unrelated kidney transplant.  She has ESRD secondary to HTN.  Patient is on hemodialysis.  She has presented for her final preoperative medical evaluation.  She denies any recent hospitalizations or ED visits.      Past Medical History:   Diagnosis Date    Anemia     Anemia of renal disease     Anxiety     Chronic renal failure 01/10/2018    Depression     Essential hypertension     GERD (gastroesophageal reflux disease)     Gout     H pylori ulcer     Hyperlipidemia     Hypertension     Obesity     Secondary hyperparathyroidism of renal origin      Past Surgical History:   Procedure Laterality Date    declot Left 2/19/2019    Performed [facility] CATH LAB    DIALYSIS FISTULA CREATION Left 02/2018    FISTULOGRAM Left 3/13/2019    Per[facility] by [facility]lin [facility] at [facility] CATH LAB    FISTULOGRAM Left 12/19/2018    [facility] by [facility] at [facility] CATH LAB    HYSTERECTOMY  1971    [facility]    PERMCATH REMOVAL-TUNNELED CVC REMOVAL N/A 4/23/2018    Performed by [facility] at [facility] CATH LAB    PERMCATH REWIRE- TUNNELED CATH REWIRE N/A 3/21/2018    Performed by [facility] at [facility] CATH LAB    THROMBECTOMY, HEMODIALYSIS GRAFT OR FISTULA Left 12/13/2018    Performed by [facility] at [facility] CATH LAB    THROMBECTOMY, HEMODIALYSIS GRAFT OR FISTULA Left 11/9/2018    Performed by [facility] CATH LAB    THROMBECTOMY, HEMODIALYSIS GRAFT OR FISTULA Left 9/12/2018    Performed by [facility] CATH LAB    THROMBECTOMY, HEMODIALYSIS GRAFT OR FISTULA N/A 9/6/2018    Performed by [facility] CATH LAB    THROMBECTOMY, HEMODIALYSIS GRAFT OR FISTULA Left 6/13/2018    Performed by  [facility] at [facility] CATH LAB    THROMBECTOMY, HEMODIALYSIS GRAFT OR FISTULA Left 6/11/2018    Performed by [facility] at [facility] CATH LAB    ULTRASOUND, DIAGNOSTIC Left 10/10/2018    Performed by [facility] CATH LAB    ULTRASOUND, DIAGNOSTIC Left 9/19/2018    Performed by [facility] CATH LAB    ULTRASOUND, DIAGNOSTIC Left 8/20/2018    Performed by [facility] Gibson [facility] at [facility] CATH LAB    ULTRASOUND, DIAGNOSTIC Left 8/6/2018    Performed by [facility] at [facility] CATH LAB    ULTRASOUND, DIAGNOSTIC Left 7/9/2018    Performed by [facility] Gibson [facility] at [facility] CATH LAB    ULTRASOUND, DIAGNOSTIC Left 6/18/2018    Performed by [facility] Gibson [facility] at [facility] CATH LAB     Review of patient's allergies indicates:  No Known Allergies  Review of Systems   Constitutional: Negative.  Negative for appetite change, chills, fatigue, fever and unexpected weight change.   HENT: Negative for dental problem, facial swelling, mouth sores, nosebleeds, sore throat and voice change.    Eyes: Negative for photophobia, discharge, itching and visual disturbance.   Respiratory: Negative for cough, chest tightness, shortness of breath, wheezing and stridor.    Cardiovascular: Negative for chest pain, palpitations and leg swelling.   Gastrointestinal: Negative for abdominal distention, abdominal pain, blood in stool, constipation, diarrhea, nausea and vomiting.   Genitourinary: Negative for difficulty urinating, dysuria, hematuria, urgency, vaginal bleeding and vaginal discharge.   Musculoskeletal: Negative for arthralgias, back pain, joint swelling and myalgias.   Skin: Negative for color change, rash and wound.   Neurological: Negative for dizziness, tremors, seizures, syncope, speech difficulty, numbness and headaches.   Hematological: Negative for adenopathy. Does not bruise/bleed easily.   Psychiatric/Behavioral: Negative for agitation, confusion, dysphoric mood, hallucinations and sleep disturbance. The  "patient is not nervous/anxious.        Objective:     Blood pressure 138/81, pulse 96, temperature 98 °F (36.7 °C), temperature source Oral, resp. rate 18, height 5' 2.99" (1.6 m), weight 83 kg (182 lb 15.7 oz), last menstrual period 08/08/1971, SpO2 95 %.  Physical Exam   Constitutional: She is oriented to person, place, and time. She appears well-developed and well-nourished.   HENT:   Head: Normocephalic and atraumatic.   Right Ear: External ear normal.   Left Ear: External ear normal.   Nose: Nose normal.   Mouth/Throat: Oropharynx is clear and moist. No oropharyngeal exudate.   Eyes: Pupils are equal, round, and reactive to light. EOM are normal. No scleral icterus.   Neck: Normal range of motion. Neck supple. No JVD present. No tracheal deviation present. No thyromegaly present.   Cardiovascular: Normal rate, regular rhythm, normal heart sounds and intact distal pulses. Exam reveals no gallop and no friction rub.   No murmur heard.  Pulmonary/Chest: Effort normal and breath sounds normal. No respiratory distress. She has no wheezes. She has no rales.   Abdominal: Soft. Bowel sounds are normal. She exhibits no distension and no mass. There is no tenderness.       Musculoskeletal: Normal range of motion. She exhibits no edema or tenderness.   Lymphadenopathy:     She has no cervical adenopathy.   Neurological: She is alert and oriented to person, place, and time. No cranial nerve deficit. Coordination normal.   Skin: Skin is warm and dry. No rash noted. No erythema.   Psychiatric: She has a normal mood and affect. Her behavior is normal. Judgment and thought content normal.       Labs:  3/15/2019: PTH, Intact 539.0 pg/mL* (Ref range: 9.0 - 77.0 pg/mL)  3/26/2019: Prothrombin Time 10.1 sec (Ref range: 9.0 - 12.5 sec)  Labs were reviewed with the patient.    ABO type: B POS    Assessment:     1. ESRD (end stage renal disease) on dialysis        Plan:      Transplant Candidacy:   Based on available information, Ms. " Kristopher remains a high-risk kidney transplant candidate due to obesity and advanced age.  She may proceed with transplant surgery as planned. She is stopping the Plavix several days prior to surgery.  Despite her laboratory finding of positive anti-phospholipid antibody, she is not believed by hematology to have a true hypercoagulable state.    Inocente Rodrigues MD

## 2019-03-26 NOTE — PROGRESS NOTES
Kidney Transplant Recipient Preoperative Evaluation    Subjective:     CC:  Preoperative evaluation of kidney transplant candidacy.    HPI:  Ms. Summers is a 68 y.o. year old Black or  female who has been approved to receive a living unrelated kidney transplant from a friend.  She has ESRD secondary to HTN.  Patient is on hemodialysis.  She has presented for her final preoperative medical evaluation.  She was last seen in listed RR clinic on 1/11/19. She denies any recent hospitalizations or ED visits.      She was approved pending in selection committee on 9/28/18 for urology clearance for hyperechoic kidney lesion and repeat anti-coagulation studies. She was seen and cleared by urology on 10/17/18.  She had repeat anti-coagulation studies and referred to hem-onc who saw the patient on 12/5/18. 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 Hem-onc cleared patient and felt that she did not have an increased risk of kidney allograft thrombosis. She is on Plavix for recurrent dialysis thrombosis (she recalls 8 times) - last had stent placement on 3/13/19 - last dose per patient was on Sunday (3/24/19) - states she was taking it every other day.      NM stress negative from 7/11/18. TTE from 7/11/18 with LVEF 60-65% and PA pressure of 15 mmHg.      She doesn't really exercise but she will walk around [employer] 1-3 times a week. She lives with this her friend and 9 yo great grand-baby. She does household chores - cooking, cleaning, laundry. With the activity that she does she denies any chest pain, SOB, or claudication but does have knee pain. She states she [place] [employer] [place] to clinic without any issues. She denies having any falls. Doesn't use a cane, walker, or scooter. Patient was able to walk with this writer from clinic to the 2nd floor using the stairwell closest to the renal transplant clinic and back to clinic exam room  without any MEMBRENO, chest pain, or claudication. She used the railing to hold onto going up and down the stairs at time of 1/11/19 clinic visit.       Review of Systems   Constitutional: +fatigue; Denies fever/chills, night sweats  EENT: +vision problems - needs to go see eye doctor; Denies hearing problems, trouble swallowing.   Respiratory: Denies orthopnea, SOB, wheezing, hemoptysis, denies known TB exposure or history of positive TB skin test  Cardiovascular: Denies chest pain, palpitations, history of MI, history of stroke, history of DVT  Gastrointestinal: +chronic constipation; Denies abdominal pain, nausea/vomiting/diarrhea/constipation. Denies history of GI bleeding or ulcers.   Genitourinary: +urinates 2-3 times a day; +used to have issues with stress incontinence but states after she started dialysis she hasn't had issues with this; +states she had UTI earlier in the month and was on amoxicillin for ~10 days - thinks she finished the course week before last; Denies history of kidney stones, recurrent UTI's, history of urinary obstruction, gross hematuria, dysuria, urinary frequency  Musculoskeletal: +knee pain (R>L); +hand pain  Skin: Denies history of skin cancer, denies rash, ulcerations  Heme/onc: Denies any history of cancer, denies history of coagulopathies or bleeding disorders  Endocrine: Denies thyroid disease, unintentional weight loss/weight gain  Neurological: +left hand steal symptoms; +occasional dizziness/lightheadedness with hypotension at the end of dialysis - states her BP hasn't been low in the last 2 months or so; +numbness and tingling in bilateral hands (L>R); Denies tremors, seizures, headaches  Psychiatric: +takes Benadryl for anxiety before dialysis; Denies depression. Denies hallucinations or delusions.    Medications:  Current Outpatient Medications   Medication Sig Dispense Refill    amLODIPine (NORVASC) 10 MG tablet Take 10 mg by mouth once daily.      calcium acetate (PHOSLO)  "667 mg capsule Take 1,334 mg by mouth 3 (three) times daily with meals.      carvedilol (COREG) 6.25 MG tablet Take 6.25 mg by mouth 2 (two) times daily with meals.      cloNIDine 0.1 mg/24 hr td ptwk (CATAPRES) 0.1 mg/24 hr Place 1 patch onto the skin every 7 days.      clopidogrel (PLAVIX) 75 mg tablet Take 75 mg by mouth every other day. Stopped on 3/25      cyclobenzaprine (FLEXERIL) 10 MG tablet Take 10 mg by mouth 3 (three) times daily as needed for Muscle spasms.      docusate sodium (COLACE) 100 MG capsule Take 100 mg by mouth 2 (two) times daily as needed for Constipation.      fish oil-omega-3 fatty acids 300-1,000 mg capsule Take 2 capsules by mouth 2 (two) times daily before meals.      hydrOXYzine pamoate (VISTARIL) 25 MG Cap Take 25 mg by mouth 3 (three) times daily.      linaclotide (LINZESS) 145 mcg Cap capsule Take 145 mcg by mouth once daily. Take one po qd before 1st meal of the day on empty stomach       melatonin 10 mg Tab Take 10 mg by mouth every evening.      VOLTAREN 1 % Gel APPLY 2 GRAMS TO AFFECTED AREA QID  3    zolpidem (AMBIEN) 10 mg Tab Take 10 mg by mouth every evening.        No current facility-administered medications for this visit.            Objective:     Blood pressure 138/81, pulse 96, temperature 98 °F (36.7 °C), temperature source Oral, resp. rate 18, height 5' 2.99" (1.6 m), weight 83 kg (182 lb 15.7 oz), last menstrual period 08/08/1971, SpO2 95 %.  Physical Exam   General: No acute distress, well groomed, alert and oriented x 3  HEENT: Normocephalic, atraumatic, PERRLA, sclera anicteric, conjunctiva/corneas clear, EOM's intact bilaterally, external inspection of ears and nose normal, moist mucous membranes, no oral ulcerations/lesions; edentulous  Neck: Supple, symmetrical, trachea midline, no masses, no carotid bruits, no JVD, thyroid is normal without nodules or enlargement   Respiratory: Clear to auscultation bilaterally, respirations unlabored, no " rales/rhonchi/wheezing   Cardiovacular: Regular rate and rhythm, S1, S2 normal, no murmurs, no rubs or gallops   Gastrointestinal: Soft, non-tender, bowel sounds normal, no masses, no palpable organomegaly  Extremities: No clubbing or cyanosis of upper extremities bilaterally, no pedal edema bilaterally; +2 bilateral radial pulses  Skin: warm and dry; no rash on exposed skin  Lymph nodes: Cervical and supraclavicular nodes normal   Neurologic: No focal neurologic deficits.   Musculoskeletal: moves all extremities without difficulty, FROM, 5/5 strength, ambulates without an assistive device; chronic right knee swelling  Psychiatric: Normal mood and affect. Responds appropriately to questions.  Access: LUE AVF +thrill/bruit       Labs:  3/15/2019: PTH, Intact 539.0 pg/mL* (Ref range: 9.0 - 77.0 pg/mL)  Labs were reviewed with the patient.    ABO type: B POS    Assessment:     1. ESRD (end stage renal disease) on dialysis    2. Anemia of renal disease    3. Essential hypertension    4. Secondary hyperparathyroidism of renal origin    5. Antiphospholipid antibody positive    6. Pre-operative clearance        Plan:      Transplant Candidacy:   Based on available information, Ms. Summers remains a suitable kidney transplant candidate.  She may proceed with transplant surgery as planned pending review of diagnostic studies and labs that are still in process.    Counseled patient on notifying transplant team if she had any changes in her health.     Razia Flores MD       Follow-up:  After the transplant, the patient will follow-up with the kidney transplant team and get blood work per standard protocol as described below.    Clinic: return to transplant clinic weekly for the first month after transplant; every 2 weeks during months 2-3; then at 6-, 9-, 12-, 18-, 24-, and 36- months post-transplant to reassess for complications from immunosuppression and monitor for rejection.  Annually thereafter.    Labs: continue twice  weekly CBC, renal panel, and drug level for first month; then same labs once weekly through 3rd month post-transplant.  Urine for UA, protein/creatinine ratio monthly.  Add urine BK - PCR at 1-, 3-, 6-, 9-, 12-, 18-, 24-, and 36- months post-transplant.  Hepatic panel at 1-, 2-, 3-, 6-, 9-, 12-, 18-, 24-, and 36- months post-transplant.

## 2019-03-26 NOTE — LETTER
March 26, 2019        dJ Carias  [address]  [address]  [facility] [address]  Phone: [phone]  Fax: [phone]             [facility]  [address]  [facility] [address]  Phone: [phone]   Patient: Paige Summers   MR Number: [id number]   YOB: 1950   Date of Visit: 3/26/2019       Dear Dr. Jd Carias    Thank you for referring Paige Summers to me for evaluation. Attached you will find relevant portions of my assessment and plan of care.    If you have questions, please do not hesitate to call me. I look forward to following Paige Summers along with you.    Sincerely,    Inocente Rodrigues MD    Enclosure    If you would like to receive this communication electronically, please contact [email or web address] or [phone] to request joiz Link access.    joiz Link is a tool which provides read-only access to select patient information with whom you have a relationship. Its easy to use and provides real time access to review your patients record including encounter summaries, notes, results, and demographic information.    If you feel you have received this communication in error or would no longer like to receive these types of communications, please e-mail [email or web address]

## 2019-03-26 NOTE — PATIENT INSTRUCTIONS
1. Let the transplant team know if you have any changes in your health especially if you have fever, cough, diarrhea or any infectious symptoms   2. Stay active

## 2019-03-26 NOTE — PROGRESS NOTES
RECIPIENT PRE-OP NOTE    Potential Recipient Name: Paige Summers, [id number]  Encounter Date: 3/26/2019    Sex: female  YOB: 1950  Age: 68 y.o.    Housing/Contact Info:  [address] [facility] [address]  Telephone Information:   Mobile [phone]    Home: [phone] (home)  Work: There is no work phone number on file.  E-mail: No e-mail address on record    Potential Surgery Date: April 8, 2019  Potential Donor Name: Samir Moreno, Clinic Number: [id number]  Potential Donor Relationship: friend (pt's nurse in [facility] right after 2005 [place])    Patient presents as alert and oriented x 4, pleasant, good eye contact, well groomed, recall good, concentration/judgement good, average intelligence, calm, communicative, cooperative and asking and answering questions appropriately. Patient presents as a 68 y.o. year old female to recipient pre-op appointment for scheduled kidney living donor surgery. Patient's grand daughter accompanies patient.  Patient states that she is independent with ADLs at this time.  Patient states motivation to pursue organ transplant at this time.    Does patient drive?  Patient is aware will not be able to drive until medically cleared by the transplant team. Patient verbalizes understanding that patient will need assistance for all transportation needs until medically cleared to drive.    Caregivers/Transportation:  Lisa Burns, 56 yo friend (known Lisa for 40 years), [facility] (pt reports Lisa staying with pt in [facility] since Oct. 2018), does drive/own car. [phone]  Fuad Bueno, 34 yo grand daughter, Carrington BERNAL, does drive/own car, works full time [employer] as .     Other support listed:  Marlene Mir, [phone] Maxine Sun, [phone] or [phone] Paige Powell, [phone] Damaris Powell, [phone] Lisbet Trinidad, [phone] Mdadi Trinidad, [phone]  Lilly Cowart, [phone] Brittney Gonzalez,  [phone] Christy Lopez, [phone]    Dependents/Others who rely on Patient/Caregiver for care: pt denies    Cognitive:  Education: high school  Reading Level: 12th grade  Reports difficulty with: seeing needs glasses  Denies difficulty with: reading, writing, seeing (but reports does need glasses), hearing, comprehension, learning and memory. Pt reports independent with medication management.    Infusion Service: patient utilizing? no  Home Health: patient utilizing? no  DME:  patient utilizing? no    Living Will: no  Healthcare Power of : no  Written LW/HCPA and verbal information presented to patient today.    Insurance: Payor: MEDICARE / Plan: MEDICARE PART A & B / Product Type: Government /   Medicare ABD Silver Scripts. Medical insurance letter provided to transplant    Possible concerns regarding insurance post-donation reviewed. Patient verbalizes clear understanding.    Dialysis:  [facility], [phone]. Tues, [place], Sat. 3.50  hours     Financial:  Monthly disability income: $1083.  Employment: Patient is currently disabled. Patient does not plan to return to work once medically cleared. Patient referred to vocational rehabilitation.  Spouse/Significant Other Employment: pt reports is not  at this time.  x 2; first marriage pt was  and then second marriage ended divorce  Patient states does not expect to have any financial problems following transplant surgery.  Patient states has not conducted fundraising to assist with post-transplant costs.    Tobacco/Alcohol/Illicit Drug Abuse: Patient reports does not use tobacco products, alcohol, illicit drugs and non-prescription medications and that patient does plan to remain abstinent.     Psychiatric History: patient denies    Coping: Patient states that she is coping well with having kidney living donor surgery. Patient states will call as needed and does understand how to access resources  including the  as needed, both inpatient and outpatient.    Resources, information and support provided. Psychosocial aspects regarding organ donation and transplantation were discussed. Patient reports having a clear understanding of resources, information, support and psychosocial aspects.    Discharge Plan: Patient to discharge to own home [place] under the care of patient's friend Lisa Burns post-organ transplant. Patient states that patient's friend Lisa will be present as caregiver in the hospital. Patient's friend Lisa will transport patient home. Patient states agreement with not driving and not returning to work until medically cleared to do so.    Patient states having clear understanding and realistic expectations regarding the potential risks and potential benefits of organ transplantation and organ donation. Patient agrees to further discuss with health care team members and support system members, as well as to utilize available resources and express questions and/or concerns. Resources and information provided and reviewed.    Patient reports motivation to proceed with living organ donor transplantation as scheduled.     Suitability for Transplant: Patient presents as a suitable candidate for organ transplant at this time.  Patient states does have a caregiver plan, transportation plan, and lodging plan in place. Patient states that patient does have medical and prescription medicine insurance in place and does have a plan in place to afford post-transplant costs.    Patient provided verbal permission to release any necessary information to outside resources for patient care and discharge planning.  Resources and information provided and reviewed.  Patient is choosing [facility] pharmacy and reports is in agreement to using [facility] for transplant medicines if insurance allows.    Patient states that she is aware of what patient's normal copays and deductibles are for  prescription medicines.       provided psychosocial support, counseling, resources, education, assistance, and discharge planning.  remains available.    Recommendations/Additional Comments: Pt reports some caregivers work and may need employer paperwork completed for any time missed due to transplant.     Patient states is aware of [facility]'s affiliation and/or partnership with agencies in home health care, LTAC, SNF, DME, and other hospitals and clinics.    Lora Fuchs MSW LCSW

## 2019-03-26 NOTE — PROGRESS NOTES
RECIPIENT PRE-OP NOTE    Potential Recipient Name: Paige Summers, [id number]  Encounter Date: 3/26/2019    Sex: female  YOB: 1950  Age: 68 y.o.    Housing/Contact Info:  [address] [facility] [address]  Telephone Information:   Mobile [phone]    Home: [phone] (home)  Work: There is no work phone number on file.  E-mail: No e-mail address on record    Potential Surgery Date: April 8, 2019  Potential Donor Name: Samir Moreno, Clinic Number: [id number]  Potential Donor Relationship: friend (pt's nurse in [facility] right after [place])    Patient presents as alert and oriented x 4, pleasant, good eye contact, well groomed, recall good, concentration/judgement good, average intelligence, calm, communicative, cooperative and asking and answering questions appropriately. Patient presents as a 68 y.o. year old female to recipient pre-op appointment for scheduled kidney living donor surgery. Patient's grand daughter accompanies patient.  Patient states that she is independent with ADLs at this time.  Patient states motivation to pursue organ transplant at this time.    Does patient drive?  Patient is aware will not be able to drive until medically cleared by the transplant team. Patient verbalizes understanding that patient will need assistance for all transportation needs until medically cleared to drive.    Caregivers/Transportation:  Lisa Burns, 58 yo friend (known Lisa for 40 years), [facility] [place] (pt reports Lisa staying with pt in [facility] since Oct. 2018), does drive/own car. [phone]  Fuad Bueno, 32 yo grand daughter, Carrington BERNAL, does drive/own car, works full time [employer] as .     Other support listed:  Marlene Mir, [phone] Maxine Sun, [phone] or [phone] Paige Powell, [phone] Damaris Powell, [phone] Lisbet Trinidad, [phone] Maddi Trinidad, [phone]  Lilly Cowart, [phone] Brittney Gonzalez,  [phone] Christy Lopez, [phone]    Dependents/Others who rely on Patient/Caregiver for care: pt denies    Cognitive:  Education: high school  Reading Level: 12th grade  Reports difficulty with: seeing needs glasses  Denies difficulty with: reading, writing, seeing (but reports does need glasses), hearing, comprehension, learning and memory. Pt reports independent with medication management.    Infusion Service: patient utilizing? no  Home Health: patient utilizing? no  DME:  patient utilizing? no    Living Will: no  Healthcare Power of : no  Written LW/HCPA and verbal information presented to patient today.    Insurance: Payor: MEDICARE / Plan: MEDICARE PART A & B / Product Type: Government /   Medicare ABD Silver Scripts. Medical insurance letter provided to transplant    Possible concerns regarding insurance post-donation reviewed. Patient verbalizes clear understanding.    Dialysis:  [facility], [phone]. Tues, [place], Sat. 3.50  hours     Financial:  Monthly disability income: $1083.  Employment: Patient is currently disabled. Patient does not plan to return to work once medically cleared. Patient referred to vocational rehabilitation.  Spouse/Significant Other Employment: pt reports is not  at this time.  x 2; first marriage pt was  and then second marriage ended divorce  Patient states does not expect to have any financial problems following transplant surgery.  Patient states has not conducted fundraising to assist with post-transplant costs.    Tobacco/Alcohol/Illicit Drug Abuse: Patient reports does not use tobacco products, alcohol, illicit drugs and non-prescription medications and that patient does plan to remain abstinent.     Psychiatric History: patient denies    Coping: Patient states that she is coping well with having kidney living donor surgery. Patient states will call as needed and does understand how to access resources  including the  as needed, both inpatient and outpatient.    Resources, information and support provided. Psychosocial aspects regarding organ donation and transplantation were discussed. Patient reports having a clear understanding of resources, information, support and psychosocial aspects.    Discharge Plan: Patient to discharge to own home [place] under the care of patient's friend Lisa Burns post-organ transplant. Patient states that patient's friend Lisa will be present as caregiver in the hospital. Patient's friend Lisa will transport patient home. Patient states agreement with not driving and not returning to work until medically cleared to do so.    Patient states having clear understanding and realistic expectations regarding the potential risks and potential benefits of organ transplantation and organ donation. Patient agrees to further discuss with health care team members and support system members, as well as to utilize available resources and express questions and/or concerns. Resources and information provided and reviewed.    Patient reports motivation to proceed with living organ donor transplantation as scheduled.     Suitability for Transplant: Patient presents as a suitable candidate for organ transplant at this time.  Patient states does have a caregiver plan, transportation plan, and lodging plan in place. Patient states that patient does have medical and prescription medicine insurance in place and does have a plan in place to afford post-transplant costs.    Patient provided verbal permission to release any necessary information to outside resources for patient care and discharge planning.  Resources and information provided and reviewed.  Patient is choosing [facility] pharmacy and reports is in agreement to using [facility] for transplant medicines if insurance allows.    Patient states that she is aware of what patient's normal copays and deductibles are for  prescription medicines.       provided psychosocial support, counseling, resources, education, assistance, and discharge planning.  remains available.    Recommendations/Additional Comments: Pt reports some caregivers work and may need employer paperwork completed for any time missed due to transplant.     Patient states is aware of [facility]'s affiliation and/or partnership with agencies in home health care, LTAC, SNF, DME, and other hospitals and clinics.    Lora Fuchs MSW LCSW

## 2019-03-27 NOTE — PROGRESS NOTES
PRE-OP TEACHING NOTE    Paige Summers is here today for pre-op appointments.  Pre-op instructions reviewed and written information was provided.  Discussed again with patient of when to begin holding Plavix.  All questions were answered.  Discussed possibility that surgery may be rescheduled if the program is busy with  donor transplants.  Patient agreed and verbalized understanding of all instructions.

## 2019-04-01 ENCOUNTER — LAB VISIT (OUTPATIENT)
Dept: LAB | Facility: HOSPITAL | Age: 69
End: 2019-04-01
Payer: MEDICARE

## 2019-04-01 DIAGNOSIS — Z76.82 ORGAN TRANSPLANT CANDIDATE: ICD-10-CM

## 2019-04-01 PROCEDURE — 86977 RBC SERUM PRETX INCUBJ/INHIB: CPT | Mod: PO,TXP

## 2019-04-01 PROCEDURE — 86825 HLA X-MATH NON-CYTOTOXIC: CPT | Mod: 91,PO,TXP

## 2019-04-01 PROCEDURE — 86833 HLA CLASS II HIGH DEFIN QUAL: CPT | Mod: PO

## 2019-04-01 PROCEDURE — 86826 HLA X-MATCH NONCYTOTOXC ADDL: CPT | Mod: 91,PO,TXP

## 2019-04-01 PROCEDURE — 86826 HLA X-MATCH NONCYTOTOXC ADDL: CPT | Mod: PO,TXP

## 2019-04-01 PROCEDURE — 86825 HLA X-MATH NON-CYTOTOXIC: CPT | Mod: PO,TXP

## 2019-04-01 PROCEDURE — 86832 HLA CLASS I HIGH DEFIN QUAL: CPT | Mod: PO,TXP

## 2019-04-03 ENCOUNTER — TELEPHONE (OUTPATIENT)
Dept: TRANSPLANT | Facility: CLINIC | Age: 69
End: 2019-04-03

## 2019-04-03 LAB
B CELL RESULTS - XM ALLO: NEGATIVE
B CELL RESULTS - XM ALLO: NEGATIVE
B MCS AVERAGE - XM ALLO: 25.5
B MCS AVERAGE - XM ALLO: 28.5
DONOR MRN: NORMAL
DONOR MRN: NORMAL
FXMAL TESTING DATE: NORMAL
FXMAL TESTING DATE: NORMAL
HLA FLOW CROSSMATCH (ALLO) INTERPRETATION: NORMAL
SERUM COLLECTION DT - XM ALLO: NORMAL
SERUM COLLECTION DT - XM ALLO: NORMAL
T CELL RESULTS - XM ALLO: NEGATIVE
T CELL RESULTS - XM ALLO: NEGATIVE
T MCS AVERAGE - XM ALLO: 14.25
T MCS AVERAGE - XM ALLO: 19

## 2019-04-03 NOTE — TELEPHONE ENCOUNTER
Recipient Information  Name: Paige Summers  MRN: [id number]  Age: 68 y.o. BMI: 32.5      Primary Surgeon:Christa   Secondary Surgeon:GERALD    Dialysis status: hemodialysis in center  GFR:  8.1 mL/min  K+ at pre-op:   Lab Results   Component Value Date    K 3.6 03/26/2019         Plan for K+:    Re-Transplant: No  Currently on Blood thinners? No Reason: Plavix- Last dose 3/29/19 (HD access clotting issue)    Induction: Campath    Other Considerations:    Donor Information  Name: Linda Moreno    MRN: [id number]  Age: 62 year old    GFR: 103.5 mL/min  BMI: 23.5     Surgeon: Sanjay  Split Function: Right 49%  Left 51%  UNOS ID: [id number]  Kidney to be donated: the left side          Final CXM Results:  HLA Flow Crossmatch (Allo) Interpretation (no units)   Date Value   04/01/2019     FINAL CROSSMATCH No DSA detected Results reported to Vitaly Juarez on 4/3/2019 @ 10:15am. VRB obtained.     T MCS Average - XM Allo (no units)   Date Value   04/01/2019 19.00   04/01/2019 14.25     T Cell Results - XM Allo (no units)   Date Value   04/01/2019 Negative   04/01/2019 Negative     B MCS Average - XM Allo (no units)   Date Value   04/01/2019 28.50   04/01/2019 25.50     B Cell Results - XM Allo (no units)   Date Value   04/01/2019 Negative   04/01/2019 Negative        T MCS Average - XM Auto (no units)   Date Value   04/01/2019 10.50     T Cell Results - XM Auto (no units)   Date Value   04/01/2019 Negative     B MCS Average - XM Auto (no units)   Date Value   04/01/2019 -19.00     B Cell Results - XM Auto (no units)   Date Value   04/01/2019 Negative       Comments:     Donor with hx:  [place]-vag fistula repair, partial colectomy, +C diff infection post surgery.  Per  Hand:  PO vancomycin 125mg twice daily 3 days prior to donation and continue for 5 days after.  Started prior to surgery and Rx given to continue after donation.

## 2019-04-03 NOTE — TELEPHONE ENCOUNTER
Donor/Recipient Compatibility    Test/Item Donor Recipient Acceptable/Not Acceptable   ABO O NEG B POS Acceptable   HBsAb (Hepatitis B Surface Antibody) Reactive Hep. B Surf Ab, Qual (no units)   Date Value   06/20/2018 POSITIVE     Hep. B Surf Ab, Quant. (mIU/mL)   Date Value   06/20/2018 14    Acceptable   HBsAg (Hepatitis B Surface Antigen) Non-reactive Hepatitis B Surface Ag (no units)   Date Value   03/26/2019 Negative    Acceptable   HBcAb (Hepatitis Core Antibody) Non-reactive Hep B Core Total Ab (no units)   Date Value   03/26/2019 Negative    Acceptable   HCVab (Hepatitis C antibody) Non-reactive Hepatitis C Ab (no units)   Date Value   06/20/2018 Negative    Acceptable   HIV  Non-reactive HIV 1/2 Ag/Ab (no units)   Date Value   03/26/2019 Negative    Acceptable   CMV Reactive CMV IgG Interpretation (no units)   Date Value   06/20/2018 Reactive (A)     If no results found, check Results Review for Sendouts Acceptable   RPR Non-reactive RPR (no units)   Date Value   06/20/2018 Non-reactive    Acceptable       Recipient Only    X Match reviewed     Donor Only    Imaging reviewed imaging and agree with selection coordinator for left kidney   OFE Testing Acceptable  Look in the Labs tab of Chart Review to find the results or Results Review activity.     Yael Wilson MD

## 2019-04-03 NOTE — TELEPHONE ENCOUNTER
Recipient Information  Name: Paige Summers  MRN: [id number]  Age: 68 y.o. BMI: 32.5      Primary Surgeon:Christa   Secondary Surgeon:GERALD    Dialysis status: hemodialysis in center  GFR:  8.1 mL/min  K+ at pre-op:   Lab Results   Component Value Date    K 3.6 03/26/2019         Plan for K+:    Re-Transplant: No  Currently on Blood thinners? No Reason: Plavix- Last dose 3/29/19 (HD access clotting issue)    Induction: Campath    Other Considerations:    Donor Information  Name: Linda Moreno    MRN: [id number]  Age: 62 year old    GFR: 103.5 mL/min  BMI: 23.5     Surgeon: Sanjay  Split Function: Right 49%  Left 51%  UNOS ID: [id number]  Kidney to be donated: the left side          Final CXM Results:  HLA Flow Crossmatch (Allo) Interpretation (no units)   Date Value   04/01/2019     FINAL CROSSMATCH No DSA detected Results reported to Vitaly Juarez on 4/3/2019 @ 10:15am. VRB obtained.     T MCS Average - XM Allo (no units)   Date Value   04/01/2019 19.00   04/01/2019 14.25     T Cell Results - XM Allo (no units)   Date Value   04/01/2019 Negative   04/01/2019 Negative     B MCS Average - XM Allo (no units)   Date Value   04/01/2019 28.50   04/01/2019 25.50     B Cell Results - XM Allo (no units)   Date Value   04/01/2019 Negative   04/01/2019 Negative        T MCS Average - XM Auto (no units)   Date Value   04/01/2019 10.50     T Cell Results - XM Auto (no units)   Date Value   04/01/2019 Negative     B MCS Average - XM Auto (no units)   Date Value   04/01/2019 -19.00     B Cell Results - XM Auto (no units)   Date Value   04/01/2019 Negative       Comments:     Donor with hx:  [place]-vag fistula repair, partial colectomy, +C diff infection post surgery.  Per  Hand:  PO vancomycin 125mg twice daily 3 days prior to donation and continue for 5 days after.  Started prior to surgery and Rx given to continue after donation.

## 2019-04-04 NOTE — TELEPHONE ENCOUNTER
Donor/Recipient Compatibility    Test/Item Donor Recipient Acceptable/Not Acceptable   ABO O NEG B POS Acceptable   HBsAb (Hepatitis B Surface Antibody) Reactive Hep. B Surf Ab, Qual (no units)   Date Value   06/20/2018 POSITIVE     Hep. B Surf Ab, Quant. (mIU/mL)   Date Value   06/20/2018 14    Acceptable   HBsAg (Hepatitis B Surface Antigen) Non-reactive Hepatitis B Surface Ag (no units)   Date Value   03/26/2019 Negative    Acceptable   HBcAb (Hepatitis Core Antibody) Non-reactive Hep B Core Total Ab (no units)   Date Value   03/26/2019 Negative    Acceptable   HCVab (Hepatitis C antibody) Non-reactive Hepatitis C Ab (no units)   Date Value   06/20/2018 Negative    Acceptable   HIV  Non-reactive HIV 1/2 Ag/Ab (no units)   Date Value   03/26/2019 Negative    Acceptable   CMV Reactive CMV IgG Interpretation (no units)   Date Value   06/20/2018 Reactive (A)     If no results found, check Results Review for Sendouts Acceptable   RPR Non-reactive RPR (no units)   Date Value   06/20/2018 Non-reactive    Acceptable       Recipient Only    X Match reviewed     Donor Only    Imaging n/a   OFE Testing Acceptable  Look in the Labs tab of Chart Review to find the results or Results Review activity.     Razia Flores MD

## 2019-04-08 ENCOUNTER — ANESTHESIA (OUTPATIENT)
Dept: SURGERY | Facility: HOSPITAL | Age: 69
DRG: 652 | End: 2019-04-08
Payer: MEDICARE

## 2019-04-08 ENCOUNTER — HOSPITAL ENCOUNTER (INPATIENT)
Facility: HOSPITAL | Age: 69
LOS: 7 days | Discharge: HOME | DRG: 652 | End: 2019-04-15
Attending: SURGERY | Admitting: SURGERY
Payer: MEDICARE

## 2019-04-08 DIAGNOSIS — E83.39 HYPERPHOSPHATEMIA: ICD-10-CM

## 2019-04-08 DIAGNOSIS — I12.0 HYPERTENSION, RENAL DISEASE, STAGE 5 CHRONIC KIDNEY DISEASE OR END STAGE RENAL DISEASE: ICD-10-CM

## 2019-04-08 DIAGNOSIS — Z91.89 AT RISK FOR OPPORTUNISTIC INFECTIONS: ICD-10-CM

## 2019-04-08 DIAGNOSIS — D63.1 ANEMIA OF RENAL DISEASE: Chronic | ICD-10-CM

## 2019-04-08 DIAGNOSIS — D69.6 THROMBOCYTOPENIA, UNSPECIFIED: ICD-10-CM

## 2019-04-08 DIAGNOSIS — Z94.0 S/P KIDNEY TRANSPLANT: ICD-10-CM

## 2019-04-08 DIAGNOSIS — N18.6 ESRD (END STAGE RENAL DISEASE) ON DIALYSIS: ICD-10-CM

## 2019-04-08 DIAGNOSIS — T82.868A THROMBOSIS OF ARTERIOVENOUS FISTULA, INITIAL ENCOUNTER: ICD-10-CM

## 2019-04-08 DIAGNOSIS — E83.52 HYPERCALCEMIA: ICD-10-CM

## 2019-04-08 DIAGNOSIS — Z78.9 PRESENCE OF SURGICAL INCISION: ICD-10-CM

## 2019-04-08 DIAGNOSIS — N25.81 SECONDARY HYPERPARATHYROIDISM OF RENAL ORIGIN: Chronic | ICD-10-CM

## 2019-04-08 DIAGNOSIS — T82.868D THROMBOSIS OF ARTERIOVENOUS FISTULA, SUBSEQUENT ENCOUNTER: ICD-10-CM

## 2019-04-08 DIAGNOSIS — T86.19 DELAYED GRAFT FUNCTION OF KIDNEY: ICD-10-CM

## 2019-04-08 DIAGNOSIS — I10 ESSENTIAL HYPERTENSION: Chronic | ICD-10-CM

## 2019-04-08 DIAGNOSIS — T82.868S THROMBOSIS OF ARTERIOVENOUS FISTULA, SEQUELA: ICD-10-CM

## 2019-04-08 DIAGNOSIS — N18.9 ANEMIA OF RENAL DISEASE: Chronic | ICD-10-CM

## 2019-04-08 DIAGNOSIS — E87.1 HYPONATREMIA: ICD-10-CM

## 2019-04-08 DIAGNOSIS — I77.0 A-V FISTULA: ICD-10-CM

## 2019-04-08 DIAGNOSIS — R73.9 ACUTE HYPERGLYCEMIA: ICD-10-CM

## 2019-04-08 DIAGNOSIS — Z94.0 S/P LIVING-DONOR KIDNEY TRANSPLANTATION: Primary | ICD-10-CM

## 2019-04-08 DIAGNOSIS — Z29.89 PROPHYLACTIC IMMUNOTHERAPY: ICD-10-CM

## 2019-04-08 DIAGNOSIS — Z99.2 ESRD (END STAGE RENAL DISEASE) ON DIALYSIS: ICD-10-CM

## 2019-04-08 DIAGNOSIS — Z79.60 LONG-TERM USE OF IMMUNOSUPPRESSANT MEDICATION: ICD-10-CM

## 2019-04-08 LAB
25(OH)D3+25(OH)D2 SERPL-MCNC: 44 NG/ML (ref 30–96)
ALBUMIN SERPL BCP-MCNC: 2.8 G/DL (ref 3.5–5.2)
ALBUMIN SERPL BCP-MCNC: 3.5 G/DL (ref 3.5–5.2)
ANION GAP SERPL CALC-SCNC: 13 MMOL/L (ref 8–16)
ANION GAP SERPL CALC-SCNC: 15 MMOL/L (ref 8–16)
BUN SERPL-MCNC: 84 MG/DL (ref 8–23)
BUN SERPL-MCNC: 86 MG/DL (ref 8–23)
CALCIUM SERPL-MCNC: 10.4 MG/DL (ref 8.7–10.5)
CALCIUM SERPL-MCNC: 9.3 MG/DL (ref 8.7–10.5)
CHLORIDE SERPL-SCNC: 103 MMOL/L (ref 95–110)
CHLORIDE SERPL-SCNC: 98 MMOL/L (ref 95–110)
CO2 SERPL-SCNC: 22 MMOL/L (ref 23–29)
CO2 SERPL-SCNC: 28 MMOL/L (ref 23–29)
CREAT SERPL-MCNC: 6.7 MG/DL (ref 0.5–1.4)
CREAT SERPL-MCNC: 6.7 MG/DL (ref 0.5–1.4)
EST. GFR  (AFRICAN AMERICAN): 6.7 ML/MIN/1.73 M^2
EST. GFR  (AFRICAN AMERICAN): 6.7 ML/MIN/1.73 M^2
EST. GFR  (NON AFRICAN AMERICAN): 5.8 ML/MIN/1.73 M^2
EST. GFR  (NON AFRICAN AMERICAN): 5.8 ML/MIN/1.73 M^2
GLUCOSE SERPL-MCNC: 100 MG/DL (ref 70–110)
GLUCOSE SERPL-MCNC: 104 MG/DL (ref 70–110)
GLUCOSE SERPL-MCNC: 168 MG/DL (ref 70–110)
GLUCOSE SERPL-MCNC: 173 MG/DL (ref 70–110)
HCO3 UR-SCNC: 22 MMOL/L (ref 24–28)
HCO3 UR-SCNC: 29.6 MMOL/L (ref 24–28)
HCT VFR BLD AUTO: 31.1 % (ref 37–48.5)
HCT VFR BLD AUTO: 35.4 % (ref 37–48.5)
HCT VFR BLD CALC: 28 %PCV (ref 36–54)
HCT VFR BLD CALC: 36 %PCV (ref 36–54)
PCO2 BLDA: 36.2 MMHG (ref 35–45)
PCO2 BLDA: 50.2 MMHG (ref 35–45)
PH SMN: 7.38 [PH] (ref 7.35–7.45)
PH SMN: 7.39 [PH] (ref 7.35–7.45)
PHOSPHATE SERPL-MCNC: 4.2 MG/DL (ref 2.7–4.5)
PHOSPHATE SERPL-MCNC: 5.3 MG/DL (ref 2.7–4.5)
PO2 BLDA: 32 MMHG (ref 40–60)
PO2 BLDA: 94 MMHG (ref 80–100)
POC BE: -3 MMOL/L
POC BE: 4 MMOL/L
POC IONIZED CALCIUM: 1.09 MMOL/L (ref 1.06–1.42)
POC IONIZED CALCIUM: 1.22 MMOL/L (ref 1.06–1.42)
POC SATURATED O2: 59 % (ref 95–100)
POC SATURATED O2: 97 % (ref 95–100)
POC TCO2: 23 MMOL/L (ref 23–27)
POC TCO2: 31 MMOL/L (ref 24–29)
POCT GLUCOSE: 160 MG/DL (ref 70–110)
POCT GLUCOSE: 187 MG/DL (ref 70–110)
POTASSIUM BLD-SCNC: 4.3 MMOL/L (ref 3.5–5.1)
POTASSIUM BLD-SCNC: 4.4 MMOL/L (ref 3.5–5.1)
POTASSIUM SERPL-SCNC: 3.9 MMOL/L (ref 3.5–5.1)
POTASSIUM SERPL-SCNC: 4.4 MMOL/L (ref 3.5–5.1)
PTH-INTACT SERPL-MCNC: 715 PG/ML (ref 9–77)
SAMPLE: ABNORMAL
SAMPLE: ABNORMAL
SODIUM BLD-SCNC: 139 MMOL/L (ref 136–145)
SODIUM BLD-SCNC: 139 MMOL/L (ref 136–145)
SODIUM SERPL-SCNC: 139 MMOL/L (ref 136–145)
SODIUM SERPL-SCNC: 140 MMOL/L (ref 136–145)

## 2019-04-08 PROCEDURE — 80069 RENAL FUNCTION PANEL: CPT | Mod: 91

## 2019-04-08 PROCEDURE — 50325 PR TRANSPLANT,PREP LIVING  RENAL GRAFT: ICD-10-PCS | Mod: 51,LT,, | Performed by: SURGERY

## 2019-04-08 PROCEDURE — 25000003 PHARM REV CODE 250: Performed by: TRANSPLANT SURGERY

## 2019-04-08 PROCEDURE — 50360 RNL ALTRNSPLJ W/O RCP NFRCT: CPT | Mod: 82,LT,, | Performed by: TRANSPLANT SURGERY

## 2019-04-08 PROCEDURE — 50325 PREP DONOR RENAL GRAFT: CPT | Mod: 51,LT,, | Performed by: SURGERY

## 2019-04-08 PROCEDURE — C2617 STENT, NON-COR, TEM W/O DEL: HCPCS | Performed by: SURGERY

## 2019-04-08 PROCEDURE — 83970 ASSAY OF PARATHORMONE: CPT | Mod: NTX

## 2019-04-08 PROCEDURE — 25000003 PHARM REV CODE 250: Performed by: NURSE ANESTHETIST, CERTIFIED REGISTERED

## 2019-04-08 PROCEDURE — 36556 CENTRAL LINE: ICD-10-PCS | Mod: 59,,, | Performed by: ANESTHESIOLOGY

## 2019-04-08 PROCEDURE — 25000003 PHARM REV CODE 250: Performed by: SURGERY

## 2019-04-08 PROCEDURE — 82962 GLUCOSE BLOOD TEST: CPT | Performed by: SURGERY

## 2019-04-08 PROCEDURE — 36000930 HC OR TIME LEV VII 1ST 15 MIN: Performed by: SURGERY

## 2019-04-08 PROCEDURE — D9220A PRA ANESTHESIA: ICD-10-PCS | Mod: CRNA,,, | Performed by: NURSE ANESTHETIST, CERTIFIED REGISTERED

## 2019-04-08 PROCEDURE — 27201423 OPTIME MED/SURG SUP & DEVICES STERILE SUPPLY: Performed by: SURGERY

## 2019-04-08 PROCEDURE — 71000033 HC RECOVERY, INTIAL HOUR: Performed by: SURGERY

## 2019-04-08 PROCEDURE — 27000191 HC C-V MONITORING: Mod: NTX

## 2019-04-08 PROCEDURE — 63600175 PHARM REV CODE 636 W HCPCS: Performed by: ANESTHESIOLOGY

## 2019-04-08 PROCEDURE — 50360 PR TRANSPLANTATION OF KIDNEY: ICD-10-PCS | Mod: 82,LT,, | Performed by: TRANSPLANT SURGERY

## 2019-04-08 PROCEDURE — 50360 PR TRANSPLANTATION OF KIDNEY: ICD-10-PCS | Mod: LT,,, | Performed by: SURGERY

## 2019-04-08 PROCEDURE — 85014 HEMATOCRIT: CPT | Mod: NTX

## 2019-04-08 PROCEDURE — D9220A PRA ANESTHESIA: Mod: CRNA,,, | Performed by: NURSE ANESTHETIST, CERTIFIED REGISTERED

## 2019-04-08 PROCEDURE — 63600175 PHARM REV CODE 636 W HCPCS: Performed by: NURSE ANESTHETIST, CERTIFIED REGISTERED

## 2019-04-08 PROCEDURE — 27201039 HC RAPID INFUSION SYSTEM (R.I.S.): Mod: NTX

## 2019-04-08 PROCEDURE — 36620 INSERTION CATHETER ARTERY: CPT | Mod: 59,,, | Performed by: ANESTHESIOLOGY

## 2019-04-08 PROCEDURE — 63600175 PHARM REV CODE 636 W HCPCS: Performed by: TRANSPLANT SURGERY

## 2019-04-08 PROCEDURE — 27000221 HC OXYGEN, UP TO 24 HOURS

## 2019-04-08 PROCEDURE — 82306 VITAMIN D 25 HYDROXY: CPT | Mod: NTX

## 2019-04-08 PROCEDURE — 63600175 PHARM REV CODE 636 W HCPCS: Mod: NTX | Performed by: TRANSPLANT SURGERY

## 2019-04-08 PROCEDURE — 25000003 PHARM REV CODE 250: Mod: NTX | Performed by: TRANSPLANT SURGERY

## 2019-04-08 PROCEDURE — 63600175 PHARM REV CODE 636 W HCPCS: Performed by: SURGERY

## 2019-04-08 PROCEDURE — 50328 PR TRANSPLANT,PREP RENAL GRAFT/ARTERIAL: ICD-10-PCS | Mod: 51,LT,, | Performed by: SURGERY

## 2019-04-08 PROCEDURE — 36415 COLL VENOUS BLD VENIPUNCTURE: CPT | Mod: NTX

## 2019-04-08 PROCEDURE — 63600175 PHARM REV CODE 636 W HCPCS

## 2019-04-08 PROCEDURE — 50360 RNL ALTRNSPLJ W/O RCP NFRCT: CPT | Mod: LT,,, | Performed by: SURGERY

## 2019-04-08 PROCEDURE — D9220A PRA ANESTHESIA: ICD-10-PCS | Mod: ANES,,, | Performed by: ANESTHESIOLOGY

## 2019-04-08 PROCEDURE — 36620 ARTERIAL: ICD-10-PCS | Mod: 59,,, | Performed by: ANESTHESIOLOGY

## 2019-04-08 PROCEDURE — 37000008 HC ANESTHESIA 1ST 15 MINUTES: Performed by: SURGERY

## 2019-04-08 PROCEDURE — 36556 INSERT NON-TUNNEL CV CATH: CPT | Mod: 59,,, | Performed by: ANESTHESIOLOGY

## 2019-04-08 PROCEDURE — S5010 5% DEXTROSE AND 0.45% SALINE: HCPCS | Performed by: TRANSPLANT SURGERY

## 2019-04-08 PROCEDURE — 94761 N-INVAS EAR/PLS OXIMETRY MLT: CPT

## 2019-04-08 PROCEDURE — 36000931 HC OR TIME LEV VII EA ADD 15 MIN: Performed by: SURGERY

## 2019-04-08 PROCEDURE — 20600001 HC STEP DOWN PRIVATE ROOM

## 2019-04-08 PROCEDURE — 50605 INSERT URETERAL SUPPORT: CPT | Mod: 51,LT,, | Performed by: SURGERY

## 2019-04-08 PROCEDURE — D9220A PRA ANESTHESIA: Mod: ANES,,, | Performed by: ANESTHESIOLOGY

## 2019-04-08 PROCEDURE — 37000009 HC ANESTHESIA EA ADD 15 MINS: Performed by: SURGERY

## 2019-04-08 PROCEDURE — 50605 PR URETEROTOMY TO INSERT STENT: ICD-10-PCS | Mod: 51,LT,, | Performed by: SURGERY

## 2019-04-08 PROCEDURE — 71000039 HC RECOVERY, EACH ADD'L HOUR: Performed by: SURGERY

## 2019-04-08 DEVICE — STENT DOUBLE J 7FRX12CM: Type: IMPLANTABLE DEVICE | Site: URETER | Status: FUNCTIONAL

## 2019-04-08 RX ORDER — INSULIN ASPART 100 [IU]/ML
0-5 INJECTION, SOLUTION INTRAVENOUS; SUBCUTANEOUS
Status: DISCONTINUED | OUTPATIENT
Start: 2019-04-08 | End: 2019-04-12

## 2019-04-08 RX ORDER — EPHEDRINE SULFATE 50 MG/ML
INJECTION, SOLUTION INTRAVENOUS
Status: DISCONTINUED | OUTPATIENT
Start: 2019-04-08 | End: 2019-04-08

## 2019-04-08 RX ORDER — IBUPROFEN 200 MG
16 TABLET ORAL
Status: DISCONTINUED | OUTPATIENT
Start: 2019-04-08 | End: 2019-04-12

## 2019-04-08 RX ORDER — MORPHINE SULFATE 2 MG/ML
INJECTION, SOLUTION INTRAMUSCULAR; INTRAVENOUS
Status: DISPENSED
Start: 2019-04-08 | End: 2019-04-09

## 2019-04-08 RX ORDER — PROPOFOL 10 MG/ML
VIAL (ML) INTRAVENOUS
Status: DISCONTINUED | OUTPATIENT
Start: 2019-04-08 | End: 2019-04-08

## 2019-04-08 RX ORDER — VALGANCICLOVIR 450 MG/1
450 TABLET, FILM COATED ORAL EVERY MORNING
Status: DISCONTINUED | OUTPATIENT
Start: 2019-04-09 | End: 2019-04-10

## 2019-04-08 RX ORDER — GLYCOPYRROLATE 0.2 MG/ML
INJECTION INTRAMUSCULAR; INTRAVENOUS
Status: DISCONTINUED | OUTPATIENT
Start: 2019-04-08 | End: 2019-04-08

## 2019-04-08 RX ORDER — DIPHENHYDRAMINE HYDROCHLORIDE 50 MG/ML
50 INJECTION INTRAMUSCULAR; INTRAVENOUS ONCE
Status: COMPLETED | OUTPATIENT
Start: 2019-04-08 | End: 2019-04-08

## 2019-04-08 RX ORDER — IBUPROFEN 200 MG
24 TABLET ORAL
Status: DISCONTINUED | OUTPATIENT
Start: 2019-04-08 | End: 2019-04-12

## 2019-04-08 RX ORDER — GLUCAGON 1 MG
1 KIT INJECTION CONTINUOUS PRN
Status: DISCONTINUED | OUTPATIENT
Start: 2019-04-08 | End: 2019-04-15 | Stop reason: HOSPADM

## 2019-04-08 RX ORDER — SODIUM CHLORIDE 9 MG/ML
INJECTION, SOLUTION INTRAVENOUS CONTINUOUS
Status: DISCONTINUED | OUTPATIENT
Start: 2019-04-08 | End: 2019-04-09

## 2019-04-08 RX ORDER — HEPARIN SODIUM 1000 [USP'U]/ML
INJECTION, SOLUTION INTRAVENOUS; SUBCUTANEOUS
Status: DISCONTINUED | OUTPATIENT
Start: 2019-04-08 | End: 2019-04-08 | Stop reason: HOSPADM

## 2019-04-08 RX ORDER — BISACODYL 5 MG
10 TABLET, DELAYED RELEASE (ENTERIC COATED) ORAL NIGHTLY
Status: DISCONTINUED | OUTPATIENT
Start: 2019-04-08 | End: 2019-04-09

## 2019-04-08 RX ORDER — SULFAMETHOXAZOLE AND TRIMETHOPRIM 400; 80 MG/1; MG/1
1 TABLET ORAL EVERY MORNING
Status: DISCONTINUED | OUTPATIENT
Start: 2019-04-09 | End: 2019-04-10

## 2019-04-08 RX ORDER — OXYCODONE HYDROCHLORIDE 5 MG/1
5 TABLET ORAL
Status: DISCONTINUED | OUTPATIENT
Start: 2019-04-08 | End: 2019-04-08 | Stop reason: HOSPADM

## 2019-04-08 RX ORDER — VERAPAMIL HYDROCHLORIDE 2.5 MG/ML
INJECTION, SOLUTION INTRAVENOUS
Status: DISCONTINUED | OUTPATIENT
Start: 2019-04-08 | End: 2019-04-08 | Stop reason: HOSPADM

## 2019-04-08 RX ORDER — ACETAMINOPHEN 650 MG/20.3ML
650 LIQUID ORAL ONCE
Status: COMPLETED | OUTPATIENT
Start: 2019-04-08 | End: 2019-04-08

## 2019-04-08 RX ORDER — NALOXONE HCL 0.4 MG/ML
0.02 VIAL (ML) INJECTION
Status: DISCONTINUED | OUTPATIENT
Start: 2019-04-08 | End: 2019-04-15 | Stop reason: HOSPADM

## 2019-04-08 RX ORDER — NEOSTIGMINE METHYLSULFATE 1 MG/ML
INJECTION, SOLUTION INTRAVENOUS
Status: DISCONTINUED | OUTPATIENT
Start: 2019-04-08 | End: 2019-04-08

## 2019-04-08 RX ORDER — DOPAMINE HYDROCHLORIDE 160 MG/100ML
INJECTION, SOLUTION INTRAVENOUS CONTINUOUS PRN
Status: DISCONTINUED | OUTPATIENT
Start: 2019-04-08 | End: 2019-04-08

## 2019-04-08 RX ORDER — CEFAZOLIN SODIUM 1 G/3ML
2 INJECTION, POWDER, FOR SOLUTION INTRAMUSCULAR; INTRAVENOUS
Status: COMPLETED | OUTPATIENT
Start: 2019-04-08 | End: 2019-04-08

## 2019-04-08 RX ORDER — FAMOTIDINE 20 MG/1
20 TABLET, FILM COATED ORAL NIGHTLY
Status: DISCONTINUED | OUTPATIENT
Start: 2019-04-08 | End: 2019-04-15 | Stop reason: HOSPADM

## 2019-04-08 RX ORDER — HEPARIN SODIUM 1000 [USP'U]/ML
INJECTION, SOLUTION INTRAVENOUS; SUBCUTANEOUS
Status: DISCONTINUED | OUTPATIENT
Start: 2019-04-08 | End: 2019-04-08

## 2019-04-08 RX ORDER — DOCUSATE SODIUM 100 MG/1
100 CAPSULE, LIQUID FILLED ORAL 3 TIMES DAILY
Status: DISCONTINUED | OUTPATIENT
Start: 2019-04-08 | End: 2019-04-15 | Stop reason: HOSPADM

## 2019-04-08 RX ORDER — TACROLIMUS 1 MG/1
2 CAPSULE ORAL 2 TIMES DAILY
Status: DISCONTINUED | OUTPATIENT
Start: 2019-04-08 | End: 2019-04-09

## 2019-04-08 RX ORDER — ONDANSETRON 2 MG/ML
INJECTION INTRAMUSCULAR; INTRAVENOUS
Status: DISCONTINUED | OUTPATIENT
Start: 2019-04-08 | End: 2019-04-08

## 2019-04-08 RX ORDER — CEFAZOLIN SODIUM 1 G/3ML
2 INJECTION, POWDER, FOR SOLUTION INTRAMUSCULAR; INTRAVENOUS
Status: DISCONTINUED | OUTPATIENT
Start: 2019-04-08 | End: 2019-04-09

## 2019-04-08 RX ORDER — HEPARIN SODIUM 5000 [USP'U]/ML
5000 INJECTION, SOLUTION INTRAVENOUS; SUBCUTANEOUS EVERY 8 HOURS
Status: DISCONTINUED | OUTPATIENT
Start: 2019-04-08 | End: 2019-04-08 | Stop reason: HOSPADM

## 2019-04-08 RX ORDER — MORPHINE SULFATE 2 MG/ML
INJECTION, SOLUTION INTRAMUSCULAR; INTRAVENOUS
Status: COMPLETED
Start: 2019-04-08 | End: 2019-04-08

## 2019-04-08 RX ORDER — METHYLPREDNISOLONE SOD SUCC 125 MG
VIAL (EA) INJECTION
Status: DISCONTINUED | OUTPATIENT
Start: 2019-04-08 | End: 2019-04-08

## 2019-04-08 RX ORDER — CEFAZOLIN SODIUM 1 G/3ML
INJECTION, POWDER, FOR SOLUTION INTRAMUSCULAR; INTRAVENOUS
Status: DISCONTINUED | OUTPATIENT
Start: 2019-04-08 | End: 2019-04-08 | Stop reason: HOSPADM

## 2019-04-08 RX ORDER — MORPHINE SULFATE 1 MG/ML
INJECTION INTRAVENOUS CONTINUOUS
Status: DISCONTINUED | OUTPATIENT
Start: 2019-04-08 | End: 2019-04-09

## 2019-04-08 RX ORDER — MORPHINE SULFATE 2 MG/ML
2 INJECTION, SOLUTION INTRAMUSCULAR; INTRAVENOUS EVERY 5 MIN PRN
Status: DISCONTINUED | OUTPATIENT
Start: 2019-04-08 | End: 2019-04-08 | Stop reason: HOSPADM

## 2019-04-08 RX ORDER — MIDAZOLAM HYDROCHLORIDE 1 MG/ML
INJECTION, SOLUTION INTRAMUSCULAR; INTRAVENOUS
Status: DISCONTINUED | OUTPATIENT
Start: 2019-04-08 | End: 2019-04-08

## 2019-04-08 RX ORDER — CISATRACURIUM BESYLATE 10 MG/ML
INJECTION, SOLUTION INTRAVENOUS
Status: DISCONTINUED | OUTPATIENT
Start: 2019-04-08 | End: 2019-04-08

## 2019-04-08 RX ORDER — MANNITOL 250 MG/ML
INJECTION, SOLUTION INTRAVENOUS
Status: DISCONTINUED | OUTPATIENT
Start: 2019-04-08 | End: 2019-04-08

## 2019-04-08 RX ORDER — SODIUM CHLORIDE 9 MG/ML
INJECTION, SOLUTION INTRAVENOUS CONTINUOUS PRN
Status: DISCONTINUED | OUTPATIENT
Start: 2019-04-08 | End: 2019-04-08

## 2019-04-08 RX ORDER — HEPARIN SODIUM 5000 [USP'U]/ML
5000 INJECTION, SOLUTION INTRAVENOUS; SUBCUTANEOUS
Status: DISCONTINUED | OUTPATIENT
Start: 2019-04-08 | End: 2019-04-09

## 2019-04-08 RX ORDER — DEXTROSE MONOHYDRATE AND SODIUM CHLORIDE 5; .45 G/100ML; G/100ML
INJECTION, SOLUTION INTRAVENOUS CONTINUOUS
Status: DISCONTINUED | OUTPATIENT
Start: 2019-04-08 | End: 2019-04-09

## 2019-04-08 RX ORDER — LIDOCAINE HCL/PF 100 MG/5ML
SYRINGE (ML) INTRAVENOUS
Status: DISCONTINUED | OUTPATIENT
Start: 2019-04-08 | End: 2019-04-08

## 2019-04-08 RX ORDER — PHENYLEPHRINE HYDROCHLORIDE 10 MG/ML
INJECTION INTRAVENOUS
Status: DISCONTINUED | OUTPATIENT
Start: 2019-04-08 | End: 2019-04-08

## 2019-04-08 RX ORDER — TACROLIMUS 1 MG/1
CAPSULE ORAL
Status: DISPENSED
Start: 2019-04-08 | End: 2019-04-09

## 2019-04-08 RX ORDER — MYCOPHENOLATE MOFETIL 250 MG/1
500 CAPSULE ORAL 2 TIMES DAILY
Status: DISCONTINUED | OUTPATIENT
Start: 2019-04-08 | End: 2019-04-15 | Stop reason: HOSPADM

## 2019-04-08 RX ORDER — FUROSEMIDE 10 MG/ML
INJECTION INTRAMUSCULAR; INTRAVENOUS
Status: DISCONTINUED | OUTPATIENT
Start: 2019-04-08 | End: 2019-04-08

## 2019-04-08 RX ORDER — ONDANSETRON 2 MG/ML
4 INJECTION INTRAMUSCULAR; INTRAVENOUS EVERY 8 HOURS PRN
Status: DISCONTINUED | OUTPATIENT
Start: 2019-04-08 | End: 2019-04-09

## 2019-04-08 RX ORDER — FENTANYL CITRATE 50 UG/ML
INJECTION, SOLUTION INTRAMUSCULAR; INTRAVENOUS
Status: DISCONTINUED | OUTPATIENT
Start: 2019-04-08 | End: 2019-04-08

## 2019-04-08 RX ORDER — NYSTATIN 100000 [USP'U]/ML
500000 SUSPENSION ORAL
Status: DISCONTINUED | OUTPATIENT
Start: 2019-04-08 | End: 2019-04-15 | Stop reason: HOSPADM

## 2019-04-08 RX ORDER — MUPIROCIN 20 MG/G
1 OINTMENT TOPICAL 2 TIMES DAILY
Status: DISPENSED | OUTPATIENT
Start: 2019-04-08 | End: 2019-04-13

## 2019-04-08 RX ADMIN — PHENYLEPHRINE HYDROCHLORIDE 100 MCG: 10 INJECTION INTRAVENOUS at 12:04

## 2019-04-08 RX ADMIN — SODIUM CHLORIDE 30 MG: 9 INJECTION, SOLUTION INTRAVENOUS at 11:04

## 2019-04-08 RX ADMIN — CISATRACURIUM BESYLATE 4 MG: 10 INJECTION INTRAVENOUS at 02:04

## 2019-04-08 RX ADMIN — CALCIUM CHLORIDE 500 MG: 100 INJECTION, SOLUTION INTRAVENOUS at 02:04

## 2019-04-08 RX ADMIN — CEFAZOLIN 2 G: 330 INJECTION, POWDER, FOR SOLUTION INTRAMUSCULAR; INTRAVENOUS at 10:04

## 2019-04-08 RX ADMIN — FENTANYL CITRATE 25 MCG: 50 INJECTION, SOLUTION INTRAMUSCULAR; INTRAVENOUS at 03:04

## 2019-04-08 RX ADMIN — FENTANYL CITRATE 25 MCG: 50 INJECTION, SOLUTION INTRAMUSCULAR; INTRAVENOUS at 04:04

## 2019-04-08 RX ADMIN — DOCUSATE SODIUM 100 MG: 100 CAPSULE, LIQUID FILLED ORAL at 08:04

## 2019-04-08 RX ADMIN — PHENYLEPHRINE HYDROCHLORIDE 200 MCG: 10 INJECTION INTRAVENOUS at 02:04

## 2019-04-08 RX ADMIN — CISATRACURIUM BESYLATE 6 MG: 10 INJECTION INTRAVENOUS at 10:04

## 2019-04-08 RX ADMIN — DOPAMINE HYDROCHLORIDE 3 MCG/KG/MIN: 160 INJECTION, SOLUTION INTRAVENOUS at 01:04

## 2019-04-08 RX ADMIN — GLYCOPYRROLATE 0.6 MG: 0.2 INJECTION, SOLUTION INTRAMUSCULAR; INTRAVENOUS at 03:04

## 2019-04-08 RX ADMIN — FENTANYL CITRATE 50 MCG: 50 INJECTION, SOLUTION INTRAMUSCULAR; INTRAVENOUS at 10:04

## 2019-04-08 RX ADMIN — MANNITOL 25 MG: 250 INJECTION, SOLUTION INTRAVENOUS at 12:04

## 2019-04-08 RX ADMIN — BISACODYL 10 MG: 5 TABLET, COATED ORAL at 08:04

## 2019-04-08 RX ADMIN — SODIUM CHLORIDE 1000 ML: 0.9 INJECTION, SOLUTION INTRAVENOUS at 09:04

## 2019-04-08 RX ADMIN — NYSTATIN 500000 UNITS: 500000 SUSPENSION ORAL at 08:04

## 2019-04-08 RX ADMIN — HEPARIN SODIUM 5000 UNITS: 5000 INJECTION, SOLUTION INTRAVENOUS; SUBCUTANEOUS at 06:04

## 2019-04-08 RX ADMIN — CISATRACURIUM BESYLATE 6 MG: 10 INJECTION INTRAVENOUS at 12:04

## 2019-04-08 RX ADMIN — ACETAMINOPHEN 650 MG: 650 SOLUTION ORAL at 10:04

## 2019-04-08 RX ADMIN — SODIUM CHLORIDE, SODIUM GLUCONATE, SODIUM ACETATE, POTASSIUM CHLORIDE, MAGNESIUM CHLORIDE, SODIUM PHOSPHATE, DIBASIC, AND POTASSIUM PHOSPHATE: .53; .5; .37; .037; .03; .012; .00082 INJECTION, SOLUTION INTRAVENOUS at 10:04

## 2019-04-08 RX ADMIN — CISATRACURIUM BESYLATE 6 MG: 10 INJECTION INTRAVENOUS at 11:04

## 2019-04-08 RX ADMIN — CEFAZOLIN 2 G: 1 INJECTION, POWDER, FOR SOLUTION INTRAMUSCULAR; INTRAVENOUS at 08:04

## 2019-04-08 RX ADMIN — HEPARIN SODIUM 2000 UNITS: 1000 INJECTION, SOLUTION INTRAVENOUS; SUBCUTANEOUS at 01:04

## 2019-04-08 RX ADMIN — FUROSEMIDE 100 MG: 10 INJECTION, SOLUTION INTRAMUSCULAR; INTRAVENOUS at 12:04

## 2019-04-08 RX ADMIN — SODIUM CHLORIDE: 0.9 INJECTION, SOLUTION INTRAVENOUS at 09:04

## 2019-04-08 RX ADMIN — MORPHINE SULFATE 2 MG: 2 INJECTION, SOLUTION INTRAMUSCULAR; INTRAVENOUS at 05:04

## 2019-04-08 RX ADMIN — Medication: at 05:04

## 2019-04-08 RX ADMIN — PHENYLEPHRINE HYDROCHLORIDE 100 MCG: 10 INJECTION INTRAVENOUS at 02:04

## 2019-04-08 RX ADMIN — DEXTROSE AND SODIUM CHLORIDE: 5; .45 INJECTION, SOLUTION INTRAVENOUS at 04:04

## 2019-04-08 RX ADMIN — LIDOCAINE HYDROCHLORIDE 75 MG: 20 INJECTION, SOLUTION INTRAVENOUS at 09:04

## 2019-04-08 RX ADMIN — MYCOPHENOLATE MOFETIL 500 MG: 250 CAPSULE ORAL at 08:04

## 2019-04-08 RX ADMIN — METHYLPREDNISOLONE SODIUM SUCCINATE 500 MG: 125 INJECTION, POWDER, FOR SOLUTION INTRAMUSCULAR; INTRAVENOUS at 10:04

## 2019-04-08 RX ADMIN — MORPHINE SULFATE 2 MG: 2 INJECTION, SOLUTION INTRAMUSCULAR; INTRAVENOUS at 04:04

## 2019-04-08 RX ADMIN — ONDANSETRON 4 MG: 2 INJECTION INTRAMUSCULAR; INTRAVENOUS at 03:04

## 2019-04-08 RX ADMIN — CEFAZOLIN 2 G: 330 INJECTION, POWDER, FOR SOLUTION INTRAMUSCULAR; INTRAVENOUS at 12:04

## 2019-04-08 RX ADMIN — PROPOFOL 170 MG: 10 INJECTION, EMULSION INTRAVENOUS at 09:04

## 2019-04-08 RX ADMIN — HEPARIN SODIUM 3000 UNITS: 1000 INJECTION, SOLUTION INTRAVENOUS; SUBCUTANEOUS at 01:04

## 2019-04-08 RX ADMIN — FUROSEMIDE 50 MG: 10 INJECTION, SOLUTION INTRAMUSCULAR; INTRAVENOUS at 03:04

## 2019-04-08 RX ADMIN — FENTANYL CITRATE 100 MCG: 50 INJECTION, SOLUTION INTRAMUSCULAR; INTRAVENOUS at 09:04

## 2019-04-08 RX ADMIN — CISATRACURIUM BESYLATE 4 MG: 10 INJECTION INTRAVENOUS at 01:04

## 2019-04-08 RX ADMIN — EPHEDRINE SULFATE 10 MG: 50 INJECTION, SOLUTION INTRAMUSCULAR; INTRAVENOUS; SUBCUTANEOUS at 01:04

## 2019-04-08 RX ADMIN — CISATRACURIUM BESYLATE 14 MG: 10 INJECTION INTRAVENOUS at 09:04

## 2019-04-08 RX ADMIN — NEOSTIGMINE METHYLSULFATE 5 MG: 1 INJECTION INTRAVENOUS at 03:04

## 2019-04-08 RX ADMIN — DIPHENHYDRAMINE HYDROCHLORIDE 50 MG: 50 INJECTION, SOLUTION INTRAMUSCULAR; INTRAVENOUS at 10:04

## 2019-04-08 RX ADMIN — TACROLIMUS 2 MG: 1 CAPSULE ORAL at 05:04

## 2019-04-08 RX ADMIN — SODIUM CHLORIDE 50 ML: 0.9 INJECTION, SOLUTION INTRAVENOUS at 04:04

## 2019-04-08 RX ADMIN — MIDAZOLAM HYDROCHLORIDE 2 MG: 1 INJECTION, SOLUTION INTRAMUSCULAR; INTRAVENOUS at 09:04

## 2019-04-08 RX ADMIN — FAMOTIDINE 20 MG: 20 TABLET, FILM COATED ORAL at 08:04

## 2019-04-08 NOTE — OP NOTE
Operative Report    Date of Procedure: 4/8/2019    Surgeons:  Surgeon(s) and Role:     * Mathew Goodwin MD - Primary     * Roberto Calle MD - Fellow     * Inocente Rodrigues - Assistant    First Assistant Attestation:  The presence of an additional attending surgeon functioning as first assistant was required due to the complexity of the procedure relative to any available residents. I certify that no resident was available who was qualified to serve as first assistant. Duties performed by the assistant included assisting the primary surgeon.    Pre-operative Diagnosis: ESRD, requiring chronic dialysis secondary to Hypertensive Nephrosclerosis  Post-operative Diagnosis: Same    Procedure(s) Performed:   1. Back Table Preparation of Left Kidney    2. Living Kidney transplant    Anesthesia: General endotracheal    Preamble  Indications and Patient Counseling: The patient is a 68 y.o. year-old female with end-stage kidney disease secondary to Hypertensive Nephrosclerosis who has been evaluated for a kidney transplant.  The procedure was thoroughly discussed with the patient, including potential risks, complications, and alternatives.  Specific complications mentioned included death, graft non-function, bleeding, infection, and rejection, as well as the possibility of other complications not specifically mentioned.    Donor Risk Factors: Prior to the operation, the patient was advised of any donor-specific risk factors requiring specific disclosure.  Factors in this case included nothing that required specific disclosure.      Specific PHS Increased Risk Behavior criteria for the organ donor include:  None    All questions were answered, the patient voiced appropriate understanding, and she agreed to proceed with the planned procedure.    ABO Confirmation: Immediately following arrival of the donor organ and prior to implantation, a formal ABO confirmation was done according to hospital and UNOS policies.  I confirmed the  UNOS ID number ([id number]) of the donor organ and the donor and recipient ABO types, directly verifying these data by comparison with the UNOS Match Run report ().  This confirmation was personally done by an attending surgeon and circulating nurse, and is officially documented elsewhere.    Time-Out: A complete time out was carried out prior to incision, with confirmation of patient identity, correct procedure, correct operative site, appropriate antibiotic prophylaxis, review of any known allergies, and presence of all needed equipment.    Procedure in Detail  Prior to starting the operation, the left kidney  was prepared on the back table. Arterial anatomy was double. It had been reconstructed in a side to side pair of pants fashion in the donor operating room by the donor team. Venous anatomy was single. Ureteral anatomy was single. Back table vascular reconstruction was required, consisting of arterial anastomosis.  Unneeded fat was removed from the kidney, the vessels were cleaned of adherent tissue and tested for leaks, and the kidney was maintained at ice temperature in organ preservation solution until it was brought to the operative field.  We placed 6-0 prolene marking suture at the cranial portion of the renal artery and vein to facilitate identification druing sew in.    The patient was brought into the operating room and placed in a supine position on the OR table.  After the induction of general endotracheal anesthesia, lines were placed by the anesthesiologist.  The urinary bladder was catheterized and irrigated with antibiotic solution.  There was no tension on the axillae and all pressure points were padded.  Sequential compression boots were used as were Bill Huggers.  The abdomen was prepped and draped in the usual sterile fashion.  Skin was incised over the right with a knife and deepened with electrocautery.  The peritoneum and its contents were swept medially, exposing the right external iliac  artery and the right external iliac vein.  The Bookwalter retractor was used to provide exposure.  Overlying lymphatics were ligated or cauterized and the vessels were dissected free for a length compatible with anastomosis.  The kidney was brought to the OR table at 4/8/2019  11:48 PM.  Venous control was obtained with a vascular clamp.  A venotomy was made, the vein irrigated, and an end renal to right external iliac vein anastomosis was created with 5-0 polypropylene.  Arterial control was obtained with a vascular clamp.  Arteriotomy was made, the artery irrigated, and an reconstructed to right external iliac artery anastomosis was created with 6-0 polypropylene.  The kidney was unclamped and reperfused at 4/8/2019  12;12 PM.  Reperfusion quality was fair.    Due to poor perfusion of the kidney graft we injected verapamil in the iliac artery, repositioned the kidney and clamped the distal iliac artery. The kidney remained partially pink.    After we had performed the ureteric anastomosis over a 7-12 double J stent, we noticed the perfusion of the kidney was not optimal.    We performed a formal ultrasound and contacted the procurement team to obtain chilled [employer] solution.    We heparinized the patient to facilitate removal. Clamps were applied to the artery and vein - the artery was opened, there was clot in the main artery - the abnormality to flow appeared to be distal on the renal artery towards the hilum of the kidney.    We removed the transplant kidney, reflushed it with chilled  heparinized [employer] solution in an ice bath. On the recipient, we resected our previous arteriotomy and venotomy and obtained arterial venous continuity - using a side biting clamp on the vein, and by closing  the arteriotomy with a running 5-0 prolene suture. Prior to re-implantation the arteriotomy closure was taken down, a small amount of iliac artery was resected and an end to end anastomosis was performed using  5-0 prolene suture    The donor kidney was reviewed on the back table, we cut back the artery, and elimnated the pair of pants anastomosis.  We cut the main artery back as there was an intimal abnormality.    The kidney was brought to the OR table at 4/8/2019  1:55 PM.  Venous control was obtained with a vascular clamp.  The vein clamp was repositioned, the vein irrigated, and an end renal to right external iliac vein anastomosis was created with 5-0 polypropylene.  Arterial control was obtained with a vascular clamp.  Arteriotomy was made with a 4.8 mmm punch, the artery irrigated, and an reconstructed to right external iliac artery anastomosis was created with 6-0 polypropylene.     The second arteriotomy was made with a 4.0 punch and the second artery was anastomosed end to side to distal ilac artery using 7-0 prolene.     The kidney was unclamped and reperfused at 4/8/2019  2:26 PM.  Reperfusion quality was fair.    We performed formal ultrasound, open and  with the fascia closed.    There was an excellent pulse in the main renal artery, the segmental flow was visible.     Intraoperative urine production was observed.  After hemostasis was obtained, a Lich uretero-neocystostomy was created.  The bladder was filled and identified, opened, and the anastomosis created using 6-0 PDS.  The bladder muscle was closed over the distal ureter to create an antireflux tunnel.  A ureteral stent was used.  With the kidney well perfused and sitting appropriately without tension on the anastomoses, viscera were replaced in their usual position.  The wound was closed after a final check for hemostasis.  Overall, the graft quality was assessed to be fair. At the end of the case the needle, sponge and instrument counts were all correct.  Sterile dressings were applied and the patient was brought to the recovery room/ICU in good condition.    Estimated Blood Loss: 200 mL  Fluids Administered:    Drains: 19f Sherif drains x  1  Specimens: none    Findings:    Organ Transplanted: Left Kidney    Arterial Anatomy: double  Number of Arteries: 2  Configuration of Multiple Arteries: separate anastomoses   Venous Anatomy: single  Number of Veins: 1  Ureteral Anatomy: single  Number of Ureters: 1  Reperfusion Quality: fair  Overall Graft Quality: fair  Intraoperative Urine Production: yes  Davison: not to be removed before 5 days.  Ureteral Stent: Yes    Ischemic Times:   Anastomosis (warm ischemia) time: 31 minutes   Cold ischemia time: 189 minutes  Total ischemia time: 220 minutes    [id number]   [facility] Match Run:    Donor Type: Living   Donor CMV Status:    Donor HBcAB:    Donor HCV Status:

## 2019-04-08 NOTE — TRANSFER OF CARE
"Anesthesia Transfer of Care Note    Patient: Paige Summers    Procedure(s) Performed: Procedure(s) (LRB):  TRANSPLANT, KIDNEY (N/A)    Patient location: PACU    Anesthesia Type: general    Transport from OR: Transported from OR on 6-10 L/min O2 by face mask with adequate spontaneous ventilation    Post pain: adequate analgesia    Post assessment: no apparent anesthetic complications and tolerated procedure well    Post vital signs: stable    Level of consciousness: awake, alert and oriented    Nausea/Vomiting: no nausea/vomiting    Complications: none    Transfer of care protocol was followed      Last vitals:   Visit Vitals  /60   Pulse 105   Temp 36.5 °C (97.7 °F) (Oral)   Resp 16   Ht 5' 3" (1.6 m)   Wt 83 kg (182 lb 15.7 oz)   LMP 08/08/1971   SpO2 96%   Breastfeeding? No   BMI 32.41 kg/m²     "

## 2019-04-08 NOTE — PLAN OF CARE
OR nurse called nurse liaison to update family @ [id number]. Nurse liaison called into room for additional update again @ [id number].

## 2019-04-08 NOTE — INTERVAL H&P NOTE
"The patient has been examined and the H&P has been reviewed:    I concur with the findings and no changes have occurred since H&P was written.    Last HD 4/7/2019  Native UOP <100 ml  Estimated body mass index is 32.41 kg/m² as calculated from the following:    Height as of this encounter: 5' 3" (1.6 m).    Weight as of this encounter: 83 kg (182 lb 15.7 oz).    Anesthesia/Surgery risks, benefits and alternative options discussed and understood by patient/family.          Active Hospital Problems    Diagnosis  POA    ESRD (end stage renal disease) on dialysis [N18.6, Z99.2]  Not Applicable      Resolved Hospital Problems   No resolved problems to display.     "

## 2019-04-08 NOTE — ANESTHESIA PROCEDURE NOTES
Central Line    Diagnosis: esrd  Patient location during procedure: done in OR  Procedure start time: 4/8/2019 10:05 AM  Timeout: 4/8/2019 10:04 AM  Procedure end time: 4/8/2019 10:16 AM  Staffing  Anesthesiologist: Elie Galicia MD  Anesthesiologist was present at the time of the procedure.  Preanesthetic Checklist  Completed: patient identified, site marked, surgical consent, pre-op evaluation, timeout performed, IV checked, risks and benefits discussed, monitors and equipment checked and anesthesia consent given  Indication   Indication: hemodynamic monitoring, med administration     Anesthesia   general anesthesia    Central Line   Skin Prep: skin prepped with ChloraPrep, skin prep agent completely dried prior to procedure  maximum sterile barriers used during central venous catheter insertion  hand hygiene performed prior to central venous catheter insertion  Location: right, internal jugular.   Catheter type: triple lumen  Catheter Size: 7 Fr  Inserted Catheter Length: 13 cm  Ultrasound: none   Manometry: Venous cannualation confirmed by visual estimation of blood vessel pressure using manometry.  Insertion Attempts: 1   Securement:line sutured, chlorhexidine patch, sterile dressing applied and blood return through all ports    Post-Procedure   Adverse Events:none    Guidewire Guidewire removed intact.

## 2019-04-08 NOTE — NURSING TRANSFER
Nursing Transfer Note      4/8/2019     Transfer To: [id number]    Transfer via bed    Transfer with     Transported by escort    Medicines sent: IVF PCA    Chart send with patient: Yes    Notified: caregiver    Patient reassessed at: 4/8/19    Upon arrival to floor:

## 2019-04-08 NOTE — ANESTHESIA PROCEDURE NOTES
Arterial    Diagnosis: esrd    Patient location during procedure: done in OR  Procedure start time: 4/8/2019 10:00 AM  Procedure end time: 4/8/2019 10:02 AM  Staffing  Anesthesiologist: Elie Galicia MD  Performed: anesthesiologist   Anesthesiologist was present at the time of the procedure.  Preanesthetic Checklist  Completed: patient identified, site marked, surgical consent, pre-op evaluation, timeout performed, IV checked, risks and benefits discussed, monitors and equipment checked and anesthesia consent givenArterial  Skin Prep: chlorhexidine gluconate and isopropyl alcohol  Local Infiltration: none  Orientation: right  Location: radial  Catheter Size: 20 GInsertion Attempts: 1  Assessment  Dressing: secured with tape and tegaderm  Patient: Tolerated well

## 2019-04-08 NOTE — PLAN OF CARE
Pt AAOX4 Pt remained stable during pacu stay.VSS. Patient states pain is tolerable. PCA Morphine in use. No N&V. Tolerated sips of water. Dressing to rt abdomen intact with SARA drain in place. Davison in place draining clear yellow urine. Teds/SCD's in place throughout duration in PACU. Transferred to the next Phase of Care.

## 2019-04-09 PROBLEM — T82.868A AV FISTULA THROMBOSIS: Status: ACTIVE | Noted: 2019-04-09

## 2019-04-09 PROBLEM — Z78.9 PRESENCE OF SURGICAL INCISION: Status: ACTIVE | Noted: 2019-04-09

## 2019-04-09 PROBLEM — R73.9 ACUTE HYPERGLYCEMIA: Status: ACTIVE | Noted: 2019-04-09

## 2019-04-09 PROBLEM — I12.0 HYPERTENSION, RENAL DISEASE, STAGE 5 CHRONIC KIDNEY DISEASE OR END STAGE RENAL DISEASE: Status: ACTIVE | Noted: 2019-04-09

## 2019-04-09 LAB
ALBUMIN SERPL BCP-MCNC: 2.8 G/DL (ref 3.5–5.2)
ALBUMIN SERPL BCP-MCNC: 2.8 G/DL (ref 3.5–5.2)
ALBUMIN SERPL BCP-MCNC: 3.1 G/DL (ref 3.5–5.2)
ALP SERPL-CCNC: 67 U/L (ref 55–135)
ALT SERPL W/O P-5'-P-CCNC: 15 U/L (ref 10–44)
ANION GAP SERPL CALC-SCNC: 13 MMOL/L (ref 8–16)
ANION GAP SERPL CALC-SCNC: 14 MMOL/L (ref 8–16)
AST SERPL-CCNC: 65 U/L (ref 10–40)
BASOPHILS # BLD AUTO: 0.01 K/UL (ref 0–0.2)
BASOPHILS # BLD AUTO: 0.01 K/UL (ref 0–0.2)
BASOPHILS NFR BLD: 0.1 % (ref 0–1.9)
BASOPHILS NFR BLD: 0.1 % (ref 0–1.9)
BILIRUB DIRECT SERPL-MCNC: 0.2 MG/DL (ref 0.1–0.3)
BILIRUB SERPL-MCNC: 0.4 MG/DL (ref 0.1–1)
BUN SERPL-MCNC: 79 MG/DL (ref 8–23)
BUN SERPL-MCNC: 81 MG/DL (ref 8–23)
CALCIUM SERPL-MCNC: 9.1 MG/DL (ref 8.7–10.5)
CALCIUM SERPL-MCNC: 9.4 MG/DL (ref 8.7–10.5)
CHLORIDE SERPL-SCNC: 100 MMOL/L (ref 95–110)
CHLORIDE SERPL-SCNC: 102 MMOL/L (ref 95–110)
CO2 SERPL-SCNC: 21 MMOL/L (ref 23–29)
CO2 SERPL-SCNC: 24 MMOL/L (ref 23–29)
CREAT SERPL-MCNC: 6.2 MG/DL (ref 0.5–1.4)
CREAT SERPL-MCNC: 6.4 MG/DL (ref 0.5–1.4)
DIFFERENTIAL METHOD: ABNORMAL
DIFFERENTIAL METHOD: ABNORMAL
EOSINOPHIL # BLD AUTO: 0 K/UL (ref 0–0.5)
EOSINOPHIL # BLD AUTO: 0 K/UL (ref 0–0.5)
EOSINOPHIL NFR BLD: 0 % (ref 0–8)
EOSINOPHIL NFR BLD: 0 % (ref 0–8)
ERYTHROCYTE [DISTWIDTH] IN BLOOD BY AUTOMATED COUNT: 15.9 % (ref 11.5–14.5)
ERYTHROCYTE [DISTWIDTH] IN BLOOD BY AUTOMATED COUNT: 16.1 % (ref 11.5–14.5)
EST. GFR  (AFRICAN AMERICAN): 7 ML/MIN/1.73 M^2
EST. GFR  (AFRICAN AMERICAN): 7.3 ML/MIN/1.73 M^2
EST. GFR  (NON AFRICAN AMERICAN): 6.1 ML/MIN/1.73 M^2
EST. GFR  (NON AFRICAN AMERICAN): 6.3 ML/MIN/1.73 M^2
GLUCOSE SERPL-MCNC: 103 MG/DL (ref 70–110)
GLUCOSE SERPL-MCNC: 136 MG/DL (ref 70–110)
HCT VFR BLD AUTO: 31.4 % (ref 37–48.5)
HCT VFR BLD AUTO: 32.9 % (ref 37–48.5)
HGB BLD-MCNC: 10.1 G/DL (ref 12–16)
HGB BLD-MCNC: 10.5 G/DL (ref 12–16)
IMM GRANULOCYTES # BLD AUTO: 0.11 K/UL (ref 0–0.04)
IMM GRANULOCYTES # BLD AUTO: 0.11 K/UL (ref 0–0.04)
IMM GRANULOCYTES NFR BLD AUTO: 0.6 % (ref 0–0.5)
IMM GRANULOCYTES NFR BLD AUTO: 0.6 % (ref 0–0.5)
LDH SERPL L TO P-CCNC: 611 U/L (ref 110–260)
LYMPHOCYTES # BLD AUTO: 0 K/UL (ref 1–4.8)
LYMPHOCYTES # BLD AUTO: 0 K/UL (ref 1–4.8)
LYMPHOCYTES NFR BLD: 0.1 % (ref 18–48)
LYMPHOCYTES NFR BLD: 0.2 % (ref 18–48)
MAGNESIUM SERPL-MCNC: 2.1 MG/DL (ref 1.6–2.6)
MCH RBC QN AUTO: 29.6 PG (ref 27–31)
MCH RBC QN AUTO: 29.9 PG (ref 27–31)
MCHC RBC AUTO-ENTMCNC: 31.9 G/DL (ref 32–36)
MCHC RBC AUTO-ENTMCNC: 32.2 G/DL (ref 32–36)
MCV RBC AUTO: 93 FL (ref 82–98)
MCV RBC AUTO: 93 FL (ref 82–98)
MONOCYTES # BLD AUTO: 0.2 K/UL (ref 0.3–1)
MONOCYTES # BLD AUTO: 0.3 K/UL (ref 0.3–1)
MONOCYTES NFR BLD: 1.1 % (ref 4–15)
MONOCYTES NFR BLD: 1.5 % (ref 4–15)
NEUTROPHILS # BLD AUTO: 19 K/UL (ref 1.8–7.7)
NEUTROPHILS # BLD AUTO: 19.3 K/UL (ref 1.8–7.7)
NEUTROPHILS NFR BLD: 97.6 % (ref 38–73)
NEUTROPHILS NFR BLD: 98.1 % (ref 38–73)
NRBC BLD-RTO: 0 /100 WBC
NRBC BLD-RTO: 0 /100 WBC
PHOSPHATE SERPL-MCNC: 6.3 MG/DL (ref 2.7–4.5)
PHOSPHATE SERPL-MCNC: 6.5 MG/DL (ref 2.7–4.5)
PLATELET # BLD AUTO: 161 K/UL (ref 150–350)
PLATELET # BLD AUTO: 205 K/UL (ref 150–350)
PMV BLD AUTO: 9.7 FL (ref 9.2–12.9)
PMV BLD AUTO: 9.9 FL (ref 9.2–12.9)
POCT GLUCOSE: 115 MG/DL (ref 70–110)
POCT GLUCOSE: 149 MG/DL (ref 70–110)
POCT GLUCOSE: 184 MG/DL (ref 70–110)
POCT GLUCOSE: 189 MG/DL (ref 70–110)
POTASSIUM SERPL-SCNC: 4.6 MMOL/L (ref 3.5–5.1)
POTASSIUM SERPL-SCNC: 4.9 MMOL/L (ref 3.5–5.1)
PROT SERPL-MCNC: 6.7 G/DL (ref 6–8.4)
RBC # BLD AUTO: 3.38 M/UL (ref 4–5.4)
RBC # BLD AUTO: 3.55 M/UL (ref 4–5.4)
SODIUM SERPL-SCNC: 135 MMOL/L (ref 136–145)
SODIUM SERPL-SCNC: 139 MMOL/L (ref 136–145)
TACROLIMUS BLD-MCNC: 2.3 NG/ML (ref 5–15)
WBC # BLD AUTO: 19.3 K/UL (ref 3.9–12.7)
WBC # BLD AUTO: 19.79 K/UL (ref 3.9–12.7)

## 2019-04-09 PROCEDURE — 63600175 PHARM REV CODE 636 W HCPCS: Performed by: STUDENT IN AN ORGANIZED HEALTH CARE EDUCATION/TRAINING PROGRAM

## 2019-04-09 PROCEDURE — 25000003 PHARM REV CODE 250: Performed by: TRANSPLANT SURGERY

## 2019-04-09 PROCEDURE — 84075 ASSAY ALKALINE PHOSPHATASE: CPT

## 2019-04-09 PROCEDURE — 25000003 PHARM REV CODE 250: Performed by: NURSE PRACTITIONER

## 2019-04-09 PROCEDURE — 63600175 PHARM REV CODE 636 W HCPCS: Performed by: TRANSPLANT SURGERY

## 2019-04-09 PROCEDURE — 25000003 PHARM REV CODE 250: Performed by: STUDENT IN AN ORGANIZED HEALTH CARE EDUCATION/TRAINING PROGRAM

## 2019-04-09 PROCEDURE — 80069 RENAL FUNCTION PANEL: CPT | Mod: 91

## 2019-04-09 PROCEDURE — 99233 PR SUBSEQUENT HOSPITAL CARE,LEVL III: ICD-10-PCS | Mod: 24,,, | Performed by: NURSE PRACTITIONER

## 2019-04-09 PROCEDURE — 93990 DOPPLER FLOW TESTING: CPT | Performed by: SURGERY

## 2019-04-09 PROCEDURE — 80197 ASSAY OF TACROLIMUS: CPT

## 2019-04-09 PROCEDURE — 99221 1ST HOSP IP/OBS SF/LOW 40: CPT | Mod: ,,, | Performed by: NURSE PRACTITIONER

## 2019-04-09 PROCEDURE — 83735 ASSAY OF MAGNESIUM: CPT

## 2019-04-09 PROCEDURE — 99221 PR INITIAL HOSPITAL CARE,LEVL I: ICD-10-PCS | Mod: ,,, | Performed by: NURSE PRACTITIONER

## 2019-04-09 PROCEDURE — 36415 COLL VENOUS BLD VENIPUNCTURE: CPT

## 2019-04-09 PROCEDURE — 99233 SBSQ HOSP IP/OBS HIGH 50: CPT | Mod: 24,,, | Performed by: NURSE PRACTITIONER

## 2019-04-09 PROCEDURE — 83615 LACTATE (LD) (LDH) ENZYME: CPT

## 2019-04-09 PROCEDURE — 20600001 HC STEP DOWN PRIVATE ROOM

## 2019-04-09 PROCEDURE — 85025 COMPLETE CBC W/AUTO DIFF WBC: CPT

## 2019-04-09 PROCEDURE — 80069 RENAL FUNCTION PANEL: CPT

## 2019-04-09 PROCEDURE — 63600175 PHARM REV CODE 636 W HCPCS: Performed by: NURSE PRACTITIONER

## 2019-04-09 RX ORDER — BISACODYL 5 MG
10 TABLET, DELAYED RELEASE (ENTERIC COATED) ORAL DAILY PRN
Refills: 0 | COMMUNITY
Start: 2019-04-09 | End: 2020-04-17

## 2019-04-09 RX ORDER — FAMOTIDINE 20 MG/1
20 TABLET, FILM COATED ORAL NIGHTLY
Qty: 30 TABLET | Refills: 1 | Status: SHIPPED | OUTPATIENT
Start: 2019-04-09 | End: 2019-06-17

## 2019-04-09 RX ORDER — CINACALCET 30 MG/1
30 TABLET, FILM COATED ORAL
Qty: 30 TABLET | Refills: 3 | Status: SHIPPED | OUTPATIENT
Start: 2019-04-10 | End: 2019-09-16 | Stop reason: SDUPTHER

## 2019-04-09 RX ORDER — OXYCODONE AND ACETAMINOPHEN 5; 325 MG/1; MG/1
1 TABLET ORAL EVERY 4 HOURS PRN
Status: DISCONTINUED | OUTPATIENT
Start: 2019-04-09 | End: 2019-04-10

## 2019-04-09 RX ORDER — TACROLIMUS 1 MG/1
6 CAPSULE ORAL EVERY 12 HOURS
Qty: 360 CAPSULE | Refills: 11 | Status: SHIPPED | OUTPATIENT
Start: 2019-04-09 | End: 2019-04-15

## 2019-04-09 RX ORDER — OXYCODONE AND ACETAMINOPHEN 10; 325 MG/1; MG/1
1 TABLET ORAL EVERY 4 HOURS PRN
Status: DISCONTINUED | OUTPATIENT
Start: 2019-04-09 | End: 2019-04-10

## 2019-04-09 RX ORDER — METHYLPREDNISOLONE SOD SUCC 125 MG
125 VIAL (EA) INJECTION DAILY
Status: COMPLETED | OUTPATIENT
Start: 2019-04-10 | End: 2019-04-10

## 2019-04-09 RX ORDER — CINACALCET 30 MG/1
30 TABLET, FILM COATED ORAL
Status: DISCONTINUED | OUTPATIENT
Start: 2019-04-10 | End: 2019-04-15 | Stop reason: HOSPADM

## 2019-04-09 RX ORDER — CLONIDINE HYDROCHLORIDE 0.1 MG/1
0.1 TABLET ORAL 2 TIMES DAILY
Status: DISCONTINUED | OUTPATIENT
Start: 2019-04-09 | End: 2019-04-15 | Stop reason: HOSPADM

## 2019-04-09 RX ORDER — SULFAMETHOXAZOLE AND TRIMETHOPRIM 400; 80 MG/1; MG/1
1 TABLET ORAL DAILY
Qty: 30 TABLET | Refills: 11 | Status: SHIPPED | OUTPATIENT
Start: 2019-04-08 | End: 2019-04-15

## 2019-04-09 RX ORDER — ONDANSETRON 4 MG/1
8 TABLET, FILM COATED ORAL EVERY 8 HOURS PRN
Status: DISCONTINUED | OUTPATIENT
Start: 2019-04-09 | End: 2019-04-15 | Stop reason: HOSPADM

## 2019-04-09 RX ORDER — OXYCODONE AND ACETAMINOPHEN 5; 325 MG/1; MG/1
1 TABLET ORAL EVERY 4 HOURS PRN
Status: DISCONTINUED | OUTPATIENT
Start: 2019-04-09 | End: 2019-04-09

## 2019-04-09 RX ORDER — CARVEDILOL 3.12 MG/1
6.25 TABLET ORAL 2 TIMES DAILY WITH MEALS
Status: DISCONTINUED | OUTPATIENT
Start: 2019-04-09 | End: 2019-04-10

## 2019-04-09 RX ORDER — MYCOPHENOLATE MOFETIL 250 MG/1
500 CAPSULE ORAL 2 TIMES DAILY
Qty: 120 CAPSULE | Refills: 11 | Status: SHIPPED | OUTPATIENT
Start: 2019-04-09 | End: 2019-04-18 | Stop reason: SDUPTHER

## 2019-04-09 RX ORDER — HYDRALAZINE HYDROCHLORIDE 20 MG/ML
INJECTION INTRAMUSCULAR; INTRAVENOUS
Status: DISPENSED
Start: 2019-04-09 | End: 2019-04-10

## 2019-04-09 RX ORDER — ACETAMINOPHEN 325 MG/1
650 TABLET ORAL EVERY 6 HOURS PRN
Status: DISCONTINUED | OUTPATIENT
Start: 2019-04-09 | End: 2019-04-15 | Stop reason: HOSPADM

## 2019-04-09 RX ORDER — HYDRALAZINE HYDROCHLORIDE 20 MG/ML
10 INJECTION INTRAMUSCULAR; INTRAVENOUS EVERY 6 HOURS PRN
Status: DISCONTINUED | OUTPATIENT
Start: 2019-04-09 | End: 2019-04-15 | Stop reason: HOSPADM

## 2019-04-09 RX ORDER — DOCUSATE SODIUM 100 MG/1
100 CAPSULE, LIQUID FILLED ORAL 2 TIMES DAILY PRN
Refills: 0 | COMMUNITY
Start: 2019-04-09

## 2019-04-09 RX ORDER — AMLODIPINE BESYLATE 10 MG/1
10 TABLET ORAL DAILY
Status: DISCONTINUED | OUTPATIENT
Start: 2019-04-10 | End: 2019-04-15 | Stop reason: HOSPADM

## 2019-04-09 RX ORDER — OXYCODONE AND ACETAMINOPHEN 10; 325 MG/1; MG/1
2 TABLET ORAL EVERY 4 HOURS PRN
Status: DISCONTINUED | OUTPATIENT
Start: 2019-04-09 | End: 2019-04-09

## 2019-04-09 RX ORDER — SODIUM CHLORIDE 9 MG/ML
INJECTION, SOLUTION INTRAVENOUS CONTINUOUS
Status: DISCONTINUED | OUTPATIENT
Start: 2019-04-09 | End: 2019-04-10

## 2019-04-09 RX ORDER — HEPARIN SODIUM 5000 [USP'U]/ML
5000 INJECTION, SOLUTION INTRAVENOUS; SUBCUTANEOUS
Status: DISCONTINUED | OUTPATIENT
Start: 2019-04-09 | End: 2019-04-12

## 2019-04-09 RX ORDER — VALGANCICLOVIR 450 MG/1
900 TABLET, FILM COATED ORAL DAILY
Qty: 60 TABLET | Refills: 2 | Status: SHIPPED | OUTPATIENT
Start: 2019-04-08 | End: 2019-04-15

## 2019-04-09 RX ORDER — DIPHENHYDRAMINE HCL 25 MG
25 CAPSULE ORAL EVERY 6 HOURS PRN
Status: DISCONTINUED | OUTPATIENT
Start: 2019-04-09 | End: 2019-04-15 | Stop reason: HOSPADM

## 2019-04-09 RX ORDER — OXYCODONE HYDROCHLORIDE 5 MG/1
5 TABLET ORAL EVERY 4 HOURS PRN
Status: DISCONTINUED | OUTPATIENT
Start: 2019-04-09 | End: 2019-04-09

## 2019-04-09 RX ORDER — TACROLIMUS 1 MG/1
3 CAPSULE ORAL 2 TIMES DAILY
Status: DISCONTINUED | OUTPATIENT
Start: 2019-04-09 | End: 2019-04-10

## 2019-04-09 RX ADMIN — DOCUSATE SODIUM 100 MG: 100 CAPSULE, LIQUID FILLED ORAL at 07:04

## 2019-04-09 RX ADMIN — HEPARIN SODIUM 5000 UNITS: 5000 INJECTION, SOLUTION INTRAVENOUS; SUBCUTANEOUS at 07:04

## 2019-04-09 RX ADMIN — OXYCODONE HYDROCHLORIDE AND ACETAMINOPHEN 1 TABLET: 10; 325 TABLET ORAL at 06:04

## 2019-04-09 RX ADMIN — FAMOTIDINE 20 MG: 20 TABLET, FILM COATED ORAL at 07:04

## 2019-04-09 RX ADMIN — SULFAMETHOXAZOLE AND TRIMETHOPRIM 1 TABLET: 400; 80 TABLET ORAL at 08:04

## 2019-04-09 RX ADMIN — NYSTATIN 500000 UNITS: 500000 SUSPENSION ORAL at 07:04

## 2019-04-09 RX ADMIN — DOCUSATE SODIUM 100 MG: 100 CAPSULE, LIQUID FILLED ORAL at 08:04

## 2019-04-09 RX ADMIN — THERA TABS 1 TABLET: TAB at 08:04

## 2019-04-09 RX ADMIN — DEXTROSE 250 MG: 50 INJECTION, SOLUTION INTRAVENOUS at 12:04

## 2019-04-09 RX ADMIN — SODIUM CHLORIDE 1000 ML: 0.9 INJECTION, SOLUTION INTRAVENOUS at 12:04

## 2019-04-09 RX ADMIN — MYCOPHENOLATE MOFETIL 500 MG: 250 CAPSULE ORAL at 07:04

## 2019-04-09 RX ADMIN — NYSTATIN 500000 UNITS: 500000 SUSPENSION ORAL at 08:04

## 2019-04-09 RX ADMIN — TACROLIMUS 3 MG: 1 CAPSULE ORAL at 05:04

## 2019-04-09 RX ADMIN — SODIUM CHLORIDE 1000 ML: 0.9 INJECTION, SOLUTION INTRAVENOUS at 05:04

## 2019-04-09 RX ADMIN — DOCUSATE SODIUM 100 MG: 100 CAPSULE, LIQUID FILLED ORAL at 01:04

## 2019-04-09 RX ADMIN — OXYCODONE HYDROCHLORIDE AND ACETAMINOPHEN 1 TABLET: 10; 325 TABLET ORAL at 11:04

## 2019-04-09 RX ADMIN — HEPARIN SODIUM 5000 UNITS: 5000 INJECTION, SOLUTION INTRAVENOUS; SUBCUTANEOUS at 12:04

## 2019-04-09 RX ADMIN — CLONIDINE HYDROCHLORIDE 0.1 MG: 0.1 TABLET ORAL at 06:04

## 2019-04-09 RX ADMIN — CLONIDINE HYDROCHLORIDE 0.1 MG: 0.1 TABLET ORAL at 11:04

## 2019-04-09 RX ADMIN — NYSTATIN 500000 UNITS: 500000 SUSPENSION ORAL at 05:04

## 2019-04-09 RX ADMIN — CARVEDILOL 6.25 MG: 3.12 TABLET, FILM COATED ORAL at 06:04

## 2019-04-09 RX ADMIN — VALGANCICLOVIR 450 MG: 450 TABLET, FILM COATED ORAL at 08:04

## 2019-04-09 RX ADMIN — NYSTATIN 500000 UNITS: 500000 SUSPENSION ORAL at 01:04

## 2019-04-09 RX ADMIN — MYCOPHENOLATE MOFETIL 500 MG: 250 CAPSULE ORAL at 08:04

## 2019-04-09 RX ADMIN — HEPARIN SODIUM 5000 UNITS: 5000 INJECTION, SOLUTION INTRAVENOUS; SUBCUTANEOUS at 01:04

## 2019-04-09 RX ADMIN — Medication: at 09:04

## 2019-04-09 RX ADMIN — HYDRALAZINE HYDROCHLORIDE 10 MG: 20 INJECTION INTRAMUSCULAR; INTRAVENOUS at 06:04

## 2019-04-09 RX ADMIN — DIPHENHYDRAMINE HYDROCHLORIDE 25 MG: 25 CAPSULE ORAL at 10:04

## 2019-04-09 RX ADMIN — TACROLIMUS 2 MG: 1 CAPSULE ORAL at 08:04

## 2019-04-09 RX ADMIN — LINACLOTIDE 290 MCG: 290 CAPSULE, GELATIN COATED ORAL at 05:04

## 2019-04-09 NOTE — NURSING
Pt arrived to unit floor.  Oriented pt to room. Educated pt on use of call bell.  Pt verbalize understanding to call when needed assistance. Notified MD of pt arrival to unit floor. VSS Admission documents completed. Will continue to monitor.

## 2019-04-09 NOTE — CONSULTS
[facility]  Endocrinology  Diabetes Consult Note    Consult Requested by: Mathew Goodwin MD   Reason for admit: S/P living-donor kidney transplantation    HISTORY OF PRESENT ILLNESS:  Reason for Consult: Management of Hyperglycemia     Surgical Procedure and Date: Kidney Transplant - 2019    HPI:   Patient is a 68 y.o. female with a diagnosis of ESRD secondary to HTN.  Patient is on hemodialysis (last procedure Saturday). She is now [place] to [facility] [place]/p kidney transplant. No dx of DM noted on file. Patient denies previous dx of DM and or use of anti DM medications in the past.   No results found for: LABA1C, HGBA1C                     Interval HPI:   Overnight events:   NAEON. BG is within goal without the need for SQ insulin therapy.   Eatin%  Nausea: No  Hypoglycemia and intervention: No  Fever: No  TPN and/or TF: No  If yes, type of TF/TPN and rate: None    PMH, PSH, FH, SH  reviewed       Review of Systems  Constitutional: Negative for weight changes.  Eyes: Negative for visual disturbance.  Respiratory: Negative for cough.   Cardiovascular: Negative for chest pain.  Gastrointestinal: Negative for nausea.  Endocrine: Negative for polyuria, polydipsia.  Musculoskeletal: Negative for back pain.  Skin: Negative for rash.  Neurological: Negative for syncope.  Psychiatric/Behavioral: Negative for depression.      Current Medications and/or Treatments Impacting Glycemic Control  Immunotherapy:    Immunosuppressants         Stop Route Frequency     tacrolimus capsule 3 mg      -- Oral 2 times daily     mycophenolate capsule 500 mg      -- Oral 2 times daily     tacrolimus (PROGRAF) 1 MG capsule       0559          Steroids:   Hormones (From admission, onward)    Start     Stop Route Frequency Ordered    04/10/19 0900  methylPREDNISolone sodium succinate injection 125 mg  (MEDICATIONS - IP TXP POST-OP CAMPATH  FOR KIDNEY TRANSPLANT PATHWAY)       0859 IV Daily 19 0958         Pressors:    Autonomic Drugs (From admission, onward)    None        Hyperglycemia/Diabetes Medications:   Antihyperglycemics (From admission, onward)    Start     Stop Route Frequency Ordered    04/08/19 1851  insulin aspart U-100 pen 0-5 Units      -- SubQ Before meals & nightly PRN 04/08/19 1751             PHYSICAL EXAMINATION:  Vitals:    04/09/19 1141   BP:    Pulse:    Resp:    Temp: 97.7 °F (36.5 °C)     Body mass index is 34.75 kg/m².    Physical Exam   Constitutional: Well developed, well nourished, NAD.  ENT: External ears no masses with nose patent; normal hearing.  Neck: Supple; trachea midline.  Cardiovascular: Normal heart sounds, no LE edema. DP +2 bilaterally.  Lungs: Normal effort; lungs anterior bilaterally clear to auscultation.  Abdomen: Soft, no masses, no hernias.  MS: No clubbing or cyanosis of nails noted; unable to assess gait.  Skin: No rashes, lesions, or ulcers; no nodules.   Psychiatric: Good judgement and insight; normal mood and affect.  Neurological: Cranial nerves are grossly intact. Normal sensation in the bilateral lower extremities.  Foot: Nails in good condition, no amputations noted.         Labs Reviewed and Include   Recent Labs   Lab 04/09/19  0600      CALCIUM 9.1   ALBUMIN 2.8*  2.8*   PROT 6.7      K 4.9   CO2 24      BUN 81*   CREATININE 6.2*   ALKPHOS 67   ALT 15   AST 65*   BILITOT 0.4     Lab Results   Component Value Date    WBC 19.79 (H) 04/09/2019    HGB 10.1 (L) 04/09/2019    HCT 31.4 (L) 04/09/2019    MCV 93 04/09/2019     04/09/2019     No results for input(s): TSH, FREET4 in the last 168 hours.  No results found for: HGBA1C    Nutritional status:   Body mass index is 34.75 kg/m².  Lab Results   Component Value Date    ALBUMIN 2.8 (L) 04/09/2019    ALBUMIN 2.8 (L) 04/09/2019    ALBUMIN 2.8 (L) 04/08/2019     No results found for: PREALBUMIN    Estimated Creatinine Clearance: 9.1 mL/min (A) (based on SCr of 6.2 mg/dL  (H)).    Accu-Checks  Recent Labs     04/08/19  1703 04/08/19  2120 04/09/19  0821 04/09/19  1222   POCTGLUCOSE 187* 160* 115* 149*        ASSESSMENT and PLAN    * S/P living-donor kidney transplantation  Managed per primary team  Avoid hypoglycemia        Acute hyperglycemia  BG goal 140 - 180    Low Dose SQ Insulin Correction Scale as needed for hyperglycemia post surgery. Patient does not have dx of DM.   BG monitoring AC/HS    ** Please call Endocrine for any BG related issues **  ** Please notify Endocrine for any change and/or advance in diet**        Anemia of renal disease  May affect A1c readings.   Recommend updating A1c levels once patient is no longer anemic.       Prophylactic immunotherapy  On immunosepresive therapy per transplant team; may elevate BG readings        Presence of surgical incision  Optimize BG control to improve wound healing        Long-term use of immunosuppressant medication  On immunosepresive therapy per transplant team; may elevate BG readings            Plan discussed with patient, family, and RN at bedside.     Alec Johnson, MAMADOU  Endocrinology  [facility]

## 2019-04-09 NOTE — PROGRESS NOTES
TRANSPLANT NOTE:      ORGAN: LEFT KIDNEY    Disease Etiology: Hypertensive Nephrosclerosis  Donor CMV Status: Positive  Donor HCV Status: Negative  Donor HBcAb: Negative  Donor HBV OFE: Organ record is missing.  Donor HCV OFE: Organ record is missing.      Paige Summers is a 68 y.o. female s/p  Living   kidney transplant on 4/8/2019 (Kidney) for Hypertensive Nephrosclerosis.   This patient will follow the Campath Induction protocol.  This patients immunosuppression will include Campath for induction with early steroid withdrawal and Prograf and Cellcept for maintenance immunosuppression.  Opportunistic infection prophylaxis will include Valcyte for 3 months (CMV D + , R + ), Bactrim for 1 year, and nystatin for 4 weeks.  Patient is to begin self medications today, and I plan to meet with this patient and his/her support person on POD #2 to review the medication section of the Kidney Transplant Education Manual.  I have reviewed the pre-op medications and have restarted those, as appropriate.

## 2019-04-09 NOTE — ASSESSMENT & PLAN NOTE
- Patients immunosuppression will include Campath for induction with early steroid withdrawal and Prograf and Cellcept for maintenance immunosuppression.    - Chronic Prophylactic Immunosuppression- cont to check prograf level daily.  Assess for toxicity and adjust level as needed

## 2019-04-09 NOTE — CONSULTS
[facility]  Kidney Transplant  Consult Note    IP Consult to Kidney/Pancreas Transplant Medicine  Consult performed by: Arina Sanon NP  Consult ordered by: Inocente Rodrigues MD  Reason for consult: s/p kidney transplant          Subjective:   History of Present Illness:  HPI:  69 y/o female w ESRD 2/2 Hypertensive Nephrosclerosis.  S/p living unrelated kidney transplant 4/8/19, CMV +/+, WIT 30 min, CIT 3h 9 m, HD Tues- Thurs- Sat.  Dry weight 83 kg.  Native UOP <100 ml.  5 day gallardo.   Patients immunosuppression will include Campath for induction with early steroid withdrawal and Prograf and Cellcept for maintenance immunosuppression.  Opportunistic infection prophylaxis will include Valcyte for 3 months (CMV D + , R + ), Bactrim for 1 year, and nystatin for 4 weeks.       Surgery significant for intra op US w slightly decreased perfusion.   Kidney graft injected w verapamil in the artery, repositioned the kidney and clamped the distal iliac artery. The kidney remianed partially pink. Kidney removed, resected previous arteriotomy and venotomy and obtained arterial venous continuity. A small amount of iliac artery was resected and an end to end anastomosis was performed.  Donor kidney was reviewed on the back table, artery was cut back, and elimnated the pair of pants anastomosis. Then the main artery was cut back as there appeared to be an intimal abnormality. Venotomy was made, the vein irrigated, and an end renal to right external iliac vein anastomosis was created.  Arterial control was obtained with a vascular clamp.  Arteriotomy was made, the artery irrigated, and an reconstructed to right external iliac artery anastomosis was created.  The second artery was anastomosed end to side to distal ilac artery.  Reperfusion quality was fair.  Ultrasound, open and closed satisfactory.  There was an excellent pulse in the main renal artery, the segmental flow was visible.   Intraoperative urine  production was observed.         Hospital Course:  Interval History-  No acute events overnight.  UOP ~250 ml/hr.  Cr has trended down from 6.7 to 6.2    K+ 4.9.  US post kidney this am satisfactory.  Pt has LUE AVF- no thrill or bruit noted.  US to assess AVF ordered.  Pt was on Plavix for reoccurring dialysis thrombus.   No plan for urgent HD now.  Incision RLQ w dressing CDI.  One SARA w serosang drainage.  Pt tolerating PO.  Appetite fair.  Bowel sounds Ax4.  (+) belching, no BM.  Abdominal pain well managed w PCA.  BP slightly elevated. Pt was on Clonidine patch 0.1 mg, Coreg 6.25 and Norvasc 10 mg qd.  Post transplant, plan to d/c clonidine patch.  Clonidine 0.1 mg PO bid started.  Vit D 44.  .  Sensipar started.  Patient progressing very well.  Noted to have right hand swelling and 1+ tay pedal edema.  IV fluid replacement discontinued.  IV NS at 75 ml/hr ordered.  Pathway initiated.  Pt will need central line converted to trilaysis IF she needs HD.  Repeat labs for 1300 ordered.  Family at bedside and supportive in care.  Monitor.       Past Medical, Surgical, Family, and Social History:   Unchanged from H&P.    Scheduled Meds:   bisacodyl  10 mg Oral QHS    [START ON 4/10/2019] cinacalcet  30 mg Oral Daily with breakfast    cloNIDine  0.1 mg Oral BID    docusate sodium  100 mg Oral TID    famotidine  20 mg Oral QHS    heparin (porcine)  5,000 Units Subcutaneous Q8H    methylPREDNISolone (SOLU-Medrol) IVPB (doses > 250 mg)  250 mg Intravenous Once    [START ON 4/10/2019] methylPREDNISolone sodium succinate  125 mg Intravenous Daily    multivitamin  1 tablet Oral Daily    mupirocin  1 g Nasal BID    mycophenolate  500 mg Oral BID    nystatin  500,000 Units Mouth/Throat QID (PC + HS)    sulfamethoxazole-trimethoprim 400-80mg  1 tablet Oral Daily    tacrolimus  2 mg Oral BID    valGANciclovir  450 mg Oral Daily     Continuous Infusions:   sodium chloride 0.9% 75 mL/hr at 04/09/19 1056     glucagon (human recombinant)       PRN Meds:acetaminophen, dextrose 50%, glucagon (human recombinant), glucose, glucose, glucose, insulin aspart U-100, naloxone, ondansetron, ondansetron, oxyCODONE-acetaminophen, oxyCODONE-acetaminophen    Intake/Output - Last 3 Shifts       04/07 0700 - 04/08 0659 04/08 0700 - 04/09 0659 04/09 0700 - 04/10 0659    P.O.  1981 360    I.V. (mL/kg)  6731.7 (75.6) 992.1 (11.1)    Total Intake(mL/kg)  8711.7 (97.9) 1352.1 (15.2)    Urine (mL/kg/hr)  4905 (2.3) 800 (1.6)    Drains  60     Blood  250     Total Output  5215 800    Net  +3496.7 +552.1               Review of Systems   Constitutional: Positive for activity change and appetite change (fair/decrease). Negative for chills, fatigue, fever and unexpected weight change.   HENT: Negative.  Negative for postnasal drip.    Eyes: Negative.  Negative for visual disturbance.   Respiratory: Negative for cough, shortness of breath and wheezing.    Cardiovascular: Positive for leg swelling. Negative for chest pain and palpitations.   Gastrointestinal: Positive for abdominal distention, abdominal pain and constipation. Negative for anal bleeding, blood in stool, diarrhea, nausea, rectal pain and vomiting.   Genitourinary: Negative for decreased urine volume, difficulty urinating, dysuria, hematuria and urgency.   Musculoskeletal: Negative for arthralgias and gait problem.   Skin: Positive for wound. Negative for rash.   Allergic/Immunologic: Positive for immunocompromised state.   Neurological: Negative for dizziness, light-headedness and headaches.   Psychiatric/Behavioral: The patient is not nervous/anxious.       Objective:     Vital Signs (Most Recent):  Temp: 97.7 °F (36.5 °C) (04/09/19 1141)  Pulse: 109 (04/09/19 1015)  Resp: (!) 23 (04/09/19 1015)  BP: (!) 147/88 (04/09/19 1015)  SpO2: (!) 92 % (04/09/19 1015) Vital Signs (24h Range):  Temp:  [97.3 °F (36.3 °C)-97.8 °F (36.6 °C)] 97.7 °F (36.5 °C)  Pulse:  [] 109  Resp:  [13-24]  "23  SpO2:  [92 %-99 %] 92 %  BP: (113-178)/() 147/88  Arterial Line BP: (125-167)/(57-72) 162/70     Weight: 89 kg (196 lb 3.1 oz)  Height: 5' 3" (160 cm)  Body mass index is 34.75 kg/m².    Physical Exam   Constitutional: She is oriented to person, place, and time. She appears well-developed. No distress.   HENT:   Head: Normocephalic and atraumatic.   Mouth/Throat: No oropharyngeal exudate.   Eyes: Pupils are equal, round, and reactive to light. EOM are normal.   Neck: Normal range of motion. Neck supple.   Cardiovascular: Normal rate and regular rhythm.   R TLC   Pulmonary/Chest: Effort normal and breath sounds normal. No respiratory distress. She has no wheezes.   Abdominal: Soft. Normal appearance and bowel sounds are normal. She exhibits no distension, no ascites and no mass. There is no hepatosplenomegaly. generalized  There is no rigidity, no rebound, no guarding and negative Smith's sign.   Musculoskeletal: Normal range of motion. She exhibits no edema or tenderness.   Lymphadenopathy:     She has no cervical adenopathy.   Neurological: She is oriented to person, place, and time.   Skin: Skin is warm. She is not diaphoretic.   Psychiatric: She has a normal mood and affect. Her behavior is normal. Judgment and thought content normal.       Laboratory:  CBC:   Recent Labs   Lab 04/08/19  1325 04/08/19  1604 04/09/19  0600   WBC  --   --  19.79*   RBC  --   --  3.38*   HGB  --   --  10.1*   HCT 28* 31.1* 31.4*   PLT  --   --  205   MCV  --   --  93   MCH  --   --  29.9   MCHC  --   --  32.2     BMP:   Recent Labs   Lab 04/08/19  0611 04/08/19  1604 04/09/19  0600    168* 103    140 139   K 4.4 3.9 4.9   CL 98 103 102   CO2 28 22* 24   BUN 86* 84* 81*   CREATININE 6.7* 6.7* 6.2*   CALCIUM 10.4 9.3 9.1     Labs within the past 24 hours have been reviewed.    Diagnostic Results:  US - Kidney:   Results for orders placed during the hospital encounter of 04/08/19   US Transplant Kidney With " Doppler    Narrative EXAMINATION:  US TRANSPLANT KIDNEY WITH DOPPLER    CLINICAL HISTORY:  assess perfusion;    TECHNIQUE:  Real time gray scale and doppler ultrasound was performed over the patient's renal allograft.    COMPARISON:  Intraoperative ultrasound 04/08/2019.    FINDINGS:  Renal allograft in the right lower quadrant.  The allograft measures 11.5 cm. Normal perfusion. No hydronephrosis.    Two peritransplant fluid collections measuring 4.4 x 1.1 x 3.6 cm and 3.4 x 2.9 x 3.4 cm.    Vasculature:    Resistive indices ranged from 0.67 to 0.74.    Main renal artery peak systolic velocity: 248cm/sec with normal waveform.    Renal artery/iliac ratio: 2.2.    The main renal vein is patent.      Impression Satisfactory day 1 postop gray scale and doppler evaluation of renal allograft.    Two small peritransplant fluid collections.    COMMUNICATION  This result was relayed directly by Dr. Dalton by phone to Dr. Goodwin.    Electronically signed by resident: Valerio Montanez MD  Date:    04/09/2019  Time:    08:03    Electronically signed by: Pan Dalton MD  Date:    04/09/2019  Time:    08:20     Assessment/Plan:     Hypertension, renal disease, stage 5 chronic kidney disease or end stage renal disease  - Pt was on Clonidine patch 0.1 mg, Coreg 6.25 and Norvasc 10 mg qd.    - Post transplant, plan to d/c clonidine patch.  Clonidine 0.1 mg PO bid started w holding parameters.      AV fistula thrombosis  - Pt last used AVF on Saturday for HD.  On am exam, no thrill/bruit noted.  Vas US ordered to assess for flow.    - Pt has hx of AVF thrombosis (per pt 9 times) and was on Plavix for reoccurring thrombus.  Held post transplant.      Hypercalcemia  - 9.1, .  Sensipar started.        At risk for opportunistic infections  - Opportunistic infection prophylaxis will include Valcyte for 3 months (CMV D + , R + ), Bactrim for 1 year, and nystatin for 4 weeks.        Long-term use of immunosuppressant medication  -  see prophylactic immnuo      Prophylactic immunotherapy  - Patients immunosuppression will include Campath for induction with early steroid withdrawal and Prograf and Cellcept for maintenance immunosuppression.    - Chronic Prophylactic Immunosuppression- cont to check prograf level daily.  Assess for toxicity and adjust level as needed        S/P living-donor kidney transplantation  - ESRD 2/2 Hypertensive Nephrosclerosis.  S/p living unrelated (friend) kidney transplant 4/8/19, CMV +/+, WIT 30 min, CIT 3h 9 m, HD Tues- Thurs- Sat.  Dry weight 83 kg.  Native UOP <100 ml.    - Surgery significant for intra op US w slightly decreased perfusion. Kidney removed and replaced (see OR report).  US this am satisfactory.  She will need repeat kidney US on Thursday.  - 5 day gallardo.   Making ~400 ml/hr.  Cont strict I/O's.    - Pathway initiated.  IV fluids changed to NS 75 ml/hr.  Appetite and PO intake fair.  1+ tay pedal edema noted.    - US post kidney this am w satisfactory flow.  Plan to repeat US on Thursday.           Presence of surgical incision  - RLQ incision w staples CDI.  Monitor.      Secondary hyperparathyroidism of renal origin  - started Sensipar.      Anemia of renal disease  - h/h stable.  Monitor.          Discharge Planning:  No plan for d/c today.        Arina Sanon NP  Kidney Transplant  [facility]

## 2019-04-09 NOTE — ASSESSMENT & PLAN NOTE
- Pt last used AVF on Saturday for HD.  On am exam, no thrill/bruit noted.  Vas US ordered to assess for flow.    - Pt has hx of AVF thrombosis (per pt 9 times) and was on Plavix for reoccurring thrombus.  Held post transplant.

## 2019-04-09 NOTE — HOSPITAL COURSE
Interval History:  Due to EMR and system outages, current treatment plans were maintained until sufficient data became available. No acute events overnight, continue to progress well. Cr remains elevated, excellent uop, will tentatively plan for permcath placement Monday due to clotted LUE AVF. Platelets trended down, but now improving. BP stable. Ambulating in halls TID, tolerating diet, (+) BM's.

## 2019-04-09 NOTE — PROGRESS NOTES
Admit Note     Met with patient, friend, daughter and granddaughter to assess needs. Patient is a 68 y.o.  female, admitted for for kidney transplant.      Patient admitted from home on 4/8/2019 .  At this time, patient presents as alert and oriented x 4, pleasant, good eye contact, well groomed, recall good, concentration/judgement good, average intelligence, calm, communicative, cooperative and asking and answering questions appropriately.  At this time, patients caregiver presents as alert and oriented x 4, pleasant, good eye contact, well groomed, recall good, concentration/judgement good, average intelligence, calm, communicative, cooperative and asking and answering questions appropriately.    Household/Family Systems     Patient resides with patient's friend, daughter and granddaughter, at [facility] [place] [address].  Support system includes pt's family, friends and .  Patient does not have dependents that are need of being cared for.     Patients primary caregiver is Lisa Burns, patients friend, phone number [phone] . Pt also presents with friend Mayela Beckham, daughter, [phone] and granddaughter Christy Lopez [phone]. Confirmed patients contact information is [phone] (home);   Telephone Information:   Mobile [phone]   .    During admission, patient's caregiver plans to stay in patient's room.  Confirmed patient and patients caregivers do have access to reliable transportation.    Cognitive Status/Learning     Patient reports reading ability as 12th grade and states patient does not have difficulty with reading, writing, seeing, hearing, comprehension, learning and memory.  Patient reports patient learns best by a combination of verbal, written and hands on demonstration.  Needed: No.   Highest education level: High School (9-12) or GED    Vocation/Disability   .  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    Adherence     Patient reports a high level of adherence to patients health care regimen.  Adherence counseling and education provided. Patient verbalizes understanding.    Substance Use    Patient reports the following substance usage.    Tobacco: none, patient denies any use.  Alcohol: none, patient denies any use.  Illicit Drugs/Non-prescribed Medications: none, patient denies any use.  Patient states clear understanding of the potential impact of substance use.  Substance abstinence/cessation counseling, education and resources provided and reviewed.     Services Utilizing/ADLS    Infusion Service: Prior to admission, patient utilizing? no  Home Health: Prior to admission, patient utilizing? no  DME: Prior to admission, yes walker, cane and BP cuff  Pulmonary/Cardiac Rehab: Prior to admission, no  Dialysis:  Prior to admission, yes Pt dialyzed Saturday morning  Transplant Specialty Pharmacy:  Prior to admission, no.    Prior to admission, patient reports patient was independent with ADLS and was driving.  Patient reports patient is not able to care for self at this time due to compromised medical condition (as documented in medical record) and physical weakness..  Patient indicates a willingness to care for self once medically cleared to do so.    Insurance/Medications    Insured by   Payor/Plan Subscr  Sex Relation Sub. Ins. ID Effective Group Num   1. [place] - [place]* BECKY DORSEY* 1950 Female  [id number]                                    [address]   2. [place] BEC[place]* 1950 Female  [id number]* 3/1/15                                    [address] [address]      Primary Insurance (for UNOS reporting): Public Insurance - Medicare FFS (Fee For Service)  Secondary Insurance (for UNOS reporting): Public Insurance - Medicaid    Patient reports patient is able to obtain and afford medications at this  time and at time of discharge. Pt reports wanting assistance with affording medications and pt's daughter Mayela reports helping financially as necessary. Pt reports being able to afford her $3 medications but not her $200+ medication. SWI to follow up with pharmacist Navid.    Living Will/Healthcare Power of     Patient states patient does not have a LW and/or HCPA.   provided education regarding LW and HCPA and the completion of forms.    Coping/Mental Health    Patient is coping adequately with the aid of  family members, friends and kiana.  Patient denies mental health difficulties.     Discharge Planning    At time of discharge, patient plans to return to patient's home under the care of pt's friend and daughter.  Patients friend and daughter will transport patient.  Per rounds today, expected discharge date has not been medically determined at this time. Patient and patients caregiver  verbalize understanding and are involved in treatment planning and discharge process.    Additional Concerns    Patient's caretaker denies additional needs and/or concerns at this time. Patient is being followed for needs, education, resources, information, emotional support, supportive counseling, and for supportive and skilled discharge plan of care.  providing ongoing psychosocial support, education, resources and d/c planning as needed.  SW remains available.  provided resource list, patient choice, psychosocial and supportive counseling, resources, education, assistance and discharge planning with patient and caregiver involvement, ongoing SW availability and services as appropriate.  remains available. Patient's caregiver verbalizes understanding and agreement with information reviewed,  availability and how to access available resources as needed. Patient denies additional needs and/or concerns at this time. Patient verbalizes understanding and  agreement with information reviewed, social work availability, and how to access available resources as needed.

## 2019-04-09 NOTE — PLAN OF CARE
Problem: Adult Inpatient Plan of Care  Goal: Plan of Care Review  Recommendations     Recommendation/Intervention: 1.) Suggest cardiac diet. 2.) If PO intake <50%, suggest Boost Plus-vanilla TID.   Goals: 1.) Pt to consume/tolerate >75% of EEN and EPN.   Nutrition Goal Status: new  Communication of RD Recs: other (comment)(POC)    Assessment and Plan  Nutrition Problem  Increased nutrient needs     Related to (etiology):   Wound healing     Signs and Symptoms (as evidenced by):   S/p kidney transplant     Interventions/Recommendations (treatment strategy):  Collaboration with other providers.     Nutrition Diagnosis Status:   New

## 2019-04-09 NOTE — ASSESSMENT & PLAN NOTE
BG goal 140 - 180    Low Dose SQ Insulin Correction Scale as needed for hyperglycemia post surgery. Patient does not have dx of DM.   BG monitoring AC/HS    ** Please call Endocrine for any BG related issues **  ** Please notify Endocrine for any change and/or advance in diet**

## 2019-04-09 NOTE — HPI
Reason for Consult: Management of Hyperglycemia     Surgical Procedure and Date: Kidney Transplant - 04/08/2019    HPI:   Patient is a 68 y.o. female with a diagnosis of ESRD secondary to HTN.  Patient is on hemodialysis (last procedure Saturday). She is now admitted to [facility] s/p kidney transplant. No dx of DM noted on file. Patient denies previous dx of DM and or use of anti DM medications in the past.   No results found for: LABA1C, HGBA1C

## 2019-04-09 NOTE — PROGRESS NOTES
Patient admitted for Kidney transplant.Transplant coordinator met with the patient on rounds to introduce self and explain the coordinator role. The post-transplant teaching book was given. Transplant Coordinator explained that she will follow the patient while in the hospital and assist with discharge.     ESRD 2/2 HTN, JAMESRD obey transplant  Campath  5 day gallardo-kidney has to reflushed, removed and re-positioned in OR

## 2019-04-09 NOTE — HPI
Paige Summers is a 67 y/o female w ESRD 2/2 Hypertensive Nephrosclerosis.  S/p living unrelated kidney transplant 4/8/19, CMV +/+, WIT 30 min, CIT 3h 9 m, HD Tues- Thurs- Sat.  Pt has LUE AVF- last used 4/6/19.  Pt was on Plavix for reoccurring AVF thrombus. Per pt, AVF has been declotted 9 times.  Dry weight 83 kg.  Native UOP <100 ml.  5 day gallardo.  Patients immunosuppression will include Campath for induction with early steroid withdrawal and Prograf and Cellcept for maintenance immunosuppression.  Opportunistic infection prophylaxis will include Valcyte for 3 months (CMV D + , R + ), Bactrim for 1 year, and nystatin for 4 weeks.     Surgery significant for intra op US w slightly decreased perfusion.   Kidney graft injected w verapamil in the artery, repositioned the kidney and clamped the distal iliac artery. The kidney remianed partially pink. Kidney removed, resected previous arteriotomy and venotomy and obtained arterial venous continuity. A small amount of iliac artery was resected and an end to end anastomosis was performed.  Donor kidney was reviewed on the back table, artery was cut back, and elimnated the pair of pants anastomosis. Then the main artery was cut back as there appeared to be an intimal abnormality. Venotomy was made, the vein irrigated, and an end renal to right external iliac vein anastomosis was created.  Arterial control was obtained with a vascular clamp.  Arteriotomy was made, the artery irrigated, and an reconstructed to right external iliac artery anastomosis was created.  The second artery was anastomosed end to side to distal ilac artery.  Reperfusion quality was fair.  Ultrasound, open and closed satisfactory.  There was an excellent pulse in the main renal artery, the segmental flow was visible.   Intraoperative urine production was observed.

## 2019-04-09 NOTE — ANESTHESIA POSTPROCEDURE EVALUATION
Anesthesia Post Evaluation    Patient: Paige Summers    Procedure(s) Performed: Procedure(s) (LRB):  TRANSPLANT, KIDNEY (N/A)    Final Anesthesia Type: general  Patient location during evaluation: PACU  Patient participation: Yes- Able to Participate  Level of consciousness: awake and alert and oriented  Post-procedure vital signs: reviewed and stable  Pain management: adequate  Airway patency: patent  PONV status at discharge: No PONV  Anesthetic complications: no      Cardiovascular status: stable  Respiratory status: unassisted  Hydration status: euvolemic  Follow-up not needed.          Vitals Value Taken Time   /82 4/8/2019  6:31 PM   Temp 36.4 °C (97.5 °F) 4/8/2019  6:15 PM   Pulse 92 4/8/2019  6:45 PM   Resp 17 4/8/2019  6:45 PM   SpO2 97 % 4/8/2019  6:47 PM   Vitals shown include unvalidated device data.      Event Time     Out of Recovery 18:45:53          Pain/Corinna Score: Pain Rating Prior to Med Admin: 6 (4/8/2019  5:23 PM)  Corinna Score: 9 (4/8/2019  6:15 PM)

## 2019-04-09 NOTE — PLAN OF CARE
Pt AAOx4, VSS, afebrile. BG WNL, no SSI required. RLQ incision with surgical dressing intact, scant drainage, area marked. R SARA drain CDI, putting out sanguinous drainage. Davison CDI and adhered to top of thigh with StatLock.  Drainage bag below bladder and off the floor, no dependent loops or kinks. Draining clear yellow urine; see flowsheet for UOP. Morphine PCA pump in use. Fall risk precautions initiated.  Pt in lowest bed position setting, lighting adjusted, pt to wear nonskid socks when ambulating, side rails up x2.  Pt remain free from falls during shift. Caregiver at bedside. Call light within reach. Will continue to monitor.

## 2019-04-09 NOTE — SUBJECTIVE & OBJECTIVE
Interval HPI:   Overnight events:   NAEON. BG is within goal without the need for SQ insulin therapy.   Eatin%  Nausea: No  Hypoglycemia and intervention: No  Fever: No  TPN and/or TF: No  If yes, type of TF/TPN and rate: None    PMH, PSH, FH, SH  reviewed       Review of Systems  Constitutional: Negative for weight changes.  Eyes: Negative for visual disturbance.  Respiratory: Negative for cough.   Cardiovascular: Negative for chest pain.  Gastrointestinal: Negative for nausea.  Endocrine: Negative for polyuria, polydipsia.  Musculoskeletal: Negative for back pain.  Skin: Negative for rash.  Neurological: Negative for syncope.  Psychiatric/Behavioral: Negative for depression.      Current Medications and/or Treatments Impacting Glycemic Control  Immunotherapy:    Immunosuppressants         Stop Route Frequency     tacrolimus capsule 3 mg      -- Oral 2 times daily     mycophenolate capsule 500 mg      -- Oral 2 times daily     tacrolimus (PROGRAF) 1 MG capsule       0559          Steroids:   Hormones (From admission, onward)    Start     Stop Route Frequency Ordered    04/10/19 0900  methylPREDNISolone sodium succinate injection 125 mg  (MEDICATIONS - IP TXP POST-OP CAMPATH  FOR KIDNEY TRANSPLANT PATHWAY)       0859 IV Daily 19 0958        Pressors:    Autonomic Drugs (From admission, onward)    None        Hyperglycemia/Diabetes Medications:   Antihyperglycemics (From admission, onward)    Start     Stop Route Frequency Ordered    19 1851  insulin aspart U-100 pen 0-5 Units      -- SubQ Before meals & nightly PRN 19 1751             PHYSICAL EXAMINATION:  Vitals:    19 1141   BP:    Pulse:    Resp:    Temp: 97.7 °F (36.5 °C)     Body mass index is 34.75 kg/m².    Physical Exam   Constitutional: Well developed, well nourished, NAD.  ENT: External ears no masses with nose patent; normal hearing.  Neck: Supple; trachea midline.  Cardiovascular: Normal heart sounds, no LE  edema. DP +2 bilaterally.  Lungs: Normal effort; lungs anterior bilaterally clear to auscultation.  Abdomen: Soft, no masses, no hernias.  MS: No clubbing or cyanosis of nails noted; unable to assess gait.  Skin: No rashes, lesions, or ulcers; no nodules.   Psychiatric: Good judgement and insight; normal mood and affect.  Neurological: Cranial nerves are grossly intact. Normal sensation in the bilateral lower extremities.  Foot: Nails in good condition, no amputations noted.

## 2019-04-09 NOTE — SUBJECTIVE & OBJECTIVE
Subjective:   History of Present Illness:  HPI:  67 y/o female w ESRD 2/2 Hypertensive Nephrosclerosis.  S/p living unrelated kidney transplant 4/8/19, CMV +/+, WIT 30 min, CIT 3h 9 m, HD Tues- Thurs- Sat.  Dry weight 83 kg.  Native UOP <100 ml.  5 day gallardo.   Patients immunosuppression will include Campath for induction with early steroid withdrawal and Prograf and Cellcept for maintenance immunosuppression.  Opportunistic infection prophylaxis will include Valcyte for 3 months (CMV D + , R + ), Bactrim for 1 year, and nystatin for 4 weeks.       Surgery significant for intra op US w slightly decreased perfusion.   Kidney graft injected w verapamil in the artery, repositioned the kidney and clamped the distal iliac artery. The kidney remianed partially pink. Kidney removed, resected previous arteriotomy and venotomy and obtained arterial venous continuity. A small amount of iliac artery was resected and an end to end anastomosis was performed.  Donor kidney was reviewed on the back table, artery was cut back, and elimnated the pair of pants anastomosis. Then the main artery was cut back as there appeared to be an intimal abnormality. Venotomy was made, the vein irrigated, and an end renal to right external iliac vein anastomosis was created.  Arterial control was obtained with a vascular clamp.  Arteriotomy was made, the artery irrigated, and an reconstructed to right external iliac artery anastomosis was created.  The second artery was anastomosed end to side to distal ilac artery.  Reperfusion quality was fair.  Ultrasound, open and closed satisfactory.  There was an excellent pulse in the main renal artery, the segmental flow was visible.   Intraoperative urine production was observed.         Hospital Course:  Interval History-  No acute events overnight.  UOP ~250 ml/hr.  Cr has trended down from 6.7 to 6.2    K+ 4.9.  US post kidney this am satisfactory.  Pt has LUE AVF- no thrill or bruit noted.  US to  assess AVF ordered.  Pt was on Plavix for reoccurring dialysis thrombus.   No plan for urgent HD now.  Incision RLQ w dressing CDI.  One SARA w serosang drainage.  Pt tolerating PO.  Appetite fair.  Bowel sounds Ax4.  (+) belching, no BM.  Abdominal pain well managed w PCA.  BP slightly elevated. Pt was on Clonidine patch 0.1 mg, Coreg 6.25 and Norvasc 10 mg qd.  Post transplant, plan to d/c clonidine patch.  Clonidine 0.1 mg PO bid started.  Vit D 44.  .  Sensipar started.  Patient progressing very well.  Noted to have right hand swelling and 1+ aty pedal edema.  IV fluid replacement discontinued.  IV NS at 75 ml/hr ordered.  Pathway initiated.  Pt will need central line converted to trilaysis IF she needs HD.  Repeat labs for 1300 ordered.  Family at bedside and supportive in care.  Monitor.       Past Medical, Surgical, Family, and Social History:   Unchanged from H&P.    Scheduled Meds:   bisacodyl  10 mg Oral QHS    [START ON 4/10/2019] cinacalcet  30 mg Oral Daily with breakfast    cloNIDine  0.1 mg Oral BID    docusate sodium  100 mg Oral TID    famotidine  20 mg Oral QHS    heparin (porcine)  5,000 Units Subcutaneous Q8H    methylPREDNISolone (SOLU-Medrol) IVPB (doses > 250 mg)  250 mg Intravenous Once    [START ON 4/10/2019] methylPREDNISolone sodium succinate  125 mg Intravenous Daily    multivitamin  1 tablet Oral Daily    mupirocin  1 g Nasal BID    mycophenolate  500 mg Oral BID    nystatin  500,000 Units Mouth/Throat QID (PC + HS)    sulfamethoxazole-trimethoprim 400-80mg  1 tablet Oral Daily    tacrolimus  2 mg Oral BID    valGANciclovir  450 mg Oral Daily     Continuous Infusions:   sodium chloride 0.9% 75 mL/hr at 04/09/19 1056    glucagon (human recombinant)       PRN Meds:acetaminophen, dextrose 50%, glucagon (human recombinant), glucose, glucose, glucose, insulin aspart U-100, naloxone, ondansetron, ondansetron, oxyCODONE-acetaminophen,  "oxyCODONE-acetaminophen    Intake/Output - Last 3 Shifts       04/07 0700 - 04/08 0659 04/08 0700 - 04/09 0659 04/09 0700 - 04/10 0659    P.O.  1981 360    I.V. (mL/kg)  6731.7 (75.6) 992.1 (11.1)    Total Intake(mL/kg)  8711.7 (97.9) 1352.1 (15.2)    Urine (mL/kg/hr)  4905 (2.3) 800 (1.6)    Drains  60     Blood  250     Total Output  5215 800    Net  +3496.7 +552.1               Review of Systems   Constitutional: Positive for activity change and appetite change (fair/decrease). Negative for chills, fatigue, fever and unexpected weight change.   HENT: Negative.  Negative for postnasal drip.    Eyes: Negative.  Negative for visual disturbance.   Respiratory: Negative for cough, shortness of breath and wheezing.    Cardiovascular: Positive for leg swelling. Negative for chest pain and palpitations.   Gastrointestinal: Positive for abdominal distention, abdominal pain and constipation. Negative for anal bleeding, blood in stool, diarrhea, nausea, rectal pain and vomiting.   Genitourinary: Negative for decreased urine volume, difficulty urinating, dysuria, hematuria and urgency.   Musculoskeletal: Negative for arthralgias and gait problem.   Skin: Positive for wound. Negative for rash.   Allergic/Immunologic: Positive for immunocompromised state.   Neurological: Negative for dizziness, light-headedness and headaches.   Psychiatric/Behavioral: The patient is not nervous/anxious.       Objective:     Vital Signs (Most Recent):  Temp: 97.7 °F (36.5 °C) (04/09/19 1141)  Pulse: 109 (04/09/19 1015)  Resp: (!) 23 (04/09/19 1015)  BP: (!) 147/88 (04/09/19 1015)  SpO2: (!) 92 % (04/09/19 1015) Vital Signs (24h Range):  Temp:  [97.3 °F (36.3 °C)-97.8 °F (36.6 °C)] 97.7 °F (36.5 °C)  Pulse:  [] 109  Resp:  [13-24] 23  SpO2:  [92 %-99 %] 92 %  BP: (113-178)/() 147/88  Arterial Line BP: (125-167)/(57-72) 162/70     Weight: 89 kg (196 lb 3.1 oz)  Height: 5' 3" (160 cm)  Body mass index is 34.75 kg/m².    Physical Exam "   Constitutional: She is oriented to person, place, and time. She appears well-developed. No distress.   HENT:   Head: Normocephalic and atraumatic.   Mouth/Throat: No oropharyngeal exudate.   Eyes: Pupils are equal, round, and reactive to light. EOM are normal.   Neck: Normal range of motion. Neck supple.   Cardiovascular: Normal rate and regular rhythm.   R TLC   Pulmonary/Chest: Effort normal and breath sounds normal. No respiratory distress. She has no wheezes.   Abdominal: Soft. Normal appearance and bowel sounds are normal. She exhibits no distension, no ascites and no mass. There is no hepatosplenomegaly. generalized  There is no rigidity, no rebound, no guarding and negative Smith's sign.   Musculoskeletal: Normal range of motion. She exhibits no edema or tenderness.   Lymphadenopathy:     She has no cervical adenopathy.   Neurological: She is oriented to person, place, and time.   Skin: Skin is warm. She is not diaphoretic.   Psychiatric: She has a normal mood and affect. Her behavior is normal. Judgment and thought content normal.       Laboratory:  CBC:   Recent Labs   Lab 04/08/19  1325 04/08/19  1604 04/09/19  0600   WBC  --   --  19.79*   RBC  --   --  3.38*   HGB  --   --  10.1*   HCT 28* 31.1* 31.4*   PLT  --   --  205   MCV  --   --  93   MCH  --   --  29.9   MCHC  --   --  32.2     BMP:   Recent Labs   Lab 04/08/19  0611 04/08/19  1604 04/09/19  0600    168* 103    140 139   K 4.4 3.9 4.9   CL 98 103 102   CO2 28 22* 24   BUN 86* 84* 81*   CREATININE 6.7* 6.7* 6.2*   CALCIUM 10.4 9.3 9.1     Labs within the past 24 hours have been reviewed.    Diagnostic Results:  US - Kidney:   Results for orders placed during the hospital encounter of 04/08/19   US Transplant Kidney With Doppler    Narrative EXAMINATION:  US TRANSPLANT KIDNEY WITH DOPPLER    CLINICAL HISTORY:  assess perfusion;    TECHNIQUE:  Real time gray scale and doppler ultrasound was performed over the patient's renal  allograft.    COMPARISON:  Intraoperative ultrasound 04/08/2019.    FINDINGS:  Renal allograft in the right lower quadrant.  The allograft measures 11.5 cm. Normal perfusion. No hydronephrosis.    Two peritransplant fluid collections measuring 4.4 x 1.1 x 3.6 cm and 3.4 x 2.9 x 3.4 cm.    Vasculature:    Resistive indices ranged from 0.67 to 0.74.    Main renal artery peak systolic velocity: 248cm/sec with normal waveform.    Renal artery/iliac ratio: 2.2.    The main renal vein is patent.      Impression Satisfactory day 1 postop gray scale and doppler evaluation of renal allograft.    Two small peritransplant fluid collections.    COMMUNICATION  This result was relayed directly by Dr. Dalton by phone to Dr. Goodwin.    Electronically signed by resident: Valerio Montanez MD  Date:    04/09/2019  Time:    08:03    Electronically signed by: Pan Dalton MD  Date:    04/09/2019  Time:    08:20

## 2019-04-09 NOTE — ASSESSMENT & PLAN NOTE
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  S/p living unrelated (friend) kidney transplant 4/8/19, CMV +/+, WIT 30 min, CIT 3h 9 m, HD Tues- Thurs- Sat.  Dry weight 83 kg.  Native UOP <100 ml.    - Surgery significant for intra op US w slightly decreased perfusion. Kidney removed and replaced (see OR report).  US this am satisfactory.  She will need repeat kidney US on Thursday.  - 5 day gallardo.  Making ~400 ml/hr.  Cont strict I/O's.    - Pathway initiated.  IV fluids changed to NS 75 ml/hr.  Appetite and PO intake fair.  1+ tay pedal edema noted.

## 2019-04-09 NOTE — ASSESSMENT & PLAN NOTE
- Pt was on Clonidine patch 0.1 mg, Coreg 6.25 and Norvasc 10 mg qd.    - Post transplant, plan to d/c clonidine patch.  Clonidine 0.1 mg PO bid started w holding parameters.

## 2019-04-09 NOTE — CARE UPDATE
Rapid Response Nurse Chart Check:    Chart check completed, abnormal VS noted, charge RNPolina contacted, no concerns verbalized at this time, reports patient is getting Hydralazine IVP and pain medication, instructed to call [phone] for further concerns or assistance.

## 2019-04-09 NOTE — CONSULTS
"  [facility]  Adult Nutrition  Consult Note    SUMMARY     Recommendations    Recommendation/Intervention: 1.) Suggest cardiac diet. 2.) If PO intake <50%, suggest Boost Plus-vanilla TID.   Goals: 1.) Pt to consume/tolerate >75% of EEN and EPN.   Nutrition Goal Status: new  Communication of RD Recs: other (comment)(POC)    Reason for Assessment    Reason For Assessment: consult  Diagnosis: transplant/postoperative complications(s/p KTx )  Relevant Medical History: HTN, CKD, anemia, gout, HLD, anxiety, GERD  Interdisciplinary Rounds: did not attend  General Information Comments: S/p KTx. Pt reports feeling good. Observed ~50% of bkfst consumed. Pt states she does not have too big of an appetite, but understands she needs to eat. Pt states PTA, consuming x3/day ONS (does not know the name). Agreeable to ONS while admitted. No GI distress. No chew/swallowing issues. NKFA. NFPE performed at bedside. Pt appears to be well nourished.   Nutrition Discharge Planning: Post transplant nutrition education provided. Food safety/drug interactions emphasized. General healthy/low salt diet recommended. RD name/contact information, education material left.  No other needs identified. Caregiver present.      Nutrition Risk Screen    Nutrition Risk Screen: no indicators present    Nutrition/Diet History    Spiritual, Cultural Beliefs, Cheondoism Practices, Values that Affect Care: no    Anthropometrics    Temp: 97.8 °F (36.6 °C)  Height Method: Stated  Height: 5' 3" (160 cm)  Height (inches): 63 in  Weight Method: Stated  Weight: 89 kg (196 lb 3.1 oz)  Weight (lb): 196.19 lb  Ideal Body Weight (IBW), Female: 115 lb  % Ideal Body Weight, Female (lb): 159.11 lb  BMI (Calculated): 32.5  Usual Body Weight (UBW), k kg(per pt)  % Usual Body Weight: 100.2       Lab/Procedures/Meds    Pertinent Labs Reviewed: reviewed  Pertinent Labs Comments: H/H 10.1/31.4, BUN 81, Cr 6.2, GFR 6.3, Phos 6.3, AST 65  Pertinent " Medications Reviewed: reviewed  Pertinent Medications Comments: bisacodyl, docusate, famotidine, MVI, statin, mycophenolate, statin, tacrolimus       Estimated/Assessed Needs    Weight Used For Calorie Calculations: 89 kg (196 lb 3.4 oz)  Energy Calorie Requirements (kcal): 7506-8781  Energy Need Method: Kcal/kg(30-35 kcal/kg of IBW (52.2kg))  Protein Requirements: 63-78(g/day)  Weight Used For Protein Calculations: (1.2-1.5 g/kg of IBW (52.2kg))  Fluid Requirements (mL): 1 mL/kcal or per MD  Estimated Fluid Requirement Method: RDA Method  RDA Method (mL): 1566       Nutrition Prescription Ordered    Current Diet Order: regular    Evaluation of Received Nutrient/Fluid Intake    I/O: +1.6L since admit  Comments: LBM 4/7  % Intake of Estimated Energy Needs: 25 - 50 %  % Meal Intake: 25 - 50 %    Nutrition Risk    Level of Risk/Frequency of Follow-up: low - moderate     Assessment and Plan  Nutrition Problem  Increased nutrient needs    Related to (etiology):   Wound healing    Signs and Symptoms (as evidenced by):   S/p kidney transplant    Interventions/Recommendations (treatment strategy):  Collaboration with other providers.    Nutrition Diagnosis Status:   New           Monitor and Evaluation    Food and Nutrient Intake: energy intake, food and beverage intake  Food and Nutrient Adminstration: diet order  Knowledge/Beliefs/Attitudes: food and nutrition knowledge/skill  Physical Activity and Function: nutrition-related ADLs and IADLs  Anthropometric Measurements: weight, weight change, body mass index  Biochemical Data, Medical Tests and Procedures: electrolyte and renal panel, gastrointestinal profile, glucose/endocrine profile, inflammatory profile  Nutrition-Focused Physical Findings: overall appearance     Malnutrition Assessment                 Orbital Region (Subcutaneous Fat Loss): well nourished  Upper Arm Region (Subcutaneous Fat Loss): well nourished  Thoracic and Lumbar Region: well nourished   Temple  Region (Muscle Loss): mild depletion  Clavicle Bone Region (Muscle Loss): well nourished  Clavicle and Acromion Bone Region (Muscle Loss): well nourished  Scapular Bone Region (Muscle Loss): well nourished  Dorsal Hand (Muscle Loss): mild depletion  Patellar Region (Muscle Loss): well nourished  Anterior Thigh Region (Muscle Loss): well nourished  Posterior Calf Region (Muscle Loss): well nourished   Edema (Fluid Accumulation): 0-->no edema present   Subcutaneous Fat Loss (Final Summary): well nourished  Muscle Loss Evaluation (Final Summary): well nourished         Nutrition Follow-Up    RD Follow-up?: Yes

## 2019-04-09 NOTE — ASSESSMENT & PLAN NOTE
- Opportunistic infection prophylaxis will include Valcyte for 3 months (CMV D + , R + ), Bactrim for 1 year, and nystatin for 4 weeks.

## 2019-04-10 PROBLEM — N18.6 ESRD (END STAGE RENAL DISEASE) ON DIALYSIS: Status: RESOLVED | Noted: 2018-12-19 | Resolved: 2019-04-10

## 2019-04-10 PROBLEM — E83.39 HYPERPHOSPHATEMIA: Status: ACTIVE | Noted: 2019-04-10

## 2019-04-10 PROBLEM — Z99.2 ESRD (END STAGE RENAL DISEASE) ON DIALYSIS: Status: RESOLVED | Noted: 2018-12-19 | Resolved: 2019-04-10

## 2019-04-10 LAB
ALBUMIN SERPL BCP-MCNC: 2.9 G/DL (ref 3.5–5.2)
ANION GAP SERPL CALC-SCNC: 11 MMOL/L (ref 8–16)
ANISOCYTOSIS BLD QL SMEAR: SLIGHT
BASOPHILS # BLD AUTO: 0.01 K/UL (ref 0–0.2)
BASOPHILS NFR BLD: 0.1 % (ref 0–1.9)
BUN SERPL-MCNC: 84 MG/DL (ref 8–23)
CALCIUM SERPL-MCNC: 9.4 MG/DL (ref 8.7–10.5)
CHLORIDE SERPL-SCNC: 102 MMOL/L (ref 95–110)
CO2 SERPL-SCNC: 22 MMOL/L (ref 23–29)
CREAT SERPL-MCNC: 6.2 MG/DL (ref 0.5–1.4)
DIFFERENTIAL METHOD: ABNORMAL
EOSINOPHIL # BLD AUTO: 0 K/UL (ref 0–0.5)
EOSINOPHIL NFR BLD: 0 % (ref 0–8)
ERYTHROCYTE [DISTWIDTH] IN BLOOD BY AUTOMATED COUNT: 16.1 % (ref 11.5–14.5)
EST. GFR  (AFRICAN AMERICAN): 7.3 ML/MIN/1.73 M^2
EST. GFR  (NON AFRICAN AMERICAN): 6.3 ML/MIN/1.73 M^2
ESTIMATED AVG GLUCOSE: 100 MG/DL (ref 68–131)
GLUCOSE SERPL-MCNC: 120 MG/DL (ref 70–110)
HBA1C MFR BLD HPLC: 5.1 % (ref 4–5.6)
HCT VFR BLD AUTO: 29.1 % (ref 37–48.5)
HGB BLD-MCNC: 9.7 G/DL (ref 12–16)
IMM GRANULOCYTES # BLD AUTO: 0.11 K/UL (ref 0–0.04)
IMM GRANULOCYTES NFR BLD AUTO: 0.7 % (ref 0–0.5)
LYMPHOCYTES # BLD AUTO: 0 K/UL (ref 1–4.8)
LYMPHOCYTES NFR BLD: 0.1 % (ref 18–48)
MAGNESIUM SERPL-MCNC: 2.1 MG/DL (ref 1.6–2.6)
MCH RBC QN AUTO: 30.5 PG (ref 27–31)
MCHC RBC AUTO-ENTMCNC: 33.3 G/DL (ref 32–36)
MCV RBC AUTO: 92 FL (ref 82–98)
MONOCYTES # BLD AUTO: 0.6 K/UL (ref 0.3–1)
MONOCYTES NFR BLD: 3.9 % (ref 4–15)
NEUTROPHILS # BLD AUTO: 14.8 K/UL (ref 1.8–7.7)
NEUTROPHILS NFR BLD: 95.2 % (ref 38–73)
NRBC BLD-RTO: 0 /100 WBC
PHOSPHATE SERPL-MCNC: 6.6 MG/DL (ref 2.7–4.5)
PLATELET # BLD AUTO: 104 K/UL (ref 150–350)
PLATELET BLD QL SMEAR: ABNORMAL
PMV BLD AUTO: 10.6 FL (ref 9.2–12.9)
POCT GLUCOSE: 123 MG/DL (ref 70–110)
POCT GLUCOSE: 153 MG/DL (ref 70–110)
POCT GLUCOSE: 218 MG/DL (ref 70–110)
POLYCHROMASIA BLD QL SMEAR: ABNORMAL
POTASSIUM SERPL-SCNC: 4.7 MMOL/L (ref 3.5–5.1)
RBC # BLD AUTO: 3.18 M/UL (ref 4–5.4)
SODIUM SERPL-SCNC: 135 MMOL/L (ref 136–145)
TACROLIMUS BLD-MCNC: 4.4 NG/ML (ref 5–15)
WBC # BLD AUTO: 15.55 K/UL (ref 3.9–12.7)

## 2019-04-10 PROCEDURE — 63600175 PHARM REV CODE 636 W HCPCS: Performed by: NURSE PRACTITIONER

## 2019-04-10 PROCEDURE — 99231 PR SUBSEQUENT HOSPITAL CARE,LEVL I: ICD-10-PCS | Mod: ,,, | Performed by: NURSE PRACTITIONER

## 2019-04-10 PROCEDURE — 83735 ASSAY OF MAGNESIUM: CPT

## 2019-04-10 PROCEDURE — 20600001 HC STEP DOWN PRIVATE ROOM

## 2019-04-10 PROCEDURE — 85025 COMPLETE CBC W/AUTO DIFF WBC: CPT

## 2019-04-10 PROCEDURE — 99233 PR SUBSEQUENT HOSPITAL CARE,LEVL III: ICD-10-PCS | Mod: ,,, | Performed by: NURSE PRACTITIONER

## 2019-04-10 PROCEDURE — 25000003 PHARM REV CODE 250: Performed by: NURSE PRACTITIONER

## 2019-04-10 PROCEDURE — 97161 PT EVAL LOW COMPLEX 20 MIN: CPT

## 2019-04-10 PROCEDURE — 99233 SBSQ HOSP IP/OBS HIGH 50: CPT | Mod: ,,, | Performed by: NURSE PRACTITIONER

## 2019-04-10 PROCEDURE — 99231 SBSQ HOSP IP/OBS SF/LOW 25: CPT | Mod: ,,, | Performed by: NURSE PRACTITIONER

## 2019-04-10 PROCEDURE — 25000003 PHARM REV CODE 250: Performed by: TRANSPLANT SURGERY

## 2019-04-10 PROCEDURE — 63600175 PHARM REV CODE 636 W HCPCS: Performed by: PHYSICIAN ASSISTANT

## 2019-04-10 PROCEDURE — 80069 RENAL FUNCTION PANEL: CPT

## 2019-04-10 PROCEDURE — 81100000 HC KIDNEY ACQUISITION-LIVE DONOR

## 2019-04-10 PROCEDURE — 25000003 PHARM REV CODE 250: Performed by: INTERNAL MEDICINE

## 2019-04-10 PROCEDURE — 80197 ASSAY OF TACROLIMUS: CPT

## 2019-04-10 PROCEDURE — 83036 HEMOGLOBIN GLYCOSYLATED A1C: CPT

## 2019-04-10 PROCEDURE — 63600175 PHARM REV CODE 636 W HCPCS: Performed by: TRANSPLANT SURGERY

## 2019-04-10 PROCEDURE — 97530 THERAPEUTIC ACTIVITIES: CPT

## 2019-04-10 PROCEDURE — 25000003 PHARM REV CODE 250: Performed by: STUDENT IN AN ORGANIZED HEALTH CARE EDUCATION/TRAINING PROGRAM

## 2019-04-10 RX ORDER — SULFAMETHOXAZOLE AND TRIMETHOPRIM 400; 80 MG/1; MG/1
1 TABLET ORAL
Status: DISCONTINUED | OUTPATIENT
Start: 2019-04-10 | End: 2019-04-15 | Stop reason: HOSPADM

## 2019-04-10 RX ORDER — CARVEDILOL 3.12 MG/1
12.5 TABLET ORAL 2 TIMES DAILY WITH MEALS
Status: DISCONTINUED | OUTPATIENT
Start: 2019-04-10 | End: 2019-04-15 | Stop reason: HOSPADM

## 2019-04-10 RX ORDER — TACROLIMUS 1 MG/1
3 CAPSULE ORAL EVERY EVENING
Status: DISCONTINUED | OUTPATIENT
Start: 2019-04-10 | End: 2019-04-11

## 2019-04-10 RX ORDER — HYDROCODONE BITARTRATE AND ACETAMINOPHEN 5; 325 MG/1; MG/1
1 TABLET ORAL EVERY 4 HOURS PRN
Status: DISCONTINUED | OUTPATIENT
Start: 2019-04-10 | End: 2019-04-15 | Stop reason: HOSPADM

## 2019-04-10 RX ORDER — VALGANCICLOVIR 450 MG/1
450 TABLET, FILM COATED ORAL EVERY MORNING
Status: DISCONTINUED | OUTPATIENT
Start: 2019-04-19 | End: 2019-04-15 | Stop reason: HOSPADM

## 2019-04-10 RX ORDER — CARVEDILOL 3.12 MG/1
6.25 TABLET ORAL ONCE
Status: COMPLETED | OUTPATIENT
Start: 2019-04-10 | End: 2019-04-10

## 2019-04-10 RX ORDER — TRAZODONE HYDROCHLORIDE 50 MG/1
50 TABLET ORAL NIGHTLY
Status: DISCONTINUED | OUTPATIENT
Start: 2019-04-10 | End: 2019-04-15 | Stop reason: HOSPADM

## 2019-04-10 RX ORDER — TACROLIMUS 1 MG/1
4 CAPSULE ORAL EVERY MORNING
Status: DISCONTINUED | OUTPATIENT
Start: 2019-04-11 | End: 2019-04-11

## 2019-04-10 RX ORDER — HYDROCODONE BITARTRATE AND ACETAMINOPHEN 10; 325 MG/1; MG/1
1 TABLET ORAL EVERY 4 HOURS PRN
Status: DISCONTINUED | OUTPATIENT
Start: 2019-04-10 | End: 2019-04-15 | Stop reason: HOSPADM

## 2019-04-10 RX ORDER — HYDROCODONE BITARTRATE AND ACETAMINOPHEN 10; 325 MG/1; MG/1
1 TABLET ORAL EVERY 4 HOURS PRN
Qty: 40 TABLET | Refills: 0 | Status: SHIPPED | OUTPATIENT
Start: 2019-04-10 | End: 2019-04-22 | Stop reason: ALTCHOICE

## 2019-04-10 RX ORDER — SEVELAMER CARBONATE 800 MG/1
800 TABLET, FILM COATED ORAL
Status: DISCONTINUED | OUTPATIENT
Start: 2019-04-10 | End: 2019-04-11

## 2019-04-10 RX ADMIN — FAMOTIDINE 20 MG: 20 TABLET, FILM COATED ORAL at 08:04

## 2019-04-10 RX ADMIN — DOCUSATE SODIUM 100 MG: 100 CAPSULE, LIQUID FILLED ORAL at 09:04

## 2019-04-10 RX ADMIN — CARVEDILOL 6.25 MG: 3.12 TABLET, FILM COATED ORAL at 09:04

## 2019-04-10 RX ADMIN — MYCOPHENOLATE MOFETIL 500 MG: 250 CAPSULE ORAL at 08:04

## 2019-04-10 RX ADMIN — SEVELAMER CARBONATE 800 MG: 800 TABLET, FILM COATED ORAL at 12:04

## 2019-04-10 RX ADMIN — CLONIDINE HYDROCHLORIDE 0.1 MG: 0.1 TABLET ORAL at 08:04

## 2019-04-10 RX ADMIN — NYSTATIN 500000 UNITS: 500000 SUSPENSION ORAL at 01:04

## 2019-04-10 RX ADMIN — HEPARIN SODIUM 5000 UNITS: 5000 INJECTION, SOLUTION INTRAVENOUS; SUBCUTANEOUS at 01:04

## 2019-04-10 RX ADMIN — LINACLOTIDE 290 MCG: 290 CAPSULE, GELATIN COATED ORAL at 09:04

## 2019-04-10 RX ADMIN — MYCOPHENOLATE MOFETIL 500 MG: 250 CAPSULE ORAL at 09:04

## 2019-04-10 RX ADMIN — AMLODIPINE BESYLATE 10 MG: 10 TABLET ORAL at 08:04

## 2019-04-10 RX ADMIN — SEVELAMER CARBONATE 800 MG: 800 TABLET, FILM COATED ORAL at 05:04

## 2019-04-10 RX ADMIN — NYSTATIN 500000 UNITS: 500000 SUSPENSION ORAL at 08:04

## 2019-04-10 RX ADMIN — NYSTATIN 500000 UNITS: 500000 SUSPENSION ORAL at 09:04

## 2019-04-10 RX ADMIN — THERA TABS 1 TABLET: TAB at 09:04

## 2019-04-10 RX ADMIN — TRAZODONE HYDROCHLORIDE 50 MG: 50 TABLET ORAL at 08:04

## 2019-04-10 RX ADMIN — DOCUSATE SODIUM 100 MG: 100 CAPSULE, LIQUID FILLED ORAL at 01:04

## 2019-04-10 RX ADMIN — MUPIROCIN 1 G: 20 OINTMENT TOPICAL at 09:04

## 2019-04-10 RX ADMIN — HEPARIN SODIUM 5000 UNITS: 5000 INJECTION, SOLUTION INTRAVENOUS; SUBCUTANEOUS at 08:04

## 2019-04-10 RX ADMIN — CARVEDILOL 12.5 MG: 3.12 TABLET, FILM COATED ORAL at 05:04

## 2019-04-10 RX ADMIN — INSULIN ASPART 1 UNITS: 100 INJECTION, SOLUTION INTRAVENOUS; SUBCUTANEOUS at 09:04

## 2019-04-10 RX ADMIN — CARVEDILOL 6.25 MG: 3.12 TABLET, FILM COATED ORAL at 08:04

## 2019-04-10 RX ADMIN — METHYLPREDNISOLONE SODIUM SUCCINATE 125 MG: 125 INJECTION, POWDER, FOR SOLUTION INTRAMUSCULAR; INTRAVENOUS at 09:04

## 2019-04-10 RX ADMIN — HYDROCODONE BITARTRATE AND ACETAMINOPHEN 1 TABLET: 5; 325 TABLET ORAL at 09:04

## 2019-04-10 RX ADMIN — TACROLIMUS 3 MG: 1 CAPSULE ORAL at 09:04

## 2019-04-10 RX ADMIN — HEPARIN SODIUM 5000 UNITS: 5000 INJECTION, SOLUTION INTRAVENOUS; SUBCUTANEOUS at 06:04

## 2019-04-10 RX ADMIN — DOCUSATE SODIUM 100 MG: 100 CAPSULE, LIQUID FILLED ORAL at 08:04

## 2019-04-10 RX ADMIN — SULFAMETHOXAZOLE AND TRIMETHOPRIM 1 TABLET: 400; 80 TABLET ORAL at 09:04

## 2019-04-10 RX ADMIN — CINACALCET HYDROCHLORIDE 30 MG: 30 TABLET, COATED ORAL at 09:04

## 2019-04-10 RX ADMIN — TACROLIMUS 3 MG: 1 CAPSULE ORAL at 05:04

## 2019-04-10 NOTE — ASSESSMENT & PLAN NOTE
- Pt last used AVF on Saturday for HD.  On am exam, no thrill/bruit noted.    - Pt has hx of AVF thrombosis (per pt 9 times) and was on Plavix for reoccurring thrombus.    - VAS HD access US today

## 2019-04-10 NOTE — ASSESSMENT & PLAN NOTE
- continue renal diet. Renvela started. Expect kidney function to improve then renvela can be d/c'd

## 2019-04-10 NOTE — PLAN OF CARE
Problem: Physical Therapy Goal  Goal: Physical Therapy Goal  Outcome: Outcome(s) achieved Date Met: 04/10/19  Patient at this time not require skilled acute PT services at this time. Please re consult PT if pt has change in functional status.     Javier Myers PT, DPT  4/10/2019  Pager: [phone]

## 2019-04-10 NOTE — PLAN OF CARE
Pt aaox4.  Bed in low and locked position.  Nonskid footwear and jaycee hose in use.  Call bell, phon, food, drink within reach in bed or on bedside table.  Friends and family at bedside throughout day and active in care.  All aware to call if  Needing assistance.  PCA dc and placed on pathway.  PO prn pain meds given x2 thus far in shift.  Friends pulling self meds 100%.  RLQ drsg removed per jennifer np who then taught to paint with betadine. R SARA drsg cdi.    IS at bedside and pt aware of use.  CVP 8.  R TLC CDI.  Left HD site clotted - plan for possible fix this hospital stay.  Davison draining clear yellow urine.  Up to chair since 10am.  Has walked bragg x3 thus far in shift.  accjoshua ac/hs.  See flowsheet for full assessment and details.

## 2019-04-10 NOTE — SUBJECTIVE & OBJECTIVE
Subjective:   History of Present Illness:  Paige Summers is a 69 y/o female w ESRD 2/2 Hypertensive Nephrosclerosis.  S/p living unrelated kidney transplant 4/8/19, CMV +/+, WIT 30 min, CIT 3h 9 m, HD Tues- Thurs- Sat.  Pt has LUE AVF- last used 4/6/19.  Pt was on Plavix for reoccurring AVF thrombus. Per pt, AVF has been declotted 9 times.  Dry weight 83 kg.  Native UOP <100 ml.  5 day gallardo.   Patients immunosuppression will include Campath for induction with early steroid withdrawal and Prograf and Cellcept for maintenance immunosuppression.  Opportunistic infection prophylaxis will include Valcyte for 3 months (CMV D + , R + ), Bactrim for 1 year, and nystatin for 4 weeks.     Surgery significant for intra op US w slightly decreased perfusion.   Kidney graft injected w verapamil in the artery, repositioned the kidney and clamped the distal iliac artery. The kidney remianed partially pink. Kidney removed, resected previous arteriotomy and venotomy and obtained arterial venous continuity. A small amount of iliac artery was resected and an end to end anastomosis was performed.  Donor kidney was reviewed on the back table, artery was cut back, and elimnated the pair of pants anastomosis. Then the main artery was cut back as there appeared to be an intimal abnormality. Venotomy was made, the vein irrigated, and an end renal to right external iliac vein anastomosis was created.  Arterial control was obtained with a vascular clamp.  Arteriotomy was made, the artery irrigated, and an reconstructed to right external iliac artery anastomosis was created.  The second artery was anastomosed end to side to distal ilac artery.  Reperfusion quality was fair.  Ultrasound, open and closed satisfactory.  There was an excellent pulse in the main renal artery, the segmental flow was visible.   Intraoperative urine production was observed.       Interval History: no acute events overnight. Feels well today. C/o significant itching  with oxycodone impaired sleep overnight, pain meds changed to hydrocodone. Cr 6.2 similar to yesterday, likely plateau, expect some DGF related to surgery events. Excellent uop, gallardo for 5 days (due to be removed 4/13), SARA with minimal ss drg. LUE AVF no thrill/no bruit, VAS US scheduled for afternoon to assess AVF. H/o AVF clotting requiring plavix use. She is tolerating a diet, no N/V, passing some flatus, (-) BM, on bowel regimen (h/o chronic constipation), reduce narcotic use. Ambulating with minimal assistance. BP remains slightly elevated but improved, dc clonidine patch, increased coreg dose. Scheduled for surveillance kidney US tomorrow. Continue renal diet, renvela started for hyperphosphatemia.       Past Medical, Surgical, Family, and Social History:   Unchanged from H&P.    Scheduled Meds:   amLODIPine  10 mg Oral Daily    carvedilol  12.5 mg Oral BID WM    cinacalcet  30 mg Oral Daily with breakfast    cloNIDine  0.1 mg Oral BID    docusate sodium  100 mg Oral TID    famotidine  20 mg Oral QHS    heparin (porcine)  5,000 Units Subcutaneous Q8H    linaclotide  290 mcg Oral Daily    multivitamin  1 tablet Oral Daily    mupirocin  1 g Nasal BID    mycophenolate  500 mg Oral BID    nystatin  500,000 Units Mouth/Throat QID (PC + HS)    sevelamer carbonate  800 mg Oral TID WM    sulfamethoxazole-trimethoprim 400-80mg  1 tablet Oral Every Mon, Wed, Fri    tacrolimus  3 mg Oral BID    traZODone  50 mg Oral QHS    [START ON 4/19/2019] valGANciclovir  450 mg Oral Daily     Continuous Infusions:   glucagon (human recombinant)       PRN Meds:acetaminophen, dextrose 50%, diphenhydrAMINE, glucagon (human recombinant), glucose, glucose, glucose, hydrALAZINE, HYDROcodone-acetaminophen, HYDROcodone-acetaminophen, insulin aspart U-100, naloxone, ondansetron    Intake/Output - Last 3 Shifts       04/08 0700 - 04/09 0659 04/09 0700 - 04/10 0659 04/10 0700 - 04/11 0659    P.O. 1980 2100 300    I.V.  "(mL/kg) 6731.7 (75.6) 1459.6 (16.3)     Other  0     Total Intake(mL/kg) 8711.7 (97.9) 3559.6 (39.8) 300 (3.4)    Urine (mL/kg/hr) 4905 (2.3) 2900 (1.4) 400 (1)    Drains 60 40     Blood 250      Total Output 5215 2940 400    Net +3496.7 +619.6 -100                  Review of Systems   Constitutional: Positive for activity change and appetite change (fair/decrease). Negative for chills, fatigue, fever and unexpected weight change.   HENT: Negative.  Negative for postnasal drip.    Eyes: Negative.  Negative for visual disturbance.   Respiratory: Negative for cough, shortness of breath and wheezing.    Cardiovascular: Positive for leg swelling. Negative for chest pain and palpitations.   Gastrointestinal: Positive for abdominal distention, abdominal pain and constipation. Negative for anal bleeding, blood in stool, diarrhea, nausea, rectal pain and vomiting.   Genitourinary: Negative for decreased urine volume, difficulty urinating, dysuria, hematuria and urgency.   Musculoskeletal: Negative for arthralgias and gait problem.   Skin: Positive for wound. Negative for rash.   Allergic/Immunologic: Positive for immunocompromised state.   Neurological: Negative for dizziness, light-headedness and headaches.   Psychiatric/Behavioral: The patient is not nervous/anxious.       Objective:     Vital Signs (Most Recent):  Temp: 97.7 °F (36.5 °C) (04/10/19 0804)  Pulse: 79 (04/10/19 0950)  Resp: 19 (04/10/19 0950)  BP: (!) 146/89 (04/10/19 0950)  SpO2: (!) 94 % (04/10/19 0950) Vital Signs (24h Range):  Temp:  [97.7 °F (36.5 °C)-98.4 °F (36.9 °C)] 97.7 °F (36.5 °C)  Pulse:  [] 79  Resp:  [14-24] 19  SpO2:  [92 %-98 %] 94 %  BP: (136-204)/() 146/89     Weight: 89.4 kg (197 lb 1.5 oz)  Height: 5' 3" (160 cm)  Body mass index is 34.91 kg/m².    Physical Exam   Constitutional: She is oriented to person, place, and time. She is active and cooperative.   LUE AVF (-) thrill/(-) bruit   Cardiovascular: Normal rate, regular " rhythm, normal heart sounds, intact distal pulses and normal pulses.   No murmur heard.  Pulmonary/Chest: Effort normal and breath sounds normal. No respiratory distress. She has no decreased breath sounds. She has no wheezes. She has no rales.   Abdominal: Soft. Normal appearance and bowel sounds are normal. She exhibits no distension. There is no tenderness. There is no guarding.   RLQ incision LOKESH with staples, soft, non tender to palpation  R SARA with minimal ss drg   Genitourinary:   Genitourinary Comments: Davison for 5 days (due to be removed 4/13), draining clear, yellow urine   Musculoskeletal: Normal range of motion. She exhibits edema (+1 generalized).   Neurological: She is alert and oriented to person, place, and time.   Skin: Skin is warm, dry and intact.   Nursing note and vitals reviewed.      Laboratory:  CBC:   Recent Labs   Lab 04/09/19  0600 04/09/19  1519 04/10/19  0500   WBC 19.79* 19.30* 15.55*   RBC 3.38* 3.55* 3.18*   HGB 10.1* 10.5* 9.7*   HCT 31.4* 32.9* 29.1*    161 104*   MCV 93 93 92   MCH 29.9 29.6 30.5   MCHC 32.2 31.9* 33.3     CMP:   Recent Labs   Lab 04/09/19  0600 04/09/19  1519 04/10/19  0500    136* 120*   CALCIUM 9.1 9.4 9.4   ALBUMIN 2.8*  2.8* 3.1* 2.9*   PROT 6.7  --   --     135* 135*   K 4.9 4.6 4.7   CO2 24 21* 22*    100 102   BUN 81* 79* 84*   CREATININE 6.2* 6.4* 6.2*   ALKPHOS 67  --   --    ALT 15  --   --    AST 65*  --   --      Coagulation: No results for input(s): PT, APTT in the last 168 hours.  Labs within the past 24 hours have been reviewed.    Diagnostic Results:  US - Kidney:   Results for orders placed during the hospital encounter of 04/08/19   US Transplant Kidney With Doppler    Narrative EXAMINATION:  US TRANSPLANT KIDNEY WITH DOPPLER    CLINICAL HISTORY:  assess perfusion;    TECHNIQUE:  Real time gray scale and doppler ultrasound was performed over the patient's renal allograft.    COMPARISON:  Intraoperative ultrasound  04/08/2019.    FINDINGS:  Renal allograft in the right lower quadrant.  The allograft measures 11.5 cm. Normal perfusion. No hydronephrosis.    Two peritransplant fluid collections measuring 4.4 x 1.1 x 3.6 cm and 3.4 x 2.9 x 3.4 cm.    Vasculature:    Resistive indices ranged from 0.67 to 0.74.    Main renal artery peak systolic velocity: 248cm/sec with normal waveform.    Renal artery/iliac ratio: 2.2.    The main renal vein is patent.      Impression Satisfactory day 1 postop gray scale and doppler evaluation of renal allograft.    Two small peritransplant fluid collections.    COMMUNICATION  This result was relayed directly by Dr. Dalton by phone to Dr. Goodwin.    Electronically signed by resident: Valerio Montanez MD  Date:    04/09/2019  Time:    08:03    Electronically signed by: Pan Dalton MD  Date:    04/09/2019  Time:    08:20

## 2019-04-10 NOTE — PLAN OF CARE
Problem: Adult Inpatient Plan of Care  Goal: Plan of Care Review  Outcome: Ongoing (interventions implemented as appropriate)  AAOx3, afebrile, c/o itching x1 order given by MD Raeann Banegasadryl 25mg PO Q6 hrs prn for itching. Incision CDI, painted with betadine. Davison CDI clear yellow urine. Please see doc flowsheets for outputs for shift. SARA bloody -30cc thus far this shift.  Pt in lowest postion, side rails up x2, non skid footwear in place, call light within reach, pt verbalized understanding to call the nurse when needed. Hand hygiene practiced per protocol. Pt able to reposition self independently. Will continue to monitor.

## 2019-04-10 NOTE — SUBJECTIVE & OBJECTIVE
"Interval HPI:   Overnight events:  NAEON. BG is currently below goal without need for SQ insulin therapy. Scheduled to received IV steroid therapy today.     Eatin%  Nausea: No  Hypoglycemia and intervention: No  Fever: No  TPN and/or TF: No  If yes, type of TF/TPN and rate: None    BP (!) 197/97 (BP Location: Right arm, Patient Position: Sitting)   Pulse 92   Temp 97.7 °F (36.5 °C) (Oral)   Resp 16   Ht 5' 3" (1.6 m)   Wt 89.4 kg (197 lb 1.5 oz)   LMP 1971   SpO2 98%   Breastfeeding? No   BMI 34.91 kg/m²     Labs Reviewed and Include    Recent Labs   Lab 04/10/19  0500   *   CALCIUM 9.4   ALBUMIN 2.9*   *   K 4.7   CO2 22*      BUN 84*   CREATININE 6.2*     Lab Results   Component Value Date    WBC 15.55 (H) 04/10/2019    HGB 9.7 (L) 04/10/2019    HCT 29.1 (L) 04/10/2019    MCV 92 04/10/2019     (L) 04/10/2019     No results for input(s): TSH, FREET4 in the last 168 hours.  Lab Results   Component Value Date    HGBA1C 5.1 04/10/2019       Nutritional status:   Body mass index is 34.91 kg/m².  Lab Results   Component Value Date    ALBUMIN 2.9 (L) 04/10/2019    ALBUMIN 3.1 (L) 2019    ALBUMIN 2.8 (L) 2019    ALBUMIN 2.8 (L) 2019     No results found for: PREALBUMIN    Estimated Creatinine Clearance: 9.1 mL/min (A) (based on SCr of 6.2 mg/dL (H)).    Accu-Checks  Recent Labs     19  1703 19  2120 19  0821 19  1222 19  1705 04/09/19  2005 04/10/19  0832   POCTGLUCOSE 187* 160* 115* 149* 184* 189* 123*       Current Medications and/or Treatments Impacting Glycemic Control  Immunotherapy:    Immunosuppressants         Stop Route Frequency     tacrolimus capsule 3 mg      -- Oral 2 times daily     mycophenolate capsule 500 mg      -- Oral 2 times daily     tacrolimus (PROGRAF) 1 MG capsule       0559          Steroids:   Hormones (From admission, onward)    None        Pressors:    Autonomic Drugs (From admission, onward) "    None        Hyperglycemia/Diabetes Medications:   Antihyperglycemics (From admission, onward)    Start     Stop Route Frequency Ordered    04/08/19 1851  insulin aspart U-100 pen 0-5 Units      -- SubQ Before meals & nightly PRN 04/08/19 1751

## 2019-04-10 NOTE — ASSESSMENT & PLAN NOTE
- Pt was on Clonidine patch 0.1 mg, Coreg 6.25 --> increased to 12.5mg BID, and Norvasc 10 mg qd.    - Post transplant, clonidine patch d/c'd, started on clonidine 0.1 mg PO bid started w holding parameters.

## 2019-04-10 NOTE — ASSESSMENT & PLAN NOTE
- Patients immunosuppression will include Campath for induction with early steroid withdrawal and Prograf and Cellcept for maintenance immunosuppression.    - cont to check prograf level daily.  Assess for toxicity and adjust level as needed

## 2019-04-10 NOTE — PROGRESS NOTES
JIA spoke with Roxanne, RN at pt's home dialysis unit to discuss whether pt would be able to have an afternoon shift for dialysis since pt seems to be experiencing DGF. Roxanne reports that she will need to talk to her : Ayleen to see if pt can move shifts. JIA remains available to pt, pt's family, and transplant team at [phone].

## 2019-04-10 NOTE — OP NOTE
Certification of Assistant at Surgery       Surgery Date: 4/8/2019     Participating Surgeons:  Surgeon(s) and Role:     * Mathew Goodwin MD - Primary     * Roberto Calle MD - Fellow    Procedures:  Procedure(s) (LRB):  TRANSPLANT, KIDNEY (N/A)    Assistant Surgeon's Certification of Necessity:  I understand that section 1842 (b) (6) (d) of the Social Security Act generally prohibits Medicare Part B reasonable charge payment for the services of assistants at surgery in teaching hospitals when qualified residents are available to furnish such services. I certify that the services for which payment is claimed were medically necessary, and that no qualified resident was available to perform the services. I further understand that these services are subject to post-payment review by the Medicare carrier.      Inocente Rdorigues MD    04/10/2019  5:16 AM

## 2019-04-10 NOTE — ASSESSMENT & PLAN NOTE
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  S/p living unrelated (friend) kidney transplant 4/8/19, CMV +/+, WIT 30 min, CIT 3h 9 m, HD TTS.  Dry weight 83 kg.  Native UOP <100 ml.    - Surgery significant for intra op US w slightly decreased perfusion related to thrombus in main artery. Kidney removed, pair-of-pants anastomosis converted to double artery, and kidney was replaced (see OR note for full report).   - Kidney US 4/9 with good perfusion, plan to repeat US 4/11 for surveillance  - Cr 6.2 similar to prior day, likely plateau. Excellent uop. No RRT today but expect some DGF related to surgical events  - Davison for 5 days (to be removed 4/13), SARA x 1  - Pain well controlled, changed to hydrocodone due to severe itching with oxycodone  - Tolerating diet, (+) flatus, (-) BM, continue bowel regimen, h/o chronic constipation requiring linzess which was restarted

## 2019-04-10 NOTE — PROGRESS NOTES
"[facility]  Endocrinology  Progress Note    Admit Date: 2019     Reason for Consult: Management of Hyperglycemia     Surgical Procedure and Date: Kidney Transplant - 2019    HPI:   Patient is a 68 y.o. female with a diagnosis of ESRD secondary to HTN.  Patient is on hemodialysis (last procedure Saturday). She is now admitted to [facility] s/p kidney transplant. No dx of DM noted on file. Patient denies previous dx of DM and or use of anti DM medications in the past.   No results found for: LABA1C, HGBA1C                     Interval HPI:   Overnight events:  NAEON. BG is currently below goal without need for SQ insulin therapy. Scheduled to received IV steroid therapy today.     Eatin%  Nausea: No  Hypoglycemia and intervention: No  Fever: No  TPN and/or TF: No  If yes, type of TF/TPN and rate: None    BP (!) 197/97 (BP Location: Right arm, Patient Position: Sitting)   Pulse 92   Temp 97.7 °F (36.5 °C) (Oral)   Resp 16   Ht 5' 3" (1.6 m)   Wt 89.4 kg (197 lb 1.5 oz)   LMP 1971   SpO2 98%   Breastfeeding? No   BMI 34.91 kg/m²      Labs Reviewed and Include    Recent Labs   Lab 04/10/19  0500   *   CALCIUM 9.4   ALBUMIN 2.9*   *   K 4.7   CO2 22*      BUN 84*   CREATININE 6.2*     Lab Results   Component Value Date    WBC 15.55 (H) 04/10/2019    HGB 9.7 (L) 04/10/2019    HCT 29.1 (L) 04/10/2019    MCV 92 04/10/2019     (L) 04/10/2019     No results for input(s): TSH, FREET4 in the last 168 hours.  Lab Results   Component Value Date    HGBA1C 5.1 04/10/2019       Nutritional status:   Body mass index is 34.91 kg/m².  Lab Results   Component Value Date    ALBUMIN 2.9 (L) 04/10/2019    ALBUMIN 3.1 (L) 2019    ALBUMIN 2.8 (L) 2019    ALBUMIN 2.8 (L) 2019     No results found for: PREALBUMIN    Estimated Creatinine Clearance: 9.1 mL/min (A) (based on SCr of 6.2 mg/dL (H)).    Accu-Checks  Recent Labs     19  1703 19  7120 " 04/09/19  0821 04/09/19  1222 04/09/19  1705 04/09/19  2005 04/10/19  0832   POCTGLUCOSE 187* 160* 115* 149* 184* 189* 123*       Current Medications and/or Treatments Impacting Glycemic Control  Immunotherapy:    Immunosuppressants         Stop Route Frequency     tacrolimus capsule 3 mg      -- Oral 2 times daily     mycophenolate capsule 500 mg      -- Oral 2 times daily     tacrolimus (PROGRAF) 1 MG capsule      04/09 0559          Steroids:   Hormones (From admission, onward)    None        Pressors:    Autonomic Drugs (From admission, onward)    None        Hyperglycemia/Diabetes Medications:   Antihyperglycemics (From admission, onward)    Start     Stop Route Frequency Ordered    04/08/19 1851  insulin aspart U-100 pen 0-5 Units      -- SubQ Before meals & nightly PRN 04/08/19 1751          ASSESSMENT and PLAN    * S/P living-donor kidney transplantation  Managed per primary team  Avoid hypoglycemia        Acute hyperglycemia  BG goal 140 - 180    Low Dose SQ Insulin Correction Scale as needed for hyperglycemia post surgery. Patient does not have dx of DM. Will consider sign off tomorrow if patient continues to not need insulin therapy.   BG monitoring AC/HS    ** Please call Endocrine for any BG related issues **  ** Please notify Endocrine for any change and/or advance in diet**        Anemia of renal disease  May affect A1c readings.   Recommend updating A1c levels once patient is no longer anemic.       Prophylactic immunotherapy  On immunosepresive therapy per transplant team; may elevate BG readings        Presence of surgical incision  Optimize BG control to improve wound healing        Long-term use of immunosuppressant medication  On immunosepresive therapy per transplant team; may elevate BG readings            Alec Johnson NP  Endocrinology  [facility]

## 2019-04-10 NOTE — ASSESSMENT & PLAN NOTE
BG goal 140 - 180    Low Dose SQ Insulin Correction Scale as needed for hyperglycemia post surgery. Patient does not have dx of DM. Will consider sign off tomorrow if patient continues to not need insulin therapy.   BG monitoring AC/HS    ** Please call Endocrine for any BG related issues **  ** Please notify Endocrine for any change and/or advance in diet**

## 2019-04-10 NOTE — PLAN OF CARE
Problem: Adult Inpatient Plan of Care  Goal: Plan of Care Review  Plan of care reviewed on am rounds, afrebrile, bp better controlled with anti hypertensives adjusted CVP 7, wbc 15  H/h stable Cr trended down slightly 6.2 K 4.7, renal diet with good appetite, urine output adequate via gallardo, rlq incision inatct with staples, SARA with small amount drainage, caregiver doing well with self meds, pain med changed to Hydrocodone as itching with Oxy, pain controlled and no further c/o itching. Up ambulatory in halls with walker, pleasant and cooperative with care, emotional support provided througout shift.

## 2019-04-10 NOTE — PROGRESS NOTES
EDUCATION NOTE:    Met with Paige Summers and her caregivers to provide teaching re: immunosuppressant medications.  Reviewed medication section of the Kidney Transplant Education book that was provided.  Emphasized the importance of compliance, role of the blue medication card, concerns for drug interactions, and process of obtaining refills.  Counseled regarding Prograf, Cellcept , prednisone, including directions for use, monitoring, how to handle missed doses, and side effects.  Ms. Summers and her caregiver verbalized understanding and had the opportunity to ask questions.

## 2019-04-10 NOTE — ASSESSMENT & PLAN NOTE
- Opportunistic infection prophylaxis will include Valcyte for 3 months to start on POD#10 or day of discharge (CMV D + , R + ), Bactrim for 1 year, and nystatin for 4 weeks.

## 2019-04-10 NOTE — PROGRESS NOTES
Discovered that visi monitor on pt linked to a different pt.  Obtained BP with machine and found to be 180-200/ sitting and standing    IVF stopped    Mohini pagelorenzo and informed.  Ordered prn IVP hydralazine and stated that she would look over home meds and place orders to start some of them.      Will follow through with orders

## 2019-04-10 NOTE — PT/OT/SLP EVAL
Physical Therapy Evaluation and Discharge Note    Patient Name:  Paige Summers   MRN:  [id number]    Recommendations:     Discharge Recommendations:  home health PT   Discharge Equipment Recommendations: none   Barriers to discharge: None    Assessment:     Paige Summers is a 68 y.o. female admitted with a medical diagnosis of S/P living-donor kidney transplantation. .  At this time, patient is functioning at their prior level of function and does not require further acute PT services.     Patient safe to ambulate in bragg with staff 3 x per day with appropriate device and should  be up in their chair for every meal.       Recent Surgery: Procedure(s) (LRB):  TRANSPLANT, KIDNEY (N/A) 2 Days Post-Op    Plan:     During this hospitalization, patient does not require further acute PT services.  Please re-consult if situation changes.      Subjective     Chief Complaint: pt states that her pain was unbearable yesterday but is almost gone at this time  Patient/Family Comments/goals: to return home. Pt states she has been walking in the bragg already this morning  Pain/Comfort:  · Pain Rating 1: 0/10  · Pain Rating Post-Intervention 1: 0/10    Patients cultural, spiritual, Protestant conflicts given the current situation: no    Living Environment:  Pt lives in a [facility] with 4 MARTINA with HR on R and wall on L.   Prior to admission, patients level of function was ( I) with all household ambulation and limited community ambulator.  Equipment used at home: cane, straight, walker, standard.  DME owned (not currently used): none.  Upon discharge, patient will have assistance from her caregiver 24/7 and her young daughter.    Objective:     Communicated with RN  prior to session.  Patient found up in chair with gallrado catheter upon PT entry to room.    General Precautions: Standard, fall   Orthopedic Precautions:N/A   Braces: N/A     Exams:  · Cognitive Exam:  Patient is oriented to Person, Place, Time and Situation  · Fine  Motor Coordination: -       Intact  · Gross Motor Coordination:  WFL  · Postural Exam:  Patient presented with the following abnormalities: -       Rounded shoulders  · -       Forward head  · Sensation: -       Intact  · RLE ROM: WFL  · RLE Strength: WFL  · LLE ROM: WFL  · LLE Strength: WFL    Functional Mobility:  · Bed Mobility:     · Transfers:  Sit to Stand:  supervision with no AD  · Gait: x ~260 feet with HHA/CGA no AD. pt with decreasd chiquis likely due to pain . no apparent gait deviatons or increasd in sway/LOB noted  · Stairs:  Pt ascended/descended 4 stair(s) with No Assistive Device with right handrail with Contact Guard Assistance.     AM-PAC 6 CLICK MOBILITY  Total Score:22       Therapeutic Activities and Exercises:       Patient education  · Patient educated on the role of PT and POC  · Patient educated on importance  activity while in the hosptial per tolerance for improved endurance and to limit deconditioning   · Patient educated on safe transfers with nursing as appropriate  · Patient educated on proper transfer mechanics and safety  · All of patients questions were answered within the scope of PT        AM-PAC 6 CLICK MOBILITY  Total Score:22     Patient left up in chair with all lines intact, call button in reach and family/caregiver present.    GOALS:   Multidisciplinary Problems     Physical Therapy Goals     Not on file          Multidisciplinary Problems (Resolved)        Problem: Physical Therapy Goal    Goal Priority Disciplines Outcome Goal Variances Interventions   Physical Therapy Goal   (Resolved)     PT, PT/OT Outcome(s) achieved                     History:     Past Medical History:   Diagnosis Date    Anemia     Anemia of renal disease     Anxiety     Chronic renal failure 01/10/2018    Depression     Essential hypertension     GERD (gastroesophageal reflux disease)     Gout     H pylori ulcer     Hyperlipidemia     Hypertension     Obesity     Secondary  hyperparathyroidism of renal origin        Past Surgical History:   Procedure Laterality Date    declot Left 2/19/2019    Performed [facility] at [facility] CATH LAB    DIALYSIS FISTULA CREATION Left 02/2018    FISTULOGRAM Left 3/13/2019    Performed by Irineo Devlin MD at [facility] CATH LAB    FISTULOGRAM Left 12/19/2018    Performed by Javed Lopez MD at [facility] CATH LAB    HYSTERECTOMY  1971    TVH    PERMCATH REMOVAL-TUNNELED CVC REMOVAL N/A 4/23/2018    Performed by [facility] at [facility] CATH LAB    PERMCATH REWIRE- TUNNELED CATH REWIRE N/A 3/21/2018    Performed by [facility] Gibson [facility] at [facility] CATH LAB    THROMBECTOMY, HEMODIALYSIS GRAFT OR FISTULA Left 12/13/2018    Performed by Javed Lopez [facility] at [facility] CATH LAB    THROMBECTOMY, HEMODIALYSIS GRAFT OR FISTULA Left 11/9/2018    Performed by [facility] Gibson [facility] at [facility] CATH LAB    THROMBECTOMY, HEMODIALYSIS GRAFT OR FISTULA Left 9/12/2018    Performed by [facility] Gibson [facility] at [facility] CATH LAB    THROMBECTOMY, HEMODIALYSIS GRAFT OR FISTULA N/A 9/6/2018    Performed by [facility] Gibson [facility] at [facility] CATH LAB    THROMBECTOMY, HEMODIALYSIS GRAFT OR FISTULA Left 6/13/2018    Performed by Yuri[facility] Gibson [facility] at [facility] CATH LAB    THROMBECTOMY, HEMODIALYSIS GRAFT OR FISTULA Left 6/11/2018    Performed by [facility]y Gibson [facility] at [facility] CATH LAB    TRANSPLANT, KIDNEY N/A 4/8/2019    Performed by Mathew Goodwin MD at [facility] OR [address] [place] [address]    ULTRASOUND, DIAGNOSTIC Left 10/10/2018    Performed by [facility] Gibson [facility] at [facility] CATH LAB    ULTRASOUND, DIAGNOSTIC Left 9/19/2018    Performed by Yuriy Gibson MD at [facility] CATH LAB    ULTRASOUND, DIAGNOSTIC Left 8/20/2018    Performed by Yuriy Gibson MD at [facility] CATH LAB    ULTRASOUND, DIAGNOSTIC Left 8/6/2018    Performed by Yuriy Gibson MD at [facility] CATH LAB    ULTRASOUND, DIAGNOSTIC Left 7/9/2018    Performed by Yuriy Gibson MD at [facility] CATH LAB    ULTRASOUND, DIAGNOSTIC Left 6/18/2018    Performed by [facility] at  [facility] CATH LAB       Time Tracking:     PT Received On: 04/10/19  PT Start Time: 1132     PT Stop Time: 1155  PT Total Time (min): 23 min     Billable Minutes: Evaluation 15 min and Therapeutic Activity 8 min      Javier Myers, PT  04/10/2019

## 2019-04-11 ENCOUNTER — TELEPHONE (OUTPATIENT)
Dept: NEPHROLOGY | Facility: CLINIC | Age: 69
End: 2019-04-11

## 2019-04-11 PROBLEM — E87.1 HYPONATREMIA: Status: ACTIVE | Noted: 2019-04-11

## 2019-04-11 PROBLEM — D69.6 THROMBOCYTOPENIA, UNSPECIFIED: Status: ACTIVE | Noted: 2019-04-11

## 2019-04-11 LAB
ALBUMIN SERPL BCP-MCNC: 2.8 G/DL (ref 3.5–5.2)
ANION GAP SERPL CALC-SCNC: 10 MMOL/L (ref 8–16)
BASOPHILS # BLD AUTO: 0.01 K/UL (ref 0–0.2)
BASOPHILS NFR BLD: 0.1 % (ref 0–1.9)
BUN SERPL-MCNC: 105 MG/DL (ref 8–23)
CALCIUM SERPL-MCNC: 8.6 MG/DL (ref 8.7–10.5)
CHLORIDE SERPL-SCNC: 99 MMOL/L (ref 95–110)
CO2 SERPL-SCNC: 22 MMOL/L (ref 23–29)
CREAT SERPL-MCNC: 6.6 MG/DL (ref 0.5–1.4)
DIFFERENTIAL METHOD: ABNORMAL
EOSINOPHIL # BLD AUTO: 0 K/UL (ref 0–0.5)
EOSINOPHIL NFR BLD: 0 % (ref 0–8)
ERYTHROCYTE [DISTWIDTH] IN BLOOD BY AUTOMATED COUNT: 16.1 % (ref 11.5–14.5)
EST. GFR  (AFRICAN AMERICAN): 6.8 ML/MIN/1.73 M^2
EST. GFR  (NON AFRICAN AMERICAN): 5.9 ML/MIN/1.73 M^2
GLUCOSE SERPL-MCNC: 128 MG/DL (ref 70–110)
HCT VFR BLD AUTO: 26.7 % (ref 37–48.5)
HGB BLD-MCNC: 8.8 G/DL (ref 12–16)
IMM GRANULOCYTES # BLD AUTO: 0.08 K/UL (ref 0–0.04)
IMM GRANULOCYTES NFR BLD AUTO: 0.7 % (ref 0–0.5)
LYMPHOCYTES # BLD AUTO: 0 K/UL (ref 1–4.8)
LYMPHOCYTES NFR BLD: 0.2 % (ref 18–48)
MAGNESIUM SERPL-MCNC: 2.2 MG/DL (ref 1.6–2.6)
MCH RBC QN AUTO: 29.8 PG (ref 27–31)
MCHC RBC AUTO-ENTMCNC: 33 G/DL (ref 32–36)
MCV RBC AUTO: 91 FL (ref 82–98)
MONOCYTES # BLD AUTO: 0.7 K/UL (ref 0.3–1)
MONOCYTES NFR BLD: 5.7 % (ref 4–15)
NEUTROPHILS # BLD AUTO: 10.8 K/UL (ref 1.8–7.7)
NEUTROPHILS NFR BLD: 93.3 % (ref 38–73)
NRBC BLD-RTO: 0 /100 WBC
PHOSPHATE SERPL-MCNC: 5 MG/DL (ref 2.7–4.5)
PLATELET # BLD AUTO: 79 K/UL (ref 150–350)
PMV BLD AUTO: 10.9 FL (ref 9.2–12.9)
POCT GLUCOSE: 119 MG/DL (ref 70–110)
POCT GLUCOSE: 121 MG/DL (ref 70–110)
POCT GLUCOSE: 137 MG/DL (ref 70–110)
POTASSIUM SERPL-SCNC: 4.5 MMOL/L (ref 3.5–5.1)
RBC # BLD AUTO: 2.95 M/UL (ref 4–5.4)
SODIUM SERPL-SCNC: 131 MMOL/L (ref 136–145)
TACROLIMUS BLD-MCNC: 4.7 NG/ML (ref 5–15)
WBC # BLD AUTO: 11.61 K/UL (ref 3.9–12.7)

## 2019-04-11 PROCEDURE — 99233 SBSQ HOSP IP/OBS HIGH 50: CPT | Mod: ,,, | Performed by: PHYSICIAN ASSISTANT

## 2019-04-11 PROCEDURE — 85025 COMPLETE CBC W/AUTO DIFF WBC: CPT

## 2019-04-11 PROCEDURE — 25000003 PHARM REV CODE 250: Performed by: NURSE PRACTITIONER

## 2019-04-11 PROCEDURE — 99223 PR INITIAL HOSPITAL CARE,LEVL III: ICD-10-PCS | Mod: ,,, | Performed by: SURGERY

## 2019-04-11 PROCEDURE — 63600175 PHARM REV CODE 636 W HCPCS: Performed by: PHYSICIAN ASSISTANT

## 2019-04-11 PROCEDURE — 20600001 HC STEP DOWN PRIVATE ROOM

## 2019-04-11 PROCEDURE — 83735 ASSAY OF MAGNESIUM: CPT

## 2019-04-11 PROCEDURE — 25000003 PHARM REV CODE 250: Performed by: PHYSICIAN ASSISTANT

## 2019-04-11 PROCEDURE — 36415 COLL VENOUS BLD VENIPUNCTURE: CPT

## 2019-04-11 PROCEDURE — 99223 1ST HOSP IP/OBS HIGH 75: CPT | Mod: ,,, | Performed by: SURGERY

## 2019-04-11 PROCEDURE — 63600175 PHARM REV CODE 636 W HCPCS: Performed by: TRANSPLANT SURGERY

## 2019-04-11 PROCEDURE — 63600175 PHARM REV CODE 636 W HCPCS: Performed by: NURSE PRACTITIONER

## 2019-04-11 PROCEDURE — 25000003 PHARM REV CODE 250: Performed by: TRANSPLANT SURGERY

## 2019-04-11 PROCEDURE — 80197 ASSAY OF TACROLIMUS: CPT

## 2019-04-11 PROCEDURE — 80069 RENAL FUNCTION PANEL: CPT

## 2019-04-11 PROCEDURE — 25000003 PHARM REV CODE 250: Performed by: STUDENT IN AN ORGANIZED HEALTH CARE EDUCATION/TRAINING PROGRAM

## 2019-04-11 PROCEDURE — 99233 PR SUBSEQUENT HOSPITAL CARE,LEVL III: ICD-10-PCS | Mod: ,,, | Performed by: PHYSICIAN ASSISTANT

## 2019-04-11 RX ORDER — BISACODYL 10 MG
10 SUPPOSITORY, RECTAL RECTAL DAILY PRN
Status: DISCONTINUED | OUTPATIENT
Start: 2019-04-11 | End: 2019-04-15 | Stop reason: HOSPADM

## 2019-04-11 RX ORDER — TACROLIMUS 1 MG/1
4 CAPSULE ORAL 2 TIMES DAILY
Status: DISCONTINUED | OUTPATIENT
Start: 2019-04-11 | End: 2019-04-12

## 2019-04-11 RX ORDER — CALCIUM ACETATE 667 MG/1
1334 CAPSULE ORAL
Status: DISCONTINUED | OUTPATIENT
Start: 2019-04-11 | End: 2019-04-15

## 2019-04-11 RX ADMIN — CLONIDINE HYDROCHLORIDE 0.1 MG: 0.1 TABLET ORAL at 08:04

## 2019-04-11 RX ADMIN — TRAZODONE HYDROCHLORIDE 50 MG: 50 TABLET ORAL at 09:04

## 2019-04-11 RX ADMIN — TACROLIMUS 4 MG: 1 CAPSULE ORAL at 05:04

## 2019-04-11 RX ADMIN — MUPIROCIN 1 G: 20 OINTMENT TOPICAL at 09:04

## 2019-04-11 RX ADMIN — CARVEDILOL 12.5 MG: 3.12 TABLET, FILM COATED ORAL at 05:04

## 2019-04-11 RX ADMIN — CALCIUM ACETATE 1334 MG: 667 CAPSULE ORAL at 05:04

## 2019-04-11 RX ADMIN — HEPARIN SODIUM 5000 UNITS: 5000 INJECTION, SOLUTION INTRAVENOUS; SUBCUTANEOUS at 02:04

## 2019-04-11 RX ADMIN — HYDROCODONE BITARTRATE AND ACETAMINOPHEN 1 TABLET: 10; 325 TABLET ORAL at 08:04

## 2019-04-11 RX ADMIN — CARVEDILOL 12.5 MG: 3.12 TABLET, FILM COATED ORAL at 09:04

## 2019-04-11 RX ADMIN — MYCOPHENOLATE MOFETIL 500 MG: 250 CAPSULE ORAL at 08:04

## 2019-04-11 RX ADMIN — HYDROCODONE BITARTRATE AND ACETAMINOPHEN 1 TABLET: 5; 325 TABLET ORAL at 02:04

## 2019-04-11 RX ADMIN — THERA TABS 1 TABLET: TAB at 09:04

## 2019-04-11 RX ADMIN — CINACALCET HYDROCHLORIDE 30 MG: 30 TABLET, COATED ORAL at 09:04

## 2019-04-11 RX ADMIN — AMLODIPINE BESYLATE 10 MG: 10 TABLET ORAL at 09:04

## 2019-04-11 RX ADMIN — HEPARIN SODIUM 5000 UNITS: 5000 INJECTION, SOLUTION INTRAVENOUS; SUBCUTANEOUS at 08:04

## 2019-04-11 RX ADMIN — FAMOTIDINE 20 MG: 20 TABLET, FILM COATED ORAL at 08:04

## 2019-04-11 RX ADMIN — LINACLOTIDE 290 MCG: 290 CAPSULE, GELATIN COATED ORAL at 09:04

## 2019-04-11 RX ADMIN — HYDROCODONE BITARTRATE AND ACETAMINOPHEN 1 TABLET: 5; 325 TABLET ORAL at 10:04

## 2019-04-11 RX ADMIN — NYSTATIN 500000 UNITS: 500000 SUSPENSION ORAL at 01:04

## 2019-04-11 RX ADMIN — DOCUSATE SODIUM 100 MG: 100 CAPSULE, LIQUID FILLED ORAL at 09:04

## 2019-04-11 RX ADMIN — DOCUSATE SODIUM 100 MG: 100 CAPSULE, LIQUID FILLED ORAL at 08:04

## 2019-04-11 RX ADMIN — DOCUSATE SODIUM 100 MG: 100 CAPSULE, LIQUID FILLED ORAL at 02:04

## 2019-04-11 RX ADMIN — NYSTATIN 500000 UNITS: 500000 SUSPENSION ORAL at 08:04

## 2019-04-11 RX ADMIN — CLONIDINE HYDROCHLORIDE 0.1 MG: 0.1 TABLET ORAL at 09:04

## 2019-04-11 RX ADMIN — TACROLIMUS 4 MG: 1 CAPSULE ORAL at 09:04

## 2019-04-11 RX ADMIN — NYSTATIN 500000 UNITS: 500000 SUSPENSION ORAL at 09:04

## 2019-04-11 RX ADMIN — MYCOPHENOLATE MOFETIL 500 MG: 250 CAPSULE ORAL at 09:04

## 2019-04-11 RX ADMIN — CALCIUM ACETATE 1334 MG: 667 CAPSULE ORAL at 01:04

## 2019-04-11 RX ADMIN — SEVELAMER CARBONATE 800 MG: 800 TABLET, FILM COATED ORAL at 09:04

## 2019-04-11 NOTE — PROGRESS NOTES
Discharge Note:    Pt will discharge to patient's home under the care of Lisa Burns, patient's caregiver and friend, phone number [phone] with outpatient dialysis on an as needed basis. JIA contacted pt's dialysis unit and spoke with the admin who reports that they have availability for pt to come on her usual dialysis days, but at 12pm. JIA relayed this information to pt and transplant team. Pt aware of, involved in, and coping well with this discharge plan. Pt did not have any concerns with the discharge plan at this time. JIA remains available at [phone].

## 2019-04-11 NOTE — PLAN OF CARE
Problem: Adult Inpatient Plan of Care  Goal: Plan of Care Review  Outcome: Ongoing (interventions implemented as appropriate)  AAOx4, afebrile, no c/o pain. Self meds 100%. Davison in place, CDI. Urine geoffrey colored. Encouraged PO fluids. Pt in lowest postion, side rails up x2, non skid footwear in place, call light within reach, pt verbalized understanding to call the nurse when needed. SARA sangenous with minimal output. Hand hygiene practiced per protocol. Pt able to reposition self independently. Attentive family at bedside. Will continue to monitor.

## 2019-04-11 NOTE — PLAN OF CARE
Problem: Adult Inpatient Plan of Care  Goal: Plan of Care Review  Plan of care reviewed on am rounds, afrebrile, vss, bp better controlled since med adjustment yesterday.  Wbc 11 h/h stable Cr 6.6, urine output 350 cc/ 4 hours, reenforced with patient need to drink 2 liters/day, rlq healing with staples, SARA with small amount serosang drainage. Incisional pain controlled with prn hydrocodone, appetite good, glucoses 100's range, passing little gas but no BM since surgery, Linzess/ Colace continued ( hx of chronic constipation), prn Duculox order obtained, patient walking several times in bragg with walker/ standby assist, doing well with self meds. Supportive/ attentive caregiver remains with patient, positive attitude displayed, emotional support provided to both. Plan NPO after MN for permcath placement tomorrow as left arm fistula has clotted.

## 2019-04-11 NOTE — PROGRESS NOTES
[facility]  Kidney Transplant  Progress Note      Reason for Follow-up: Reassessment of Kidney Transplant - 4/8/2019  (#1) recipient and management of immunosuppression.    ORGAN: LEFT KIDNEY    Donor Type: Living    Donor CMV Status:   Donor HBcAB:  Donor HCV Status:  Donor HBV OFE:   Donor HCV OFE:       Subjective:   History of Present Illness:  Paige Summers is a 69 y/o female w ESRD 2/2 Hypertensive Nephrosclerosis.  S/p living unrelated kidney transplant 4/8/19, CMV +/+, WIT 30 min, CIT 3h 9 m, HD Tues- Thurs- Sat.  Pt has LUE AVF- last used 4/6/19.  Pt was on Plavix for reoccurring AVF thrombus. Per pt, AVF has been declotted 9 times.  Dry weight 83 kg.  Native UOP <100 ml.  5 day gallardo.   Patients immunosuppression will include Campath for induction with early steroid withdrawal and Prograf and Cellcept for maintenance immunosuppression.  Opportunistic infection prophylaxis will include Valcyte for 3 months (CMV D + , R + ), Bactrim for 1 year, and nystatin for 4 weeks.     Surgery significant for intra op US w slightly decreased perfusion.   Kidney graft injected w verapamil in the artery, repositioned the kidney and clamped the distal iliac artery. The kidney remianed partially pink. Kidney removed, resected previous arteriotomy and venotomy and obtained arterial venous continuity. A small amount of iliac artery was resected and an end to end anastomosis was performed.  Donor kidney was reviewed on the back table, artery was cut back, and elimnated the pair of pants anastomosis. Then the main artery was cut back as there appeared to be an intimal abnormality. Venotomy was made, the vein irrigated, and an end renal to right external iliac vein anastomosis was created.  Arterial control was obtained with a vascular clamp.  Arteriotomy was made, the artery irrigated, and an reconstructed to right external iliac artery anastomosis was created.  The second artery was anastomosed end to side  to distal ilac artery.  Reperfusion quality was fair.  Ultrasound, open and closed satisfactory.  There was an excellent pulse in the main renal artery, the segmental flow was visible.   Intraoperative urine production was observed.       Interval History: No acute events overnight. US this AM satisfactory doppler, no fluid collections. Cr increased from 6.2 yesterday to 6.6 today, expect some DGF related to surgery events. Excellent UOP, gallardo for 5 days (due to be removed 4/13), SARA with minimal ss drg. LUE AV graft no thrill/no bruit,  h/o graft clotting requiring plavix use. VAS US yesterday showed that graft has again clotted. Vascular surgery consulted to assess graft for declotting - recommending tunneled cath placement instead as graft likely to clot again soon if opened up. Spoke with Nephrologist, Dr. Bay. Will plan for perm cath placement tomorrow - pt to be NPO at midnight. Bps better controlled today after d/c'ing clonidine patch and increasing coreg dose yesterday. Pruritus better after switching from oxy to percocet yesterday. She is tolerating a diet, no N/V, passing some flatus, (-) BM, on bowel regimen (h/o chronic constipation). Ambulating with minimal assistance. Plan for discharge within next few days, pending perm cath placement. Continue to monitor.          Past Medical, Surgical, Family, and Social History:   Unchanged from H&P.    Scheduled Meds:   amLODIPine  10 mg Oral Daily    calcium acetate  1,334 mg Oral TID WM    carvedilol  12.5 mg Oral BID WM    cinacalcet  30 mg Oral Daily with breakfast    cloNIDine  0.1 mg Oral BID    docusate sodium  100 mg Oral TID    famotidine  20 mg Oral QHS    heparin (porcine)  5,000 Units Subcutaneous Q8H    linaclotide  290 mcg Oral Daily    multivitamin  1 tablet Oral Daily    mupirocin  1 g Nasal BID    mycophenolate  500 mg Oral BID    nystatin  500,000 Units Mouth/Throat QID (PC + HS)    sulfamethoxazole-trimethoprim 400-80mg  1  tablet Oral Every Mon, Wed, Fri    tacrolimus  3 mg Oral Daily    tacrolimus  4 mg Oral Daily    traZODone  50 mg Oral QHS    [START ON 4/19/2019] valGANciclovir  450 mg Oral Daily     Continuous Infusions:   glucagon (human recombinant)       PRN Meds:acetaminophen, dextrose 50%, diphenhydrAMINE, glucagon (human recombinant), glucose, glucose, glucose, hydrALAZINE, HYDROcodone-acetaminophen, HYDROcodone-acetaminophen, insulin aspart U-100, naloxone, ondansetron    Intake/Output - Last 3 Shifts       04/09 0700 - 04/10 0659 04/10 0700 - 04/11 0659 04/11 0700 - 04/12 0659    P.O. 2100 2240 1050    I.V. (mL/kg) 1459.6 (16.3)      Other 0      Total Intake(mL/kg) 3559.6 (39.8) 2240 (25.3) 1050 (11.9)    Urine (mL/kg/hr) 2900 (1.4) 1700 (0.8) 350 (0.5)    Drains 40 0 20    Stool  0     Blood       Total Output 2940 1700 370    Net +619.6 +540 +680           Stool Occurrence  0 x            Review of Systems   Constitutional: Positive for activity change and appetite change (fair/decrease). Negative for chills, fatigue, fever and unexpected weight change.   HENT: Negative.  Negative for postnasal drip and trouble swallowing.    Eyes: Negative.  Negative for visual disturbance.   Respiratory: Negative for cough, shortness of breath and wheezing.    Cardiovascular: Positive for leg swelling. Negative for chest pain and palpitations.   Gastrointestinal: Positive for abdominal distention, abdominal pain and constipation. Negative for anal bleeding, blood in stool, diarrhea, nausea, rectal pain and vomiting.   Genitourinary: Negative for decreased urine volume, difficulty urinating, dysuria, hematuria and urgency.   Musculoskeletal: Negative for arthralgias and gait problem.   Skin: Positive for wound. Negative for rash.   Allergic/Immunologic: Positive for immunocompromised state.   Neurological: Negative for dizziness, light-headedness and headaches.   Psychiatric/Behavioral: The patient is not nervous/anxious.      "  Objective:     Vital Signs (Most Recent):  Temp: 97.7 °F (36.5 °C) (04/11/19 1121)  Pulse: 81 (04/11/19 1210)  Resp: (!) 21 (04/11/19 1210)  BP: 129/67 (04/11/19 1210)  SpO2: 100 % (04/11/19 1210) Vital Signs (24h Range):  Temp:  [97.5 °F (36.4 °C)-97.8 °F (36.6 °C)] 97.7 °F (36.5 °C)  Pulse:  [67-88] 81  Resp:  [13-22] 21  SpO2:  [93 %-100 %] 100 %  BP: (129-154)/(67-93) 129/67     Weight: 88.5 kg (195 lb 1.7 oz)  Height: 5' 3" (160 cm)  Body mass index is 34.56 kg/m².    Physical Exam   Constitutional: She is oriented to person, place, and time. She appears well-developed. She is active and cooperative. No distress.   LUE AVF (-) thrill/(-) bruit   HENT:   Head: Normocephalic and atraumatic.   Eyes: EOM are normal. No scleral icterus.   Cardiovascular: Normal rate, regular rhythm, normal heart sounds, intact distal pulses and normal pulses.   No murmur heard.  Pulmonary/Chest: Effort normal and breath sounds normal. No respiratory distress. She has no decreased breath sounds. She has no wheezes. She has no rales.   Abdominal: Soft. Normal appearance and bowel sounds are normal. She exhibits no distension. There is no tenderness. There is no guarding.   RLQ incision LOKESH with staples, soft, non tender to palpation  R SARA with minimal ss drg   Genitourinary:   Genitourinary Comments: Davison for 5 days (due to be removed 4/13), draining clear, yellow urine   Musculoskeletal: Normal range of motion. She exhibits edema (+1 generalized).   Neurological: She is alert and oriented to person, place, and time.   Skin: Skin is warm, dry and intact. She is not diaphoretic.   Psychiatric: She has a normal mood and affect.   Nursing note and vitals reviewed.      Laboratory:  CBC:   Recent Labs   Lab 04/09/19  1519 04/10/19  0500 04/11/19  0554   WBC 19.30* 15.55* 11.61   RBC 3.55* 3.18* 2.95*   HGB 10.5* 9.7* 8.8*   HCT 32.9* 29.1* 26.7*    104* 79*   MCV 93 92 91   MCH 29.6 30.5 29.8   MCHC 31.9* 33.3 33.0     CMP: "   Recent Labs   Lab 04/09/19  0600 04/09/19  1519 04/10/19  0500 04/11/19  0554    136* 120* 128*   CALCIUM 9.1 9.4 9.4 8.6*   ALBUMIN 2.8*  2.8* 3.1* 2.9* 2.8*   PROT 6.7  --   --   --     135* 135* 131*   K 4.9 4.6 4.7 4.5   CO2 24 21* 22* 22*    100 102 99   BUN 81* 79* 84* 105*   CREATININE 6.2* 6.4* 6.2* 6.6*   ALKPHOS 67  --   --   --    ALT 15  --   --   --    AST 65*  --   --   --      Coagulation: No results for input(s): PT, APTT in the last 168 hours.  Labs within the past 24 hours have been reviewed.    Diagnostic Results:  US - Kidney:   Results for orders placed during the hospital encounter of 04/08/19   US Transplant Kidney With Doppler    Narrative EXAMINATION:  US TRANSPLANT KIDNEY WITH DOPPLER    CLINICAL HISTORY:  assess perfusion;    TECHNIQUE:  Real time gray scale and doppler ultrasound was performed over the patient's renal allograft.    COMPARISON:  Intraoperative ultrasound 04/08/2019.    FINDINGS:  Renal allograft in the right lower quadrant.  The allograft measures 11.5 cm. Normal perfusion. No hydronephrosis.    Two peritransplant fluid collections measuring 4.4 x 1.1 x 3.6 cm and 3.4 x 2.9 x 3.4 cm.    Vasculature:    Resistive indices ranged from 0.67 to 0.74.    Main renal artery peak systolic velocity: 248cm/sec with normal waveform.    Renal artery/iliac ratio: 2.2.    The main renal vein is patent.      Impression Satisfactory day 1 postop gray scale and doppler evaluation of renal allograft.    Two small peritransplant fluid collections.    COMMUNICATION  This result was relayed directly by Dr. Dalton by phone to Dr. Goodwin.    Electronically signed by resident: Valerio Montanez MD  Date:    04/09/2019  Time:    08:03    Electronically signed by: Pan Dalton MD  Date:    04/09/2019  Time:    08:20     Assessment/Plan:     * S/P living-donor kidney transplantation  - ESRD 2/2 Hypertensive Nephrosclerosis.  S/p living unrelated (friend) kidney transplant  4/8/19, CMV +/+, WIT 30 min, CIT 3h 9 m, HD TTS.  Dry weight 83 kg.  Native UOP <100 ml.    - Surgery significant for intra op US w slightly decreased perfusion related to thrombus in main artery. Kidney removed, pair-of-pants anastomosis converted to double artery, and kidney was replaced (see OR note for full report).   - Kidney US 4/9 with good perfusion.  - Kidney US 4/11 with again with good perfusion.  - Cr increased from 6.2 yesterday to 6.6 today. Excellent uop. No RRT today but expect some DGF related to surgical event.  - Perm cath to be placed tomorrow.  - Davison for 5 days (to be removed 4/13), SARA x 1  - Pain well controlled, changed to hydrocodone due to severe itching with oxycodone - itching improved.  - Tolerating diet, (+) flatus, (-) BM, continue bowel regimen, h/o chronic constipation requiring linzess which was restarted.    Hyponatremia  - Continue to monitor w/ daily labs.      Thrombocytopenia, unspecified  - Continue to monitor w/ daily labs.      Essential hypertension  - Continue Coreg and Amlodipine.      Hyperphosphatemia  - Continue renal diet. Renvela started. Expect kidney function to improve then renvela can be d/c'd    Acute hyperglycemia  - Related to steroids. No h/o DM. Endocrine consulted.     Hypertension, renal disease, stage 5 chronic kidney disease or end stage renal disease  - Pt was on Clonidine patch 0.1 mg, Coreg 6.25 --> increased to 12.5mg BID, and Norvasc 10 mg qd.    - Post transplant, clonidine patch d/c'd, started on clonidine 0.1 mg PO bid started w holding parameters.    AV fistula thrombosis  - Pt last used AVF on Saturday for HD.  On am exam, no thrill/bruit noted.    - Pt has hx of AVF thrombosis (per pt 9 times) and was on Plavix for reoccurring thrombus.    - VAS HD access US 4/10 confirmed AVF again thrombosed.  - Vascular surgery evaluated patient/graft and recommended perm cath placement instead of de-clotting as graft likely to clot again soon  regardless.  - Perm cath placement scheduled for tomorrow afternoon.      Hypercalcemia  - 9.1, .  Sensipar started.      At risk for opportunistic infections  - Opportunistic infection prophylaxis will include Valcyte for 3 months to start on POD#10 or day of discharge (CMV D + , R + ), Bactrim for 1 year, and nystatin for 4 weeks.    Long-term use of immunosuppressant medication  - See prophylactic immnuotherapy    Prophylactic immunotherapy  - Patients immunosuppression will include Campath for induction with early steroid withdrawal and Prograf and Cellcept for maintenance immunosuppression.    - Continue to check prograf level daily.  Assess for toxicity and adjust level as needed    Presence of surgical incision  - RLQ incision w staples CDI.  Monitor.      Secondary hyperparathyroidism of renal origin  - Continue Sensipar.    Anemia of renal disease  - H/H decreasing.   - No overt signs of bleeding.  - Will check hemolysis labs and iron panel in AM.  - Monitor.        Discharge Planning:  Plan for discharge in next 1-2 days, pending perm cath placement.    Dilma Mims PA-C  Kidney Transplant  [facility]

## 2019-04-11 NOTE — ASSESSMENT & PLAN NOTE
- Patients immunosuppression will include Campath for induction with early steroid withdrawal and Prograf and Cellcept for maintenance immunosuppression.    - Continue to check prograf level daily.  Assess for toxicity and adjust level as needed

## 2019-04-11 NOTE — CONSULTS
Paige Powell Kristopher  04/11/2019    Vascular Surgery Consult Note    CC: Clotted LUE AV graft    HPI:  Patient is a 68 y.o. female with a h/o ESRD on HD (Last HD 4/6/19), now s/p KTx 4/8/19. Delayed functioning of transplanted kidney and will likely require HD in next 2-3 days. Found to have thrombosed AV graft today.     Left upper arm AVG created 04/'17 at OSH, has been maintained by access center until her most recent intervention 1 mo ago at [facility]. Patient states a stent was placed.     Of note, patient has had 8~9 declot/intervention to the LUE AVG since placement.     Contacted transplant kidney team, they wish the LUE AVG to be declotted for anticipated HD in next 2-3 days. Transplant nephrology team ok with upto 30-40cc of contrast that may be used during the declot process.         Past Medical History:   Diagnosis Date    Anemia     Anemia of renal disease     Anxiety     Chronic renal failure 01/10/2018    Depression     Essential hypertension     GERD (gastroesophageal reflux disease)     Gout     H pylori ulcer     Hyperlipidemia     Hypertension     Obesity     Secondary hyperparathyroidism of renal origin      Past Surgical History:   Procedure Laterality Date    declot Left 2/19/2019    Performed by [facility]uriy Gibson MD at [facility] CATH LAB    DIALYSIS FISTULA CREATION Left 02/2018    FISTULOGRAM Left 3/13/2019    Performed by Irineo Devlin MD at [facility] CATH LAB    FISTULOGRAM Left 12/19/2018    Performed by Javed Lopez MD at [facility] CATH LAB    HYSTERECTOMY  1971    TVH    PERMCATH REMOVAL-TUNNELED CVC REMOVAL N/A 4/23/2018    Performed by Yuriy Gibson MD at [facility] CATH LAB    PERMCATH REWIRE- TUNNELED CATH REWIRE N/A 3/21/2018    Performed by [facility]y Gibson MD at [facility] CATH LAB    THROMBECTOMY, HEMODIALYSIS GRAFT OR FISTULA Left 12/13/2018    Performed by Javed Lopez MD at [facility] CATH LAB    THROMBECTOMY, HEMODIALYSIS GRAFT OR FISTULA Left 11/9/2018    Performed by  Yuriy Gibson MD at [facility] CATH LAB    THROMBECTOMY, HEMODIALYSIS GRAFT OR FISTULA Left 9/12/2018    Performed by Y[facility]y Gibson [facility] at [facility] CATH LAB    THROMBECTOMY, HEMODIALYSIS GRAFT OR FISTULA N/A 9/6/2018    Performed by Yuriy Gibson MD at [facility] CATH LAB    THROMBECTOMY, HEMODIALYSIS GRAFT OR FISTULA Left 6/13/2018    Performed by Y[facility]y Gibson MD at [facility] CATH LAB    THROMBECTOMY, HEMODIALYSIS GRAFT OR FISTULA Left 6/11/2018    Performed by Yuriy Gibson MD at [facility] CATH LAB    TRANSPLANT, KIDNEY N/A 4/8/2019    Per[facility] by [facility]w [facility]    ULTRASOUND, DIAGNOSTIC Left 10/10/2018    Performed by Yuriy Gibson MD at [facility] CATH LAB    ULTRASOUND, DIAGNOSTIC Left 9/19/2018    Performed by Yuriy Gibson MD at [facility] CATH LAB    ULTRASOUND, DIAGNOSTIC Left 8/20/2018    Performed by Yuriy Gibson MD at [facility] CATH LAB    ULTRASOUND, DIAGNOSTIC Left 8/6/2018    Performed by Yuriy Gibson MD at [facility] CATH LAB    ULTRASOUND, DIAGNOSTIC Left 7/9/2018    Performed by Yuriy Gibson MD at [facility] CATH LAB    ULTRASOUND, DIAGNOSTIC Left 6/18/2018    Performed by Yuriy Gibson MD at [facility] CATH LAB     Family History   Problem Relation Age of Onset    Alcohol abuse Mother     No Known Problems Father     Hypertension Sister     Arthritis Sister     Heart disease Brother     Heart failure Brother     Heart failure Brother     Diabetes Cousin     Breast cancer Neg Hx     Colon cancer Neg Hx     Ovarian cancer Neg Hx      Social History     Socioeconomic History    Marital status:      Spouse name: Not on file    Number of children: Not on file    Years of education: Not on file    Highest education level: Not on file   Occupational History    Not on file   Social Needs    Financial resource strain: Not on file    Food insecurity:     Worry: Not on file     Inability: Not on file    Transportation needs:     Medical: Not on file     Non-medical: Not on file   Tobacco Use     Smoking status: Never Smoker    Smokeless tobacco: Never Used   Substance and Sexual Activity    Alcohol use: No    Drug use: No    Sexual activity: Never     Partners: Male     Comment: Celibate since 2010   Lifestyle    Physical activity:     Days per week: Not on file     Minutes per session: Not on file    Stress: Not on file   Relationships    Social connections:     Talks on phone: Not on file     Gets together: Not on file     Attends Worship service: Not on file     Active member of club or organization: Not on file     Attends meetings of clubs or organizations: Not on file     Relationship status: Not on file   Other Topics Concern    Not on file   Social History Narrative    Not on file     Review of patient's allergies indicates:   Allergen Reactions    Oxycodone Itching       Current Facility-Administered Medications:     acetaminophen tablet 650 mg, 650 mg, Oral, Q6H PRN, Arina Sanon NP    amLODIPine tablet 10 mg, 10 mg, Oral, Daily, Polina Cary MD, 10 mg at 04/11/19 0906    calcium acetate capsule 1,334 mg, 1,334 mg, Oral, TID WM, Dilma Mims PA-C    carvedilol tablet 12.5 mg, 12.5 mg, Oral, BID WM, Janette Fajardo DNP, 12.5 mg at 04/11/19 0906    cinacalcet tablet 30 mg, 30 mg, Oral, Daily with breakfast, Arina Sanon NP, 30 mg at 04/11/19 0906    cloNIDine tablet 0.1 mg, 0.1 mg, Oral, BID, Arina Sanon NP, 0.1 mg at 04/11/19 0906    dextrose 50% injection 12.5 g, 12.5 g, Intravenous, PRN, Inocente Rodrigues MD    diphenhydrAMINE capsule 25 mg, 25 mg, Oral, Q6H PRN, Polina Cary MD, 25 mg at 04/09/19 2237    docusate sodium capsule 100 mg, 100 mg, Oral, TID, Inocente Rodrigues MD, 100 mg at 04/11/19 0907    famotidine tablet 20 mg, 20 mg, Oral, QHS, Inocente Rodrigues MD, 20 mg at 04/10/19 2032    glucagon (human recombinant) injection 1 mg, 1 mg, Intramuscular, Continuous PRN, Inocente Rodrigues MD    glucose chewable tablet 16 g, 16 g,  Oral, PRN, Inocente Rodrigues MD    glucose chewable tablet 16 g, 16 g, Oral, PRN, Inocente Rodrigues MD    glucose chewable tablet 24 g, 24 g, Oral, PRN, Inocente Rodrigues MD    heparin (porcine) injection 5,000 Units, 5,000 Units, Subcutaneous, Q8H, Arina Sanon NP, 5,000 Units at 04/10/19 2032    hydrALAZINE injection 10 mg, 10 mg, Intravenous, Q6H PRN, Polina Cary MD, Stopped at 04/09/19 2230    HYDROcodone-acetaminophen  mg per tablet 1 tablet, 1 tablet, Oral, Q4H PRN, Janette Fajardo DNP    HYDROcodone-acetaminophen 5-325 mg per tablet 1 tablet, 1 tablet, Oral, Q4H PRN, Janette Fajardo DNP, 1 tablet at 04/11/19 1030    insulin aspart U-100 pen 0-5 Units, 0-5 Units, Subcutaneous, QID (AC + HS) PRN, Sandy Leonard PA-C, 1 Units at 04/10/19 2103    linaclotide Cap 290 mcg, 290 mcg, Oral, Daily, Arina Sanno NP, 290 mcg at 04/11/19 0910    multivitamin tablet 1 tablet, 1 tablet, Oral, Daily, Inocente Rodrigues MD, 1 tablet at 04/11/19 0908    mupirocin 2 % ointment 1 g, 1 g, Nasal, BID, Inocente Rodrigues MD, 1 g at 04/10/19 0900    mycophenolate capsule 500 mg, 500 mg, Oral, BID, Inocente Rodrigues MD, 500 mg at 04/11/19 0907    naloxone 0.4 mg/mL injection 0.02 mg, 0.02 mg, Intravenous, PRN, Inocente Rodrigues MD    nystatin 100,000 unit/mL suspension 500,000 Units, 500,000 Units, Mouth/Throat, QID (PC + HS), Inocente Rodrigues MD, 500,000 Units at 04/11/19 0906    ondansetron tablet 8 mg, 8 mg, Oral, Q8H PRN, Arina Sanon NP    sulfamethoxazole-trimethoprim 400-80mg per tablet 1 tablet, 1 tablet, Oral, Every Mon, Wed, Fri, Taty Guevara MD, 1 tablet at 04/10/19 0905    tacrolimus capsule 3 mg, 3 mg, Oral, Daily, Chanda Botello NP, 3 mg at 04/10/19 1738    tacrolimus capsule 4 mg, 4 mg, Oral, Daily, Chanda Botello NP, 4 mg at 04/11/19 0905    traZODone tablet 50 mg, 50 mg, Oral, QHS, Janette Fajardo DNP, 50 mg at 04/10/19 2033    [START ON 4/19/2019] valGANciclovir tablet 450  mg, 450 mg, Oral, Daily, Taty Guevara MD    REVIEW OF SYSTEMS:  General: negative;   ENT: negative;   Allergy and Immunology: negative;   Hematological and Lymphatic: Negative;   Endocrine: negative;   Respiratory: no cough, shortness of breath, or wheezing;   Cardiovascular: no chest pain or dyspnea on exertion;   Gastrointestinal: no abdominal pain/back, change in bowel habits, or bloody stools;   Genito-Urinary: no dysuria, trouble voiding, or hematuria;   Musculoskeletal: negative  Neurological: no TIA or stroke symptoms;   Psychiatric: no nervousness, anxiety or depression.    PHYSICAL EXAM:      Pulse: 67  Temp: 97.5 °F (36.4 °C)      General appearance:  Alert, well-appearing, and in no distress.  Oriented to person, place, and time   Neurological:  Normal speech, no focal findings noted; CN II - XII grossly intact                 Neck: Supple, no significant adenopathy; thyroid is not enlarged      Right IJ TLC                Chest:  Clear to auscultation, no wheezes, rales or rhonchi, symmetric air entry      No use of accessory muscles             Cardiac: Normal rate and regular rhythm, S1 and S2 normal; PMI non-displaced          Abdomen: Soft, nontender, nondistended, no masses or organomegaly      No rebound tenderness noted; bowel sounds normal      Extremities:   Left forearm loop AVG (never worked)      Left upper arm AVF without bruit or thrill    LAB RESULTS:  Lab Results   Component Value Date    K 4.5 04/11/2019    K 4.7 04/10/2019    K 4.6 04/09/2019    CREATININE 6.6 (H) 04/11/2019    CREATININE 6.2 (H) 04/10/2019    CREATININE 6.4 (H) 04/09/2019     Lab Results   Component Value Date    WBC 11.61 04/11/2019    WBC 15.55 (H) 04/10/2019    WBC 19.30 (H) 04/09/2019    HCT 26.7 (L) 04/11/2019    HCT 29.1 (L) 04/10/2019    HCT 32.9 (L) 04/09/2019    PLT 79 (L) 04/11/2019     (L) 04/10/2019     04/09/2019     Lab Results   Component Value Date    HGBA1C 5.1 04/10/2019        IMAGING:    IMP/PLAN:  68 y.o. female with ESRD on HD (Last HD 4/6/19), now s/p KTx 4/8/19. Delayed functioning of transplanted kidney and will likely require HD in next 2-3 days. Found to have thrombosed AV graft today.    - All previous intervention images were reviewed.  - 8-9 previous intervention/declot of the LUE AV graft. Most recently a stent at the axillary vein due to recurrent stenosis.   - Unlikely this AVG will stay patent despite declot/intervention with contrast use, which could put newly transplanted kidney at high risk for contrast induced nephropathy.  - Best option would be to place tunneled catheter by [facility] or General Surgery should patient need acute dialysis. Should her transplanted kidney not function as expected and need future AV access, she will need Right arm AVG.     Thank you for this consult. Vascular surgery will sign off.    Miguel Naqvi MD  Vascular/Endovascular Surgery Fellow    Vascular Attending  Agree with above  Reviewed the previous L arm angios (last few at [facility]) in LUE AVG. Has delayed graft function of kidney transplant -- given the previously-placed stent into the L axillary vein and the multiple and frequent recent procedures in the LUE AVG, any new percutaneous thrombectomy has a very low of even intermediate patency. I do not think it's worth the nephrotoxicity risk in this new KTx with delayed graft function. If needs HD for a few weeks, would consider permacath -- if needs more long-term, I can see her in clinic (gave her card) to create ideally an autogenous/AVF in R arm, if she has suitable veins.    Pee Mcgee MD FACS

## 2019-04-11 NOTE — ASSESSMENT & PLAN NOTE
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  S/p living unrelated (friend) kidney transplant 4/8/19, CMV +/+, WIT 30 min, CIT 3h 9 m, HD TTS.  Dry weight 83 kg.  Native UOP <100 ml.    - Surgery significant for intra op US w slightly decreased perfusion related to thrombus in main artery. Kidney removed, pair-of-pants anastomosis converted to double artery, and kidney was replaced (see OR note for full report).   - Kidney US 4/9 with good perfusion.  - Kidney US 4/11 with again with good perfusion.  - Cr increased from 6.2 yesterday to 6.6 today. Excellent uop. No RRT today but expect some DGF related to surgical event.  - Perm cath to be placed tomorrow.  - Davison for 5 days (to be removed 4/13), SARA x 1  - Pain well controlled, changed to hydrocodone due to severe itching with oxycodone - itching improved.  - Tolerating diet, (+) flatus, (-) BM, continue bowel regimen, h/o chronic constipation requiring linzess which was restarted.

## 2019-04-11 NOTE — CARE UPDATE
BG goal 140-180. Minor prandial excursions noted yesterday; received correction scale x1. BG at goal this AM.       Continue bg monitoring ac/hs and low dose correction scale.       ** Please call Endocrine for any BG related issues **

## 2019-04-11 NOTE — SUBJECTIVE & OBJECTIVE
Subjective:   History of Present Illness:  Paige Summers is a 67 y/o female w ESRD 2/2 Hypertensive Nephrosclerosis.  S/p living unrelated kidney transplant 4/8/19, CMV +/+, WIT 30 min, CIT 3h 9 m, HD Tues- Thurs- Sat.  Pt has LUE AVF- last used 4/6/19.  Pt was on Plavix for reoccurring AVF thrombus. Per pt, AVF has been declotted 9 times.  Dry weight 83 kg.  Native UOP <100 ml.  5 day gallardo.   Patients immunosuppression will include Campath for induction with early steroid withdrawal and Prograf and Cellcept for maintenance immunosuppression.  Opportunistic infection prophylaxis will include Valcyte for 3 months (CMV D + , R + ), Bactrim for 1 year, and nystatin for 4 weeks.     Surgery significant for intra op US w slightly decreased perfusion.   Kidney graft injected w verapamil in the artery, repositioned the kidney and clamped the distal iliac artery. The kidney remianed partially pink. Kidney removed, resected previous arteriotomy and venotomy and obtained arterial venous continuity. A small amount of iliac artery was resected and an end to end anastomosis was performed.  Donor kidney was reviewed on the back table, artery was cut back, and elimnated the pair of pants anastomosis. Then the main artery was cut back as there appeared to be an intimal abnormality. Venotomy was made, the vein irrigated, and an end renal to right external iliac vein anastomosis was created.  Arterial control was obtained with a vascular clamp.  Arteriotomy was made, the artery irrigated, and an reconstructed to right external iliac artery anastomosis was created.  The second artery was anastomosed end to side to distal ilac artery.  Reperfusion quality was fair.  Ultrasound, open and closed satisfactory.  There was an excellent pulse in the main renal artery, the segmental flow was visible.   Intraoperative urine production was observed.       Interval History: No acute events overnight. US this AM satisfactory doppler, no fluid  collections. Cr increased from 6.2 yesterday to 6.6 today, expect some DGF related to surgery events. Excellent UOP, gallardo for 5 days (due to be removed 4/13), SARA with minimal ss drg. LUE AV graft no thrill/no bruit,  h/o graft clotting requiring plavix use. VAS US yesterday showed that graft has again clotted. Vascular surgery consulted to assess graft for declotting - recommending tunneled cath placement instead as graft likely to clot again soon if opened up. Spoke with Nephrologist, Dr. Bay. Will plan for perm cath placement tomorrow - pt to be NPO at midnight. Bps better controlled today after d/c'ing clonidine patch and increasing coreg dose yesterday. Pruritus better after switching from oxy to percocet yesterday. She is tolerating a diet, no N/V, passing some flatus, (-) BM, on bowel regimen (h/o chronic constipation). Ambulating with minimal assistance. Plan for discharge within next few days, pending perm cath placement. Continue to monitor.          Past Medical, Surgical, Family, and Social History:   Unchanged from H&P.    Scheduled Meds:   amLODIPine  10 mg Oral Daily    calcium acetate  1,334 mg Oral TID WM    carvedilol  12.5 mg Oral BID WM    cinacalcet  30 mg Oral Daily with breakfast    cloNIDine  0.1 mg Oral BID    docusate sodium  100 mg Oral TID    famotidine  20 mg Oral QHS    heparin (porcine)  5,000 Units Subcutaneous Q8H    linaclotide  290 mcg Oral Daily    multivitamin  1 tablet Oral Daily    mupirocin  1 g Nasal BID    mycophenolate  500 mg Oral BID    nystatin  500,000 Units Mouth/Throat QID (PC + HS)    sulfamethoxazole-trimethoprim 400-80mg  1 tablet Oral Every Mon, Wed, Fri    tacrolimus  3 mg Oral Daily    tacrolimus  4 mg Oral Daily    traZODone  50 mg Oral QHS    [START ON 4/19/2019] valGANciclovir  450 mg Oral Daily     Continuous Infusions:   glucagon (human recombinant)       PRN Meds:acetaminophen, dextrose 50%, diphenhydrAMINE, glucagon (human  recombinant), glucose, glucose, glucose, hydrALAZINE, HYDROcodone-acetaminophen, HYDROcodone-acetaminophen, insulin aspart U-100, naloxone, ondansetron    Intake/Output - Last 3 Shifts       04/09 0700 - 04/10 0659 04/10 0700 - 04/11 0659 04/11 0700 - 04/12 0659    P.O. 2100 2240 1050    I.V. (mL/kg) 1459.6 (16.3)      Other 0      Total Intake(mL/kg) 3559.6 (39.8) 2240 (25.3) 1050 (11.9)    Urine (mL/kg/hr) 2900 (1.4) 1700 (0.8) 350 (0.5)    Drains 40 0 20    Stool  0     Blood       Total Output 2940 1700 370    Net +619.6 +540 +680           Stool Occurrence  0 x            Review of Systems   Constitutional: Positive for activity change and appetite change (fair/decrease). Negative for chills, fatigue, fever and unexpected weight change.   HENT: Negative.  Negative for postnasal drip and trouble swallowing.    Eyes: Negative.  Negative for visual disturbance.   Respiratory: Negative for cough, shortness of breath and wheezing.    Cardiovascular: Positive for leg swelling. Negative for chest pain and palpitations.   Gastrointestinal: Positive for abdominal distention, abdominal pain and constipation. Negative for anal bleeding, blood in stool, diarrhea, nausea, rectal pain and vomiting.   Genitourinary: Negative for decreased urine volume, difficulty urinating, dysuria, hematuria and urgency.   Musculoskeletal: Negative for arthralgias and gait problem.   Skin: Positive for wound. Negative for rash.   Allergic/Immunologic: Positive for immunocompromised state.   Neurological: Negative for dizziness, light-headedness and headaches.   Psychiatric/Behavioral: The patient is not nervous/anxious.       Objective:     Vital Signs (Most Recent):  Temp: 97.7 °F (36.5 °C) (04/11/19 1121)  Pulse: 81 (04/11/19 1210)  Resp: (!) 21 (04/11/19 1210)  BP: 129/67 (04/11/19 1210)  SpO2: 100 % (04/11/19 1210) Vital Signs (24h Range):  Temp:  [97.5 °F (36.4 °C)-97.8 °F (36.6 °C)] 97.7 °F (36.5 °C)  Pulse:  [67-88] 81  Resp:  [13-22]  "21  SpO2:  [93 %-100 %] 100 %  BP: (129-154)/(67-93) 129/67     Weight: 88.5 kg (195 lb 1.7 oz)  Height: 5' 3" (160 cm)  Body mass index is 34.56 kg/m².    Physical Exam   Constitutional: She is oriented to person, place, and time. She appears well-developed. She is active and cooperative. No distress.   LUE AVF (-) thrill/(-) bruit   HENT:   Head: Normocephalic and atraumatic.   Eyes: EOM are normal. No scleral icterus.   Cardiovascular: Normal rate, regular rhythm, normal heart sounds, intact distal pulses and normal pulses.   No murmur heard.  Pulmonary/Chest: Effort normal and breath sounds normal. No respiratory distress. She has no decreased breath sounds. She has no wheezes. She has no rales.   Abdominal: Soft. Normal appearance and bowel sounds are normal. She exhibits no distension. There is no tenderness. There is no guarding.   RLQ incision LOKESH with staples, soft, non tender to palpation  R SARA with minimal ss drg   Genitourinary:   Genitourinary Comments: Davison for 5 days (due to be removed 4/13), draining clear, yellow urine   Musculoskeletal: Normal range of motion. She exhibits edema (+1 generalized).   Neurological: She is alert and oriented to person, place, and time.   Skin: Skin is warm, dry and intact. She is not diaphoretic.   Psychiatric: She has a normal mood and affect.   Nursing note and vitals reviewed.      Laboratory:  CBC:   Recent Labs   Lab 04/09/19  1519 04/10/19  0500 04/11/19  0554   WBC 19.30* 15.55* 11.61   RBC 3.55* 3.18* 2.95*   HGB 10.5* 9.7* 8.8*   HCT 32.9* 29.1* 26.7*    104* 79*   MCV 93 92 91   MCH 29.6 30.5 29.8   MCHC 31.9* 33.3 33.0     CMP:   Recent Labs   Lab 04/09/19  0600 04/09/19  1519 04/10/19  0500 04/11/19  0554    136* 120* 128*   CALCIUM 9.1 9.4 9.4 8.6*   ALBUMIN 2.8*  2.8* 3.1* 2.9* 2.8*   PROT 6.7  --   --   --     135* 135* 131*   K 4.9 4.6 4.7 4.5   CO2 24 21* 22* 22*    100 102 99   BUN 81* 79* 84* 105*   CREATININE 6.2* 6.4* " 6.2* 6.6*   ALKPHOS 67  --   --   --    ALT 15  --   --   --    AST 65*  --   --   --      Coagulation: No results for input(s): PT, APTT in the last 168 hours.  Labs within the past 24 hours have been reviewed.    Diagnostic Results:  US - Kidney:   Results for orders placed during the hospital encounter of 04/08/19   US Transplant Kidney With Doppler    Narrative EXAMINATION:  US TRANSPLANT KIDNEY WITH DOPPLER    CLINICAL HISTORY:  assess perfusion;    TECHNIQUE:  Real time gray scale and doppler ultrasound was performed over the patient's renal allograft.    COMPARISON:  Intraoperative ultrasound 04/08/2019.    FINDINGS:  Renal allograft in the right lower quadrant.  The allograft measures 11.5 cm. Normal perfusion. No hydronephrosis.    Two peritransplant fluid collections measuring 4.4 x 1.1 x 3.6 cm and 3.4 x 2.9 x 3.4 cm.    Vasculature:    Resistive indices ranged from 0.67 to 0.74.    Main renal artery peak systolic velocity: 248cm/sec with normal waveform.    Renal artery/iliac ratio: 2.2.    The main renal vein is patent.      Impression Satisfactory day 1 postop gray scale and doppler evaluation of renal allograft.    Two small peritransplant fluid collections.    COMMUNICATION  This result was relayed directly by Dr. Dalton by phone to Dr. Goodwin.    Electronically signed by resident: Valerio Montanez MD  Date:    04/09/2019  Time:    08:03    Electronically signed by: Pan Dalton MD  Date:    04/09/2019  Time:    08:20

## 2019-04-11 NOTE — TELEPHONE ENCOUNTER
Scheduled pt. for tomorrow at 2pm for dash-spoke with Bob in cath lab, HALLIE Rendon and Reza her nurse and gave the time. Dimla said Dr. Dyson gave order for NPO after Mn.

## 2019-04-11 NOTE — ASSESSMENT & PLAN NOTE
- H/H decreasing.   - No overt signs of bleeding.  - Will check hemolysis labs and iron panel in AM.  - Monitor.

## 2019-04-12 PROBLEM — E87.1 HYPONATREMIA: Status: RESOLVED | Noted: 2019-04-11 | Resolved: 2019-04-12

## 2019-04-12 LAB
ALBUMIN SERPL BCP-MCNC: 2.7 G/DL (ref 3.5–5.2)
ANION GAP SERPL CALC-SCNC: 11 MMOL/L (ref 8–16)
BASOPHILS # BLD AUTO: 0 K/UL (ref 0–0.2)
BASOPHILS NFR BLD: 0 % (ref 0–1.9)
BUN SERPL-MCNC: 116 MG/DL (ref 8–23)
CALCIUM SERPL-MCNC: 8.9 MG/DL (ref 8.7–10.5)
CHLORIDE SERPL-SCNC: 102 MMOL/L (ref 95–110)
CO2 SERPL-SCNC: 21 MMOL/L (ref 23–29)
CREAT SERPL-MCNC: 6.5 MG/DL (ref 0.5–1.4)
DIFFERENTIAL METHOD: ABNORMAL
EOSINOPHIL # BLD AUTO: 0 K/UL (ref 0–0.5)
EOSINOPHIL NFR BLD: 0.2 % (ref 0–8)
ERYTHROCYTE [DISTWIDTH] IN BLOOD BY AUTOMATED COUNT: 15.9 % (ref 11.5–14.5)
EST. GFR  (AFRICAN AMERICAN): 6.9 ML/MIN/1.73 M^2
EST. GFR  (NON AFRICAN AMERICAN): 6 ML/MIN/1.73 M^2
FERRITIN SERPL-MCNC: 407 NG/ML (ref 20–300)
GLUCOSE SERPL-MCNC: 77 MG/DL (ref 70–110)
HAPTOGLOB SERPL-MCNC: <10 MG/DL (ref 30–250)
HCT VFR BLD AUTO: 26.5 % (ref 37–48.5)
HEPARIN INDUCED THROMBOCYTOPENIA: NORMAL
HGB BLD-MCNC: 8.9 G/DL (ref 12–16)
IMM GRANULOCYTES # BLD AUTO: 0.06 K/UL (ref 0–0.04)
IMM GRANULOCYTES NFR BLD AUTO: 1.1 % (ref 0–0.5)
IRON SERPL-MCNC: 103 UG/DL (ref 30–160)
LDH SERPL L TO P-CCNC: 614 U/L (ref 110–260)
LYMPHOCYTES # BLD AUTO: 0 K/UL (ref 1–4.8)
LYMPHOCYTES NFR BLD: 0.2 % (ref 18–48)
MAGNESIUM SERPL-MCNC: 2.1 MG/DL (ref 1.6–2.6)
MCH RBC QN AUTO: 30.5 PG (ref 27–31)
MCHC RBC AUTO-ENTMCNC: 33.6 G/DL (ref 32–36)
MCV RBC AUTO: 91 FL (ref 82–98)
MONOCYTES # BLD AUTO: 0.5 K/UL (ref 0.3–1)
MONOCYTES NFR BLD: 8.6 % (ref 4–15)
NEUTROPHILS # BLD AUTO: 5 K/UL (ref 1.8–7.7)
NEUTROPHILS NFR BLD: 89.9 % (ref 38–73)
NRBC BLD-RTO: 0 /100 WBC
PHOSPHATE SERPL-MCNC: 4.9 MG/DL (ref 2.7–4.5)
PLATELET # BLD AUTO: 66 K/UL (ref 150–350)
PMV BLD AUTO: 12.6 FL (ref 9.2–12.9)
POCT GLUCOSE: 140 MG/DL (ref 70–110)
POCT GLUCOSE: 91 MG/DL (ref 70–110)
POTASSIUM SERPL-SCNC: 4.6 MMOL/L (ref 3.5–5.1)
RBC # BLD AUTO: 2.92 M/UL (ref 4–5.4)
RETICS/RBC NFR AUTO: 2.5 % (ref 0.5–2.5)
SATURATED IRON: 52 % (ref 20–50)
SODIUM SERPL-SCNC: 134 MMOL/L (ref 136–145)
TACROLIMUS BLD-MCNC: 5.3 NG/ML (ref 5–15)
TOTAL IRON BINDING CAPACITY: 200 UG/DL (ref 250–450)
TRANSFERRIN SERPL-MCNC: 135 MG/DL (ref 200–375)
WBC # BLD AUTO: 5.6 K/UL (ref 3.9–12.7)

## 2019-04-12 PROCEDURE — 99233 PR SUBSEQUENT HOSPITAL CARE,LEVL III: ICD-10-PCS | Mod: ,,, | Performed by: NURSE PRACTITIONER

## 2019-04-12 PROCEDURE — 83540 ASSAY OF IRON: CPT

## 2019-04-12 PROCEDURE — 63600175 PHARM REV CODE 636 W HCPCS: Performed by: NURSE PRACTITIONER

## 2019-04-12 PROCEDURE — 99231 SBSQ HOSP IP/OBS SF/LOW 25: CPT | Mod: ,,, | Performed by: NURSE PRACTITIONER

## 2019-04-12 PROCEDURE — 25000003 PHARM REV CODE 250: Performed by: PHYSICIAN ASSISTANT

## 2019-04-12 PROCEDURE — 25000003 PHARM REV CODE 250: Performed by: STUDENT IN AN ORGANIZED HEALTH CARE EDUCATION/TRAINING PROGRAM

## 2019-04-12 PROCEDURE — 25000003 PHARM REV CODE 250: Performed by: TRANSPLANT SURGERY

## 2019-04-12 PROCEDURE — 25000003 PHARM REV CODE 250: Performed by: INTERNAL MEDICINE

## 2019-04-12 PROCEDURE — 83735 ASSAY OF MAGNESIUM: CPT

## 2019-04-12 PROCEDURE — 63600175 PHARM REV CODE 636 W HCPCS: Performed by: PHYSICIAN ASSISTANT

## 2019-04-12 PROCEDURE — 20600001 HC STEP DOWN PRIVATE ROOM

## 2019-04-12 PROCEDURE — 36415 COLL VENOUS BLD VENIPUNCTURE: CPT

## 2019-04-12 PROCEDURE — 82728 ASSAY OF FERRITIN: CPT

## 2019-04-12 PROCEDURE — 80197 ASSAY OF TACROLIMUS: CPT

## 2019-04-12 PROCEDURE — 99233 SBSQ HOSP IP/OBS HIGH 50: CPT | Mod: ,,, | Performed by: NURSE PRACTITIONER

## 2019-04-12 PROCEDURE — 86022 PLATELET ANTIBODIES: CPT

## 2019-04-12 PROCEDURE — 25000003 PHARM REV CODE 250: Performed by: NURSE PRACTITIONER

## 2019-04-12 PROCEDURE — 85025 COMPLETE CBC W/AUTO DIFF WBC: CPT

## 2019-04-12 PROCEDURE — 99231 PR SUBSEQUENT HOSPITAL CARE,LEVL I: ICD-10-PCS | Mod: ,,, | Performed by: NURSE PRACTITIONER

## 2019-04-12 PROCEDURE — 63600175 PHARM REV CODE 636 W HCPCS: Performed by: TRANSPLANT SURGERY

## 2019-04-12 PROCEDURE — 83615 LACTATE (LD) (LDH) ENZYME: CPT

## 2019-04-12 PROCEDURE — 83010 ASSAY OF HAPTOGLOBIN QUANT: CPT

## 2019-04-12 PROCEDURE — 85045 AUTOMATED RETICULOCYTE COUNT: CPT

## 2019-04-12 PROCEDURE — 80069 RENAL FUNCTION PANEL: CPT

## 2019-04-12 RX ORDER — SYRING-NEEDL,DISP,INSUL,0.3 ML 29 G X1/2"
296 SYRINGE, EMPTY DISPOSABLE MISCELLANEOUS
Status: COMPLETED | OUTPATIENT
Start: 2019-04-12 | End: 2019-04-12

## 2019-04-12 RX ORDER — PSEUDOEPHEDRINE/ACETAMINOPHEN 30MG-500MG
100 TABLET ORAL
Status: COMPLETED | OUTPATIENT
Start: 2019-04-12 | End: 2019-04-12

## 2019-04-12 RX ORDER — TACROLIMUS 1 MG/1
4 CAPSULE ORAL EVERY MORNING
Status: DISCONTINUED | OUTPATIENT
Start: 2019-04-13 | End: 2019-04-15 | Stop reason: HOSPADM

## 2019-04-12 RX ORDER — TACROLIMUS 1 MG/1
5 CAPSULE ORAL EVERY EVENING
Status: DISCONTINUED | OUTPATIENT
Start: 2019-04-12 | End: 2019-04-15 | Stop reason: HOSPADM

## 2019-04-12 RX ADMIN — HYDROCODONE BITARTRATE AND ACETAMINOPHEN 1 TABLET: 5; 325 TABLET ORAL at 07:04

## 2019-04-12 RX ADMIN — FAMOTIDINE 20 MG: 20 TABLET, FILM COATED ORAL at 08:04

## 2019-04-12 RX ADMIN — CINACALCET HYDROCHLORIDE 30 MG: 30 TABLET, COATED ORAL at 08:04

## 2019-04-12 RX ADMIN — DOCUSATE SODIUM 100 MG: 100 CAPSULE, LIQUID FILLED ORAL at 08:04

## 2019-04-12 RX ADMIN — HYDROCODONE BITARTRATE AND ACETAMINOPHEN 1 TABLET: 10; 325 TABLET ORAL at 08:04

## 2019-04-12 RX ADMIN — AMLODIPINE BESYLATE 10 MG: 10 TABLET ORAL at 08:04

## 2019-04-12 RX ADMIN — SULFAMETHOXAZOLE AND TRIMETHOPRIM 1 TABLET: 400; 80 TABLET ORAL at 08:04

## 2019-04-12 RX ADMIN — NYSTATIN 500000 UNITS: 500000 SUSPENSION ORAL at 02:04

## 2019-04-12 RX ADMIN — NYSTATIN 500000 UNITS: 500000 SUSPENSION ORAL at 06:04

## 2019-04-12 RX ADMIN — TACROLIMUS 4 MG: 1 CAPSULE ORAL at 08:04

## 2019-04-12 RX ADMIN — MAGNESIUM CITRATE 296 ML: 1.75 LIQUID ORAL at 05:04

## 2019-04-12 RX ADMIN — DOCUSATE SODIUM 100 MG: 100 CAPSULE, LIQUID FILLED ORAL at 02:04

## 2019-04-12 RX ADMIN — BISACODYL 10 MG: 10 SUPPOSITORY RECTAL at 02:04

## 2019-04-12 RX ADMIN — MUPIROCIN 1 G: 20 OINTMENT TOPICAL at 08:04

## 2019-04-12 RX ADMIN — HYDROCODONE BITARTRATE AND ACETAMINOPHEN 1 TABLET: 5; 325 TABLET ORAL at 11:04

## 2019-04-12 RX ADMIN — TACROLIMUS 5 MG: 1 CAPSULE ORAL at 06:04

## 2019-04-12 RX ADMIN — CARVEDILOL 12.5 MG: 3.12 TABLET, FILM COATED ORAL at 06:04

## 2019-04-12 RX ADMIN — CLONIDINE HYDROCHLORIDE 0.1 MG: 0.1 TABLET ORAL at 08:04

## 2019-04-12 RX ADMIN — NYSTATIN 500000 UNITS: 500000 SUSPENSION ORAL at 08:04

## 2019-04-12 RX ADMIN — MYCOPHENOLATE MOFETIL 500 MG: 250 CAPSULE ORAL at 08:04

## 2019-04-12 RX ADMIN — THERA TABS 1 TABLET: TAB at 08:04

## 2019-04-12 RX ADMIN — CARVEDILOL 12.5 MG: 3.12 TABLET, FILM COATED ORAL at 08:04

## 2019-04-12 RX ADMIN — TRAZODONE HYDROCHLORIDE 50 MG: 50 TABLET ORAL at 08:04

## 2019-04-12 RX ADMIN — CALCIUM ACETATE 1334 MG: 667 CAPSULE ORAL at 11:04

## 2019-04-12 RX ADMIN — LINACLOTIDE 290 MCG: 290 CAPSULE, GELATIN COATED ORAL at 08:04

## 2019-04-12 RX ADMIN — SODIUM CHLORIDE 500 ML: 0.9 INJECTION, SOLUTION INTRAVENOUS at 05:04

## 2019-04-12 RX ADMIN — CALCIUM ACETATE 1334 MG: 667 CAPSULE ORAL at 06:04

## 2019-04-12 RX ADMIN — Medication 100 ML: at 05:04

## 2019-04-12 RX ADMIN — CALCIUM ACETATE 1334 MG: 667 CAPSULE ORAL at 08:04

## 2019-04-12 NOTE — ASSESSMENT & PLAN NOTE
- Continue to monitor w/ daily labs.  - cont to decrease.  HIT panel ordered.  H/H stable.    - haptoglobin <10, .  Plan to repeat haptoglobin in am.

## 2019-04-12 NOTE — SUBJECTIVE & OBJECTIVE
Subjective:   History of Present Illness:  Paige Summers is a 67 y/o female w ESRD 2/2 Hypertensive Nephrosclerosis.  S/p living unrelated kidney transplant 4/8/19, CMV +/+, WIT 30 min, CIT 3h 9 m, HD Tues- Thurs- Sat.  Pt has LUE AVF- last used 4/6/19.  Pt was on Plavix for reoccurring AVF thrombus. Per pt, AVF has been declotted 9 times.  Dry weight 83 kg.  Native UOP <100 ml.  5 day gallardo.   Patients immunosuppression will include Campath for induction with early steroid withdrawal and Prograf and Cellcept for maintenance immunosuppression.  Opportunistic infection prophylaxis will include Valcyte for 3 months (CMV D + , R + ), Bactrim for 1 year, and nystatin for 4 weeks.     Surgery significant for intra op US w slightly decreased perfusion.   Kidney graft injected w verapamil in the artery, repositioned the kidney and clamped the distal iliac artery. The kidney remianed partially pink. Kidney removed, resected previous arteriotomy and venotomy and obtained arterial venous continuity. A small amount of iliac artery was resected and an end to end anastomosis was performed.  Donor kidney was reviewed on the back table, artery was cut back, and elimnated the pair of pants anastomosis. Then the main artery was cut back as there appeared to be an intimal abnormality. Venotomy was made, the vein irrigated, and an end renal to right external iliac vein anastomosis was created.  Arterial control was obtained with a vascular clamp.  Arteriotomy was made, the artery irrigated, and an reconstructed to right external iliac artery anastomosis was created.  The second artery was anastomosed end to side to distal ilac artery.  Reperfusion quality was fair.  Ultrasound, open and closed satisfactory.  There was an excellent pulse in the main renal artery, the segmental flow was visible.   Intraoperative urine production was observed.       Interval History:  Progressing well.  Cr improved today from 6.6 to 6.5.  BUN up to  116.  Making great urine- UOP 2.3L.  Last US kidney 4/11 satisfactory doppler, no fluid collections.  5 days (due to be removed 4/13- order to remove entered), SARA with minimal ss drg (can be removed over wkd likely). LUE AV graft no thrill/no bruit,  h/o graft clotting requiring plavix use. VAS US was consulted- no plan to declot as likely will reclot again. Plan to have tunneled cath placed TENTATIVELY Monday- NPO after MN 4/15.  IF cr improved Monday, will cancel procedure.  BPs managed on Coreg 12.5 mg and Clonidine 0.1 mg bid.  She is tolerating diet, denies N/V, passing flatus, (-) BM, on bowel regimen (h/o chronic constipation). Cont Linzess.  Brown bomb ordered.   Ambulating with walker.  HD chair set up for noon T/Th/S outpatient. Will assess readiness for discharge Monday.  Continue to monitor.        Past Medical, Surgical, Family, and Social History:   Unchanged from H&P.    Scheduled Meds:   amLODIPine  10 mg Oral Daily    calcium acetate  1,334 mg Oral TID WM    carvedilol  12.5 mg Oral BID WM    cinacalcet  30 mg Oral Daily with breakfast    cloNIDine  0.1 mg Oral BID    docusate sodium  100 mg Oral TID    famotidine  20 mg Oral QHS    glycerin 99.5%  100 mL Rectal ED 1 Time    And    magnesium citrate  296 mL Rectal ED 1 Time    And    sodium chloride 0.9%  500 mL Rectal ED 1 Time    linaclotide  290 mcg Oral Daily    multivitamin  1 tablet Oral Daily    mupirocin  1 g Nasal BID    mycophenolate  500 mg Oral BID    nystatin  500,000 Units Mouth/Throat QID (PC + HS)    sulfamethoxazole-trimethoprim 400-80mg  1 tablet Oral Every Mon, Wed, Fri    [START ON 4/13/2019] tacrolimus  4 mg Oral Daily    tacrolimus  5 mg Oral Daily    traZODone  50 mg Oral QHS    [START ON 4/19/2019] valGANciclovir  450 mg Oral Daily     Continuous Infusions:   glucagon (human recombinant)       PRN Meds:acetaminophen, bisacodyl, dextrose 50%, diphenhydrAMINE, glucagon (human recombinant), glucose, glucose,  glucose, hydrALAZINE, HYDROcodone-acetaminophen, HYDROcodone-acetaminophen, insulin aspart U-100, naloxone, ondansetron    Intake/Output - Last 3 Shifts       04/10 0700 - 04/11 0659 04/11 0700 - 04/12 0659 04/12 0700 - 04/13 0659    P.O. 2240 2190 200    I.V. (mL/kg)       Other       Total Intake(mL/kg) 2240 (25.3) 2190 (25.5) 200 (2.3)    Urine (mL/kg/hr) 1700 (0.8) 2350 (1.1) 550 (0.8)    Drains 0 35 0    Stool 0 0 0    Total Output 1700 2385 550    Net +540 -195 -350           Stool Occurrence 0 x 0 x 0 x           Review of Systems   Constitutional: Positive for activity change and appetite change (improved). Negative for chills, fatigue, fever and unexpected weight change.   HENT: Negative.  Negative for postnasal drip and trouble swallowing.    Eyes: Negative.  Negative for visual disturbance.   Respiratory: Negative for cough, shortness of breath and wheezing.    Cardiovascular: Positive for leg swelling (improved). Negative for chest pain and palpitations.   Gastrointestinal: Positive for abdominal distention, abdominal pain and constipation. Negative for anal bleeding, blood in stool, diarrhea, nausea, rectal pain and vomiting.   Genitourinary: Negative for decreased urine volume, difficulty urinating, dysuria, hematuria and urgency.   Musculoskeletal: Negative for arthralgias and gait problem.   Skin: Positive for wound. Negative for rash.   Allergic/Immunologic: Positive for immunocompromised state.   Neurological: Negative for dizziness, light-headedness and headaches.   Psychiatric/Behavioral: The patient is not nervous/anxious.       Objective:     Vital Signs (Most Recent):  Temp: 97.6 °F (36.4 °C) (04/12/19 1123)  Pulse: 88 (04/12/19 0730)  Resp: (!) 21 (04/12/19 0730)  BP: 138/79 (04/12/19 0730)  SpO2: 96 % (04/12/19 0730) Vital Signs (24h Range):  Temp:  [97.6 °F (36.4 °C)-98 °F (36.7 °C)] 97.6 °F (36.4 °C)  Pulse:  [68-88] 88  Resp:  [12-21] 21  SpO2:  [96 %-99 %] 96 %  BP: (138-169)/(73-98)  "138/79     Weight: 86 kg (189 lb 11.3 oz)  Height: 5' 3" (160 cm)  Body mass index is 33.6 kg/m².    Physical Exam   Constitutional: She is oriented to person, place, and time. She appears well-developed. She is active and cooperative. No distress.   LUE AVF (-) thrill/(-) bruit   HENT:   Head: Normocephalic and atraumatic.   Eyes: EOM are normal. No scleral icterus.   Cardiovascular: Normal rate, regular rhythm, normal heart sounds, intact distal pulses and normal pulses.   No murmur heard.  Pulmonary/Chest: Effort normal and breath sounds normal. No respiratory distress. She has no decreased breath sounds. She has no wheezes. She has no rales.   Abdominal: Soft. Normal appearance and bowel sounds are normal. She exhibits no distension. There is no tenderness. There is no guarding.   RLQ incision LOKESH with staples, soft, non tender to palpation  R SARA with minimal ss drg   Genitourinary:   Genitourinary Comments: Davison for 5 days (due to be removed 4/13), draining clear, yellow urine   Musculoskeletal: Normal range of motion. She exhibits edema (+1 generalized).   Neurological: She is alert and oriented to person, place, and time.   Skin: Skin is warm, dry and intact. She is not diaphoretic.   Psychiatric: She has a normal mood and affect.   Nursing note and vitals reviewed.      Laboratory:  CBC:   Recent Labs   Lab 04/10/19  0500 04/11/19  0554 04/12/19  0525   WBC 15.55* 11.61 5.60   RBC 3.18* 2.95* 2.92*   HGB 9.7* 8.8* 8.9*   HCT 29.1* 26.7* 26.5*   * 79* 66*   MCV 92 91 91   MCH 30.5 29.8 30.5   MCHC 33.3 33.0 33.6     CMP:   Recent Labs   Lab 04/09/19  0600  04/10/19  0500 04/11/19  0554 04/12/19  0525      < > 120* 128* 77   CALCIUM 9.1   < > 9.4 8.6* 8.9   ALBUMIN 2.8*  2.8*   < > 2.9* 2.8* 2.7*   PROT 6.7  --   --   --   --       < > 135* 131* 134*   K 4.9   < > 4.7 4.5 4.6   CO2 24   < > 22* 22* 21*      < > 102 99 102   BUN 81*   < > 84* 105* 116*   CREATININE 6.2*   < > 6.2* " 6.6* 6.5*   ALKPHOS 67  --   --   --   --    ALT 15  --   --   --   --    AST 65*  --   --   --   --     < > = values in this interval not displayed.     Coagulation: No results for input(s): PT, APTT in the last 168 hours.  Labs within the past 24 hours have been reviewed.    Diagnostic Results:  US - Kidney:   Results for orders placed during the hospital encounter of 04/08/19   US Transplant Kidney With Doppler    Narrative EXAMINATION:  US TRANSPLANT KIDNEY WITH DOPPLER    CLINICAL HISTORY:  assess perfusion;    TECHNIQUE:  Real time gray scale and doppler ultrasound was performed over the patient's renal allograft.    COMPARISON:  Intraoperative ultrasound 04/08/2019.    FINDINGS:  Renal allograft in the right lower quadrant.  The allograft measures 11.5 cm. Normal perfusion. No hydronephrosis.    Two peritransplant fluid collections measuring 4.4 x 1.1 x 3.6 cm and 3.4 x 2.9 x 3.4 cm.    Vasculature:    Resistive indices ranged from 0.67 to 0.74.    Main renal artery peak systolic velocity: 248cm/sec with normal waveform.    Renal artery/iliac ratio: 2.2.    The main renal vein is patent.      Impression Satisfactory day 1 postop gray scale and doppler evaluation of renal allograft.    Two small peritransplant fluid collections.    COMMUNICATION  This result was relayed directly by Dr. Dalton by phone to Dr. Goodwin.    Electronically signed by resident: Valerio Montanez MD  Date:    04/09/2019  Time:    08:03    Electronically signed by: Pan Dalton MD  Date:    04/09/2019  Time:    08:20

## 2019-04-12 NOTE — PROGRESS NOTES
[facility]  Kidney Transplant  Progress Note      Reason for Follow-up: Reassessment of Kidney Transplant - 4/8/2019  (#1) recipient and management of immunosuppression.    ORGAN: LEFT KIDNEY    Donor Type: Living    PHS Increased Risk: no   Cold Ischemia:   Induction Medications: Campath      Subjective:   History of Present Illness:  Paige Summers is a 69 y/o female w ESRD 2/2 Hypertensive Nephrosclerosis.  S/p living unrelated kidney transplant 4/8/19, CMV +/+, WIT 30 min, CIT 3h 9 m, HD Tues- Thurs- Sat.  Pt has LUE AVF- last used 4/6/19.  Pt was on Plavix for reoccurring AVF thrombus. Per pt, AVF has been declotted 9 times.  Dry weight 83 kg.  Native UOP <100 ml.  5 day gallardo.   Patients immunosuppression will include Campath for induction with early steroid withdrawal and Prograf and Cellcept for maintenance immunosuppression.  Opportunistic infection prophylaxis will include Valcyte for 3 months (CMV D + , R + ), Bactrim for 1 year, and nystatin for 4 weeks.     Surgery significant for intra op US w slightly decreased perfusion.   Kidney graft injected w verapamil in the artery, repositioned the kidney and clamped the distal iliac artery. The kidney remianed partially pink. Kidney removed, resected previous arteriotomy and venotomy and obtained arterial venous continuity. A small amount of iliac artery was resected and an end to end anastomosis was performed.  Donor kidney was reviewed on the back table, artery was cut back, and elimnated the pair of pants anastomosis. Then the main artery was cut back as there appeared to be an intimal abnormality. Venotomy was made, the vein irrigated, and an end renal to right external iliac vein anastomosis was created.  Arterial control was obtained with a vascular clamp.  Arteriotomy was made, the artery irrigated, and an reconstructed to right external iliac artery anastomosis was created.  The second artery was anastomosed end to side to distal  ilac artery.  Reperfusion quality was fair.  Ultrasound, open and closed satisfactory.  There was an excellent pulse in the main renal artery, the segmental flow was visible.   Intraoperative urine production was observed.       Interval History:  Progressing well.  Cr improved today from 6.6 to 6.5.  BUN up to 116.  Making great urine- UOP 2.3L.  Last US kidney 4/11 satisfactory doppler, no fluid collections.  5 days (due to be removed 4/13- order to remove entered), SARA with minimal ss drg (can be removed over wkd likely). LUE AV graft no thrill/no bruit,  h/o graft clotting requiring plavix use. VAS US was consulted- no plan to declot as likely will reclot again. Plan to have tunneled cath placed TENTATIVELY Monday- NPO after MN 4/15.  IF cr improved Monday, will cancel procedure.  BPs managed on Coreg 12.5 mg and Clonidine 0.1 mg bid.  She is tolerating diet, denies N/V, passing flatus, (-) BM, on bowel regimen (h/o chronic constipation). Cont Linzess.  Brown bomb ordered.   Ambulating with walker.  HD chair set up for noon T/Th/S outpatient. Will assess readiness for discharge Monday.  Continue to monitor.        Past Medical, Surgical, Family, and Social History:   Unchanged from H&P.    Scheduled Meds:   amLODIPine  10 mg Oral Daily    calcium acetate  1,334 mg Oral TID WM    carvedilol  12.5 mg Oral BID WM    cinacalcet  30 mg Oral Daily with breakfast    cloNIDine  0.1 mg Oral BID    docusate sodium  100 mg Oral TID    famotidine  20 mg Oral QHS    glycerin 99.5%  100 mL Rectal ED 1 Time    And    magnesium citrate  296 mL Rectal ED 1 Time    And    sodium chloride 0.9%  500 mL Rectal ED 1 Time    linaclotide  290 mcg Oral Daily    multivitamin  1 tablet Oral Daily    mupirocin  1 g Nasal BID    mycophenolate  500 mg Oral BID    nystatin  500,000 Units Mouth/Throat QID (PC + HS)    sulfamethoxazole-trimethoprim 400-80mg  1 tablet Oral Every Mon, Wed, Fri    [START ON 4/13/2019] tacrolimus  4  mg Oral Daily    tacrolimus  5 mg Oral Daily    traZODone  50 mg Oral QHS    [START ON 4/19/2019] valGANciclovir  450 mg Oral Daily     Continuous Infusions:   glucagon (human recombinant)       PRN Meds:acetaminophen, bisacodyl, dextrose 50%, diphenhydrAMINE, glucagon (human recombinant), glucose, glucose, glucose, hydrALAZINE, HYDROcodone-acetaminophen, HYDROcodone-acetaminophen, insulin aspart U-100, naloxone, ondansetron    Intake/Output - Last 3 Shifts       04/10 0700 - 04/11 0659 04/11 0700 - 04/12 0659 04/12 0700 - 04/13 0659    P.O. 2240 2190 200    I.V. (mL/kg)       Other       Total Intake(mL/kg) 2240 (25.3) 2190 (25.5) 200 (2.3)    Urine (mL/kg/hr) 1700 (0.8) 2350 (1.1) 550 (0.8)    Drains 0 35 0    Stool 0 0 0    Total Output 1700 2385 550    Net +540 -195 -350           Stool Occurrence 0 x 0 x 0 x           Review of Systems   Constitutional: Positive for activity change and appetite change (improved). Negative for chills, fatigue, fever and unexpected weight change.   HENT: Negative.  Negative for postnasal drip and trouble swallowing.    Eyes: Negative.  Negative for visual disturbance.   Respiratory: Negative for cough, shortness of breath and wheezing.    Cardiovascular: Positive for leg swelling (improved). Negative for chest pain and palpitations.   Gastrointestinal: Positive for abdominal distention, abdominal pain and constipation. Negative for anal bleeding, blood in stool, diarrhea, nausea, rectal pain and vomiting.   Genitourinary: Negative for decreased urine volume, difficulty urinating, dysuria, hematuria and urgency.   Musculoskeletal: Negative for arthralgias and gait problem.   Skin: Positive for wound. Negative for rash.   Allergic/Immunologic: Positive for immunocompromised state.   Neurological: Negative for dizziness, light-headedness and headaches.   Psychiatric/Behavioral: The patient is not nervous/anxious.       Objective:     Vital Signs (Most Recent):  Temp: 97.6 °F  "(36.4 °C) (04/12/19 1123)  Pulse: 88 (04/12/19 0730)  Resp: (!) 21 (04/12/19 0730)  BP: 138/79 (04/12/19 0730)  SpO2: 96 % (04/12/19 0730) Vital Signs (24h Range):  Temp:  [97.6 °F (36.4 °C)-98 °F (36.7 °C)] 97.6 °F (36.4 °C)  Pulse:  [68-88] 88  Resp:  [12-21] 21  SpO2:  [96 %-99 %] 96 %  BP: (138-169)/(73-98) 138/79     Weight: 86 kg (189 lb 11.3 oz)  Height: 5' 3" (160 cm)  Body mass index is 33.6 kg/m².    Physical Exam   Constitutional: She is oriented to person, place, and time. She appears well-developed. She is active and cooperative. No distress.   LUE AVF (-) thrill/(-) bruit   HENT:   Head: Normocephalic and atraumatic.   Eyes: EOM are normal. No scleral icterus.   Cardiovascular: Normal rate, regular rhythm, normal heart sounds, intact distal pulses and normal pulses.   No murmur heard.  Pulmonary/Chest: Effort normal and breath sounds normal. No respiratory distress. She has no decreased breath sounds. She has no wheezes. She has no rales.   Abdominal: Soft. Normal appearance and bowel sounds are normal. She exhibits no distension. There is no tenderness. There is no guarding.   RLQ incision LOKESH with staples, soft, non tender to palpation  R SARA with minimal ss drg   Genitourinary:   Genitourinary Comments: Davison for 5 days (due to be removed 4/13), draining clear, yellow urine   Musculoskeletal: Normal range of motion. She exhibits edema (+1 generalized).   Neurological: She is alert and oriented to person, place, and time.   Skin: Skin is warm, dry and intact. She is not diaphoretic.   Psychiatric: She has a normal mood and affect.   Nursing note and vitals reviewed.      Laboratory:  CBC:   Recent Labs   Lab 04/10/19  0500 04/11/19  0554 04/12/19  0525   WBC 15.55* 11.61 5.60   RBC 3.18* 2.95* 2.92*   HGB 9.7* 8.8* 8.9*   HCT 29.1* 26.7* 26.5*   * 79* 66*   MCV 92 91 91   MCH 30.5 29.8 30.5   MCHC 33.3 33.0 33.6     CMP:   Recent Labs   Lab 04/09/19  0600  04/10/19  0500 04/11/19  0554 " 04/12/19  0525      < > 120* 128* 77   CALCIUM 9.1   < > 9.4 8.6* 8.9   ALBUMIN 2.8*  2.8*   < > 2.9* 2.8* 2.7*   PROT 6.7  --   --   --   --       < > 135* 131* 134*   K 4.9   < > 4.7 4.5 4.6   CO2 24   < > 22* 22* 21*      < > 102 99 102   BUN 81*   < > 84* 105* 116*   CREATININE 6.2*   < > 6.2* 6.6* 6.5*   ALKPHOS 67  --   --   --   --    ALT 15  --   --   --   --    AST 65*  --   --   --   --     < > = values in this interval not displayed.     Coagulation: No results for input(s): PT, APTT in the last 168 hours.  Labs within the past 24 hours have been reviewed.    Diagnostic Results:  US - Kidney:   Results for orders placed during the hospital encounter of 04/08/19   US Transplant Kidney With Doppler    Narrative EXAMINATION:  US TRANSPLANT KIDNEY WITH DOPPLER    CLINICAL HISTORY:  assess perfusion;    TECHNIQUE:  Real time gray scale and doppler ultrasound was performed over the patient's renal allograft.    COMPARISON:  Intraoperative ultrasound 04/08/2019.    FINDINGS:  Renal allograft in the right lower quadrant.  The allograft measures 11.5 cm. Normal perfusion. No hydronephrosis.    Two peritransplant fluid collections measuring 4.4 x 1.1 x 3.6 cm and 3.4 x 2.9 x 3.4 cm.    Vasculature:    Resistive indices ranged from 0.67 to 0.74.    Main renal artery peak systolic velocity: 248cm/sec with normal waveform.    Renal artery/iliac ratio: 2.2.    The main renal vein is patent.      Impression Satisfactory day 1 postop gray scale and doppler evaluation of renal allograft.    Two small peritransplant fluid collections.    COMMUNICATION  This result was relayed directly by Dr. Dalton by phone to Dr. Goodwin.    Electronically signed by resident: Valerio Montanez MD  Date:    04/09/2019  Time:    08:03    Electronically signed by: Pan Dalton MD  Date:    04/09/2019  Time:    08:20     Assessment/Plan:     * S/P kidney transplant  - ESRD 2/2 Hypertensive Nephrosclerosis.  S/p living  unrelated (friend) kidney transplant 4/8/19, CMV +/+, WIT 30 min, CIT 3h 9 m, HD TTS.  Dry weight 83 kg.  Native UOP <100 ml.    - Surgery significant for intra op US w slightly decreased perfusion related to thrombus in main artery. Kidney removed, pair-of-pants anastomosis converted to double artery, and kidney was replaced (see OR note for full report).   - Kidney US 4/9 with good perfusion.  - Kidney US 4/11 with again with good perfusion.  - Cr increased from 6.2 yesterday to 6.6 today. Excellent uop. No RRT today but expect some DGF related to surgical event.  - Perm cath rescheduled to Monday as expect Cr to cont to improve.    - Gallardo for 5 days (to be removed 4/13), SARA x 1- Order to remove gallardo in, Will likely be able to remove drain after gallardo removed.    - Pain well controlled, changed to hydrocodone due to severe itching with oxycodone - itching improved.  - Tolerating diet, (+) flatus, (-) BM, continue bowel regimen, h/o chronic constipation requiring linzess which was restarted.  ENEMA ordered.     Thrombocytopenia, unspecified  - Continue to monitor w/ daily labs.  - cont to decrease.  HIT panel ordered.  H/H stable.    - haptoglobin <10, .  Plan to repeat haptoglobin in am.        Essential hypertension  - Continue Coreg and clonidine.  Amlodipine added.      Hyperphosphatemia  - Continue renal diet. Renvela started. Expect kidney function to improve then renvela can be d/c'd    Acute hyperglycemia  - Related to steroids. No h/o DM. Endocrine consulted and has signed off.     Hypertension, renal disease, stage 5 chronic kidney disease or end stage renal disease  - Pt was on Clonidine patch 0.1 mg, Coreg 6.25 --> increased to 12.5mg BID, and Norvasc 10 mg qd.    - Post transplant, clonidine patch d/c'd, started on clonidine 0.1 mg PO bid started w holding parameters.    AV fistula thrombosis  - Pt last used AVF on Saturday for HD.  On am exam, no thrill/bruit noted.    - Pt has hx of AVF  thrombosis (per pt 9 times) and was on Plavix for reoccurring thrombus.    - VAS HD access US 4/10 confirmed AVF again thrombosed.  - Vascular surgery evaluated patient/graft and recommended perm cath placement instead of de-clotting as graft likely to clot again soon regardless.  - Perm cath placement rescheduled for Monday.  Expect cr to improve.  If improved on Monday- will cancel procedure.        Hypercalcemia  - 9.1, .  Sensipar started.      At risk for opportunistic infections  - Opportunistic infection prophylaxis will include Valcyte for 3 months to start on POD#10 or day of discharge (CMV D + , R + ), Bactrim for 1 year, and nystatin for 4 weeks.    Long-term use of immunosuppressant medication  - See prophylactic immnuotherapy    Prophylactic immunotherapy  - Patients immunosuppression will include Campath for induction with early steroid withdrawal and Prograf and Cellcept for maintenance immunosuppression.    - Continue to check prograf level daily.  Assess for toxicity and adjust level as needed    Presence of surgical incision  - RLQ incision w staples CDI.  Monitor.      Secondary hyperparathyroidism of renal origin  - Continue Sensipar.    Anemia of renal disease  - H/H stable.   - No overt signs of bleeding.  - haptoglobin <10. .   -  HIT panel ordered.  Heparin SQ d/c'd.    - Plan to repeat Haptoglobin in am.   Monitor.         Discharge Planning:  No plan for discharge over the WEEKEND    Arina Sanon NP  Kidney Transplant  [facility]

## 2019-04-12 NOTE — PLAN OF CARE
"Problem: Adult Inpatient Plan of Care  Goal: Plan of Care Review  Outcome: Ongoing (interventions implemented as appropriate)  - Last BM day prior to surgery (4/7).  Suppository administered, awaiting results.  Enema ordered & to be delivered by pharmacy  - Plan to d/mariam gallardo tomorrow AM (4/13)  - Permacath placement tentatively deferred to Monday (4/15).  NPO past MN that day - trend labs  - RJP with no output so far this shift  - RLQ incision with staples intact, small amount of serosanguinous drainage  - RIJ TLC sutured in place, dressing reinforced  - Norco Q4H with moderate pain control, please offer to patient when available as she states she "don't want to bother anybody"  - Decreased UOP this shift so far, however patient was NPO for procedure for 10 hours.  Advised to increase intake following diet restart.  Strict I&Os  - Bed low & locked, call light in reach, nonslip socks in use, calls for assistance appropriately, ambulates with 1x assist or walker, min assist to get up due to incisional pain      Caregiver pulling self meds 100% accurately, attentive to patient & asking questions appropriately          "

## 2019-04-12 NOTE — ASSESSMENT & PLAN NOTE
- Pt last used AVF on Saturday for HD.  On am exam, no thrill/bruit noted.    - Pt has hx of AVF thrombosis (per pt 9 times) and was on Plavix for reoccurring thrombus.    - VAS HD access US 4/10 confirmed AVF again thrombosed.  - Vascular surgery evaluated patient/graft and recommended perm cath placement instead of de-clotting as graft likely to clot again soon regardless.  - Perm cath placement rescheduled for Monday.  Expect cr to improve.  If improved on Monday- will cancel procedure.

## 2019-04-12 NOTE — ASSESSMENT & PLAN NOTE
BG goal 140 - 180    BG at goal without insulin >24 hours.   Discontinue BG monitoring.   No history of DM. No futher insulin/endocrine needs, will sign off.

## 2019-04-12 NOTE — CONSULTS
Progress Note  Nephrology    Admit Date: 4/8/2019  Length of Stay: 4  Consult Requested By: Mathew Goodwin MD  Reason for Consult: permacath  SUBJECTIVE:     Interval History:  ZOË consulted for permacath placement in the setting of DGF and clotted AVG. Patient is doing well.     Review of Systems:  Constitutional: denies fever/weakness  Respiratory: denies SOB, cough   Cardiovascular: denies chest pain/palpitations   Gastrointestinal: denies N/V, diarrhea/constipation   Psych: denies confusion, depression      Patient Active Problem List   Diagnosis    Encounter for fitting and adjustment of dialysis catheter    Preoperative clearance    Mechanical complication of vascular device    Antiphospholipid antibody positive    Clotted dialysis access    Anemia of renal disease    Secondary hyperparathyroidism of renal origin    Presence of surgical incision    S/P living-donor kidney transplantation    Prophylactic immunotherapy    Long-term use of immunosuppressant medication    At risk for opportunistic infections    Hypercalcemia    AV fistula thrombosis    Hypertension, renal disease, stage 5 chronic kidney disease or end stage renal disease    Acute hyperglycemia    Hyperphosphatemia    Essential hypertension    Thrombocytopenia, unspecified    Hyponatremia    A-V fistula     Past Medical History:   Diagnosis Date    Anemia     Anemia of renal disease     Anxiety     Chronic renal failure 01/10/2018    Depression     Essential hypertension     GERD (gastroesophageal reflux disease)     Gout     H pylori ulcer     Hyperlipidemia     Hypertension     Obesity     Secondary hyperparathyroidism of renal origin         OBJECTIVE:   Temp:  [97.7 °F (36.5 °C)-98 °F (36.7 °C)]   Pulse:  [68-88]   Resp:  [12-21]   BP: (129-169)/(67-98)   SpO2:  [96 %-100 %]       Intake/Output Summary (Last 24 hours) at 4/12/2019 0923  Last data filed at 4/12/2019 0600  Gross per 24 hour   Intake 1920 ml    Output 2385 ml   Net -465 ml         Physical Exam:  Gen: AAOx3, NAD  HEENT: mmm  Neck: no bruit, no JVD  CV: RRR, no m/r  Resp: CTAx2, normal effort  GI: soft, ND, NTTP, +BS  Extr: no LE edema  Neuro: normal reflexes, no focal deficits  Left AVG no thrill/bruit    Laboratory:  CBC with Diff:   Recent Labs   Lab 04/10/19  0500 04/11/19  0554 04/12/19  0525   WBC 15.55* 11.61 5.60   HGB 9.7* 8.8* 8.9*   HCT 29.1* 26.7* 26.5*   * 79* 66*   LYMPH 0.1*  0.0* 0.2*  0.0* 0.2*  0.0*   MONO 3.9*  0.6 5.7  0.7 8.6  0.5   EOSINOPHIL 0.0 0.0 0.2     COAG:  No results for input(s): APTT, INR, PTT in the last 168 hours.    CMP:   Recent Labs   Lab 04/09/19  0600  04/10/19  0500 04/11/19  0554 04/12/19  0525      < > 120* 128* 77   CALCIUM 9.1   < > 9.4 8.6* 8.9   ALBUMIN 2.8*  2.8*   < > 2.9* 2.8* 2.7*   PROT 6.7  --   --   --   --       < > 135* 131* 134*   K 4.9   < > 4.7 4.5 4.6   CO2 24   < > 22* 22* 21*      < > 102 99 102   BUN 81*   < > 84* 105* 116*   CREATININE 6.2*   < > 6.2* 6.6* 6.5*   ALKPHOS 67  --   --   --   --    ALT 15  --   --   --   --    AST 65*  --   --   --   --    BILITOT 0.4  --   --   --   --    MG 2.1  --  2.1 2.2 2.1   PHOS 6.3*   < > 6.6* 5.0* 4.9*    < > = values in this interval not displayed.       Recent Labs   Lab 04/08/19  0619 04/08/19  1325   PH 7.379 7.391   PO2 32* 94   PCO2 50.2* 36.2   HCO3 29.6* 22.0*   BE 4 -3         Hemoglobin A1C   Date Value Ref Range Status   04/10/2019 5.1 4.0 - 5.6 % Final     Comment:     ADA Screening Guidelines:  5.7-6.4%  Consistent with prediabetes  >or=6.5%  Consistent with diabetes  High levels of fetal hemoglobin interfere with the HbA1C  assay. Heterozygous hemoglobin variants (HbS, HgC, etc)do  not significantly interfere with this assay.   However, presence of multiple variants may affect accuracy.         ASSESSMENT/PLAN:   Principle Problem:  S/P living-donor kidney transplantation  Active Hospital Problems     Diagnosis  POA    *S/P living-donor kidney transplantation [Z94.0]  Not Applicable    Thrombocytopenia, unspecified [D69.6]  No    Hyponatremia [E87.1]  No    A-V fistula [I77.0]  Unknown    Hyperphosphatemia [E83.39]  Yes    Essential hypertension [I10]  Yes     Chronic    Presence of surgical incision [Z78.9]  Yes    AV fistula thrombosis [T82.868A]  No    Hypertension, renal disease, stage 5 chronic kidney disease or end stage renal disease [I12.0]  Yes    Acute hyperglycemia [R73.9]  Yes    Prophylactic immunotherapy [Z29.8]  Not Applicable    Long-term use of immunosuppressant medication [Z79.899]  Not Applicable    At risk for opportunistic infections [Z91.89]  No    Hypercalcemia [E83.52]  Yes    Anemia of renal disease [D63.1]  Yes     Chronic    Secondary hyperparathyroidism of renal origin [N25.81]  Yes     Chronic      Resolved Hospital Problems    Diagnosis Date Resolved POA    ESRD (end stage renal disease) on dialysis [N18.6, Z99.2] 04/10/2019 Not Applicable       A/P:  Will place tunneled HD catheter for DGF.

## 2019-04-12 NOTE — NURSING
"Pt returned to TSU [id number] via stretcher, ambulating to bed with walker & caregiver assistance.  Pt states "the doctor told me they are waiting until Monday to place a dialysis line."  Notified team & per NP ok to restart diet.  "

## 2019-04-12 NOTE — ASSESSMENT & PLAN NOTE
- H/H stable.   - No overt signs of bleeding.  - haptoglobin <10. .   -  HIT panel ordered.  Heparin SQ d/c'd.    - Plan to repeat Haptoglobin in am.   Monitor.

## 2019-04-12 NOTE — SUBJECTIVE & OBJECTIVE
"Interval HPI:   Overnight events:  Remains in TSU. NAEON. BG at or below goal without insulin. Steroid therapy complete.   Eatin%  Nausea: No  Hypoglycemia and intervention: No  Fever: No  TPN and/or TF: No  BP (!) 142/82   Pulse 68   Temp 97.7 °F (36.5 °C)   Resp 12   Ht 5' 3" (1.6 m)   Wt 88.5 kg (195 lb 1.7 oz)   LMP 1971   SpO2 98%   Breastfeeding? No   BMI 34.56 kg/m²     Labs Reviewed and Include    Recent Labs   Lab 19  0525   GLU 77   CALCIUM 8.9   ALBUMIN 2.7*   *   K 4.6   CO2 21*      *   CREATININE 6.5*     Lab Results   Component Value Date    WBC 5.60 2019    HGB 8.9 (L) 2019    HCT 26.5 (L) 2019    MCV 91 2019    PLT 66 (L) 2019     No results for input(s): TSH, FREET4 in the last 168 hours.  Lab Results   Component Value Date    HGBA1C 5.1 04/10/2019       Nutritional status:   Body mass index is 34.56 kg/m².  Lab Results   Component Value Date    ALBUMIN 2.7 (L) 2019    ALBUMIN 2.8 (L) 2019    ALBUMIN 2.9 (L) 04/10/2019     No results found for: PREALBUMIN    Estimated Creatinine Clearance: 8.6 mL/min (A) (based on SCr of 6.5 mg/dL (H)).    Accu-Checks  Recent Labs     19  0821 19  1222 19  1705 04/09/19  2005 04/10/19  0832 04/10/19  1730 04/10/19  2037 19  0729 19  1720 19  2105   POCTGLUCOSE 115* 149* 184* 189* 123* 153* 218* 137* 119* 121*       Current Medications and/or Treatments Impacting Glycemic Control  Immunotherapy:    Immunosuppressants         Stop Route Frequency     tacrolimus capsule 4 mg      -- Oral 2 times daily     mycophenolate capsule 500 mg      -- Oral 2 times daily     tacrolimus (PROGRAF) 1 MG capsule      559          Steroids:   Hormones (From admission, onward)    None        Pressors:    Autonomic Drugs (From admission, onward)    None        Hyperglycemia/Diabetes Medications:   Antihyperglycemics (From admission, onward)    Start     " Stop Route Frequency Ordered    04/08/19 1851  insulin aspart U-100 pen 0-5 Units      -- SubQ Before meals & nightly PRN 04/08/19 1758

## 2019-04-12 NOTE — PLAN OF CARE
Problem: Adult Inpatient Plan of Care  Goal: Plan of Care Review  Outcome: Ongoing (interventions implemented as appropriate)  Pt has call bell in reach, non slip socks on, and bedrails up x2. Pt has friend at bedside doing well with painting incision and self meds. Pt has prn pain meds and comfortable in bed. Pt encouraged to wash hands. Pt has daily morning labs.

## 2019-04-12 NOTE — ASSESSMENT & PLAN NOTE
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  S/p living unrelated (friend) kidney transplant 4/8/19, CMV +/+, WIT 30 min, CIT 3h 9 m, HD TTS.  Dry weight 83 kg.  Native UOP <100 ml.    - Surgery significant for intra op US w slightly decreased perfusion related to thrombus in main artery. Kidney removed, pair-of-pants anastomosis converted to double artery, and kidney was replaced (see OR note for full report).   - Kidney US 4/9 with good perfusion.  - Kidney US 4/11 with again with good perfusion.  - Cr increased from 6.2 yesterday to 6.6 today. Excellent uop. No RRT today but expect some DGF related to surgical event.  - Perm cath rescheduled to Monday as expect Cr to cont to improve.    - Gallardo for 5 days (to be removed 4/13), SARA x 1- Order to remove gallardo in, Will likely be able to remove drain after gallardo removed.    - Pain well controlled, changed to hydrocodone due to severe itching with oxycodone - itching improved.  - Tolerating diet, (+) flatus, (-) BM, continue bowel regimen, h/o chronic constipation requiring linzess which was restarted.  ENEMA ordered.

## 2019-04-13 PROBLEM — R73.9 ACUTE HYPERGLYCEMIA: Status: RESOLVED | Noted: 2019-04-09 | Resolved: 2019-04-13

## 2019-04-13 LAB
ALBUMIN SERPL BCP-MCNC: 2.8 G/DL (ref 3.5–5.2)
ANION GAP SERPL CALC-SCNC: 11 MMOL/L (ref 8–16)
BASOPHILS # BLD AUTO: 0.01 K/UL (ref 0–0.2)
BASOPHILS NFR BLD: 0.1 % (ref 0–1.9)
BUN SERPL-MCNC: 116 MG/DL (ref 8–23)
CALCIUM SERPL-MCNC: 9.2 MG/DL (ref 8.7–10.5)
CHLORIDE SERPL-SCNC: 105 MMOL/L (ref 95–110)
CO2 SERPL-SCNC: 20 MMOL/L (ref 23–29)
CREAT SERPL-MCNC: 6.6 MG/DL (ref 0.5–1.4)
DIFFERENTIAL METHOD: ABNORMAL
EOSINOPHIL # BLD AUTO: 0 K/UL (ref 0–0.5)
EOSINOPHIL NFR BLD: 0.3 % (ref 0–8)
ERYTHROCYTE [DISTWIDTH] IN BLOOD BY AUTOMATED COUNT: 15.8 % (ref 11.5–14.5)
EST. GFR  (AFRICAN AMERICAN): 6.8 ML/MIN/1.73 M^2
EST. GFR  (NON AFRICAN AMERICAN): 5.9 ML/MIN/1.73 M^2
GLUCOSE SERPL-MCNC: 91 MG/DL (ref 70–110)
HAPTOGLOB SERPL-MCNC: 41 MG/DL (ref 30–250)
HCT VFR BLD AUTO: 26.2 % (ref 37–48.5)
HGB BLD-MCNC: 8.9 G/DL (ref 12–16)
IMM GRANULOCYTES # BLD AUTO: 0.06 K/UL (ref 0–0.04)
IMM GRANULOCYTES NFR BLD AUTO: 0.8 % (ref 0–0.5)
LYMPHOCYTES # BLD AUTO: 0 K/UL (ref 1–4.8)
LYMPHOCYTES NFR BLD: 0.1 % (ref 18–48)
MAGNESIUM SERPL-MCNC: 2.5 MG/DL (ref 1.6–2.6)
MCH RBC QN AUTO: 30.7 PG (ref 27–31)
MCHC RBC AUTO-ENTMCNC: 34 G/DL (ref 32–36)
MCV RBC AUTO: 90 FL (ref 82–98)
MONOCYTES # BLD AUTO: 0.6 K/UL (ref 0.3–1)
MONOCYTES NFR BLD: 8.2 % (ref 4–15)
NEUTROPHILS # BLD AUTO: 6.4 K/UL (ref 1.8–7.7)
NEUTROPHILS NFR BLD: 90.5 % (ref 38–73)
NRBC BLD-RTO: 0 /100 WBC
PHOSPHATE SERPL-MCNC: 4.9 MG/DL (ref 2.7–4.5)
PLATELET # BLD AUTO: 65 K/UL (ref 150–350)
PMV BLD AUTO: 13.6 FL (ref 9.2–12.9)
POTASSIUM SERPL-SCNC: 4.7 MMOL/L (ref 3.5–5.1)
RBC # BLD AUTO: 2.9 M/UL (ref 4–5.4)
SODIUM SERPL-SCNC: 136 MMOL/L (ref 136–145)
TACROLIMUS BLD-MCNC: 7.1 NG/ML (ref 5–15)
WBC # BLD AUTO: 7.06 K/UL (ref 3.9–12.7)

## 2019-04-13 PROCEDURE — 80197 ASSAY OF TACROLIMUS: CPT

## 2019-04-13 PROCEDURE — 99233 SBSQ HOSP IP/OBS HIGH 50: CPT | Mod: ,,, | Performed by: NURSE PRACTITIONER

## 2019-04-13 PROCEDURE — 80069 RENAL FUNCTION PANEL: CPT

## 2019-04-13 PROCEDURE — 83010 ASSAY OF HAPTOGLOBIN QUANT: CPT

## 2019-04-13 PROCEDURE — 25000003 PHARM REV CODE 250: Performed by: STUDENT IN AN ORGANIZED HEALTH CARE EDUCATION/TRAINING PROGRAM

## 2019-04-13 PROCEDURE — 25000003 PHARM REV CODE 250: Performed by: NURSE PRACTITIONER

## 2019-04-13 PROCEDURE — 63600175 PHARM REV CODE 636 W HCPCS: Performed by: NURSE PRACTITIONER

## 2019-04-13 PROCEDURE — 20600001 HC STEP DOWN PRIVATE ROOM

## 2019-04-13 PROCEDURE — 99233 PR SUBSEQUENT HOSPITAL CARE,LEVL III: ICD-10-PCS | Mod: ,,, | Performed by: NURSE PRACTITIONER

## 2019-04-13 PROCEDURE — 25000003 PHARM REV CODE 250: Performed by: PHYSICIAN ASSISTANT

## 2019-04-13 PROCEDURE — 25000003 PHARM REV CODE 250: Performed by: TRANSPLANT SURGERY

## 2019-04-13 PROCEDURE — 85025 COMPLETE CBC W/AUTO DIFF WBC: CPT

## 2019-04-13 PROCEDURE — 83735 ASSAY OF MAGNESIUM: CPT

## 2019-04-13 PROCEDURE — 63600175 PHARM REV CODE 636 W HCPCS: Performed by: TRANSPLANT SURGERY

## 2019-04-13 RX ADMIN — TACROLIMUS 4 MG: 1 CAPSULE ORAL at 09:04

## 2019-04-13 RX ADMIN — CALCIUM ACETATE 1334 MG: 667 CAPSULE ORAL at 11:04

## 2019-04-13 RX ADMIN — HYDROCODONE BITARTRATE AND ACETAMINOPHEN 1 TABLET: 10; 325 TABLET ORAL at 11:04

## 2019-04-13 RX ADMIN — CARVEDILOL 12.5 MG: 3.12 TABLET, FILM COATED ORAL at 05:04

## 2019-04-13 RX ADMIN — CALCIUM ACETATE 1334 MG: 667 CAPSULE ORAL at 09:04

## 2019-04-13 RX ADMIN — MUPIROCIN 1 G: 20 OINTMENT TOPICAL at 09:04

## 2019-04-13 RX ADMIN — MYCOPHENOLATE MOFETIL 500 MG: 250 CAPSULE ORAL at 09:04

## 2019-04-13 RX ADMIN — CARVEDILOL 12.5 MG: 3.12 TABLET, FILM COATED ORAL at 09:04

## 2019-04-13 RX ADMIN — DOCUSATE SODIUM 100 MG: 100 CAPSULE, LIQUID FILLED ORAL at 09:04

## 2019-04-13 RX ADMIN — CINACALCET HYDROCHLORIDE 30 MG: 30 TABLET, COATED ORAL at 09:04

## 2019-04-13 RX ADMIN — LINACLOTIDE 290 MCG: 290 CAPSULE, GELATIN COATED ORAL at 09:04

## 2019-04-13 RX ADMIN — CLONIDINE HYDROCHLORIDE 0.1 MG: 0.1 TABLET ORAL at 09:04

## 2019-04-13 RX ADMIN — HYDROCODONE BITARTRATE AND ACETAMINOPHEN 1 TABLET: 10; 325 TABLET ORAL at 05:04

## 2019-04-13 RX ADMIN — CALCIUM ACETATE 1334 MG: 667 CAPSULE ORAL at 05:04

## 2019-04-13 RX ADMIN — AMLODIPINE BESYLATE 10 MG: 10 TABLET ORAL at 09:04

## 2019-04-13 RX ADMIN — NYSTATIN 500000 UNITS: 500000 SUSPENSION ORAL at 09:04

## 2019-04-13 RX ADMIN — FAMOTIDINE 20 MG: 20 TABLET, FILM COATED ORAL at 09:04

## 2019-04-13 RX ADMIN — DOCUSATE SODIUM 100 MG: 100 CAPSULE, LIQUID FILLED ORAL at 02:04

## 2019-04-13 RX ADMIN — HYDROCODONE BITARTRATE AND ACETAMINOPHEN 1 TABLET: 5; 325 TABLET ORAL at 09:04

## 2019-04-13 RX ADMIN — HYDROCODONE BITARTRATE AND ACETAMINOPHEN 1 TABLET: 10; 325 TABLET ORAL at 12:04

## 2019-04-13 RX ADMIN — TRAZODONE HYDROCHLORIDE 50 MG: 50 TABLET ORAL at 09:04

## 2019-04-13 RX ADMIN — HYDROCODONE BITARTRATE AND ACETAMINOPHEN 1 TABLET: 10; 325 TABLET ORAL at 06:04

## 2019-04-13 RX ADMIN — TACROLIMUS 5 MG: 1 CAPSULE ORAL at 05:04

## 2019-04-13 RX ADMIN — NYSTATIN 500000 UNITS: 500000 SUSPENSION ORAL at 02:04

## 2019-04-13 RX ADMIN — THERA TABS 1 TABLET: TAB at 09:04

## 2019-04-13 NOTE — PLAN OF CARE
Problem: Adult Inpatient Plan of Care  Goal: Plan of Care Review  Outcome: Ongoing (interventions implemented as appropriate)  Pt is AAOx4; afebrile; vital signs stable. She ambulates in the halls with a walker. Davison pulled this morning and she has since urinated several times. SARA also rememoved. Pain well controlled with norco. She is up with one assist.

## 2019-04-13 NOTE — ASSESSMENT & PLAN NOTE
- H/H stable.   - No overt signs of bleeding.  - haptoglobin <10. .   - HIT panel negative.  Heparin SQ d/c'd 4/12.    - repeat Haptoglobin improved to 41.  Monitor.

## 2019-04-13 NOTE — PROGRESS NOTES
[facility]  Kidney Transplant  Progress Note      Reason for Follow-up: Reassessment of Kidney Transplant - 4/8/2019  (#1) recipient and management of immunosuppression.    ORGAN: LEFT KIDNEY    Donor Type: Living    Donor CMV Status:   Donor HBcAB:  Donor HCV Status:  Donor HBV OFE:   Donor HCV OFE:       Subjective:   History of Present Illness:  Paige Summers is a 67 y/o female w ESRD 2/2 Hypertensive Nephrosclerosis.  S/p living unrelated kidney transplant 4/8/19, CMV +/+, WIT 30 min, CIT 3h 9 m, HD Tues- Thurs- Sat.  Pt has LUE AVF- last used 4/6/19.  Pt was on Plavix for reoccurring AVF thrombus. Per pt, AVF has been declotted 9 times.  Dry weight 83 kg.  Native UOP <100 ml.  5 day agllardo.   Patients immunosuppression will include Campath for induction with early steroid withdrawal and Prograf and Cellcept for maintenance immunosuppression.  Opportunistic infection prophylaxis will include Valcyte for 3 months (CMV D + , R + ), Bactrim for 1 year, and nystatin for 4 weeks.     Surgery significant for intra op US w slightly decreased perfusion.   Kidney graft injected w verapamil in the artery, repositioned the kidney and clamped the distal iliac artery. The kidney remianed partially pink. Kidney removed, resected previous arteriotomy and venotomy and obtained arterial venous continuity. A small amount of iliac artery was resected and an end to end anastomosis was performed.  Donor kidney was reviewed on the back table, artery was cut back, and elimnated the pair of pants anastomosis. Then the main artery was cut back as there appeared to be an intimal abnormality. Venotomy was made, the vein irrigated, and an end renal to right external iliac vein anastomosis was created.  Arterial control was obtained with a vascular clamp.  Arteriotomy was made, the artery irrigated, and an reconstructed to right external iliac artery anastomosis was created.  The second artery was anastomosed end to side  to distal ilac artery.  Reperfusion quality was fair.  Ultrasound, open and closed satisfactory.  There was an excellent pulse in the main renal artery, the segmental flow was visible.   Intraoperative urine production was observed.       Interval History:  Progressing well.  Plt cont to drop- 66.  H/H stable.  No overt signs of bleeding.  HIT panel negative.  Heparin SQ d/c'd.  Haptoglobin <10, .  Haptoglobin improved 4/13.  Cr 6.6 and , stable.  Making great urine- UOP 2.3L.  Last US kidney 4/11 satisfactory doppler, no fluid collections. Davison removed 4/13.  Will remove SARA with minimal ss drg today. LUE AV graft no thrill/no bruit,  h/o graft clotting requiring plavix use. VAS US was consulted- no plan to declot as likely will reclot again. Plan to have tunneled cath placed TENTATIVELY Monday- NPO after MN 4/15.  IF cr improved Monday, will cancel procedure.  BPs managed on Coreg 12.5 mg and Clonidine 0.1 mg bid.  She is tolerating diet, denies N/V, passing flatus, (+) BM on bowel regimen (h/o chronic constipation).  Ambulating with walker.  HD chair set up for noon T/Th/S outpatient. Will assess readiness for discharge Monday.  Continue to monitor.        Past Medical, Surgical, Family, and Social History:   Unchanged from H&P.    Scheduled Meds:   amLODIPine  10 mg Oral Daily    calcium acetate  1,334 mg Oral TID WM    carvedilol  12.5 mg Oral BID WM    cinacalcet  30 mg Oral Daily with breakfast    cloNIDine  0.1 mg Oral BID    docusate sodium  100 mg Oral TID    famotidine  20 mg Oral QHS    linaclotide  290 mcg Oral Daily    multivitamin  1 tablet Oral Daily    mupirocin  1 g Nasal BID    mycophenolate  500 mg Oral BID    nystatin  500,000 Units Mouth/Throat QID (PC + HS)    sulfamethoxazole-trimethoprim 400-80mg  1 tablet Oral Every Mon, Wed, Fri    tacrolimus  4 mg Oral Daily    tacrolimus  5 mg Oral Daily    traZODone  50 mg Oral QHS    [START ON 4/19/2019] valGANciclovir   450 mg Oral Daily     Continuous Infusions:   glucagon (human recombinant)       PRN Meds:acetaminophen, bisacodyl, diphenhydrAMINE, glucagon (human recombinant), hydrALAZINE, HYDROcodone-acetaminophen, HYDROcodone-acetaminophen, naloxone, ondansetron    Intake/Output - Last 3 Shifts       04/11 0700 - 04/12 0659 04/12 0700 - 04/13 0659 04/13 0700 - 04/14 0659    P.O. 2190 1480     Total Intake(mL/kg) 2190 (25.5) 1480 (16.7)     Urine (mL/kg/hr) 2350 (1.1) 1480 (0.7)     Drains 35 12     Stool 0 0     Total Output 2385 1492     Net -195 -12            Stool Occurrence 0 x 2 x            Review of Systems   Constitutional: Positive for activity change and appetite change (improved). Negative for chills, fatigue, fever and unexpected weight change.   HENT: Negative.  Negative for postnasal drip and trouble swallowing.    Eyes: Negative.  Negative for visual disturbance.   Respiratory: Negative for cough, shortness of breath and wheezing.    Cardiovascular: Positive for leg swelling (improved). Negative for chest pain and palpitations.   Gastrointestinal: Positive for abdominal pain and constipation. Negative for abdominal distention, anal bleeding, blood in stool, diarrhea, nausea, rectal pain and vomiting.   Genitourinary: Negative for decreased urine volume, difficulty urinating, dysuria, hematuria and urgency.   Musculoskeletal: Negative for arthralgias and gait problem.   Skin: Positive for wound. Negative for rash.   Allergic/Immunologic: Positive for immunocompromised state.   Neurological: Negative for dizziness, light-headedness and headaches.   Psychiatric/Behavioral: The patient is not nervous/anxious.       Objective:     Vital Signs (Most Recent):  Temp: 98.3 °F (36.8 °C) (04/12/19 2015)  Pulse: 75 (04/13/19 0715)  Resp: 17 (04/13/19 0715)  BP: 101/60 (04/13/19 0715)  SpO2: 98 % (04/13/19 0715) Vital Signs (24h Range):  Temp:  [97.6 °F (36.4 °C)-98.3 °F (36.8 °C)] 98.3 °F (36.8 °C)  Pulse:  [71-88]  "75  Resp:  [14-23] 17  SpO2:  [96 %-100 %] 98 %  BP: (101-138)/(60-79) 101/60     Weight: 88.7 kg (195 lb 7.9 oz)  Height: 5' 3" (160 cm)  Body mass index is 34.63 kg/m².    Physical Exam   Constitutional: She is oriented to person, place, and time. She appears well-developed. She is active and cooperative. No distress.   LUE AVF (-) thrill/(-) bruit   HENT:   Head: Normocephalic and atraumatic.   Eyes: EOM are normal. No scleral icterus.   Cardiovascular: Normal rate, regular rhythm, normal heart sounds, intact distal pulses and normal pulses.   No murmur heard.  Pulmonary/Chest: Effort normal and breath sounds normal. No respiratory distress. She has no decreased breath sounds. She has no wheezes. She has no rales.   Abdominal: Soft. Normal appearance and bowel sounds are normal. She exhibits no distension. There is no tenderness. There is no guarding.   RLQ incision LOKESH with staples, soft, non tender to palpation  R SARA with minimal ss drg   Musculoskeletal: Normal range of motion. She exhibits edema (+1 generalized).   Neurological: She is alert and oriented to person, place, and time.   Skin: Skin is warm, dry and intact. Capillary refill takes less than 2 seconds. She is not diaphoretic.   Psychiatric: She has a normal mood and affect.   Nursing note and vitals reviewed.      Laboratory:  CBC:   Recent Labs   Lab 04/11/19  0554 04/12/19  0525 04/13/19  0600   WBC 11.61 5.60 7.06   RBC 2.95* 2.92* 2.90*   HGB 8.8* 8.9* 8.9*   HCT 26.7* 26.5* 26.2*   PLT 79* 66* 65*   MCV 91 91 90   MCH 29.8 30.5 30.7   MCHC 33.0 33.6 34.0     CMP:   Recent Labs   Lab 04/09/19  0600  04/11/19  0554 04/12/19  0525 04/13/19  0600      < > 128* 77 91   CALCIUM 9.1   < > 8.6* 8.9 9.2   ALBUMIN 2.8*  2.8*   < > 2.8* 2.7* 2.8*   PROT 6.7  --   --   --   --       < > 131* 134* 136   K 4.9   < > 4.5 4.6 4.7   CO2 24   < > 22* 21* 20*      < > 99 102 105   BUN 81*   < > 105* 116* 116*   CREATININE 6.2*   < > 6.6* 6.5* " 6.6*   ALKPHOS 67  --   --   --   --    ALT 15  --   --   --   --    AST 65*  --   --   --   --     < > = values in this interval not displayed.     Coagulation: No results for input(s): PT, APTT in the last 168 hours.  Labs within the past 24 hours have been reviewed.    Diagnostic Results:  US - Kidney:   Results for orders placed during the hospital encounter of 04/08/19   US Transplant Kidney With Doppler    Narrative EXAMINATION:  US TRANSPLANT KIDNEY WITH DOPPLER    CLINICAL HISTORY:  assess perfusion;    TECHNIQUE:  Real time gray scale and doppler ultrasound was performed over the patient's renal allograft.    COMPARISON:  Intraoperative ultrasound 04/08/2019.    FINDINGS:  Renal allograft in the right lower quadrant.  The allograft measures 11.5 cm. Normal perfusion. No hydronephrosis.    Two peritransplant fluid collections measuring 4.4 x 1.1 x 3.6 cm and 3.4 x 2.9 x 3.4 cm.    Vasculature:    Resistive indices ranged from 0.67 to 0.74.    Main renal artery peak systolic velocity: 248cm/sec with normal waveform.    Renal artery/iliac ratio: 2.2.    The main renal vein is patent.      Impression Satisfactory day 1 postop gray scale and doppler evaluation of renal allograft.    Two small peritransplant fluid collections.    COMMUNICATION  This result was relayed directly by Dr. Dalton by phone to Dr. Goodwin.    Electronically signed by resident: Valerio Montanez MD  Date:    04/09/2019  Time:    08:03    Electronically signed by: Pan Dalton MD  Date:    04/09/2019  Time:    08:20     Assessment/Plan:     * S/P kidney transplant  - ESRD 2/2 Hypertensive Nephrosclerosis.  S/p living unrelated (friend) kidney transplant 4/8/19, CMV +/+, WIT 30 min, CIT 3h 9 m, HD TTS.  Dry weight 83 kg.  Native UOP <100 ml.    - Surgery significant for intra op US w slightly decreased perfusion related to thrombus in main artery. Kidney removed, pair-of-pants anastomosis converted to double artery, and kidney was replaced  (see OR note for full report).   - Kidney US 4/9 with good perfusion.  - Kidney US 4/11 with again with good perfusion.  - Cr increased from 6.2 yesterday to 6.6 today. Excellent uop. No RRT today but expect some DGF related to surgical event.  - Perm cath rescheduled to Monday as expect Cr to cont to improve.    - Gallardo for 5 days removed 4/13, SARA x 1- will remove after gallardo discontinued    - Pain well controlled, changed to hydrocodone due to severe itching with oxycodone - itching improved.  - Tolerating diet, (+) flatus, (+) BM, continue bowel regimen, h/o chronic constipation requiring linzess which was restarted.  ENEMA administered with success.     Thrombocytopenia, unspecified  - Continue to monitor w/ daily labs.  - cont to decrease.  HIT panel ordered.  H/H stable.    - haptoglobin <10, .  Repeat haptoglobin 41 in am today.        Essential hypertension  - Continue Coreg and clonidine.  Amlodipine added.      Hyperphosphatemia  - Continue renal diet. Renvela started. Expect kidney function to improve then renvela can be d/c'd    Hypertension, renal disease, stage 5 chronic kidney disease or end stage renal disease  - Pt was on Clonidine patch 0.1 mg, Coreg 6.25 --> increased to 12.5mg BID, and Norvasc 10 mg qd.    - Post transplant, clonidine patch d/c'd, started on clonidine 0.1 mg PO bid started w holding parameters.    AV fistula thrombosis  - Pt last used AVF on Saturday for HD.  On am exam, no thrill/bruit noted.    - Pt has hx of AVF thrombosis (per pt 9 times) and was on Plavix for reoccurring thrombus.    - VAS HD access US 4/10 confirmed AVF again thrombosed.  - Vascular surgery evaluated patient/graft and recommended perm cath placement instead of de-clotting as graft likely to clot again soon regardless.  - Perm cath placement rescheduled for Monday.  Expect cr to improve.  If improved on Monday- will cancel procedure.        At risk for opportunistic infections  - Opportunistic  infection prophylaxis will include Valcyte for 3 months to start on POD#10 or day of discharge (CMV D + , R + ), Bactrim for 1 year, and nystatin for 4 weeks.    Long-term use of immunosuppressant medication  - See prophylactic immnuotherapy    Prophylactic immunotherapy  - Patients immunosuppression will include Campath for induction with early steroid withdrawal and Prograf and Cellcept for maintenance immunosuppression.    - Continue to check prograf level daily.  Assess for toxicity and adjust level as needed    Presence of surgical incision  - RLQ incision w staples CDI.  Monitor.      Secondary hyperparathyroidism of renal origin  - Continue Sensipar.    Anemia of renal disease  - H/H stable.   - No overt signs of bleeding.  - haptoglobin <10. .   - HIT panel negative.  Heparin SQ d/c'd 4/12.    - repeat Haptoglobin improved to 41.  Monitor.         Discharge Planning: not candidate at this time      Chanda Botello, MAMADOU  Kidney Transplant  [facility]

## 2019-04-13 NOTE — PROGRESS NOTES
"[facility]  Endocrinology  Progress Note    Admit Date: 2019     Reason for Consult: Management of Hyperglycemia     Surgical Procedure and Date: Kidney Transplant - 2019    HPI:   Patient is a 68 y.o. female with a diagnosis of ESRD secondary to HTN.  Patient is on hemodialysis (last procedure Saturday). She is now admitted to [facility] s/p kidney transplant. No dx of DM noted on file. Patient denies previous dx of DM and or use of anti DM medications in the past.   No results found for: LABA1C, HGBA1C                     Interval HPI:   Overnight events:  Remains in TSU. NAEON. BG at or below goal without insulin. Steroid therapy complete.   Eatin%  Nausea: No  Hypoglycemia and intervention: No  Fever: No  TPN and/or TF: No  BP (!) 142/82   Pulse 68   Temp 97.7 °F (36.5 °C)   Resp 12   Ht 5' 3" (1.6 m)   Wt 88.5 kg (195 lb 1.7 oz)   LMP 1971   SpO2 98%   Breastfeeding? No   BMI 34.56 kg/m²      Labs Reviewed and Include    Recent Labs   Lab 19  0525   GLU 77   CALCIUM 8.9   ALBUMIN 2.7*   *   K 4.6   CO2 21*      *   CREATININE 6.5*     Lab Results   Component Value Date    WBC 5.60 2019    HGB 8.9 (L) 2019    HCT 26.5 (L) 2019    MCV 91 2019    PLT 66 (L) 2019     No results for input(s): TSH, FREET4 in the last 168 hours.  Lab Results   Component Value Date    HGBA1C 5.1 04/10/2019       Nutritional status:   Body mass index is 34.56 kg/m².  Lab Results   Component Value Date    ALBUMIN 2.7 (L) 2019    ALBUMIN 2.8 (L) 2019    ALBUMIN 2.9 (L) 04/10/2019     No results found for: PREALBUMIN    Estimated Creatinine Clearance: 8.6 mL/min (A) (based on SCr of 6.5 mg/dL (H)).    Accu-Checks  Recent Labs     19  0821 19  1222 19  1705 19  2005 04/10/19  0832 04/10/19  1730 04/10/19  2037 19  0729 19  1720 19   POCTGLUCOSE 115* 149* 184* 189* 123* 153* 218* " 137* 119* 121*       Current Medications and/or Treatments Impacting Glycemic Control  Immunotherapy:    Immunosuppressants         Stop Route Frequency     tacrolimus capsule 4 mg      -- Oral 2 times daily     mycophenolate capsule 500 mg      -- Oral 2 times daily     tacrolimus (PROGRAF) 1 MG capsule      04/09 0559          Steroids:   Hormones (From admission, onward)    None        Pressors:    Autonomic Drugs (From admission, onward)    None        Hyperglycemia/Diabetes Medications:   Antihyperglycemics (From admission, onward)    Start     Stop Route Frequency Ordered    04/08/19 1851  insulin aspart U-100 pen 0-5 Units      -- SubQ Before meals & nightly PRN 04/08/19 1751          ASSESSMENT and PLAN    * S/P kidney transplant  Managed per primary team  Avoid hypoglycemia        Acute hyperglycemia  BG goal 140 - 180    BG at goal without insulin >24 hours.   Discontinue BG monitoring.   No history of DM. No futher insulin/endocrine needs, will sign off.          Long-term use of immunosuppressant medication  On immunosepresive therapy per transplant team; may elevate BG readings        Anemia of renal disease  May affect A1c readings.   Recommend updating A1c levels once patient is no longer anemic.           Yael Palmer, NP  Endocrinology  [facility]

## 2019-04-13 NOTE — ASSESSMENT & PLAN NOTE
- Continue to monitor w/ daily labs.  - cont to decrease.  HIT panel ordered.  H/H stable.    - haptoglobin <10, .  Repeat haptoglobin 41 in am today.

## 2019-04-13 NOTE — PROCEDURES
Drain removed:  Sutures removed, drain suction released.  Drain intact at time of removal.  Patient tolerated procedure well.  Bandaid placed over site.  Will continue to monitor for any complications.

## 2019-04-13 NOTE — ASSESSMENT & PLAN NOTE
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  S/p living unrelated (friend) kidney transplant 4/8/19, CMV +/+, WIT 30 min, CIT 3h 9 m, HD TTS.  Dry weight 83 kg.  Native UOP <100 ml.    - Surgery significant for intra op US w slightly decreased perfusion related to thrombus in main artery. Kidney removed, pair-of-pants anastomosis converted to double artery, and kidney was replaced (see OR note for full report).   - Kidney US 4/9 with good perfusion.  - Kidney US 4/11 with again with good perfusion.  - Cr increased from 6.2 yesterday to 6.6 today. Excellent uop. No RRT today but expect some DGF related to surgical event.  - Perm cath rescheduled to Monday as expect Cr to cont to improve.    - Gallardo for 5 days removed 4/13, SARA x 1- will remove after gallardo discontinued    - Pain well controlled, changed to hydrocodone due to severe itching with oxycodone - itching improved.  - Tolerating diet, (+) flatus, (+) BM, continue bowel regimen, h/o chronic constipation requiring linzess which was restarted.  ENEMA administered with success.

## 2019-04-13 NOTE — SUBJECTIVE & OBJECTIVE
Subjective:   History of Present Illness:  Paige Summers is a 69 y/o female w ESRD 2/2 Hypertensive Nephrosclerosis.  S/p living unrelated kidney transplant 4/8/19, CMV +/+, WIT 30 min, CIT 3h 9 m, HD Tues- Thurs- Sat.  Pt has LUE AVF- last used 4/6/19.  Pt was on Plavix for reoccurring AVF thrombus. Per pt, AVF has been declotted 9 times.  Dry weight 83 kg.  Native UOP <100 ml.  5 day gallardo.   Patients immunosuppression will include Campath for induction with early steroid withdrawal and Prograf and Cellcept for maintenance immunosuppression.  Opportunistic infection prophylaxis will include Valcyte for 3 months (CMV D + , R + ), Bactrim for 1 year, and nystatin for 4 weeks.     Surgery significant for intra op US w slightly decreased perfusion.   Kidney graft injected w verapamil in the artery, repositioned the kidney and clamped the distal iliac artery. The kidney remianed partially pink. Kidney removed, resected previous arteriotomy and venotomy and obtained arterial venous continuity. A small amount of iliac artery was resected and an end to end anastomosis was performed.  Donor kidney was reviewed on the back table, artery was cut back, and elimnated the pair of pants anastomosis. Then the main artery was cut back as there appeared to be an intimal abnormality. Venotomy was made, the vein irrigated, and an end renal to right external iliac vein anastomosis was created.  Arterial control was obtained with a vascular clamp.  Arteriotomy was made, the artery irrigated, and an reconstructed to right external iliac artery anastomosis was created.  The second artery was anastomosed end to side to distal ilac artery.  Reperfusion quality was fair.  Ultrasound, open and closed satisfactory.  There was an excellent pulse in the main renal artery, the segmental flow was visible.   Intraoperative urine production was observed.       Interval History:  Progressing well.  Plt cont to drop- 66.  H/H stable.  No overt  signs of bleeding.  HIT panel negative.  Heparin SQ d/c'd.  Haptoglobin <10, .  Haptoglobin improved 4/13.  Cr 6.6 and , stable.  Making great urine- UOP 2.3L.  Last US kidney 4/11 satisfactory doppler, no fluid collections. Davison removed 4/13.  Will remove SARA with minimal ss drg today. LUE AV graft no thrill/no bruit,  h/o graft clotting requiring plavix use. VAS US was consulted- no plan to declot as likely will reclot again. Plan to have tunneled cath placed TENTATIVELY Monday- NPO after MN 4/15.  IF cr improved Monday, will cancel procedure.  BPs managed on Coreg 12.5 mg and Clonidine 0.1 mg bid.  She is tolerating diet, denies N/V, passing flatus, (+) BM on bowel regimen (h/o chronic constipation).  Ambulating with walker.  HD chair set up for noon T/Th/S outpatient. Will assess readiness for discharge Monday.  Continue to monitor.        Past Medical, Surgical, Family, and Social History:   Unchanged from H&P.    Scheduled Meds:   amLODIPine  10 mg Oral Daily    calcium acetate  1,334 mg Oral TID WM    carvedilol  12.5 mg Oral BID WM    cinacalcet  30 mg Oral Daily with breakfast    cloNIDine  0.1 mg Oral BID    docusate sodium  100 mg Oral TID    famotidine  20 mg Oral QHS    linaclotide  290 mcg Oral Daily    multivitamin  1 tablet Oral Daily    mupirocin  1 g Nasal BID    mycophenolate  500 mg Oral BID    nystatin  500,000 Units Mouth/Throat QID (PC + HS)    sulfamethoxazole-trimethoprim 400-80mg  1 tablet Oral Every Mon, Wed, Fri    tacrolimus  4 mg Oral Daily    tacrolimus  5 mg Oral Daily    traZODone  50 mg Oral QHS    [START ON 4/19/2019] valGANciclovir  450 mg Oral Daily     Continuous Infusions:   glucagon (human recombinant)       PRN Meds:acetaminophen, bisacodyl, diphenhydrAMINE, glucagon (human recombinant), hydrALAZINE, HYDROcodone-acetaminophen, HYDROcodone-acetaminophen, naloxone, ondansetron    Intake/Output - Last 3 Shifts       04/11 0700 - 04/12 0659 04/12  "0700 - 04/13 0659 04/13 0700 - 04/14 0659    P.O. 2190 1480     Total Intake(mL/kg) 2190 (25.5) 1480 (16.7)     Urine (mL/kg/hr) 2350 (1.1) 1480 (0.7)     Drains 35 12     Stool 0 0     Total Output 2385 1492     Net -195 -12            Stool Occurrence 0 x 2 x            Review of Systems   Constitutional: Positive for activity change and appetite change (improved). Negative for chills, fatigue, fever and unexpected weight change.   HENT: Negative.  Negative for postnasal drip and trouble swallowing.    Eyes: Negative.  Negative for visual disturbance.   Respiratory: Negative for cough, shortness of breath and wheezing.    Cardiovascular: Positive for leg swelling (improved). Negative for chest pain and palpitations.   Gastrointestinal: Positive for abdominal pain and constipation. Negative for abdominal distention, anal bleeding, blood in stool, diarrhea, nausea, rectal pain and vomiting.   Genitourinary: Negative for decreased urine volume, difficulty urinating, dysuria, hematuria and urgency.   Musculoskeletal: Negative for arthralgias and gait problem.   Skin: Positive for wound. Negative for rash.   Allergic/Immunologic: Positive for immunocompromised state.   Neurological: Negative for dizziness, light-headedness and headaches.   Psychiatric/Behavioral: The patient is not nervous/anxious.       Objective:     Vital Signs (Most Recent):  Temp: 98.3 °F (36.8 °C) (04/12/19 2015)  Pulse: 75 (04/13/19 0715)  Resp: 17 (04/13/19 0715)  BP: 101/60 (04/13/19 0715)  SpO2: 98 % (04/13/19 0715) Vital Signs (24h Range):  Temp:  [97.6 °F (36.4 °C)-98.3 °F (36.8 °C)] 98.3 °F (36.8 °C)  Pulse:  [71-88] 75  Resp:  [14-23] 17  SpO2:  [96 %-100 %] 98 %  BP: (101-138)/(60-79) 101/60     Weight: 88.7 kg (195 lb 7.9 oz)  Height: 5' 3" (160 cm)  Body mass index is 34.63 kg/m².    Physical Exam   Constitutional: She is oriented to person, place, and time. She appears well-developed. She is active and cooperative. No distress.   LUE " AVF (-) thrill/(-) bruit   HENT:   Head: Normocephalic and atraumatic.   Eyes: EOM are normal. No scleral icterus.   Cardiovascular: Normal rate, regular rhythm, normal heart sounds, intact distal pulses and normal pulses.   No murmur heard.  Pulmonary/Chest: Effort normal and breath sounds normal. No respiratory distress. She has no decreased breath sounds. She has no wheezes. She has no rales.   Abdominal: Soft. Normal appearance and bowel sounds are normal. She exhibits no distension. There is no tenderness. There is no guarding.   RLQ incision LOKESH with staples, soft, non tender to palpation  R SARA with minimal ss drg   Musculoskeletal: Normal range of motion. She exhibits edema (+1 generalized).   Neurological: She is alert and oriented to person, place, and time.   Skin: Skin is warm, dry and intact. Capillary refill takes less than 2 seconds. She is not diaphoretic.   Psychiatric: She has a normal mood and affect.   Nursing note and vitals reviewed.      Laboratory:  CBC:   Recent Labs   Lab 04/11/19  0554 04/12/19  0525 04/13/19  0600   WBC 11.61 5.60 7.06   RBC 2.95* 2.92* 2.90*   HGB 8.8* 8.9* 8.9*   HCT 26.7* 26.5* 26.2*   PLT 79* 66* 65*   MCV 91 91 90   MCH 29.8 30.5 30.7   MCHC 33.0 33.6 34.0     CMP:   Recent Labs   Lab 04/09/19  0600  04/11/19  0554 04/12/19  0525 04/13/19  0600      < > 128* 77 91   CALCIUM 9.1   < > 8.6* 8.9 9.2   ALBUMIN 2.8*  2.8*   < > 2.8* 2.7* 2.8*   PROT 6.7  --   --   --   --       < > 131* 134* 136   K 4.9   < > 4.5 4.6 4.7   CO2 24   < > 22* 21* 20*      < > 99 102 105   BUN 81*   < > 105* 116* 116*   CREATININE 6.2*   < > 6.6* 6.5* 6.6*   ALKPHOS 67  --   --   --   --    ALT 15  --   --   --   --    AST 65*  --   --   --   --     < > = values in this interval not displayed.     Coagulation: No results for input(s): PT, APTT in the last 168 hours.  Labs within the past 24 hours have been reviewed.    Diagnostic Results:  US - Kidney:   Results for orders  placed during the hospital encounter of 04/08/19   US Transplant Kidney With Doppler    Narrative EXAMINATION:  US TRANSPLANT KIDNEY WITH DOPPLER    CLINICAL HISTORY:  assess perfusion;    TECHNIQUE:  Real time gray scale and doppler ultrasound was performed over the patient's renal allograft.    COMPARISON:  Intraoperative ultrasound 04/08/2019.    FINDINGS:  Renal allograft in the right lower quadrant.  The allograft measures 11.5 cm. Normal perfusion. No hydronephrosis.    Two peritransplant fluid collections measuring 4.4 x 1.1 x 3.6 cm and 3.4 x 2.9 x 3.4 cm.    Vasculature:    Resistive indices ranged from 0.67 to 0.74.    Main renal artery peak systolic velocity: 248cm/sec with normal waveform.    Renal artery/iliac ratio: 2.2.    The main renal vein is patent.      Impression Satisfactory day 1 postop gray scale and doppler evaluation of renal allograft.    Two small peritransplant fluid collections.    COMMUNICATION  This result was relayed directly by Dr. Dalton by phone to Dr. Goodwin.    Electronically signed by resident: Valerio Montanez MD  Date:    04/09/2019  Time:    08:03    Electronically signed by: Pan Dalton MD  Date:    04/09/2019  Time:    08:20

## 2019-04-13 NOTE — PLAN OF CARE
Pt AAOx4, VSS, afebrile. RLQ incision with staples intact, CDI. R SARA drain CDI, putting out scant sero-sanguinous drainage. Davison CDI and adhered to top of thigh with StatLock.  Drainage bag below bladder and off the floor, no dependent loops or kinks. Draining clear yellow urine; see flowsheet for UOP. Plan to remove in AM. Pt and caregiver requested RN to wake pt for PRN oxycodone q4h whenever available. Caregiver pulled self-meds 100% correctly. Fall risk precautions initiated.  Pt in lowest bed position setting, lighting adjusted, pt to wear nonskid socks when ambulating, side rails up x2.  Pt remain free from falls during shift. Caregiver at bedside. Call light within reach. Will continue to monitor.

## 2019-04-14 LAB
ALBUMIN SERPL BCP-MCNC: 3.1 G/DL (ref 3.5–5.2)
ALP SERPL-CCNC: 71 U/L (ref 55–135)
ALT SERPL W/O P-5'-P-CCNC: <5 U/L (ref 10–44)
ANION GAP SERPL CALC-SCNC: 11 MMOL/L (ref 8–16)
AST SERPL-CCNC: 31 U/L (ref 10–40)
BASOPHILS # BLD AUTO: 0.01 K/UL (ref 0–0.2)
BASOPHILS NFR BLD: 0.1 % (ref 0–1.9)
BILIRUB SERPL-MCNC: 0.4 MG/DL (ref 0.1–1)
BUN SERPL-MCNC: 119 MG/DL (ref 8–23)
CALCIUM SERPL-MCNC: 9.1 MG/DL (ref 8.7–10.5)
CHLORIDE SERPL-SCNC: 101 MMOL/L (ref 95–110)
CO2 SERPL-SCNC: 20 MMOL/L (ref 23–29)
CREAT SERPL-MCNC: 6.5 MG/DL (ref 0.5–1.4)
DIFFERENTIAL METHOD: ABNORMAL
EOSINOPHIL # BLD AUTO: 0 K/UL (ref 0–0.5)
EOSINOPHIL NFR BLD: 0.3 % (ref 0–8)
ERYTHROCYTE [DISTWIDTH] IN BLOOD BY AUTOMATED COUNT: 15.7 % (ref 11.5–14.5)
EST. GFR  (AFRICAN AMERICAN): 6.9 ML/MIN/1.73 M^2
EST. GFR  (NON AFRICAN AMERICAN): 6 ML/MIN/1.73 M^2
GLUCOSE SERPL-MCNC: 102 MG/DL (ref 70–110)
HCT VFR BLD AUTO: 28.1 % (ref 37–48.5)
HGB BLD-MCNC: 9.2 G/DL (ref 12–16)
IMM GRANULOCYTES # BLD AUTO: 0.11 K/UL (ref 0–0.04)
IMM GRANULOCYTES NFR BLD AUTO: 1.3 % (ref 0–0.5)
LDH SERPL L TO P-CCNC: 468 U/L (ref 110–260)
LYMPHOCYTES # BLD AUTO: 0 K/UL (ref 1–4.8)
LYMPHOCYTES NFR BLD: 0.3 % (ref 18–48)
MAGNESIUM SERPL-MCNC: 3 MG/DL (ref 1.6–2.6)
MCH RBC QN AUTO: 30.2 PG (ref 27–31)
MCHC RBC AUTO-ENTMCNC: 32.7 G/DL (ref 32–36)
MCV RBC AUTO: 92 FL (ref 82–98)
MONOCYTES # BLD AUTO: 0.6 K/UL (ref 0.3–1)
MONOCYTES NFR BLD: 6.8 % (ref 4–15)
NEUTROPHILS # BLD AUTO: 7.9 K/UL (ref 1.8–7.7)
NEUTROPHILS NFR BLD: 91.2 % (ref 38–73)
NRBC BLD-RTO: 0 /100 WBC
PHOSPHATE SERPL-MCNC: 4.1 MG/DL (ref 2.7–4.5)
PLATELET # BLD AUTO: 104 K/UL (ref 150–350)
PMV BLD AUTO: 12.6 FL (ref 9.2–12.9)
POTASSIUM SERPL-SCNC: 4.8 MMOL/L (ref 3.5–5.1)
PROT SERPL-MCNC: 6.7 G/DL (ref 6–8.4)
RBC # BLD AUTO: 3.05 M/UL (ref 4–5.4)
SODIUM SERPL-SCNC: 132 MMOL/L (ref 136–145)
TACROLIMUS BLD-MCNC: 7.2 NG/ML (ref 5–15)
WBC # BLD AUTO: 8.62 K/UL (ref 3.9–12.7)

## 2019-04-14 PROCEDURE — 25000003 PHARM REV CODE 250: Performed by: TRANSPLANT SURGERY

## 2019-04-14 PROCEDURE — 83735 ASSAY OF MAGNESIUM: CPT

## 2019-04-14 PROCEDURE — 85025 COMPLETE CBC W/AUTO DIFF WBC: CPT

## 2019-04-14 PROCEDURE — 25000003 PHARM REV CODE 250: Performed by: STUDENT IN AN ORGANIZED HEALTH CARE EDUCATION/TRAINING PROGRAM

## 2019-04-14 PROCEDURE — 83615 LACTATE (LD) (LDH) ENZYME: CPT

## 2019-04-14 PROCEDURE — 63600175 PHARM REV CODE 636 W HCPCS: Performed by: NURSE PRACTITIONER

## 2019-04-14 PROCEDURE — 20600001 HC STEP DOWN PRIVATE ROOM

## 2019-04-14 PROCEDURE — 80197 ASSAY OF TACROLIMUS: CPT

## 2019-04-14 PROCEDURE — 84100 ASSAY OF PHOSPHORUS: CPT

## 2019-04-14 PROCEDURE — 36415 COLL VENOUS BLD VENIPUNCTURE: CPT

## 2019-04-14 PROCEDURE — 80053 COMPREHEN METABOLIC PANEL: CPT

## 2019-04-14 PROCEDURE — 25000003 PHARM REV CODE 250: Performed by: NURSE PRACTITIONER

## 2019-04-14 PROCEDURE — 63600175 PHARM REV CODE 636 W HCPCS: Performed by: TRANSPLANT SURGERY

## 2019-04-14 PROCEDURE — 25000003 PHARM REV CODE 250: Performed by: PHYSICIAN ASSISTANT

## 2019-04-14 PROCEDURE — 99233 PR SUBSEQUENT HOSPITAL CARE,LEVL III: ICD-10-PCS | Mod: ,,, | Performed by: NURSE PRACTITIONER

## 2019-04-14 PROCEDURE — 99233 SBSQ HOSP IP/OBS HIGH 50: CPT | Mod: ,,, | Performed by: NURSE PRACTITIONER

## 2019-04-14 RX ADMIN — CALCIUM ACETATE 1334 MG: 667 CAPSULE ORAL at 09:04

## 2019-04-14 RX ADMIN — HYDROCODONE BITARTRATE AND ACETAMINOPHEN 1 TABLET: 10; 325 TABLET ORAL at 08:04

## 2019-04-14 RX ADMIN — DOCUSATE SODIUM 100 MG: 100 CAPSULE, LIQUID FILLED ORAL at 09:04

## 2019-04-14 RX ADMIN — CALCIUM ACETATE 1334 MG: 667 CAPSULE ORAL at 06:04

## 2019-04-14 RX ADMIN — NYSTATIN 500000 UNITS: 500000 SUSPENSION ORAL at 08:04

## 2019-04-14 RX ADMIN — CLONIDINE HYDROCHLORIDE 0.1 MG: 0.1 TABLET ORAL at 08:04

## 2019-04-14 RX ADMIN — TACROLIMUS 4 MG: 1 CAPSULE ORAL at 08:04

## 2019-04-14 RX ADMIN — HYDROCODONE BITARTRATE AND ACETAMINOPHEN 1 TABLET: 5; 325 TABLET ORAL at 12:04

## 2019-04-14 RX ADMIN — MYCOPHENOLATE MOFETIL 500 MG: 250 CAPSULE ORAL at 08:04

## 2019-04-14 RX ADMIN — CARVEDILOL 12.5 MG: 3.12 TABLET, FILM COATED ORAL at 09:04

## 2019-04-14 RX ADMIN — CARVEDILOL 12.5 MG: 3.12 TABLET, FILM COATED ORAL at 06:04

## 2019-04-14 RX ADMIN — CLONIDINE HYDROCHLORIDE 0.1 MG: 0.1 TABLET ORAL at 09:04

## 2019-04-14 RX ADMIN — AMLODIPINE BESYLATE 10 MG: 10 TABLET ORAL at 09:04

## 2019-04-14 RX ADMIN — CALCIUM ACETATE 1334 MG: 667 CAPSULE ORAL at 11:04

## 2019-04-14 RX ADMIN — CINACALCET HYDROCHLORIDE 30 MG: 30 TABLET, COATED ORAL at 09:04

## 2019-04-14 RX ADMIN — NYSTATIN 500000 UNITS: 500000 SUSPENSION ORAL at 06:04

## 2019-04-14 RX ADMIN — MYCOPHENOLATE MOFETIL 500 MG: 250 CAPSULE ORAL at 09:04

## 2019-04-14 RX ADMIN — TACROLIMUS 5 MG: 1 CAPSULE ORAL at 06:04

## 2019-04-14 RX ADMIN — DOCUSATE SODIUM 100 MG: 100 CAPSULE, LIQUID FILLED ORAL at 08:04

## 2019-04-14 RX ADMIN — LINACLOTIDE 290 MCG: 290 CAPSULE, GELATIN COATED ORAL at 09:04

## 2019-04-14 RX ADMIN — TRAZODONE HYDROCHLORIDE 50 MG: 50 TABLET ORAL at 08:04

## 2019-04-14 RX ADMIN — HYDROCODONE BITARTRATE AND ACETAMINOPHEN 1 TABLET: 5; 325 TABLET ORAL at 01:04

## 2019-04-14 RX ADMIN — NYSTATIN 500000 UNITS: 500000 SUSPENSION ORAL at 09:04

## 2019-04-14 RX ADMIN — DOCUSATE SODIUM 100 MG: 100 CAPSULE, LIQUID FILLED ORAL at 03:04

## 2019-04-14 RX ADMIN — NYSTATIN 500000 UNITS: 500000 SUSPENSION ORAL at 11:04

## 2019-04-14 RX ADMIN — THERA TABS 1 TABLET: TAB at 09:04

## 2019-04-14 RX ADMIN — FAMOTIDINE 20 MG: 20 TABLET, FILM COATED ORAL at 08:04

## 2019-04-14 NOTE — PLAN OF CARE
Pt is AAOx4; afebrile; vital signs stable. Norco given once to control incisional pain. Creatinine 6.46 today. She is NPO at midnight for permcath placement tomorrow. She is up with standby assist and a walker and walks in the halls. Family at bedside, attentive to pt.

## 2019-04-15 VITALS
HEIGHT: 63 IN | OXYGEN SATURATION: 96 % | HEART RATE: 81 BPM | TEMPERATURE: 98 F | DIASTOLIC BLOOD PRESSURE: 81 MMHG | SYSTOLIC BLOOD PRESSURE: 143 MMHG | BODY MASS INDEX: 34.84 KG/M2 | WEIGHT: 196.63 LBS | RESPIRATION RATE: 19 BRPM

## 2019-04-15 DIAGNOSIS — Z94.0 KIDNEY REPLACED BY TRANSPLANT: Primary | ICD-10-CM

## 2019-04-15 LAB
ALBUMIN SERPL BCP-MCNC: 2.6 G/DL (ref 3.5–5.2)
ALBUMIN SERPL BCP-MCNC: 2.9 G/DL (ref 3.5–5.2)
ANION GAP SERPL CALC-SCNC: 10 MMOL/L (ref 8–16)
ANION GAP SERPL CALC-SCNC: 13 MMOL/L (ref 8–16)
B CELL RESULTS - XM AUTO: NEGATIVE
B MCS AVERAGE - XM AUTO: -19
BASOPHILS # BLD AUTO: 0.01 K/UL (ref 0–0.2)
BASOPHILS NFR BLD: 0.1 % (ref 0–1.9)
BUN SERPL-MCNC: 117 MG/DL (ref 8–23)
BUN SERPL-MCNC: 120 MG/DL (ref 8–23)
CALCIUM SERPL-MCNC: 8.8 MG/DL (ref 8.7–10.5)
CALCIUM SERPL-MCNC: 9.5 MG/DL (ref 8.7–10.5)
CHLORIDE SERPL-SCNC: 102 MMOL/L (ref 95–110)
CHLORIDE SERPL-SCNC: 104 MMOL/L (ref 95–110)
CLASS I ANTIBODIES - LUMINEX: NEGATIVE
CLASS II ANTIBODIES - LUMINEX: NEGATIVE
CO2 SERPL-SCNC: 16 MMOL/L (ref 23–29)
CO2 SERPL-SCNC: 20 MMOL/L (ref 23–29)
CPRA %: 0
CREAT SERPL-MCNC: 6 MG/DL (ref 0.5–1.4)
CREAT SERPL-MCNC: 6.2 MG/DL (ref 0.5–1.4)
DIFFERENTIAL METHOD: ABNORMAL
EOSINOPHIL # BLD AUTO: 0 K/UL (ref 0–0.5)
EOSINOPHIL NFR BLD: 0.4 % (ref 0–8)
ERYTHROCYTE [DISTWIDTH] IN BLOOD BY AUTOMATED COUNT: 15.4 % (ref 11.5–14.5)
EST. GFR  (AFRICAN AMERICAN): 7.3 ML/MIN/1.73 M^2
EST. GFR  (AFRICAN AMERICAN): 7.6 ML/MIN/1.73 M^2
EST. GFR  (NON AFRICAN AMERICAN): 6.3 ML/MIN/1.73 M^2
EST. GFR  (NON AFRICAN AMERICAN): 6.6 ML/MIN/1.73 M^2
FXMAU TESTING DATE: NORMAL
GLUCOSE SERPL-MCNC: 126 MG/DL (ref 70–110)
GLUCOSE SERPL-MCNC: 92 MG/DL (ref 70–110)
HCT VFR BLD AUTO: 25 % (ref 37–48.5)
HGB BLD-MCNC: 8.2 G/DL (ref 12–16)
HLA FLOW CROSSMATCH (AUTO) INTERPRETATION: NORMAL
HPRA INTERPRETATION: NORMAL
IMM GRANULOCYTES # BLD AUTO: 0.1 K/UL (ref 0–0.04)
IMM GRANULOCYTES NFR BLD AUTO: 1.2 % (ref 0–0.5)
LYMPHOCYTES # BLD AUTO: 0 K/UL (ref 1–4.8)
LYMPHOCYTES NFR BLD: 0.4 % (ref 18–48)
MAGNESIUM SERPL-MCNC: 2.1 MG/DL (ref 1.6–2.6)
MCH RBC QN AUTO: 29.3 PG (ref 27–31)
MCHC RBC AUTO-ENTMCNC: 32.8 G/DL (ref 32–36)
MCV RBC AUTO: 89 FL (ref 82–98)
MONOCYTES # BLD AUTO: 0.6 K/UL (ref 0.3–1)
MONOCYTES NFR BLD: 7.3 % (ref 4–15)
NEUTROPHILS # BLD AUTO: 7.3 K/UL (ref 1.8–7.7)
NEUTROPHILS NFR BLD: 90.6 % (ref 38–73)
NRBC BLD-RTO: 0 /100 WBC
PHOSPHATE SERPL-MCNC: 4.1 MG/DL (ref 2.7–4.5)
PHOSPHATE SERPL-MCNC: 4.1 MG/DL (ref 2.7–4.5)
PLATELET # BLD AUTO: 142 K/UL (ref 150–350)
PMV BLD AUTO: 12 FL (ref 9.2–12.9)
POTASSIUM SERPL-SCNC: 5.1 MMOL/L (ref 3.5–5.1)
POTASSIUM SERPL-SCNC: 5.3 MMOL/L (ref 3.5–5.1)
RBC # BLD AUTO: 2.8 M/UL (ref 4–5.4)
SERUM COLLECTION DT - LUMINEX CLASS I: NORMAL
SERUM COLLECTION DT - LUMINEX CLASS II: NORMAL
SERUM COLLECTION DT - XM AUTO: NORMAL
SODIUM SERPL-SCNC: 132 MMOL/L (ref 136–145)
SODIUM SERPL-SCNC: 133 MMOL/L (ref 136–145)
SPCL1 TESTING DATE: NORMAL
SPCL2 TESTING DATE: NORMAL
SPLUA TESTING DATE: NORMAL
T CELL RESULTS - XM AUTO: NEGATIVE
T MCS AVERAGE - XM AUTO: 10.5
TACROLIMUS BLD-MCNC: 7 NG/ML (ref 5–15)
WBC # BLD AUTO: 8.03 K/UL (ref 3.9–12.7)

## 2019-04-15 PROCEDURE — 80069 RENAL FUNCTION PANEL: CPT

## 2019-04-15 PROCEDURE — 25000003 PHARM REV CODE 250: Performed by: INTERNAL MEDICINE

## 2019-04-15 PROCEDURE — 25000003 PHARM REV CODE 250: Performed by: STUDENT IN AN ORGANIZED HEALTH CARE EDUCATION/TRAINING PROGRAM

## 2019-04-15 PROCEDURE — 99233 SBSQ HOSP IP/OBS HIGH 50: CPT | Mod: 24,,, | Performed by: NURSE PRACTITIONER

## 2019-04-15 PROCEDURE — 25000003 PHARM REV CODE 250: Performed by: PHYSICIAN ASSISTANT

## 2019-04-15 PROCEDURE — 83735 ASSAY OF MAGNESIUM: CPT

## 2019-04-15 PROCEDURE — 25000003 PHARM REV CODE 250: Performed by: NURSE PRACTITIONER

## 2019-04-15 PROCEDURE — 85025 COMPLETE CBC W/AUTO DIFF WBC: CPT

## 2019-04-15 PROCEDURE — 99499 UNLISTED E&M SERVICE: CPT | Mod: ,,, | Performed by: INTERNAL MEDICINE

## 2019-04-15 PROCEDURE — 63600175 PHARM REV CODE 636 W HCPCS: Performed by: TRANSPLANT SURGERY

## 2019-04-15 PROCEDURE — 99499 NO LOS: ICD-10-PCS | Mod: ,,, | Performed by: INTERNAL MEDICINE

## 2019-04-15 PROCEDURE — 80069 RENAL FUNCTION PANEL: CPT | Mod: 91

## 2019-04-15 PROCEDURE — 63600175 PHARM REV CODE 636 W HCPCS: Performed by: NURSE PRACTITIONER

## 2019-04-15 PROCEDURE — 99233 PR SUBSEQUENT HOSPITAL CARE,LEVL III: ICD-10-PCS | Mod: 24,,, | Performed by: NURSE PRACTITIONER

## 2019-04-15 PROCEDURE — 80197 ASSAY OF TACROLIMUS: CPT

## 2019-04-15 PROCEDURE — 36415 COLL VENOUS BLD VENIPUNCTURE: CPT

## 2019-04-15 PROCEDURE — 25000003 PHARM REV CODE 250: Performed by: TRANSPLANT SURGERY

## 2019-04-15 RX ORDER — SULFAMETHOXAZOLE AND TRIMETHOPRIM 400; 80 MG/1; MG/1
1 TABLET ORAL
Qty: 12 TABLET | Refills: 11 | Status: SHIPPED | OUTPATIENT
Start: 2019-04-15 | End: 2019-07-03

## 2019-04-15 RX ORDER — SODIUM BICARBONATE 650 MG/1
1950 TABLET ORAL 2 TIMES DAILY
Status: DISCONTINUED | OUTPATIENT
Start: 2019-04-15 | End: 2019-04-15 | Stop reason: HOSPADM

## 2019-04-15 RX ORDER — VALGANCICLOVIR 450 MG/1
450 TABLET, FILM COATED ORAL
Qty: 12 TABLET | Refills: 2 | Status: SHIPPED | OUTPATIENT
Start: 2019-04-15 | End: 2019-10-14

## 2019-04-15 RX ORDER — CLONIDINE HYDROCHLORIDE 0.1 MG/1
0.1 TABLET ORAL 2 TIMES DAILY
Qty: 60 TABLET | Refills: 11 | Status: SHIPPED | OUTPATIENT
Start: 2019-04-15 | End: 2019-05-20

## 2019-04-15 RX ORDER — CARVEDILOL 12.5 MG/1
12.5 TABLET ORAL 2 TIMES DAILY WITH MEALS
Qty: 60 TABLET | Refills: 11 | Status: SHIPPED | OUTPATIENT
Start: 2019-04-15 | End: 2019-05-06

## 2019-04-15 RX ORDER — SODIUM BICARBONATE 650 MG/1
1950 TABLET ORAL 2 TIMES DAILY
Qty: 180 TABLET | Refills: 11 | Status: SHIPPED | OUTPATIENT
Start: 2019-04-15 | End: 2019-04-22

## 2019-04-15 RX ORDER — TACROLIMUS 1 MG/1
5 CAPSULE ORAL EVERY 12 HOURS
Qty: 300 CAPSULE | Refills: 11 | Status: SHIPPED | OUTPATIENT
Start: 2019-04-15 | End: 2019-04-18

## 2019-04-15 RX ORDER — FUROSEMIDE 10 MG/ML
40 INJECTION INTRAMUSCULAR; INTRAVENOUS ONCE
Status: COMPLETED | OUTPATIENT
Start: 2019-04-15 | End: 2019-04-15

## 2019-04-15 RX ADMIN — MYCOPHENOLATE MOFETIL 500 MG: 250 CAPSULE ORAL at 08:04

## 2019-04-15 RX ADMIN — SODIUM BICARBONATE 650 MG TABLET 1950 MG: at 12:04

## 2019-04-15 RX ADMIN — CALCIUM ACETATE 1334 MG: 667 CAPSULE ORAL at 08:04

## 2019-04-15 RX ADMIN — AMLODIPINE BESYLATE 10 MG: 10 TABLET ORAL at 08:04

## 2019-04-15 RX ADMIN — NYSTATIN 500000 UNITS: 500000 SUSPENSION ORAL at 08:04

## 2019-04-15 RX ADMIN — HYDROCODONE BITARTRATE AND ACETAMINOPHEN 1 TABLET: 10; 325 TABLET ORAL at 02:04

## 2019-04-15 RX ADMIN — CLONIDINE HYDROCHLORIDE 0.1 MG: 0.1 TABLET ORAL at 08:04

## 2019-04-15 RX ADMIN — DOCUSATE SODIUM 100 MG: 100 CAPSULE, LIQUID FILLED ORAL at 08:04

## 2019-04-15 RX ADMIN — TACROLIMUS 4 MG: 1 CAPSULE ORAL at 08:04

## 2019-04-15 RX ADMIN — SULFAMETHOXAZOLE AND TRIMETHOPRIM 1 TABLET: 400; 80 TABLET ORAL at 08:04

## 2019-04-15 RX ADMIN — HYDROCODONE BITARTRATE AND ACETAMINOPHEN 1 TABLET: 10; 325 TABLET ORAL at 08:04

## 2019-04-15 RX ADMIN — CINACALCET HYDROCHLORIDE 30 MG: 30 TABLET, COATED ORAL at 08:04

## 2019-04-15 RX ADMIN — CARVEDILOL 12.5 MG: 3.12 TABLET, FILM COATED ORAL at 08:04

## 2019-04-15 RX ADMIN — FUROSEMIDE 40 MG: 10 INJECTION, SOLUTION INTRAMUSCULAR; INTRAVENOUS at 12:04

## 2019-04-15 RX ADMIN — DOCUSATE SODIUM 100 MG: 100 CAPSULE, LIQUID FILLED ORAL at 02:04

## 2019-04-15 RX ADMIN — THERA TABS 1 TABLET: TAB at 08:04

## 2019-04-15 RX ADMIN — LINACLOTIDE 290 MCG: 290 CAPSULE, GELATIN COATED ORAL at 08:04

## 2019-04-15 RX ADMIN — NYSTATIN 500000 UNITS: 500000 SUSPENSION ORAL at 02:04

## 2019-04-15 NOTE — DISCHARGE SUMMARY
[facility]  Kidney Transplant  Discharge Summary    Patient Name: Paige Summers  MRN: [id number]  Admission Date: 4/8/2019  Hospital Length of Stay: 7 days  Discharge Date and Time:  04/15/2019 9:34 AM  Attending Physician: Mathew Goodwin MD   Discharging Provider: Arina Sanon NP  Primary Care Provider: Jd Carias MD    HPI:   Paige Summers is a 69 y/o female w ESRD 2/2 Hypertensive Nephrosclerosis.  S/p living unrelated kidney transplant 4/8/19, CMV +/+, WIT 30 min, CIT 3h 9 m, HD Tues- Thurs- Sat.  Pt has LUE AVF- last used 4/6/19.  Pt was on Plavix for reoccurring AVF thrombus. Per pt, AVF has been declotted 9 times.  Dry weight 83 kg.  Native UOP <100 ml.  5 day gallardo.  Patients immunosuppression will include Campath for induction with early steroid withdrawal and Prograf and Cellcept for maintenance immunosuppression. Opportunistic infection prophylaxis will include Valcyte for 3 months, Bactrim for 1 year, and nystatin for 4 weeks.  Surgery significant for intra op US w slightly decreased perfusion.   Kidney graft injected w verapamil in the artery, repositioned the kidney and clamped the distal iliac artery. The kidney remained partially pink. Kidney removed, previous arteriotomy and venotomy resected; and obtained arterial venous continuity. A small amount of iliac artery was resected and an end to end anastomosis was performed.  Donor kidney was reviewed on the back table, artery was cut back, and eliminated the pair of pants anastomosis. Then the main artery was cut back as there appeared to be an intimal abnormality. Venotomy was made, the vein irrigated, and an end renal to right external iliac vein anastomosis was created.  Arterial control was obtained with a vascular clamp.  Arteriotomy was made, the artery irrigated, and a reconstructed to right external iliac artery anastomosis was created.  The second artery was anastomosed end to side to distal ilac artery.   Reperfusion quality was fair.  Ultrasound, open and closed satisfactory.  There was an excellent pulse in the main renal artery, the segmental flow was visible.   Intraoperative urine production was observed.      Discharge delayed given Cr slow to trend down.  Left upper arm AVF w no thrill/no bruit.  Vascular was consulted-  Pt has hx of AVF clotting 9 times.  It was felt AVF would likely clot again.  Plan was to place tunneled cath if pt required HD.  Cr has trended down past 2 days.  UOP has been good.  Pt had 5 day gallardo removed 4/14/19.  SARA removed later 4/14.  Pt voiding w/o difficulty.  She is tolerating diet and denies N/V.  Abdominal pain well managed w Norco (Oxy caused her to have pruritis).   Pt passing flatus.  Pt has long history of constipation requiring Linzess.  Linzess was restarted POD#2.  Pt was able to have BM w enema 4/12.  Bowel sounds are active x4.  She is ambulating in room and hallway three times per day.  BP well managed on Coreg 12.5 mg bid.  Weight up POD#7- Lasix 60 mg x1 given.  Monitor need for diuresis as outpatient.      Post op, plt count trended down, Haptglobin <10, , HIT panel neg.  Heparin SQ was discontinued.  Platelets have trended up to normal.  On day of discharge, Bicar decreased and pt started on Sodium Bicarb supplement.  Labs repeated prior to discharge and Bicarb back to baseline - 20.      Discharge instructions reviewed by Pharmacist, Nursing and Transplant coordinator.  Patient and CG verbalize understanding and are in agreement with discharge from hospital today.  Patient is medically stable for discharge.  Patient will need daily labs to assess need for HD at which pt will require line placement.  Next visit TBD.        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] 04/09/2019 No    Hypertension, renal disease, stage 5 chronic kidney disease or end stage renal disease [I12.0] 04/09/2019 Yes    Prophylactic immunotherapy [Z29.8]  Not Applicable    Long-term use of immunosuppressant medication [Z79.899]  Not Applicable    At risk for opportunistic infections [Z91.89]  No    Anemia of renal disease [D63.1]  Yes     Chronic    Secondary hyperparathyroidism of renal origin [N25.81]  Yes     Chronic      Problems Resolved During this Admission:    Diagnosis Date Noted Date Resolved POA    Hyponatremia [E87.1] 04/11/2019 04/12/2019 No    Acute hyperglycemia [R73.9] 04/09/2019 04/13/2019 Yes    Hypercalcemia [E83.52]  04/13/2019 Yes    ESRD (end stage renal disease) on dialysis [N18.6, Z99.2] 12/19/2018 04/10/2019 Not Applicable       Treatments: surgery: s/p living kidney transplant    Consults (From admission, onward)        Status Ordering Provider     Inpatient consult to Endocrinology  Once     Provider:  (Not yet assigned)    Completed ZO NOGUEIRA     Inpatient consult to Registered Dietitian/Nutritionist  Once     Provider:  (Not yet assigned)    KARTHIK Benton     Inpatient consult to Vascular Surgery  Once     Provider:  (Not yet assigned)    Completed ASHLI MACIAS     IP Consult to Kidney/Pancreas Transplant Medicine  Once     Provider:  (Not yet assigned)    KARTHIK Benton          Pending Diagnostic Studies:     Procedure Component Value Units Date/Time    CBC auto differential [[id number]] Collected:  04/15/19 0600    Order Status:  Sent Lab Status:  No result     Specimen:  Blood     Magnesium [[id number]] Collected:  04/15/19 0600    Order Status:  Sent Lab Status:  No result     Specimen:  Blood     Renal function panel  [[id number]] Collected:  04/15/19 0600    Order Status:  Sent Lab Status:  No result     Specimen:  Blood     Tacrolimus level [[id number]] Collected:  04/15/19 0600    Order Status:  Sent Lab Status:  No result     Specimen:  Blood         Significant Diagnostic Studies: Labs:   BMP:   Recent Labs   Lab 04/14/19  0600 04/15/19  0630    92   * 133*   K 4.8 5.1    104   CO2 20* 16*   * 117*   CREATININE 6.5* 6.0*   CALCIUM 9.1 8.8   MG 3.0* 2.1   , CBC   Recent Labs   Lab 04/14/19  0600 04/15/19  0630   WBC 8.62 8.03   HGB 9.2* 8.2*   HCT 28.1* 25.0*   * 142*   , INR   Lab Results   Component Value Date    INR 1.0 03/26/2019    INR 1.0 06/20/2018    and All labs within the past 24 hours have been reviewed    Discharged Condition: fair    Disposition:  accompanied by CG    Follow Up: in am per kidney transplant coordinator    Patient Instructions:   No discharge procedures on file.  Medications:  Reconciled Home Medications:      Medication List      START taking these medications    bisacodyl 5 mg EC tablet  Commonly known as:  DULCOLAX  Take 2 tablets (10 mg total) by mouth daily as needed for Constipation.     cinacalcet 30 MG Tab  Commonly known as:  SENSIPAR  Take 1 tablet (30 mg total) by mouth daily with breakfast.     cloNIDine 0.1 MG tablet  Commonly known as:  CATAPRES  Take 1 tablet (0.1 mg total) by mouth 2 (two) times daily.     famotidine 20 MG tablet  Commonly known as:  PEPCID  Take 1 tablet (20 mg total) by mouth every evening.     HYDROcodone-acetaminophen  mg per tablet  Commonly known as:  NORCO  Take 1 tablet by mouth every 4 (four) hours as needed for Pain.     multivitamin tablet  Commonly known as:  THERAGRAN  Take 1 tablet by mouth once daily.     mycophenolate 250 mg Cap  Commonly known as:  CELLCEPT  Take 2 capsules (500 mg total) by mouth 2 (two) times daily.     sodium bicarbonate 650 MG tablet  Take 3 tablets (1,950 mg total) by mouth 2 (two) times  daily.     sulfamethoxazole-trimethoprim 400-80mg 400-80 mg per tablet  Commonly known as:  BACTRIM,SEPTRA  Take 1 tablet by mouth every Mon, Wed, Fri. Stop: 4/7/2020     tacrolimus 1 MG Cap  Commonly known as:  PROGRAF  Take 5 capsules (5 mg total) by mouth every 12 (twelve) hours.     valGANciclovir 450 mg Tab  Commonly known as:  VALCYTE  Take 1 tablet (450 mg total) by mouth every Mon, Wed, Fri. Stop: 7/7/19        CHANGE how you take these medications    carvedilol 12.5 MG tablet  Commonly known as:  COREG  Take 1 tablet (12.5 mg total) by mouth 2 (two) times daily with meals.  What changed:    · medication strength  · how much to take     linaclotide 290 mcg Cap  Commonly known as:  LINZESS  Take 1 capsule (290 mcg total) by mouth once daily.  Start taking on:  4/16/2019  What changed:    · medication strength  · how much to take  · additional instructions        CONTINUE taking these medications    amLODIPine 10 MG tablet  Commonly known as:  NORVASC  Take 10 mg by mouth once daily.     docusate sodium 100 MG capsule  Commonly known as:  COLACE  Take 1 capsule (100 mg total) by mouth 2 (two) times daily as needed for Constipation.     zolpidem 10 mg Tab  Commonly known as:  AMBIEN  Take 10 mg by mouth every evening.        STOP taking these medications    calcium acetate 667 mg capsule  Commonly known as:  PHOSLO     cloNIDine 0.1 mg/24 hr td ptwk 0.1 mg/24 hr  Commonly known as:  CATAPRES     clopidogrel 75 mg tablet  Commonly known as:  PLAVIX     cyclobenzaprine 10 MG tablet  Commonly known as:  FLEXERIL     fish oil-omega-3 fatty acids 300-1,000 mg capsule     hydrOXYzine pamoate 25 MG Cap  Commonly known as:  VISTARIL     melatonin 10 mg Tab     VOLTAREN 1 % Gel  Generic drug:  diclofenac sodium          Time spent caring for patient (Greater than 1/2 spent in direct face-to-face contact): > 30 minutes    Arina Sanon NP  Kidney Transplant  [facility]

## 2019-04-15 NOTE — ASSESSMENT & PLAN NOTE
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  S/p living unrelated (friend) kidney transplant 4/8/19, CMV +/+, WIT 30 min, CIT 3h 9 m, HD TTS.  Dry weight 83 kg.  Native UOP <100 ml.    - Surgery significant for intra op US w slightly decreased perfusion related to thrombus in main artery. Kidney removed, pair-of-pants anastomosis converted to double artery, and kidney was replaced (see OR note for full report).   - Kidney US 4/9 with good perfusion.  - Kidney US 4/11 with again with good perfusion.  - Cr increased from 6.2 yesterday to 6.6 today. Excellent uop. No RRT today but expect some DGF related to surgical event.  - Perm cath rescheduled to Monday as expect Cr to cont to improve.    - Davison for 5 days and SARA d/c'd 4/13.   - Pain well controlled, changed to hydrocodone due to severe itching with oxycodone - itching improved.  - Tolerating diet, (+) flatus, (+) BM, continue bowel regimen, h/o chronic constipation requiring linzess which was restarted.  ENEMA administered with success.

## 2019-04-15 NOTE — PROGRESS NOTES
EDUCATION NOTE:    Met with Paige Summers and her caregivers to provide teaching re: immunosuppressant medications.  Reviewed medication section of the Kidney Transplant Education book that was provided.  Emphasized the importance of compliance, role of the blue medication card, concerns for drug interactions, and process of obtaining refills.  Counseled regarding Prograf, Cellcept , prednisone, including directions for use, monitoring, how to handle missed doses, and side effects.  She and daughter verbalized understanding and had the opportunity to ask questions.

## 2019-04-15 NOTE — SUBJECTIVE & OBJECTIVE
Subjective:   History of Present Illness:  Paige Summers is a 67 y/o female w ESRD 2/2 Hypertensive Nephrosclerosis.  S/p living unrelated kidney transplant 4/8/19, CMV +/+, WIT 30 min, CIT 3h 9 m, HD Tues- Thurs- Sat.  Pt has LUE AVF- last used 4/6/19.  Pt was on Plavix for reoccurring AVF thrombus. Per pt, AVF has been declotted 9 times.  Dry weight 83 kg.  Native UOP <100 ml.  5 day gallardo.   Patients immunosuppression will include Campath for induction with early steroid withdrawal and Prograf and Cellcept for maintenance immunosuppression.  Opportunistic infection prophylaxis will include Valcyte for 3 months (CMV D + , R + ), Bactrim for 1 year, and nystatin for 4 weeks.     Surgery significant for intra op US w slightly decreased perfusion.   Kidney graft injected w verapamil in the artery, repositioned the kidney and clamped the distal iliac artery. The kidney remianed partially pink. Kidney removed, resected previous arteriotomy and venotomy and obtained arterial venous continuity. A small amount of iliac artery was resected and an end to end anastomosis was performed.  Donor kidney was reviewed on the back table, artery was cut back, and elimnated the pair of pants anastomosis. Then the main artery was cut back as there appeared to be an intimal abnormality. Venotomy was made, the vein irrigated, and an end renal to right external iliac vein anastomosis was created.  Arterial control was obtained with a vascular clamp.  Arteriotomy was made, the artery irrigated, and an reconstructed to right external iliac artery anastomosis was created.  The second artery was anastomosed end to side to distal ilac artery.  Reperfusion quality was fair.  Ultrasound, open and closed satisfactory.  There was an excellent pulse in the main renal artery, the segmental flow was visible.   Intraoperative urine production was observed.         Interval History:  Due to EMR and system outages, current treatment plans were  maintained until sufficient data became available. No acute events overnight, continue to progress well. Cr remains elevated, excellent uop, will tentatively plan for permcath placement Monday due to clotted LUE AVF. Platelets trended down, but now improving. BP stable. Ambulating in halls TID, tolerating diet, (+) BM's.         Past Medical, Surgical, Family, and Social History:   Unchanged from H&P.    Scheduled Meds:   amLODIPine  10 mg Oral Daily    calcium acetate  1,334 mg Oral TID WM    carvedilol  12.5 mg Oral BID WM    cinacalcet  30 mg Oral Daily with breakfast    cloNIDine  0.1 mg Oral BID    docusate sodium  100 mg Oral TID    famotidine  20 mg Oral QHS    linaclotide  290 mcg Oral Daily    multivitamin  1 tablet Oral Daily    mycophenolate  500 mg Oral BID    nystatin  500,000 Units Mouth/Throat QID (PC + HS)    sulfamethoxazole-trimethoprim 400-80mg  1 tablet Oral Every Mon, Wed, Fri    tacrolimus  4 mg Oral Daily    tacrolimus  5 mg Oral Daily    traZODone  50 mg Oral QHS    [START ON 4/19/2019] valGANciclovir  450 mg Oral Daily     Continuous Infusions:   glucagon (human recombinant)       PRN Meds:acetaminophen, bisacodyl, diphenhydrAMINE, glucagon (human recombinant), hydrALAZINE, HYDROcodone-acetaminophen, HYDROcodone-acetaminophen, naloxone, ondansetron    Intake/Output - Last 3 Shifts       04/13 0700 - 04/14 0659 04/14 0700 - 04/15 0659 04/15 0700 - 04/16 0659    P.O. 1680 2220     Total Intake(mL/kg) 1680 (18.9) 2220 (24.9)     Urine (mL/kg/hr) 700 (0.3) 1300 (0.6)     Drains       Stool  0     Total Output 700 1300     Net +980 +920            Stool Occurrence  0 x            Review of Systems   Constitutional: Positive for activity change and appetite change (improved). Negative for chills, fatigue, fever and unexpected weight change.   HENT: Negative.  Negative for postnasal drip and trouble swallowing.    Eyes: Negative.  Negative for visual disturbance.   Respiratory:  "Negative for cough, shortness of breath and wheezing.    Cardiovascular: Positive for leg swelling (improved). Negative for chest pain and palpitations.   Gastrointestinal: Positive for abdominal pain and constipation. Negative for abdominal distention, anal bleeding, blood in stool, diarrhea, nausea, rectal pain and vomiting.   Genitourinary: Negative for decreased urine volume, difficulty urinating, dysuria, hematuria and urgency.   Musculoskeletal: Negative for arthralgias and gait problem.   Skin: Positive for wound. Negative for rash.   Allergic/Immunologic: Positive for immunocompromised state.   Neurological: Negative for dizziness, light-headedness and headaches.   Psychiatric/Behavioral: The patient is not nervous/anxious.       Objective:     Vital Signs (Most Recent):  Temp: 97.8 °F (36.6 °C) (04/15/19 0332)  Pulse: 75 (04/15/19 0332)  Resp: 18 (04/15/19 0332)  BP: 134/69 (04/15/19 0332)  SpO2: 97 % (04/15/19 0332) Vital Signs (24h Range):  Temp:  [97.5 °F (36.4 °C)-98.4 °F (36.9 °C)] 97.8 °F (36.6 °C)  Pulse:  [75-83] 75  Resp:  [16-18] 18  SpO2:  [97 %-100 %] 97 %  BP: (133-147)/(66-82) 134/69     Weight: 89.2 kg (196 lb 9.6 oz)  Height: 5' 3" (160 cm)  Body mass index is 34.83 kg/m².    Physical Exam   Constitutional: She is oriented to person, place, and time. She appears well-developed. She is active and cooperative. No distress.   LUE AVF (-) thrill/(-) bruit   HENT:   Head: Normocephalic and atraumatic.   Eyes: EOM are normal. No scleral icterus.   Cardiovascular: Normal rate, regular rhythm, normal heart sounds, intact distal pulses and normal pulses.   No murmur heard.  Pulmonary/Chest: Effort normal and breath sounds normal. No respiratory distress. She has no decreased breath sounds. She has no wheezes. She has no rales.   Abdominal: Soft. Normal appearance and bowel sounds are normal. She exhibits no distension. There is no tenderness. There is no guarding.   RLQ incision LOKESH with staples, " soft, non tender to palpation   Musculoskeletal: Normal range of motion. She exhibits edema (+1 generalized).   Neurological: She is alert and oriented to person, place, and time.   Skin: Skin is warm, dry and intact. Capillary refill takes less than 2 seconds. She is not diaphoretic.   Psychiatric: She has a normal mood and affect.   Nursing note and vitals reviewed.      Laboratory:  CBC:   Recent Labs   Lab 04/13/19  0600 04/14/19  0600 04/15/19  0630   WBC 7.06 8.62 8.03   RBC 2.90* 3.05* 2.80*   HGB 8.9* 9.2* 8.2*   HCT 26.2* 28.1* 25.0*   PLT 65* 104* 142*   MCV 90 92 89   MCH 30.7 30.2 29.3   MCHC 34.0 32.7 32.8     CMP:   Recent Labs   Lab 04/09/19  0600  04/12/19  0525 04/13/19  0600 04/14/19  0600      < > 77 91 102   CALCIUM 9.1   < > 8.9 9.2 9.1   ALBUMIN 2.8*  2.8*   < > 2.7* 2.8* 3.1*   PROT 6.7  --   --   --  6.7      < > 134* 136 132*   K 4.9   < > 4.6 4.7 4.8   CO2 24   < > 21* 20* 20*      < > 102 105 101   BUN 81*   < > 116* 116* 119*   CREATININE 6.2*   < > 6.5* 6.6* 6.5*   ALKPHOS 67  --   --   --  71   ALT 15  --   --   --  <5*   AST 65*  --   --   --  31    < > = values in this interval not displayed.     Coagulation: No results for input(s): PT, APTT in the last 168 hours.  Labs within the past 24 hours have been reviewed.    Diagnostic Results:  US - Kidney:   Results for orders placed during the hospital encounter of 04/08/19   US Transplant Kidney With Doppler    Narrative EXAMINATION:  US TRANSPLANT KIDNEY WITH DOPPLER    CLINICAL HISTORY:  post kidney transplant US on Thursday;    TECHNIQUE:  Real time gray scale and doppler ultrasound was performed over the patient's renal allograft.    COMPARISON:  Ultrasound renal transplant 04/09/2019.    FINDINGS:  Renal allograft in the right lower quadrant.  The allograft measures 10.6 cm. Normal perfusion. No hydronephrosis.    No fluid collections.    Vasculature:    Resistive indices ranged from 0.72 to 0.77.    Main renal  artery peak systolic velocity: 203cm/sec with normal waveform.    Renal artery/iliac ratio: 0.95.    The main renal vein is patent.      Impression Satisfactory gray scale and doppler evaluation of renal allograft.    Electronically signed by resident: Valerio Montanez MD  Date:    04/11/2019  Time:    09:23    Electronically signed by: Pan Dalton MD  Date:    04/11/2019  Time:    09:41

## 2019-04-15 NOTE — PLAN OF CARE
Problem: Adult Inpatient Plan of Care  Goal: Plan of Care Review  Outcome: Ongoing (interventions implemented as appropriate)  Patient AAO today, complains of pain, prn pain medication given.  Making urine but not clearing. Creatinine today 6.6.  Patient and patients daughter pulling self meds.  No dialysis access at this time. Left and right upper arm not patent.

## 2019-04-15 NOTE — PLAN OF CARE
"Problem: Spiritual Distress Risk or Actual  Goal: Spiritual Wellbeing    Intervention: Promote Spiritual Wellbeing  Provided initial visit. Pt present with family/friend. Introduced and offered pastoral support with reflective listening, spiritual assessment, and prayer. Pt relying on her kiana for strength. She is entrusting her treatment to God and God's will, and "trusting God" that she won't need the dialysis placement for very long, if at all. Pt made aware of 's presence as needed. Chaplains will continue to follow as needed.         "

## 2019-04-15 NOTE — PT/OT/SLP PROGRESS
Physical Therapy      Patient Name:  Paige Summers   MRN:  [id number]    New PT orders received on 4/15. PT spoke with both pt and pt nsg: both reported pt ambulating hallways with family and nsg staff daily with RW usage. Reinforced the above. No need for re-evaluation. Orders discontinued on today.     Preethi Russo, PT, DPT  4/15/2019

## 2019-04-15 NOTE — PLAN OF CARE
Pt AAOx4, VSS, afebrile. RLQ incision with staples intact, CDI. R SARA drain site CDI, sutures intact. PRN norco 10mg given x1. Caregiver pulled self-meds 100% correctly. Pt placed NPO at midnight for permcath placement in AM. Fall risk precautions initiated.  Pt in lowest bed position setting, lighting adjusted, pt to wear nonskid socks when ambulating, side rails up x2.  Pt remain free from falls during shift. Caregiver at bedside. Call light within reach. Will continue to monitor.

## 2019-04-15 NOTE — PROGRESS NOTES
DISCHARGE NOTE:  SW met with pt and friend/caregiver, Lisa Burns and later with pt's youngest dtr to assess for needs and discuss d/c plan.  Pt reports her friend and dtr have received teaching from RN coordinator and stated she is eager to go home.  Pt's friend is from out of town, but states she will be staying as long as pt needs her.  Both pt and friend verbalize confidence in caregiving abilities and willingness to follow discharge instructions. No psychosocial needs identified at the time of visits.  SW remains available.

## 2019-04-15 NOTE — PROGRESS NOTES
Patients line dcd, patient tolerated well. After lying flat patient may dc.  DCD per md orders.  AVS given to the patient with followups.  Patient notified when to call the MD.  Updated blue cards and all medications given by pharmacy.  Patients daughter at the bedside, gave verbal understanding on care of incision with betadine and self meds.  Patient and patients daughter gave full verbal understanding on all written and verbal dc instructions.  No acute distress noted at this time.

## 2019-04-15 NOTE — PROGRESS NOTES
"[facility]  Adult Nutrition  Progress Note    SUMMARY       Recommendations    Recommendation/Intervention: 1.) Continue renal diet with Novasource Renal TID.   Goals: 1.) Pt to consume/tolerate >75% EEN and EPN  Nutrition Goal Status: progressing towards goal  Communication of RD Recs: (POC)    Reason for Assessment    Reason For Assessment: RD follow-up  Diagnosis: transplant/postoperative complications(s/p KLTx )  Relevant Medical History: HTN, CKD, anemia, gout, HLD, anxiety, GERD  Interdisciplinary Rounds: did not attend  General Information Comments: Pt laying in bed with lunch tray. Dtr to bedside reports she consumed ~50% of pts tray and reports pt only consuming 25%. Pt drinkg ONS to bedside. Pt denies GI distress. Observed some slurring of words. RN aware. NFPE performed . Noted 6.2kg wt gain. Attribute to fluid. Will monitor. Pt and dtr had no further questions for post-transplant diet.   Nutrition Discharge Planning: Pt to meet adequate nutritional needs to meet post-transplant nutritional needs.     Nutrition Risk Screen    Nutrition Risk Screen: no indicators present    Nutrition/Diet History    Spiritual, Cultural Beliefs, Spiritism Practices, Values that Affect Care: no    Anthropometrics    Temp: 98.1 °F (36.7 °C)  Height Method: Stated  Height: 5' 3" (160 cm)  Height (inches): 63 in  Weight Method: Standard Scale  Weight: 89.2 kg (196 lb 9.6 oz)  Weight (lb): 196.6 lb  Ideal Body Weight (IBW), Female: 115 lb  % Ideal Body Weight, Female (lb): 159.11 lb  BMI (Calculated): 32.5  Usual Body Weight (UBW), k kg(per pt)  % Usual Body Weight: 100.2       Lab/Procedures/Meds    Pertinent Labs Reviewed: reviewed  Pertinent Labs Comments: Na 133, , Cr 6.0, GFR 6.6  Pertinent Medications Reviewed: reviewed  Pertinent Medications Comments: docusate, famotidine, morphine, mycophenolate, statin, tacrolimus      Estimated/Assessed Needs    Weight Used For Calorie " Calculations: 89 kg (196 lb 3.4 oz)  Energy Calorie Requirements (kcal): 7821-6408  Energy Need Method: Kcal/kg(30-35 kcal/kg of IBW (52.2kg))  Protein Requirements: 63-78(g/day)  Weight Used For Protein Calculations: (1.2-1.5 g/kg of IBW (52.2kg))  Fluid Requirements (mL): 1 mL/kcal or per MD  Estimated Fluid Requirement Method: RDA Method  RDA Method (mL): 1566         Nutrition Prescription Ordered    Current Diet Order: renal, low K  Oral Nutrition Supplement: Novasource Renal TID    Evaluation of Received Nutrient/Fluid Intake    I/O: +6.7L since admit  Comments: LBM 4/13  % Intake of Estimated Energy Needs: 25 - 50 %  % Meal Intake: 50 - 75 %    Nutrition Risk    Level of Risk/Frequency of Follow-up: low     Assessment and Plan  Nutrition Problem  Increased nutrient needs     Related to (etiology):   Wound healing     Signs and Symptoms (as evidenced by):   S/p kidney transplant     Interventions/Recommendations (treatment strategy):  Collaboration with other providers.     Nutrition Diagnosis Status:   Continue           Monitor and Evaluation    Food and Nutrient Intake: energy intake, food and beverage intake  Food and Nutrient Adminstration: diet order  Knowledge/Beliefs/Attitudes: food and nutrition knowledge/skill  Physical Activity and Function: nutrition-related ADLs and IADLs  Anthropometric Measurements: weight, weight change, body mass index  Biochemical Data, Medical Tests and Procedures: electrolyte and renal panel, gastrointestinal profile, glucose/endocrine profile, inflammatory profile  Nutrition-Focused Physical Findings: overall appearance     Malnutrition Assessment                 Orbital Region (Subcutaneous Fat Loss): well nourished  Upper Arm Region (Subcutaneous Fat Loss): well nourished  Thoracic and Lumbar Region: well nourished   [place] Region (Muscle Loss): mild depletion  Clavicle Bone Region (Muscle Loss): well nourished  Clavicle and Acromion Bone Region (Muscle Loss): well  nourished  Scapular Bone Region (Muscle Loss): well nourished  Dorsal Hand (Muscle Loss): mild depletion  Patellar Region (Muscle Loss): well nourished  Anterior Thigh Region (Muscle Loss): well nourished  Posterior Calf Region (Muscle Loss): well nourished   Edema (Fluid Accumulation): 0-->no edema present   Subcutaneous Fat Loss (Final Summary): well nourished  Muscle Loss Evaluation (Final Summary): well nourished         Nutrition Follow-Up    RD Follow-up?: Yes

## 2019-04-15 NOTE — PROGRESS NOTES
[facility]  Kidney Transplant  Progress Note      Reason for Follow-up: Reassessment of Kidney Transplant - 4/8/2019  (#1) recipient and management of immunosuppression.    ORGAN: LEFT KIDNEY    Donor Type: Living    Donor CMV Status:   Donor HBcAB:  Donor HCV Status:  Donor HBV OFE:   Donor HCV OFE:       Subjective:   History of Present Illness:  Paige Summers is a 67 y/o female w ESRD 2/2 Hypertensive Nephrosclerosis.  S/p living unrelated kidney transplant 4/8/19, CMV +/+, WIT 30 min, CIT 3h 9 m, HD Tues- Thurs- Sat.  Pt has LUE AVF- last used 4/6/19.  Pt was on Plavix for reoccurring AVF thrombus. Per pt, AVF has been declotted 9 times.  Dry weight 83 kg.  Native UOP <100 ml.  5 day gallardo.   Patients immunosuppression will include Campath for induction with early steroid withdrawal and Prograf and Cellcept for maintenance immunosuppression.  Opportunistic infection prophylaxis will include Valcyte for 3 months (CMV D + , R + ), Bactrim for 1 year, and nystatin for 4 weeks.     Surgery significant for intra op US w slightly decreased perfusion.   Kidney graft injected w verapamil in the artery, repositioned the kidney and clamped the distal iliac artery. The kidney remianed partially pink. Kidney removed, resected previous arteriotomy and venotomy and obtained arterial venous continuity. A small amount of iliac artery was resected and an end to end anastomosis was performed.  Donor kidney was reviewed on the back table, artery was cut back, and elimnated the pair of pants anastomosis. Then the main artery was cut back as there appeared to be an intimal abnormality. Venotomy was made, the vein irrigated, and an end renal to right external iliac vein anastomosis was created.  Arterial control was obtained with a vascular clamp.  Arteriotomy was made, the artery irrigated, and an reconstructed to right external iliac artery anastomosis was created.  The second artery was anastomosed end to side  to distal ilac artery.  Reperfusion quality was fair.  Ultrasound, open and closed satisfactory.  There was an excellent pulse in the main renal artery, the segmental flow was visible.   Intraoperative urine production was observed.         Interval History:  Due to EMR and system outages, current treatment plans were maintained until sufficient data became available. No acute events overnight, continue to progress well. Cr remains elevated, excellent uop, will tentatively plan for permcath placement Monday due to clotted LUE AVF. Platelets trended down, but now improving. BP stable. Ambulating in halls TID, tolerating diet, (+) BM's.         Past Medical, Surgical, Family, and Social History:   Unchanged from H&P.    Scheduled Meds:   amLODIPine  10 mg Oral Daily    calcium acetate  1,334 mg Oral TID WM    carvedilol  12.5 mg Oral BID WM    cinacalcet  30 mg Oral Daily with breakfast    cloNIDine  0.1 mg Oral BID    docusate sodium  100 mg Oral TID    famotidine  20 mg Oral QHS    linaclotide  290 mcg Oral Daily    multivitamin  1 tablet Oral Daily    mycophenolate  500 mg Oral BID    nystatin  500,000 Units Mouth/Throat QID (PC + HS)    sulfamethoxazole-trimethoprim 400-80mg  1 tablet Oral Every Mon, Wed, Fri    tacrolimus  4 mg Oral Daily    tacrolimus  5 mg Oral Daily    traZODone  50 mg Oral QHS    [START ON 4/19/2019] valGANciclovir  450 mg Oral Daily     Continuous Infusions:   glucagon (human recombinant)       PRN Meds:acetaminophen, bisacodyl, diphenhydrAMINE, glucagon (human recombinant), hydrALAZINE, HYDROcodone-acetaminophen, HYDROcodone-acetaminophen, naloxone, ondansetron    Intake/Output - Last 3 Shifts       04/13 0700 - 04/14 0659 04/14 0700 - 04/15 0659 04/15 0700 - 04/16 0659    P.O. 1680 2220     Total Intake(mL/kg) 1680 (18.9) 2220 (24.9)     Urine (mL/kg/hr) 700 (0.3) 1300 (0.6)     Drains       Stool  0     Total Output 700 1300     Net +980 +920            Stool Occurrence   "0 x            Review of Systems   Constitutional: Positive for activity change and appetite change (improved). Negative for chills, fatigue, fever and unexpected weight change.   HENT: Negative.  Negative for postnasal drip and trouble swallowing.    Eyes: Negative.  Negative for visual disturbance.   Respiratory: Negative for cough, shortness of breath and wheezing.    Cardiovascular: Positive for leg swelling (improved). Negative for chest pain and palpitations.   Gastrointestinal: Positive for abdominal pain and constipation. Negative for abdominal distention, anal bleeding, blood in stool, diarrhea, nausea, rectal pain and vomiting.   Genitourinary: Negative for decreased urine volume, difficulty urinating, dysuria, hematuria and urgency.   Musculoskeletal: Negative for arthralgias and gait problem.   Skin: Positive for wound. Negative for rash.   Allergic/Immunologic: Positive for immunocompromised state.   Neurological: Negative for dizziness, light-headedness and headaches.   Psychiatric/Behavioral: The patient is not nervous/anxious.       Objective:     Vital Signs (Most Recent):  Temp: 97.8 °F (36.6 °C) (04/15/19 0332)  Pulse: 75 (04/15/19 0332)  Resp: 18 (04/15/19 0332)  BP: 134/69 (04/15/19 0332)  SpO2: 97 % (04/15/19 0332) Vital Signs (24h Range):  Temp:  [97.5 °F (36.4 °C)-98.4 °F (36.9 °C)] 97.8 °F (36.6 °C)  Pulse:  [75-83] 75  Resp:  [16-18] 18  SpO2:  [97 %-100 %] 97 %  BP: (133-147)/(66-82) 134/69     Weight: 89.2 kg (196 lb 9.6 oz)  Height: 5' 3" (160 cm)  Body mass index is 34.83 kg/m².    Physical Exam   Constitutional: She is oriented to person, place, and time. She appears well-developed. She is active and cooperative. No distress.   LUE AVF (-) thrill/(-) bruit   HENT:   Head: Normocephalic and atraumatic.   Eyes: EOM are normal. No scleral icterus.   Cardiovascular: Normal rate, regular rhythm, normal heart sounds, intact distal pulses and normal pulses.   No murmur " heard.  Pulmonary/Chest: Effort normal and breath sounds normal. No respiratory distress. She has no decreased breath sounds. She has no wheezes. She has no rales.   Abdominal: Soft. Normal appearance and bowel sounds are normal. She exhibits no distension. There is no tenderness. There is no guarding.   RLQ incision LOKESH with staples, soft, non tender to palpation   Musculoskeletal: Normal range of motion. She exhibits edema (+1 generalized).   Neurological: She is alert and oriented to person, place, and time.   Skin: Skin is warm, dry and intact. Capillary refill takes less than 2 seconds. She is not diaphoretic.   Psychiatric: She has a normal mood and affect.   Nursing note and vitals reviewed.      Laboratory:  CBC:   Recent Labs   Lab 04/13/19  0600 04/14/19  0600 04/15/19  0630   WBC 7.06 8.62 8.03   RBC 2.90* 3.05* 2.80*   HGB 8.9* 9.2* 8.2*   HCT 26.2* 28.1* 25.0*   PLT 65* 104* 142*   MCV 90 92 89   MCH 30.7 30.2 29.3   MCHC 34.0 32.7 32.8     CMP:   Recent Labs   Lab 04/09/19  0600  04/12/19  0525 04/13/19  0600 04/14/19  0600      < > 77 91 102   CALCIUM 9.1   < > 8.9 9.2 9.1   ALBUMIN 2.8*  2.8*   < > 2.7* 2.8* 3.1*   PROT 6.7  --   --   --  6.7      < > 134* 136 132*   K 4.9   < > 4.6 4.7 4.8   CO2 24   < > 21* 20* 20*      < > 102 105 101   BUN 81*   < > 116* 116* 119*   CREATININE 6.2*   < > 6.5* 6.6* 6.5*   ALKPHOS 67  --   --   --  71   ALT 15  --   --   --  <5*   AST 65*  --   --   --  31    < > = values in this interval not displayed.     Coagulation: No results for input(s): PT, APTT in the last 168 hours.  Labs within the past 24 hours have been reviewed.    Diagnostic Results:  US - Kidney:   Results for orders placed during the hospital encounter of 04/08/19   US Transplant Kidney With Doppler    Narrative EXAMINATION:  US TRANSPLANT KIDNEY WITH DOPPLER    CLINICAL HISTORY:  post kidney transplant US on Thursday;    TECHNIQUE:  Real time gray scale and doppler ultrasound  was performed over the patient's renal allograft.    COMPARISON:  Ultrasound renal transplant 04/09/2019.    FINDINGS:  Renal allograft in the right lower quadrant.  The allograft measures 10.6 cm. Normal perfusion. No hydronephrosis.    No fluid collections.    Vasculature:    Resistive indices ranged from 0.72 to 0.77.    Main renal artery peak systolic velocity: 203cm/sec with normal waveform.    Renal artery/iliac ratio: 0.95.    The main renal vein is patent.      Impression Satisfactory gray scale and doppler evaluation of renal allograft.    Electronically signed by resident: Valerio Montanez MD  Date:    04/11/2019  Time:    09:23    Electronically signed by: Pan Dalton MD  Date:    04/11/2019  Time:    09:41     Assessment/Plan:     * S/P living-donor kidney transplantation  - ESRD 2/2 Hypertensive Nephrosclerosis.  S/p living unrelated (friend) kidney transplant 4/8/19, CMV +/+, WIT 30 min, CIT 3h 9 m, HD TTS.  Dry weight 83 kg.  Native UOP <100 ml.    - Surgery significant for intra op US w slightly decreased perfusion related to thrombus in main artery. Kidney removed, pair-of-pants anastomosis converted to double artery, and kidney was replaced (see OR note for full report).   - Kidney US 4/9 with good perfusion.  - Kidney US 4/11 with again with good perfusion.  - Cr increased from 6.2 yesterday to 6.6 today. Excellent uop. No RRT today but expect some DGF related to surgical event.  - Perm cath rescheduled to Monday as expect Cr to cont to improve.    - Davison for 5 days and SARA d/c'd 4/13.   - Pain well controlled, changed to hydrocodone due to severe itching with oxycodone - itching improved.  - Tolerating diet, (+) flatus, (+) BM, continue bowel regimen, h/o chronic constipation requiring linzess which was restarted.  ENEMA administered with success.     Delayed graft function of kidney  - expected due to OR complications      Thrombocytopenia, unspecified  - Continue to monitor w/ daily labs.  -  HIT panel ordered.  H/H stable.    - haptoglobin <10, .  Repeat haptoglobin 41.  - platelets trended down to 65, now improving.        Essential hypertension  - Continue Coreg and clonidine.  Amlodipine added.      Hyperphosphatemia  - Continue renal diet. Renvela started. Expect kidney function to improve then renvela can be d/c'd    Hypertension, renal disease, stage 5 chronic kidney disease or end stage renal disease  - Pt was on Clonidine patch 0.1 mg, Coreg 6.25 --> increased to 12.5mg BID, and Norvasc 10 mg qd.    - Post transplant, clonidine patch d/c'd, started on clonidine 0.1 mg PO bid started w holding parameters.    AV fistula thrombosis  - Pt last used AVF on Saturday for HD.  On am exam, no thrill/bruit noted.    - Pt has hx of AVF thrombosis (per pt 9 times) and was on Plavix for reoccurring thrombus.    - VAS HD access US 4/10 confirmed AVF again thrombosed.  - Vascular surgery evaluated patient/graft and recommended perm cath placement instead of de-clotting as graft likely to clot again soon regardless.  - Perm cath placement rescheduled for Monday.  Expect cr to improve.  If improved on Monday- will cancel procedure.        At risk for opportunistic infections  - Opportunistic infection prophylaxis will include Valcyte for 3 months to start on POD#10 or day of discharge (CMV D + , R + ), Bactrim for 1 year, and nystatin for 4 weeks.    Long-term use of immunosuppressant medication  - See prophylactic immnuotherapy    Prophylactic immunotherapy  - Patients immunosuppression will include Campath for induction with early steroid withdrawal and Prograf and Cellcept for maintenance immunosuppression.    - Continue to check prograf level daily.  Assess for toxicity and adjust level as needed    Presence of surgical incision  - RLQ incision w staples CDI.  Monitor.      Secondary hyperparathyroidism of renal origin  - Continue Sensipar.    Anemia of renal disease  - H/H stable.   - No overt signs  of bleeding.  - haptoglobin <10. .   - HIT panel negative.  Heparin SQ d/c'd 4/12.    - repeat Haptoglobin improved to 41.  Monitor.         Discharge Planning: not stable for discharge at this time. Discharge likely early next week pending renal recovery and HD placement.     Janette Fajardo DNP  Kidney Transplant  [facility]

## 2019-04-15 NOTE — PROGRESS NOTES
DISCHARGE NOTE:    Paige Summers is a 68 y.o. female s/p LEFT KIDNEY   Living   transplant on 4/8/2019 (Kidney) for ESRD secondary to Hypertensive Nephrosclerosis.      Past Medical History:   Diagnosis Date    Anemia     Anemia of renal disease     Anxiety     Chronic renal failure 01/10/2018    Depression     Essential hypertension     GERD (gastroesophageal reflux disease)     Gout     H pylori ulcer     Hyperlipidemia     Hypertension     Obesity     Secondary hyperparathyroidism of renal origin        Hospital Course: Surgery complicated with initially decreased perfusion requiring arterial reconstruction.  Post-txp US with good perfusion.  SCr slowly downtrending due to complicated surgery most likely.  Plts also downtrending, HIT panel ordered.            Allergies:   Review of patient's allergies indicates:   Allergen Reactions    Oxycodone Itching       [facility]    Discharge Medications:   Paige Summers   Home Medication Instructions MERLE:[id number]    Printed on:04/15/19 3183   Medication Information                      amLODIPine (NORVASC) 10 MG tablet  Take 10 mg by mouth once daily.             bisacodyl (DULCOLAX) 5 mg EC tablet  Take 2 tablets (10 mg total) by mouth daily as needed for Constipation.             carvedilol (COREG) 12.5 MG tablet  Take 1 tablet (12.5 mg total) by mouth 2 (two) times daily with meals.             cinacalcet (SENSIPAR) 30 MG Tab  Take 1 tablet (30 mg total) by mouth daily with breakfast.             cloNIDine (CATAPRES) 0.1 MG tablet  Take 1 tablet (0.1 mg total) by mouth 2 (two) times daily.             docusate sodium (COLACE) 100 MG capsule  Take 1 capsule (100 mg total) by mouth 2 (two) times daily as needed for Constipation.             famotidine (PEPCID) 20 MG tablet  Take 1 tablet (20 mg total) by mouth every evening.             HYDROcodone-acetaminophen (NORCO)  mg per tablet  Take 1 tablet by mouth every 4  (four) hours as needed for Pain.             linaclotide (LINZESS) 290 mcg Cap  Take 1 capsule (290 mcg total) by mouth once daily.             multivitamin (THERAGRAN) tablet  Take 1 tablet by mouth once daily.             mycophenolate (CELLCEPT) 250 mg Cap  Take 2 capsules (500 mg total) by mouth 2 (two) times daily.             sodium bicarbonate 650 MG tablet  Take 3 tablets (1,950 mg total) by mouth 2 (two) times daily.             sulfamethoxazole-trimethoprim 400-80mg (BACTRIM,SEPTRA) 400-80 mg per tablet  Take 1 tablet by mouth every Mon, Wed, Fri. Stop: 4/7/2020             tacrolimus (PROGRAF) 1 MG Cap  Take 5 capsules (5 mg total) by mouth every 12 (twelve) hours.             valGANciclovir (VALCYTE) 450 mg Tab  Take 1 tablet (450 mg total) by mouth every Mon, Wed, Fri. Stop: 7/7/19             zolpidem (AMBIEN) 10 mg Tab  Take 10 mg by mouth every evening.                  Pharmacy Interventions/Recommendations:  1) Transplant Immunosuppression: tac 5/5, MMF    2) Opportunistic Infection prophylaxis: Bactrim MWF x 1 year, valcyte MWF x3 mo    3) Patient Counseling/Education:   Demonstrated the use of the BP cuff, thermometer.    4) Follow-Up/Discharge Needs:    -Confirm pt has amlodipine, but said she has only a few left     5) Patient received prescriptions for:      E-rx's:  As above         Printed rx's:        Faxed / Phoned in rx's:    To [facility] pharmacy per patient request.    6) Patient Assistance Information: n/a    7) The following medications have been placed on HOLD and should be restarted in the outpatient setting (when appropriate): n/a    Paige and her caregiver verbalized their understanding and had the opportunity to ask questions.

## 2019-04-15 NOTE — ASSESSMENT & PLAN NOTE
- Continue to monitor w/ daily labs.  - HIT panel ordered.  H/H stable.    - haptoglobin <10, .  Repeat haptoglobin 41.  - platelets trended down to 65, now improving.

## 2019-04-16 ENCOUNTER — LAB VISIT (OUTPATIENT)
Dept: LAB | Facility: HOSPITAL | Age: 69
End: 2019-04-16
Attending: INTERNAL MEDICINE
Payer: MEDICARE

## 2019-04-16 ENCOUNTER — CLINICAL SUPPORT (OUTPATIENT)
Dept: TRANSPLANT | Facility: CLINIC | Age: 69
End: 2019-04-16
Payer: MEDICARE

## 2019-04-16 VITALS
DIASTOLIC BLOOD PRESSURE: 98 MMHG | DIASTOLIC BLOOD PRESSURE: 98 MMHG | WEIGHT: 188.25 LBS | HEART RATE: 84 BPM | HEIGHT: 63 IN | WEIGHT: 188.25 LBS | BODY MASS INDEX: 33.36 KG/M2 | SYSTOLIC BLOOD PRESSURE: 155 MMHG | HEIGHT: 63 IN | RESPIRATION RATE: 18 BRPM | OXYGEN SATURATION: 96 % | TEMPERATURE: 98 F | HEART RATE: 84 BPM | RESPIRATION RATE: 18 BRPM | TEMPERATURE: 98 F | OXYGEN SATURATION: 96 % | BODY MASS INDEX: 33.36 KG/M2 | SYSTOLIC BLOOD PRESSURE: 155 MMHG

## 2019-04-16 DIAGNOSIS — Z94.0 KIDNEY REPLACED BY TRANSPLANT: Primary | ICD-10-CM

## 2019-04-16 DIAGNOSIS — N18.9 ANEMIA OF RENAL DISEASE: ICD-10-CM

## 2019-04-16 DIAGNOSIS — D63.1 ANEMIA OF RENAL DISEASE: ICD-10-CM

## 2019-04-16 DIAGNOSIS — E55.9 VITAMIN D DEFICIENCY: ICD-10-CM

## 2019-04-16 DIAGNOSIS — Z94.0 KIDNEY REPLACED BY TRANSPLANT: ICD-10-CM

## 2019-04-16 LAB
ALBUMIN SERPL BCP-MCNC: 3.1 G/DL (ref 3.5–5.2)
ANION GAP SERPL CALC-SCNC: 12 MMOL/L (ref 8–16)
BASOPHILS # BLD AUTO: 0.02 K/UL (ref 0–0.2)
BASOPHILS NFR BLD: 0.2 % (ref 0–1.9)
BUN SERPL-MCNC: 120 MG/DL (ref 8–23)
CALCIUM SERPL-MCNC: 9.6 MG/DL (ref 8.7–10.5)
CHLORIDE SERPL-SCNC: 100 MMOL/L (ref 95–110)
CO2 SERPL-SCNC: 20 MMOL/L (ref 23–29)
CREAT SERPL-MCNC: 6.2 MG/DL (ref 0.5–1.4)
DIFFERENTIAL METHOD: ABNORMAL
EOSINOPHIL # BLD AUTO: 0 K/UL (ref 0–0.5)
EOSINOPHIL NFR BLD: 0.2 % (ref 0–8)
ERYTHROCYTE [DISTWIDTH] IN BLOOD BY AUTOMATED COUNT: 15.4 % (ref 11.5–14.5)
EST. GFR  (AFRICAN AMERICAN): 7.3 ML/MIN/1.73 M^2
EST. GFR  (NON AFRICAN AMERICAN): 6.3 ML/MIN/1.73 M^2
GLUCOSE SERPL-MCNC: 112 MG/DL (ref 70–110)
HCT VFR BLD AUTO: 28 % (ref 37–48.5)
HGB BLD-MCNC: 9.2 G/DL (ref 12–16)
IMM GRANULOCYTES # BLD AUTO: 0.18 K/UL (ref 0–0.04)
IMM GRANULOCYTES NFR BLD AUTO: 1.9 % (ref 0–0.5)
LYMPHOCYTES # BLD AUTO: 0.1 K/UL (ref 1–4.8)
LYMPHOCYTES NFR BLD: 0.5 % (ref 18–48)
MAGNESIUM SERPL-MCNC: 2.5 MG/DL (ref 1.6–2.6)
MCH RBC QN AUTO: 30.2 PG (ref 27–31)
MCHC RBC AUTO-ENTMCNC: 32.9 G/DL (ref 32–36)
MCV RBC AUTO: 92 FL (ref 82–98)
MONOCYTES # BLD AUTO: 0.6 K/UL (ref 0.3–1)
MONOCYTES NFR BLD: 6.1 % (ref 4–15)
NEUTROPHILS # BLD AUTO: 8.8 K/UL (ref 1.8–7.7)
NEUTROPHILS NFR BLD: 91.1 % (ref 38–73)
NRBC BLD-RTO: 0 /100 WBC
PHOSPHATE SERPL-MCNC: 4.5 MG/DL (ref 2.7–4.5)
PLATELET # BLD AUTO: 225 K/UL (ref 150–350)
PMV BLD AUTO: 11 FL (ref 9.2–12.9)
POTASSIUM SERPL-SCNC: 5 MMOL/L (ref 3.5–5.1)
RBC # BLD AUTO: 3.05 M/UL (ref 4–5.4)
SODIUM SERPL-SCNC: 132 MMOL/L (ref 136–145)
TACROLIMUS BLD-MCNC: 6.3 NG/ML (ref 5–15)
WBC # BLD AUTO: 9.7 K/UL (ref 3.9–12.7)

## 2019-04-16 PROCEDURE — 99213 OFFICE O/P EST LOW 20 MIN: CPT | Mod: PBBFAC,27

## 2019-04-16 PROCEDURE — 83735 ASSAY OF MAGNESIUM: CPT

## 2019-04-16 PROCEDURE — 99999 PR PBB SHADOW E&M-EST. PATIENT-LVL III: ICD-10-PCS | Mod: PBBFAC,,,

## 2019-04-16 PROCEDURE — 80069 RENAL FUNCTION PANEL: CPT

## 2019-04-16 PROCEDURE — 80197 ASSAY OF TACROLIMUS: CPT

## 2019-04-16 PROCEDURE — 99214 OFFICE O/P EST MOD 30 MIN: CPT | Mod: PBBFAC

## 2019-04-16 PROCEDURE — 99999 PR PBB SHADOW E&M-EST. PATIENT-LVL IV: CPT | Mod: PBBFAC,,,

## 2019-04-16 PROCEDURE — 36415 COLL VENOUS BLD VENIPUNCTURE: CPT

## 2019-04-16 PROCEDURE — 85025 COMPLETE CBC W/AUTO DIFF WBC: CPT

## 2019-04-16 PROCEDURE — 99999 PR PBB SHADOW E&M-EST. PATIENT-LVL III: CPT | Mod: PBBFAC,,,

## 2019-04-16 PROCEDURE — 99999 PR PBB SHADOW E&M-EST. PATIENT-LVL IV: ICD-10-PCS | Mod: PBBFAC,,,

## 2019-04-16 NOTE — PROGRESS NOTES
Pt admitted for a LURD kidney transplant. ESRD 2/2 HTN. CMV +/+. Campath induction. Dry weight 83 kg.  Native UOP <100 ml.       Surgery significant for intra op US w slightly decreased perfusion. Kidney flushed and repositioned Kidney removed and reconstructed to right external iliac artery anastomosis was created.      Cr slow to trend down. Minimal UOP.    Left upper arm AVF w no thrill/no bruit.  Vascular was consulted-access clotted. No permacath       Cr down to 6 at time of d/c, making more UOP  Davison and SARA d/c  RLQ incision LOKESH with staples     Labs 4/16 and RTC to meet with coordinator/pharmD

## 2019-04-16 NOTE — PROGRESS NOTES
Clinic Note: First Return to Clinic Post-  Kidney Transplant    Paige Summers  is a 68 y.o. female  S/p LEFT KIDNEY   transplant on 4/8/2019 (Kidney) for Hypertensive Nephrosclerosis.      Discharge Course (Issues/Concerns): Patient was discharged home from the hospital last night, no issues overnight, slept well.     Objective:   Vitals:    04/16/19 0919   BP: (!) 155/98   Pulse: 84   Resp: 18   Temp: 97.6 °F (36.4 °C)       Met with patient and her caregiver in the clinic to review current medication list.     Current Outpatient Medications   Medication Sig Dispense Refill    amLODIPine (NORVASC) 10 MG tablet Take 10 mg by mouth once daily.      bisacodyl (DULCOLAX) 5 mg EC tablet Take 2 tablets (10 mg total) by mouth daily as needed for Constipation.  0    carvedilol (COREG) 12.5 MG tablet Take 1 tablet (12.5 mg total) by mouth 2 (two) times daily with meals. 60 tablet 11    cinacalcet (SENSIPAR) 30 MG Tab Take 1 tablet (30 mg total) by mouth daily with breakfast. 30 tablet 3    cloNIDine (CATAPRES) 0.1 MG tablet Take 1 tablet (0.1 mg total) by mouth 2 (two) times daily. 60 tablet 11    docusate sodium (COLACE) 100 MG capsule Take 1 capsule (100 mg total) by mouth 2 (two) times daily as needed for Constipation.  0    famotidine (PEPCID) 20 MG tablet Take 1 tablet (20 mg total) by mouth every evening. 30 tablet 1    HYDROcodone-acetaminophen (NORCO)  mg per tablet Take 1 tablet by mouth every 4 (four) hours as needed for Pain. 40 tablet 0    linaclotide (LINZESS) 290 mcg Cap Take 1 capsule (290 mcg total) by mouth once daily. 30 capsule 11    multivitamin (THERAGRAN) tablet Take 1 tablet by mouth once daily.      mycophenolate (CELLCEPT) 250 mg Cap Take 2 capsules (500 mg total) by mouth 2 (two) times daily. 120 capsule 11    sodium bicarbonate 650 MG tablet Take 3 tablets (1,950 mg total) by mouth 2 (two) times daily. 180 tablet 11    sulfamethoxazole-trimethoprim 400-80mg (BACTRIM,SEPTRA)  400-80 mg per tablet Take 1 tablet by mouth every Mon, Wed, Fri. Stop: 4/7/2020 12 tablet 11    tacrolimus (PROGRAF) 1 MG Cap Take 5 capsules (5 mg total) by mouth every 12 (twelve) hours. 300 capsule 11    valGANciclovir (VALCYTE) 450 mg Tab Take 1 tablet (450 mg total) by mouth every Mon, Wed, Fri. Stop: 7/7/19 12 tablet 2     No current facility-administered medications for this visit.        Pharmacy Interventions/Recommendations:     1) Graft Function & Immunosuppression Issues: Renal function about the same as yesterday (SCr no longer rising, SCr 6.2), on tacro and mmf per protocol    2) Opportunistic Infection prophylaxis:   PCP ppx: Bactrim STOP 4/7/20  CMV ppx: Valcyte STOP 7/7/19  Fungal ppx: n/a    3) Donor Serologies & Monitoring:     Donor CMV Status: positive  Donor HCV Status: negative  Donor HBcAb: negative  Donor HBV OFE: negative  Donor HCV OFE: negative      4) Pain Management & Bowel Regimen: Patient states pain is controlled, has not had bowel movement yet but will take OTC meds today and has restarted home linzess    5) Blood Pressure Management: Patient checking BPs at home, clinic /98 - on clonidine and coreg, no changes yet    6) Blood Sugar Management & Follow-up: n/a    7) Electrolyte Management: no changes today    8) OTHER medication follow-up (patient assistance, held medications, etc): Patient did not have blue card and needed help recalling what blue card is - but meds in evening pillbox were already pulled and correct.  Reviewed importance of blue card and patient verbalized understanding - may need education/reinforcement in the future.    - Adjust bactrim and valcyte doses as needed for renal function (no change needed today)    9) Reinforced medication education conducted in the hospital, including medication indications, dosing, administration, side effects, monitoring-- including timing of immunosuppressant levels.     Patient received their FIRST fill of medications  from [facility].  Discussed the process for obtaining refills of medications, including verifying the dose and mailing address to have medications delivered.     Paige and her caregivers verbalized understanding and had the opportunity to ask questions.

## 2019-04-16 NOTE — PROGRESS NOTES
Transplant Teaching Book given to patient, Paige Summers, during pre-op appts.  During the course of the hospital stay the patient received information regarding kidney transplant. Teaching and instruction were completed.  Areas that were discussed included: how to contact the Transplant Team, the importance of measuring intake of fluids and urine output, and monitoring vital signs such as blood pressure, temperature, and daily weights.  Parameters for which to report abnormal findings were given.  Appointment were provided along with the rational for the importance of lab work and clinic visits.  A written medication list was provided.  The importance of immunosuppressive medications, their common side effects, and treatment to prevent or minimize side effects has been reviewed.  Signs and symptoms of rejection and infection along with various treatments were reviewed.  The need to avoid infection was discussed.  Wound care and special consideration regarding activities of daily living were explained.  Written and verbal teaching of the above information was given.

## 2019-04-17 ENCOUNTER — CLINICAL SUPPORT (OUTPATIENT)
Dept: TRANSPLANT | Facility: CLINIC | Age: 69
End: 2019-04-17
Payer: MEDICARE

## 2019-04-17 ENCOUNTER — LAB VISIT (OUTPATIENT)
Dept: LAB | Facility: HOSPITAL | Age: 69
End: 2019-04-17
Attending: INTERNAL MEDICINE
Payer: MEDICARE

## 2019-04-17 VITALS
RESPIRATION RATE: 18 BRPM | HEART RATE: 76 BPM | SYSTOLIC BLOOD PRESSURE: 155 MMHG | TEMPERATURE: 98 F | HEIGHT: 63 IN | BODY MASS INDEX: 33.32 KG/M2 | WEIGHT: 188.06 LBS | OXYGEN SATURATION: 95 % | DIASTOLIC BLOOD PRESSURE: 85 MMHG

## 2019-04-17 DIAGNOSIS — Z94.0 S/P LIVING-DONOR KIDNEY TRANSPLANTATION: Primary | ICD-10-CM

## 2019-04-17 DIAGNOSIS — N18.9 ANEMIA OF RENAL DISEASE: ICD-10-CM

## 2019-04-17 DIAGNOSIS — Z94.0 KIDNEY REPLACED BY TRANSPLANT: ICD-10-CM

## 2019-04-17 DIAGNOSIS — D63.1 ANEMIA OF RENAL DISEASE: ICD-10-CM

## 2019-04-17 LAB
ALBUMIN SERPL BCP-MCNC: 3 G/DL (ref 3.5–5.2)
ANION GAP SERPL CALC-SCNC: 10 MMOL/L (ref 8–16)
BASOPHILS # BLD AUTO: 0.01 K/UL (ref 0–0.2)
BASOPHILS NFR BLD: 0.1 % (ref 0–1.9)
BUN SERPL-MCNC: 112 MG/DL (ref 8–23)
CALCIUM SERPL-MCNC: 9.4 MG/DL (ref 8.7–10.5)
CHLORIDE SERPL-SCNC: 101 MMOL/L (ref 95–110)
CO2 SERPL-SCNC: 23 MMOL/L (ref 23–29)
CREAT SERPL-MCNC: 5.9 MG/DL (ref 0.5–1.4)
DIFFERENTIAL METHOD: ABNORMAL
EOSINOPHIL # BLD AUTO: 0 K/UL (ref 0–0.5)
EOSINOPHIL NFR BLD: 0.2 % (ref 0–8)
ERYTHROCYTE [DISTWIDTH] IN BLOOD BY AUTOMATED COUNT: 15.3 % (ref 11.5–14.5)
EST. GFR  (AFRICAN AMERICAN): 7.8 ML/MIN/1.73 M^2
EST. GFR  (NON AFRICAN AMERICAN): 6.7 ML/MIN/1.73 M^2
GLUCOSE SERPL-MCNC: 114 MG/DL (ref 70–110)
HCT VFR BLD AUTO: 25.5 % (ref 37–48.5)
HGB BLD-MCNC: 8.5 G/DL (ref 12–16)
IMM GRANULOCYTES # BLD AUTO: 0.22 K/UL (ref 0–0.04)
IMM GRANULOCYTES NFR BLD AUTO: 2.5 % (ref 0–0.5)
LYMPHOCYTES # BLD AUTO: 0 K/UL (ref 1–4.8)
LYMPHOCYTES NFR BLD: 0.5 % (ref 18–48)
MAGNESIUM SERPL-MCNC: 2.5 MG/DL (ref 1.6–2.6)
MCH RBC QN AUTO: 29.8 PG (ref 27–31)
MCHC RBC AUTO-ENTMCNC: 33.3 G/DL (ref 32–36)
MCV RBC AUTO: 90 FL (ref 82–98)
MONOCYTES # BLD AUTO: 0.6 K/UL (ref 0.3–1)
MONOCYTES NFR BLD: 6.8 % (ref 4–15)
NEUTROPHILS # BLD AUTO: 7.9 K/UL (ref 1.8–7.7)
NEUTROPHILS NFR BLD: 89.9 % (ref 38–73)
NRBC BLD-RTO: 0 /100 WBC
PHOSPHATE SERPL-MCNC: 4.1 MG/DL (ref 2.7–4.5)
PLATELET # BLD AUTO: 275 K/UL (ref 150–350)
PMV BLD AUTO: 10.9 FL (ref 9.2–12.9)
POTASSIUM SERPL-SCNC: 5 MMOL/L (ref 3.5–5.1)
RBC # BLD AUTO: 2.85 M/UL (ref 4–5.4)
SODIUM SERPL-SCNC: 134 MMOL/L (ref 136–145)
TACROLIMUS BLD-MCNC: 4.8 NG/ML (ref 5–15)
WBC # BLD AUTO: 8.78 K/UL (ref 3.9–12.7)

## 2019-04-17 PROCEDURE — 85025 COMPLETE CBC W/AUTO DIFF WBC: CPT

## 2019-04-17 PROCEDURE — 99213 OFFICE O/P EST LOW 20 MIN: CPT | Mod: PBBFAC

## 2019-04-17 PROCEDURE — 80197 ASSAY OF TACROLIMUS: CPT

## 2019-04-17 PROCEDURE — 80069 RENAL FUNCTION PANEL: CPT

## 2019-04-17 PROCEDURE — 99999 PR PBB SHADOW E&M-EST. PATIENT-LVL III: ICD-10-PCS | Mod: PBBFAC,,,

## 2019-04-17 PROCEDURE — 36415 COLL VENOUS BLD VENIPUNCTURE: CPT

## 2019-04-17 PROCEDURE — 99999 PR PBB SHADOW E&M-EST. PATIENT-LVL III: CPT | Mod: PBBFAC,,,

## 2019-04-17 PROCEDURE — 83735 ASSAY OF MAGNESIUM: CPT

## 2019-04-17 RX ORDER — ONDANSETRON 4 MG/1
8 TABLET, FILM COATED ORAL EVERY 8 HOURS PRN
Qty: 16 TABLET | Refills: 0 | Status: SHIPPED | OUTPATIENT
Start: 2019-04-17 | End: 2019-07-18

## 2019-04-17 RX ADMIN — ERYTHROPOIETIN 20000 UNITS: 20000 INJECTION, SOLUTION INTRAVENOUS; SUBCUTANEOUS at 10:04

## 2019-04-17 NOTE — PROGRESS NOTES
"1ST NURSING VISIT POST DISCHARGE NOTE    1st RN appointment with Paige Summers post discharge 4/15/19 s/p ROSSANA kidney transplant 4/8/19.  Patient's daughter accompanied her.  Patient reports chronic constipation and has started Linzess.  Patient says that she is sleeping well.  Incision intact with staples.  Patient able to explain daily incision care and showering instructions.  Reviewed I&O monitoring, measuring, and recording, and the need for hydration (i.e., at least 2 liters of water daily with minimal caffeine and no grapefruit products).  Medication list and rationale were reviewed.  Patient did not bring blue medication card and medication bottles for review.  Patient reports that she has not stopped Dulcolax and has continued Colace.  Patient has not had a bowel movement since receiving an enema.  Patient expressed understanding of daily care including BID VS, medications, and I&O documentation.  Patient made aware of today's creatinine level: 6.2.  Patient aware that coordinator will review today's labs with a transplant physician and call the patient with any dose changes indicated.  Next lab appointment scheduled for tomorrow.  First post-operative transplant team appointment with labs scheduled for .    Using the Kidney Transplant Patient Reference Manual, the patient submitted her open book "Self-assessment of Kidney Transplant Patient Knowledge" test, which was completed in the transplant clinic this morning before 1st nursing visit.  This test includes questions regarding critical dose medications commonly used after kidney transplant, medication dosing and side effects, importance of timed lab draws, important signs and symptoms to report 24/7 immediately post-transplant as well as how to contact the transplant team 24/7.    Test Score: 19/22    After completing the test, the patient was given a copy of the Self-assessment Answer Key to reinforce accurate learning of test content.  Patient expressed " her understanding of the value of the information included in the self-assessment test.

## 2019-04-18 ENCOUNTER — LAB VISIT (OUTPATIENT)
Dept: LAB | Facility: HOSPITAL | Age: 69
End: 2019-04-18
Attending: INTERNAL MEDICINE
Payer: MEDICARE

## 2019-04-18 DIAGNOSIS — Z94.0 KIDNEY REPLACED BY TRANSPLANT: ICD-10-CM

## 2019-04-18 LAB
ALBUMIN SERPL BCP-MCNC: 3 G/DL (ref 3.5–5.2)
ANION GAP SERPL CALC-SCNC: 10 MMOL/L (ref 8–16)
BUN SERPL-MCNC: 93 MG/DL (ref 8–23)
CALCIUM SERPL-MCNC: 9.4 MG/DL (ref 8.7–10.5)
CHLORIDE SERPL-SCNC: 101 MMOL/L (ref 95–110)
CO2 SERPL-SCNC: 24 MMOL/L (ref 23–29)
CREAT SERPL-MCNC: 5.2 MG/DL (ref 0.5–1.4)
EST. GFR  (AFRICAN AMERICAN): 9.1 ML/MIN/1.73 M^2
EST. GFR  (NON AFRICAN AMERICAN): 7.9 ML/MIN/1.73 M^2
GLUCOSE SERPL-MCNC: 100 MG/DL (ref 70–110)
PHOSPHATE SERPL-MCNC: 4.2 MG/DL (ref 2.7–4.5)
POTASSIUM SERPL-SCNC: 4.9 MMOL/L (ref 3.5–5.1)
SODIUM SERPL-SCNC: 135 MMOL/L (ref 136–145)

## 2019-04-18 PROCEDURE — 36415 COLL VENOUS BLD VENIPUNCTURE: CPT

## 2019-04-18 PROCEDURE — 80069 RENAL FUNCTION PANEL: CPT

## 2019-04-18 RX ORDER — TACROLIMUS 1 MG/1
6 CAPSULE ORAL EVERY 12 HOURS
Qty: 360 CAPSULE | Refills: 11 | Status: SHIPPED | OUTPATIENT
Start: 2019-04-18 | End: 2019-04-24

## 2019-04-18 RX ORDER — MYCOPHENOLATE MOFETIL 250 MG/1
500 CAPSULE ORAL 2 TIMES DAILY
Qty: 120 CAPSULE | Refills: 11 | Status: SHIPPED | OUTPATIENT
Start: 2019-04-18 | End: 2019-05-17

## 2019-04-18 NOTE — PROGRESS NOTES
Improved creatinine today 5.2.     [facility] Nursing Assessment:    B/P- 153/83  Pulse-82  Weight- 182  Swelling/ fluid retention- denies  SOB- denies  Intake- 2100  Output- 2200  Any distress- Constipation, last BM with hosp admin enema 4/14. Will take Linzess and Dulcolox now.

## 2019-04-20 ENCOUNTER — TELEPHONE (OUTPATIENT)
Dept: TRANSPLANT | Facility: CLINIC | Age: 69
End: 2019-04-20

## 2019-04-20 ENCOUNTER — LAB VISIT (OUTPATIENT)
Dept: LAB | Facility: HOSPITAL | Age: 69
End: 2019-04-20
Attending: INTERNAL MEDICINE
Payer: MEDICARE

## 2019-04-20 DIAGNOSIS — Z94.0 KIDNEY REPLACED BY TRANSPLANT: ICD-10-CM

## 2019-04-20 LAB
ALBUMIN SERPL BCP-MCNC: 3 G/DL (ref 3.5–5.2)
ANION GAP SERPL CALC-SCNC: 12 MMOL/L (ref 8–16)
BUN SERPL-MCNC: 63 MG/DL (ref 8–23)
CALCIUM SERPL-MCNC: 9.8 MG/DL (ref 8.7–10.5)
CHLORIDE SERPL-SCNC: 99 MMOL/L (ref 95–110)
CO2 SERPL-SCNC: 27 MMOL/L (ref 23–29)
CREAT SERPL-MCNC: 5 MG/DL (ref 0.5–1.4)
EST. GFR  (AFRICAN AMERICAN): 9.5 ML/MIN/1.73 M^2
EST. GFR  (NON AFRICAN AMERICAN): 8.2 ML/MIN/1.73 M^2
GLUCOSE SERPL-MCNC: 119 MG/DL (ref 70–110)
PHOSPHATE SERPL-MCNC: 4.4 MG/DL (ref 2.7–4.5)
POTASSIUM SERPL-SCNC: 4.4 MMOL/L (ref 3.5–5.1)
SODIUM SERPL-SCNC: 138 MMOL/L (ref 136–145)

## 2019-04-20 PROCEDURE — 80069 RENAL FUNCTION PANEL: CPT

## 2019-04-20 PROCEDURE — 36415 COLL VENOUS BLD VENIPUNCTURE: CPT

## 2019-04-22 ENCOUNTER — TELEPHONE (OUTPATIENT)
Dept: TRANSPLANT | Facility: CLINIC | Age: 69
End: 2019-04-22

## 2019-04-22 ENCOUNTER — HOSPITAL ENCOUNTER (OUTPATIENT)
Dept: RADIOLOGY | Facility: HOSPITAL | Age: 69
Discharge: HOME | End: 2019-04-22
Attending: HOSPITALIST
Payer: MEDICARE

## 2019-04-22 ENCOUNTER — NURSE TRIAGE (OUTPATIENT)
Dept: ADMINISTRATIVE | Facility: CLINIC | Age: 69
End: 2019-04-22

## 2019-04-22 ENCOUNTER — OFFICE VISIT (OUTPATIENT)
Dept: TRANSPLANT | Facility: CLINIC | Age: 69
End: 2019-04-22
Payer: MEDICARE

## 2019-04-22 VITALS
RESPIRATION RATE: 17 BRPM | BODY MASS INDEX: 32.07 KG/M2 | HEIGHT: 63 IN | TEMPERATURE: 98 F | HEART RATE: 86 BPM | OXYGEN SATURATION: 98 % | SYSTOLIC BLOOD PRESSURE: 186 MMHG | DIASTOLIC BLOOD PRESSURE: 100 MMHG | WEIGHT: 181 LBS

## 2019-04-22 DIAGNOSIS — Z79.60 LONG-TERM USE OF IMMUNOSUPPRESSANT MEDICATION: ICD-10-CM

## 2019-04-22 DIAGNOSIS — T86.19 DELAYED GRAFT FUNCTION OF KIDNEY: ICD-10-CM

## 2019-04-22 DIAGNOSIS — D72.829 LEUKOCYTOSIS, UNSPECIFIED TYPE: Primary | ICD-10-CM

## 2019-04-22 DIAGNOSIS — N25.81 SECONDARY HYPERPARATHYROIDISM OF RENAL ORIGIN: Chronic | ICD-10-CM

## 2019-04-22 DIAGNOSIS — Z94.0 S/P LIVING-DONOR KIDNEY TRANSPLANTATION: ICD-10-CM

## 2019-04-22 DIAGNOSIS — Z91.89 AT RISK FOR OPPORTUNISTIC INFECTIONS: ICD-10-CM

## 2019-04-22 DIAGNOSIS — M79.605 LEG PAIN, POSTERIOR, LEFT: ICD-10-CM

## 2019-04-22 DIAGNOSIS — T86.19 DELAYED GRAFT FUNCTION OF KIDNEY: Primary | ICD-10-CM

## 2019-04-22 DIAGNOSIS — N18.9 ANEMIA OF RENAL DISEASE: Chronic | ICD-10-CM

## 2019-04-22 DIAGNOSIS — I10 ESSENTIAL HYPERTENSION: Chronic | ICD-10-CM

## 2019-04-22 DIAGNOSIS — R76.0 ANTIPHOSPHOLIPID ANTIBODY POSITIVE: ICD-10-CM

## 2019-04-22 DIAGNOSIS — D63.1 ANEMIA OF RENAL DISEASE: Chronic | ICD-10-CM

## 2019-04-22 PROCEDURE — 99215 OFFICE O/P EST HI 40 MIN: CPT | Mod: S$PBB,,, | Performed by: INTERNAL MEDICINE

## 2019-04-22 PROCEDURE — 99999 PR PBB SHADOW E&M-EST. PATIENT-LVL V: ICD-10-PCS | Mod: PBBFAC,,, | Performed by: INTERNAL MEDICINE

## 2019-04-22 PROCEDURE — 73502 X-RAY EXAM HIP UNI 2-3 VIEWS: CPT | Mod: 26,LT,, | Performed by: RADIOLOGY

## 2019-04-22 PROCEDURE — 93970 EXTREMITY STUDY: CPT | Mod: 26,,, | Performed by: INTERNAL MEDICINE

## 2019-04-22 PROCEDURE — 99215 PR OFFICE/OUTPT VISIT, EST, LEVL V, 40-54 MIN: ICD-10-PCS | Mod: S$PBB,,, | Performed by: INTERNAL MEDICINE

## 2019-04-22 PROCEDURE — 76776 US TRANSPLANT KIDNEY WITH DOPPLER: ICD-10-PCS | Mod: 26,,, | Performed by: INTERNAL MEDICINE

## 2019-04-22 PROCEDURE — 99999 PR PBB SHADOW E&M-EST. PATIENT-LVL V: CPT | Mod: PBBFAC,,, | Performed by: INTERNAL MEDICINE

## 2019-04-22 PROCEDURE — 93970 US LOWER EXTREMITY VEINS BILATERAL: ICD-10-PCS | Mod: 26,,, | Performed by: INTERNAL MEDICINE

## 2019-04-22 PROCEDURE — 76776 US EXAM K TRANSPL W/DOPPLER: CPT | Mod: TC

## 2019-04-22 PROCEDURE — 93970 EXTREMITY STUDY: CPT | Mod: TC

## 2019-04-22 PROCEDURE — 99215 OFFICE O/P EST HI 40 MIN: CPT | Mod: PBBFAC,25 | Performed by: INTERNAL MEDICINE

## 2019-04-22 PROCEDURE — 73502 XR HIP 2 VIEW LEFT: ICD-10-PCS | Mod: 26,LT,, | Performed by: RADIOLOGY

## 2019-04-22 PROCEDURE — 76776 US EXAM K TRANSPL W/DOPPLER: CPT | Mod: 26,,, | Performed by: INTERNAL MEDICINE

## 2019-04-22 PROCEDURE — 73502 X-RAY EXAM HIP UNI 2-3 VIEWS: CPT | Mod: TC,LT

## 2019-04-22 RX ORDER — SODIUM BICARBONATE 650 MG/1
1300 TABLET ORAL 2 TIMES DAILY
Qty: 120 TABLET | Refills: 11 | Status: SHIPPED | OUTPATIENT
Start: 2019-04-22 | End: 2019-10-14

## 2019-04-22 RX ORDER — TRAMADOL HYDROCHLORIDE 50 MG/1
50 TABLET ORAL EVERY 8 HOURS PRN
Qty: 30 TABLET | Refills: 0 | Status: SHIPPED | OUTPATIENT
Start: 2019-04-22 | End: 2019-04-23 | Stop reason: SDUPTHER

## 2019-04-22 RX ORDER — ZOLPIDEM TARTRATE 10 MG/1
5 TABLET ORAL NIGHTLY PRN
COMMUNITY
End: 2019-05-21 | Stop reason: SDUPTHER

## 2019-04-22 RX ORDER — GUAIFENESIN 1200 MG
TABLET, EXTENDED RELEASE 12 HR ORAL
Status: ON HOLD | COMMUNITY
End: 2021-09-02 | Stop reason: HOSPADM

## 2019-04-22 NOTE — TELEPHONE ENCOUNTER
----- Message from Taty Guevara MD sent at 4/22/2019  3:11 PM CDT -----  Please hold prograf if it is a true trough and check it on Wednesday for dose adjustment. Thank you!

## 2019-04-22 NOTE — PATIENT INSTRUCTIONS
1. Drink 2500 ml of water  2. If pain not improved please call your cordinator  3. US of tranplant Kidney  4. Xray left hip  5. US for blood clot of legs  6. Hold Clonidine for SBP

## 2019-04-22 NOTE — LETTER
April 23, 2019        Jd Carias  [address]  [address]  [facility] [address]  Phone: [phone]  Fax: [phone]             [facility]  [address]  [facility] [address]  Phone: [phone]   Patient: Paige Summers   MR Number: [id number]   YOB: 1950   Date of Visit: 4/22/2019       Dear Dr. Jd Carias    Thank you for referring Paige Summers to me for evaluation. Attached you will find relevant portions of my assessment and plan of care.    If you have questions, please do not hesitate to call me. I look forward to following Paige Summers along with you.    Sincerely,    Celestino Fitzgerald MD    Enclosure    If you would like to receive this communication electronically, please contact [email or web address] or [phone] to request [employer] access.    [employer] is a tool which provides read-only access to select patient information with whom you have a relationship. Its easy to use and provides real time access to review your patients record including encounter summaries, notes, results, and demographic information.    If you feel you have received this communication in error or would no longer like to receive these types of communications, please e-mail [email or web address]

## 2019-04-23 ENCOUNTER — LAB VISIT (OUTPATIENT)
Dept: LAB | Facility: HOSPITAL | Age: 69
End: 2019-04-23
Attending: INTERNAL MEDICINE
Payer: MEDICARE

## 2019-04-23 DIAGNOSIS — D72.829 LEUKOCYTOSIS, UNSPECIFIED TYPE: ICD-10-CM

## 2019-04-23 DIAGNOSIS — M66.0 BAKER'S CYST, RUPTURED: Primary | ICD-10-CM

## 2019-04-23 LAB
BACTERIA #/AREA URNS AUTO: ABNORMAL /HPF
BILIRUB UR QL STRIP: NEGATIVE
CLARITY UR REFRACT.AUTO: CLEAR
COLOR UR AUTO: YELLOW
GLUCOSE UR QL STRIP: NEGATIVE
HGB UR QL STRIP: ABNORMAL
HYALINE CASTS UR QL AUTO: 0 /LPF
KETONES UR QL STRIP: NEGATIVE
LEUKOCYTE ESTERASE UR QL STRIP: ABNORMAL
MICROSCOPIC COMMENT: ABNORMAL
NITRITE UR QL STRIP: NEGATIVE
PH UR STRIP: 8 [PH] (ref 5–8)
PROT UR QL STRIP: ABNORMAL
RBC #/AREA URNS AUTO: 6 /HPF (ref 0–4)
SP GR UR STRIP: 1.01 (ref 1–1.03)
SQUAMOUS #/AREA URNS AUTO: 0 /HPF
URN SPEC COLLECT METH UR: ABNORMAL
WBC #/AREA URNS AUTO: 2 /HPF (ref 0–5)

## 2019-04-23 PROCEDURE — 81001 URINALYSIS AUTO W/SCOPE: CPT

## 2019-04-23 PROCEDURE — 87086 URINE CULTURE/COLONY COUNT: CPT

## 2019-04-23 PROCEDURE — 87186 SC STD MICRODIL/AGAR DIL: CPT

## 2019-04-23 PROCEDURE — 87077 CULTURE AEROBIC IDENTIFY: CPT

## 2019-04-23 PROCEDURE — 87088 URINE BACTERIA CULTURE: CPT

## 2019-04-23 RX ORDER — TRAMADOL HYDROCHLORIDE 50 MG/1
50 TABLET ORAL EVERY 8 HOURS PRN
Qty: 30 TABLET | Refills: 0 | Status: SHIPPED | OUTPATIENT
Start: 2019-04-23 | End: 2021-04-26

## 2019-04-23 NOTE — PROGRESS NOTES
Please review this with surgery and follow their care plan. She had an obvious bulging aspect of the incision and this may need drainage. Will cc Dr JLUIS Goodwin. He was the transplant surgeon in clinic yesterday.

## 2019-04-23 NOTE — PROGRESS NOTES
Kidney Post-Transplant Assessment    Referring Physician: Jd Carias  Current Nephrologist: Jd Carias    ORGAN: LEFT KIDNEY  Donor Type: living  PHS Increased Risk: no  Cold Ischemia: 189 mins  Induction Medications: campath - alemtuzumab (anti-cd52)    Subjective:     CC:  Reassessment of renal allograft function and management of chronic immunosuppression.    HPI:  Ms. Summers is a 68 y.o. year old Black or  female who received a living kidney transplant on 4/8/19. Her most recent creatinine is 5. She takes mycophenolic acid and tacrolimus for maintenance immunosuppression. Her post transplant course has been complicated by delayed graft function requiring dialysis.    ESRD 2/2 Hypertensive Nephrosclerosis.  S/p living unrelated kidney transplant 4/8/19, CMV +/+, WIT 30 min, CIT 3h 9 m, HD Tues- Thurs- Sat.  Pt has LUE AVF- last used 4/6/19.  Pt was on Plavix for reoccurring AVF thrombus. Per pt, AVF has been declotted 9 times.  Dry weight 83 kg.  Native UOP <100 ml.  5 day gallardo.  Patients immunosuppression will include Campath for induction with early steroid withdrawal and Prograf and Cellcept for maintenance immunosuppression. Opportunistic infection prophylaxis will include Valcyte for 3 months, Bactrim for 1 year, and nystatin for 4 weeks.  Surgery significant for intra op US w slightly decreased perfusion.   Kidney graft injected w verapamil in the artery, repositioned the kidney and clamped the distal iliac artery. The kidney remained partially pink. Kidney removed, previous arteriotomy and venotomy resected; and obtained arterial venous continuity. A small amount of iliac artery was resected and an end to end anastomosis was performed.  Donor kidney was reviewed on the back table, artery was cut back, and eliminated the pair of pants anastomosis. Then the main artery was cut back as there appeared to be an intimal abnormality. Venotomy was made, the vein irrigated, and an end  "renal to right external iliac vein anastomosis was created.  Arterial control was obtained with a vascular clamp.  Arteriotomy was made, the artery irrigated, and a reconstructed to right external iliac artery anastomosis was created.  The second artery was anastomosed end to side to distal ilac artery.  Reperfusion quality was fair.  Ultrasound, open and closed satisfactory.  There was an excellent pulse in the main renal artery, the segmental flow was visible.   Intraoperative urine production was observed.      Interval Hx:  Her sCr is slowly improving Today it was 4.7 down from 5.0 mg/dL. Her CO2 is rising 28 today and she is taking NaBicarb 1950 mg BID. She is making very good UOP > 2.0 lts. She is able to keep uop with her intake ~ 2 lts. Her BP today in clinic is elevated today in clinic 186/100 but she is complaining of left hip/thigh pain " hurt deep like in my bone". She has not been taking pain medication (hydrocodone) secondary to severe itching per patient and care giver. Itchimg is not relieved by scratching. She describes this pain as constant and is not relieved by anything. She has bee taking Tylenol around the clock with little improvement. She rates her pain as 9/10 in severity and intensity. She is not very mobile and the pain is keeping her from walking. She was able to only walk 1/2 block yesterday. Her Care giver Ms Gonzalez has been applying Betadine to surgical site wound with staples and noticed an area of "puffyness" with mild redness in upper incision wound. Her BP at home is running in low 130's and she has been holding Clonidine over the weekend. In addition she was complaining of severe constipation but she increased her Linzess and she had a big BM on Saturday and several BM on Sunday per patient.        Current Outpatient Medications on File Prior to Visit   Medication Sig Dispense Refill    acetaminophen (TYLENOL) 325 mg Cap Take by mouth.      amLODIPine (NORVASC) 10 MG tablet Take " 10 mg by mouth once daily.      bisacodyl (DULCOLAX) 5 mg EC tablet Take 2 tablets (10 mg total) by mouth daily as needed for Constipation.  0    carvedilol (COREG) 12.5 MG tablet Take 1 tablet (12.5 mg total) by mouth 2 (two) times daily with meals. 60 tablet 11    cinacalcet (SENSIPAR) 30 MG Tab Take 1 tablet (30 mg total) by mouth daily with breakfast. 30 tablet 3    cloNIDine (CATAPRES) 0.1 MG tablet Take 1 tablet (0.1 mg total) by mouth 2 (two) times daily. 60 tablet 11    docusate sodium (COLACE) 100 MG capsule Take 1 capsule (100 mg total) by mouth 2 (two) times daily as needed for Constipation.  0    famotidine (PEPCID) 20 MG tablet Take 1 tablet (20 mg total) by mouth every evening. 30 tablet 1    multivitamin (THERAGRAN) tablet Take 1 tablet by mouth once daily.      mycophenolate (CELLCEPT) 250 mg Cap Take 2 capsules (500 mg total) by mouth 2 (two) times daily. 120 capsule 11    ondansetron (ZOFRAN) 4 MG tablet Take 2 tablets (8 mg total) by mouth every 8 (eight) hours as needed for Nausea. 16 tablet 0    sulfamethoxazole-trimethoprim 400-80mg (BACTRIM,SEPTRA) 400-80 mg per tablet Take 1 tablet by mouth every Mon, Wed, Fri. Stop: 4/7/2020 12 tablet 11    tacrolimus (PROGRAF) 1 MG Cap Take 6 capsules (6 mg total) by mouth every 12 (twelve) hours. 360 capsule 11    valGANciclovir (VALCYTE) 450 mg Tab Take 1 tablet (450 mg total) by mouth every Mon, Wed, Fri. Stop: 7/7/19 12 tablet 2    zolpidem (AMBIEN) 10 mg Tab Take 5 mg by mouth nightly as needed.      [DISCONTINUED] linaclotide (LINZESS) 290 mcg Cap Take 1 capsule (290 mcg total) by mouth once daily. 30 capsule 11    [DISCONTINUED] sodium bicarbonate 650 MG tablet Take 3 tablets (1,950 mg total) by mouth 2 (two) times daily. 180 tablet 11    [DISCONTINUED] HYDROcodone-acetaminophen (NORCO)  mg per tablet Take 1 tablet by mouth every 4 (four) hours as needed for Pain. 40 tablet 0     Current Facility-Administered Medications on File  "Prior to Visit   Medication Dose Route Frequency Provider Last Rate Last Dose    epoetin hillary injection 20,000 Units  20,000 Units Subcutaneous Q7 Days Taty Guevara MD   20,000 Units at 04/17/19 1015        Review of Systems     Skin: no skin rash  CNS; no headaches, blurred vision, seizure, or syncope  ENT: No JVD,  Adenopathies,  nasal congestion. No oral lesions  Cardiac: No chest pain, dyspnea, claudication, edema or palpitations  Respiratory: No SOB, cough, hemoptysis   Gastro-intestinal: No diarrhea, constipation, abdominal pain, nausea, vomit. No ascitis  Genitourinary: no hematuria, dysuria, frequency, frequency  Musculoskeletal: Positive for Hip thigh pain, arthritis or vasculitic changes  Psych: alert awake, oriented, No cranial nerves deficit.      Objective:       Physical Exam     BP (!) 186/100 (BP Location: Right arm, Patient Position: Sitting, BP Method: Medium (Automatic))   Pulse 86   Temp 98.1 °F (36.7 °C) (Oral)   Resp 17   Ht 5' 3" (1.6 m)   Wt 82.1 kg (181 lb)   LMP 08/08/1971   SpO2 98%   BMI 32.06 kg/m²       Head: normocephalic  Neck: No JVD, cervical axillary, or femoral adenopathies  Heart: no murmurs, Normal s1 and s2, No gallops, no rubs, No murmurs  Lungs; CTA, good respiratory effort, no crackles  Abdomen: soft, non tender, no splenomegaly or hepatomegaly, no massess, no bruits  Allograft area: Surgical wound with staple mild tenderness to be expected upper incision with small are of redness and possible fluctuated area ? Fluid collection. All staple present no open wound.  Extremities: No edema, skin rash, Left hip pain extending to posterior thigh tender to deep palpation proximal thigh and hip.   SNC: awake, alert oriented. Cranial nerves are intact, no focalized, sensitivity and strength preserved      Labs:  Lab Results   Component Value Date    WBC 13.31 (H) 04/22/2019    HGB 9.0 (L) 04/22/2019    HCT 28.0 (L) 04/22/2019     04/22/2019    K 4.0 04/22/2019    "  04/22/2019    CO2 28 04/22/2019    BUN 44 (H) 04/22/2019    CREATININE 4.7 (H) 04/22/2019    EGFRNONAA 8.9 (A) 04/22/2019    CALCIUM 9.3 04/22/2019    PHOS 3.4 04/22/2019    MG 2.2 04/22/2019    ALBUMIN 3.0 (L) 04/22/2019    AST 31 04/14/2019    ALT <5 (L) 04/14/2019    .0 (H) 04/08/2019    TACROLIMUS 4.8 (L) 04/17/2019       No results found for: EXTANC, EXTWBC, EXTSEGS, EXTPLATELETS, EXTHEMOGLOBI, EXTHEMATOCRI, EXTCREATININ, EXTSODIUM, EXTPOTASSIUM, EXTBUN, EXTCO2, EXTCALCIUM, EXTPHOSPHORU, EXTGLUCOSE, EXTALBUMIN, EXTAST, EXTALT, EXTBILITOTAL, EXTLIPASE, EXTAMYLASE    No results found for: EXTCYCLOSLVL, EXTSIROLIMUS, EXTTACROLVL, EXTPROTCRE, EXTPTHINTACT, EXTPROTEINUA, EXTWBCUA, EXTRBCUA    Labs were reviewed with the patient    Assessment:     1. Delayed graft function of kidney    2. Anemia of renal disease    3. Antiphospholipid antibody positive    4. At risk for opportunistic infections    5. Essential hypertension    6. Long-term use of immunosuppressant medication    7. S/P living-donor kidney transplantation    8. Secondary hyperparathyroidism of renal origin    9. Leg pain, posterior, left        Plan:        1. CKD stage to be dermine: sCr is improving down to 4.7 mg/dL today 04/22/2019, making good amount of UOP ~ 2 lts daily will continue follow up as per our center guidelines. She is schedule for labs this week. Education provided in appropriate fluid intake, potassium intake. Continue with oral hydration.  - Increase fluid intake to 2.5 lts   - baseline Cr to be determine   - Acid-Base: Will decrease NaBicarb to 1300 mg TID labs on thursday      2. Immunosuppression: prograf level was addressed by Dr Guevara.   Lab Results   Component Value Date    TACROLIMUS 14.3 04/22/2019    TACROLIMUS 4.8 (L) 04/17/2019    TACROLIMUS 6.3 04/16/2019       - FK levels not back from this morning will review and call her with instruction of dose needs to be changed'  - Continue current regiment   - MMF  500 mg BID   Will closely monitor for toxicities, education provided about adherence to medicines and need to communicate any side effect to the transplant nurse or physician.    3. Allograft Function: She had SGF, she never required any RRT post tx, sCr slowly improving today 4.7 mg/dL. Baseline for the patient is yet to be determine. Continue follow up as per our guidelines and with the local General nephrologist. Communication will be sent today.  Lab Results   Component Value Date    CREATININE 4.7 (H) 04/22/2019    CREATININE 5.0 (H) 04/20/2019    CREATININE 5.2 (H) 04/18/2019     No results found for: AMYLASE, LIPASE    4. Hypertension management:  Continue with home blood pressure monitoring, low salt and healthy life discussed with the patient.  · High BP today in clinic but home reads are controlled ( she is in pain today), will continue to monitor. Goal for BP is <130 mmHg SBP and BDP <80 mmHg.   · Continue current regiment.       5. Metabolic Bone Disease/Secondary Hyperparathyroidism:calcium and phosphorus level discussed with the patient, patient will continue follow up with the general nephrologist for management of metabolic bone disease   calcium and phosphorus as per our center protocol. Will monitor PTH, Vit D level, calcium.      Lab Results   Component Value Date    .0 (H) 04/08/2019    CALCIUM 9.3 04/22/2019    PHOS 3.4 04/22/2019    PHOS 4.4 04/20/2019    PHOS 4.2 04/18/2019   · Started Cinacalcet 4/10/2019   · Repeat PTH in one month.    6. Electrolytes: reviewed with the patient, essentially within the normal range no need for acute changes today, will monitor as per our center guidelines.     Lab Results   Component Value Date     04/22/2019    K 4.0 04/22/2019     04/22/2019    CO2 28 04/22/2019    CO2 27 04/20/2019    CO2 24 04/18/2019   · CO2 has been rising will decrease to 1300 mg BID    7. Anemia: will continue monitoring as per our center guidelines. No indication  for acute intervention today.     Lab Results   Component Value Date    WBC 13.31 (H) 04/22/2019    HGB 9.0 (L) 04/22/2019    HCT 28.0 (L) 04/22/2019    MCV 93 04/22/2019     (H) 04/22/2019   - Hg not onTarget  - No need for BRENDAN    - Adequate iron stores as per most recent profile     8.Proteinuria: will continue with pr/cr ratio as per our center guidelines  No results found for: PROTEINURINE, CREATRANDUR, UTPCR     9. BK virus infection screening: will continue with urine or blood PCR as per our guidelines to prevent BK virus viremia and allograft dysfunction  No results found for: BKVIRUSDNAUR, BKQUANTURINE, BKVIRUSLOG, BKVIRUSURINE, BKVIRUSPCRQB      10. Weight education: provided during the clinic visit.   Body mass index is 32.06 kg/m².     11.Patient safety education regarding immunosuppression including prophylaxis posttransplant for CMV, PCP : Education provided about vaccination and prevention of infections.    12.  Cytopenias: no significant cytopenias will monitor as per our guidelines. Medicine list reviewed including potential causes of drug-induced cytopenias     Lab Results   Component Value Date    WBC 13.31 (H) 04/22/2019    HGB 9.0 (L) 04/22/2019    HCT 28.0 (L) 04/22/2019    MCV 93 04/22/2019     (H) 04/22/2019   Leukocytosis:   - UA and UCx    13. Post-transplant Prophylaxis; CMV Infection, PJP and Candida mucosistis and other indicated for this particular patient.     14. Leg pain:  - Xray Hip left  - US DVT bilateral LE  - DC hydrocodone  - Tramadol 50 mg q 8 hrs PRN pain  - Continue Tylenol q 8 hrs    15 Constipation:  - Decrease Linzess to 145 mg     I spoke with the patient for 30 minutes. More than half dedicated to counseling and education. All questions answered    Staff Transplant Nephrology addendum:  Patient was seen and examined with         I agree with assessment and plan. I spoke with the patient for 15 minutes and explained  current plan. Patient and  family voiced understanding. All questions answered  Kidney US ordered and reviewed 4/23 with radiology and transplant surgery. Will watch for now collection and incision. It is a complex fluid collection, likely hematoma. Not easy to drain as per radiology (due to small pockets of fluid)  GFR improving. Patient feels better, I spoke with patient and care giver. Labs available reviewed with them. Urine output improved. Pain control with tramadol. US of lower extremities ordered NO DVT. Possible ruptured Baker's cyst. I sent a message to transplant coordinator asking for referral to Orthopedic surgery for evaluation. Hip X Rx was reviewed No gross evidence of fracture dislocation. Part of this attestation was made using information obtained after the clinic visit and 24 hrs later when data form US and review with radiology were available. Will send other message to our nurse about close monitoring of incision and education to the patient about signs of infection.       Follow-up:   Clinic: return to transplant clinic weekly for the first month after transplant; every 2 weeks during months 2-3; then at 6-, 9-, 12-, 18-, 24-, and 36- months post-transplant to reassess for complications from immunosuppression toxicity and monitor for rejection.  Annually thereafter.    Labs: since patient remains at high risk for rejection and drug-related complications that warrant close monitoring, labs will be ordered as follows: continue twice weekly CBC, renal panel, and drug level for first month; then same labs once weekly through 3rd month post-transplant.  Urine for UA and protein/creatinine ratio monthly.  Serum BK - PCR at 1-, 3-, 6-, 9-, 12-, 18-, 24-, 36-, 48-, and 60 months post-transplant.  Hepatic panel at 1-, 2-, 3-, 6-, 9-, 12-, 18-, 24-, and 36- months post-transplant.    Discussed With Dr Celestino Barrios MD  Transplant Nephrology Fellow       Education:   Material provided to the patient.  Patient  reminded to call with any health changes since these can be early signs of significant complications.  Also, I advised the patient to be sure any new medications or changes of old medications are discussed with either a pharmacist or physician knowledgeable with transplant to avoid rejection/drug toxicity related to significant drug interactions.

## 2019-04-23 NOTE — TELEPHONE ENCOUNTER
"    Reason for Disposition   Caller has NON-URGENT medication question about med that PCP prescribed and triager unable to answer question    Protocols used: MEDICATION QUESTION CALL-A-    Kidney Transplant 4/8/19  Paige states she came in today with her b/p elevated which she states she believes was because she was in pain all night. Her doctor prescribed her ultram and she thought that it was electronically prescribed. However, this medication was listed as a "print" prescription and patient states she was not given a printed prescription when she left the office. Spoke to Dr. Guevara who recommended nurse reach out to whoever is on call for Dr. Barrera who wrote the prescription bc she cannot escribe. Spoke to on call nephrologist (joe martínez md) who states all calls with kidney transplant should go through transplant nephrologist (Dr. guevara) Advised patient nurse would have to send a message to her coordinator get this taken care of in the morning. She verbalized understanding.  "

## 2019-04-24 ENCOUNTER — LAB VISIT (OUTPATIENT)
Dept: LAB | Facility: HOSPITAL | Age: 69
End: 2019-04-24
Attending: INTERNAL MEDICINE
Payer: MEDICARE

## 2019-04-24 DIAGNOSIS — Z94.0 KIDNEY REPLACED BY TRANSPLANT: ICD-10-CM

## 2019-04-24 LAB
ALBUMIN SERPL BCP-MCNC: 3 G/DL (ref 3.5–5.2)
ANION GAP SERPL CALC-SCNC: 10 MMOL/L (ref 8–16)
BASOPHILS # BLD AUTO: 0.04 K/UL (ref 0–0.2)
BASOPHILS NFR BLD: 0.4 % (ref 0–1.9)
BUN SERPL-MCNC: 35 MG/DL (ref 8–23)
CALCIUM SERPL-MCNC: 9.5 MG/DL (ref 8.7–10.5)
CHLORIDE SERPL-SCNC: 105 MMOL/L (ref 95–110)
CO2 SERPL-SCNC: 25 MMOL/L (ref 23–29)
CREAT SERPL-MCNC: 3.7 MG/DL (ref 0.5–1.4)
DIFFERENTIAL METHOD: ABNORMAL
EOSINOPHIL # BLD AUTO: 0.1 K/UL (ref 0–0.5)
EOSINOPHIL NFR BLD: 0.5 % (ref 0–8)
ERYTHROCYTE [DISTWIDTH] IN BLOOD BY AUTOMATED COUNT: 15.6 % (ref 11.5–14.5)
EST. GFR  (AFRICAN AMERICAN): 13.7 ML/MIN/1.73 M^2
EST. GFR  (NON AFRICAN AMERICAN): 11.8 ML/MIN/1.73 M^2
GLUCOSE SERPL-MCNC: 98 MG/DL (ref 70–110)
HCT VFR BLD AUTO: 27 % (ref 37–48.5)
HGB BLD-MCNC: 8.5 G/DL (ref 12–16)
IMM GRANULOCYTES # BLD AUTO: 0.05 K/UL (ref 0–0.04)
IMM GRANULOCYTES NFR BLD AUTO: 0.5 % (ref 0–0.5)
LYMPHOCYTES # BLD AUTO: 0.1 K/UL (ref 1–4.8)
LYMPHOCYTES NFR BLD: 0.5 % (ref 18–48)
MAGNESIUM SERPL-MCNC: 2 MG/DL (ref 1.6–2.6)
MCH RBC QN AUTO: 30.1 PG (ref 27–31)
MCHC RBC AUTO-ENTMCNC: 31.5 G/DL (ref 32–36)
MCV RBC AUTO: 96 FL (ref 82–98)
MONOCYTES # BLD AUTO: 0.4 K/UL (ref 0.3–1)
MONOCYTES NFR BLD: 4.3 % (ref 4–15)
NEUTROPHILS # BLD AUTO: 8.8 K/UL (ref 1.8–7.7)
NEUTROPHILS NFR BLD: 93.8 % (ref 38–73)
NRBC BLD-RTO: 0 /100 WBC
PHOSPHATE SERPL-MCNC: 3.4 MG/DL (ref 2.7–4.5)
PLATELET # BLD AUTO: 368 K/UL (ref 150–350)
PMV BLD AUTO: 8.9 FL (ref 9.2–12.9)
POTASSIUM SERPL-SCNC: 4.2 MMOL/L (ref 3.5–5.1)
RBC # BLD AUTO: 2.82 M/UL (ref 4–5.4)
SODIUM SERPL-SCNC: 140 MMOL/L (ref 136–145)
TACROLIMUS BLD-MCNC: 4.2 NG/ML (ref 5–15)
WBC # BLD AUTO: 9.37 K/UL (ref 3.9–12.7)

## 2019-04-24 PROCEDURE — 80069 RENAL FUNCTION PANEL: CPT

## 2019-04-24 PROCEDURE — 85025 COMPLETE CBC W/AUTO DIFF WBC: CPT

## 2019-04-24 PROCEDURE — 36415 COLL VENOUS BLD VENIPUNCTURE: CPT

## 2019-04-24 PROCEDURE — 83735 ASSAY OF MAGNESIUM: CPT

## 2019-04-24 PROCEDURE — 80197 ASSAY OF TACROLIMUS: CPT

## 2019-04-24 RX ORDER — TACROLIMUS 1 MG/1
5 CAPSULE ORAL EVERY 12 HOURS
Qty: 360 CAPSULE | Refills: 11 | Status: SHIPPED | OUTPATIENT
Start: 2019-04-24 | End: 2019-05-16

## 2019-04-25 LAB — BACTERIA UR CULT: NORMAL

## 2019-04-25 RX ORDER — CIPROFLOXACIN 500 MG/1
500 TABLET ORAL DAILY
Qty: 5 TABLET | Refills: 0 | Status: SHIPPED | OUTPATIENT
Start: 2019-04-25 | End: 2019-04-30

## 2019-04-25 NOTE — TELEPHONE ENCOUNTER
----- Message from Taty Guevara MD sent at 4/24/2019  1:28 PM CDT -----  Please start ciprofloxacin 500 mg daily X 5 days.

## 2019-04-29 ENCOUNTER — LAB VISIT (OUTPATIENT)
Dept: LAB | Facility: HOSPITAL | Age: 69
End: 2019-04-29
Attending: INTERNAL MEDICINE
Payer: MEDICARE

## 2019-04-29 ENCOUNTER — OFFICE VISIT (OUTPATIENT)
Dept: ORTHOPEDICS | Facility: CLINIC | Age: 69
End: 2019-04-29
Payer: MEDICARE

## 2019-04-29 ENCOUNTER — TELEPHONE (OUTPATIENT)
Dept: TRANSPLANT | Facility: CLINIC | Age: 69
End: 2019-04-29

## 2019-04-29 VITALS
HEART RATE: 100 BPM | HEIGHT: 63 IN | WEIGHT: 182.88 LBS | DIASTOLIC BLOOD PRESSURE: 97 MMHG | SYSTOLIC BLOOD PRESSURE: 173 MMHG | BODY MASS INDEX: 32.4 KG/M2

## 2019-04-29 DIAGNOSIS — M54.42 LEFT-SIDED LOW BACK PAIN WITH LEFT-SIDED SCIATICA, UNSPECIFIED CHRONICITY: Primary | ICD-10-CM

## 2019-04-29 DIAGNOSIS — Z94.0 KIDNEY REPLACED BY TRANSPLANT: ICD-10-CM

## 2019-04-29 LAB
ALBUMIN SERPL BCP-MCNC: 3.1 G/DL (ref 3.5–5.2)
ANION GAP SERPL CALC-SCNC: 10 MMOL/L (ref 8–16)
BASOPHILS # BLD AUTO: 0.04 K/UL (ref 0–0.2)
BASOPHILS NFR BLD: 0.6 % (ref 0–1.9)
BUN SERPL-MCNC: 22 MG/DL (ref 8–23)
CALCIUM SERPL-MCNC: 9.8 MG/DL (ref 8.7–10.5)
CHLORIDE SERPL-SCNC: 108 MMOL/L (ref 95–110)
CO2 SERPL-SCNC: 21 MMOL/L (ref 23–29)
CREAT SERPL-MCNC: 2.7 MG/DL (ref 0.5–1.4)
DIFFERENTIAL METHOD: ABNORMAL
EOSINOPHIL # BLD AUTO: 0 K/UL (ref 0–0.5)
EOSINOPHIL NFR BLD: 0.5 % (ref 0–8)
ERYTHROCYTE [DISTWIDTH] IN BLOOD BY AUTOMATED COUNT: 15 % (ref 11.5–14.5)
EST. GFR  (AFRICAN AMERICAN): 20 ML/MIN/1.73 M^2
EST. GFR  (NON AFRICAN AMERICAN): 17.3 ML/MIN/1.73 M^2
GLUCOSE SERPL-MCNC: 92 MG/DL (ref 70–110)
HCT VFR BLD AUTO: 28.1 % (ref 37–48.5)
HGB BLD-MCNC: 8.9 G/DL (ref 12–16)
IMM GRANULOCYTES # BLD AUTO: 0.02 K/UL (ref 0–0.04)
IMM GRANULOCYTES NFR BLD AUTO: 0.3 % (ref 0–0.5)
LYMPHOCYTES # BLD AUTO: 0.1 K/UL (ref 1–4.8)
LYMPHOCYTES NFR BLD: 1 % (ref 18–48)
MAGNESIUM SERPL-MCNC: 1.4 MG/DL (ref 1.6–2.6)
MCH RBC QN AUTO: 29.8 PG (ref 27–31)
MCHC RBC AUTO-ENTMCNC: 31.7 G/DL (ref 32–36)
MCV RBC AUTO: 94 FL (ref 82–98)
MONOCYTES # BLD AUTO: 0.3 K/UL (ref 0.3–1)
MONOCYTES NFR BLD: 4.9 % (ref 4–15)
NEUTROPHILS # BLD AUTO: 5.7 K/UL (ref 1.8–7.7)
NEUTROPHILS NFR BLD: 92.7 % (ref 38–73)
NRBC BLD-RTO: 0 /100 WBC
PHOSPHATE SERPL-MCNC: 2.4 MG/DL (ref 2.7–4.5)
PLATELET # BLD AUTO: 273 K/UL (ref 150–350)
PMV BLD AUTO: 9.3 FL (ref 9.2–12.9)
POTASSIUM SERPL-SCNC: 4.2 MMOL/L (ref 3.5–5.1)
RBC # BLD AUTO: 2.99 M/UL (ref 4–5.4)
SODIUM SERPL-SCNC: 139 MMOL/L (ref 136–145)
TACROLIMUS BLD-MCNC: 11.2 NG/ML (ref 5–15)
WBC # BLD AUTO: 6.17 K/UL (ref 3.9–12.7)

## 2019-04-29 PROCEDURE — 99999 PR PBB SHADOW E&M-EST. PATIENT-LVL III: CPT | Mod: PBBFAC,,, | Performed by: PHYSICIAN ASSISTANT

## 2019-04-29 PROCEDURE — 99999 PR PBB SHADOW E&M-EST. PATIENT-LVL III: ICD-10-PCS | Mod: PBBFAC,,, | Performed by: PHYSICIAN ASSISTANT

## 2019-04-29 PROCEDURE — 99203 OFFICE O/P NEW LOW 30 MIN: CPT | Mod: S$PBB,,, | Performed by: PHYSICIAN ASSISTANT

## 2019-04-29 PROCEDURE — 99213 OFFICE O/P EST LOW 20 MIN: CPT | Mod: PBBFAC | Performed by: PHYSICIAN ASSISTANT

## 2019-04-29 PROCEDURE — 36415 COLL VENOUS BLD VENIPUNCTURE: CPT

## 2019-04-29 PROCEDURE — 80197 ASSAY OF TACROLIMUS: CPT

## 2019-04-29 PROCEDURE — 99203 PR OFFICE/OUTPT VISIT, NEW, LEVL III, 30-44 MIN: ICD-10-PCS | Mod: S$PBB,,, | Performed by: PHYSICIAN ASSISTANT

## 2019-04-29 PROCEDURE — 85025 COMPLETE CBC W/AUTO DIFF WBC: CPT

## 2019-04-29 PROCEDURE — 80069 RENAL FUNCTION PANEL: CPT

## 2019-04-29 PROCEDURE — 83735 ASSAY OF MAGNESIUM: CPT

## 2019-04-29 NOTE — TELEPHONE ENCOUNTER
Call received from patient reports pain all weekend long to LLE, unresolved by Tramadol and Tylenol. Imaging studies to BLE done 4/22/19 negative for DVT, ruptured bakers cyst to RLE noted on U/S. Orthopedic surgery consulted, patient advised to see Urgent Care today.

## 2019-04-29 NOTE — PROGRESS NOTES
SUBJECTIVE:     Chief Complaint & History of Present Illness:  Paige Summers is a 68 y.o. year old female, new patient, presenting today for constant left hip pain which started about one week ago.  Patient is s/p kidney transplant on 4/8/19.  There is not a history of trauma.  The pain is located in the posterior aspect of the hip/ left buttocks.  The pain is described as sharp, 9/10.  It is is worse with weight bearing, is aggravated by walking and radiates to left leg.  Previous treatments include acetaminophen, rest and pain medication from transplant surgery which have provided minimal relief.  There is not a history of previous injury or surgery to the hip.  The patient does use an assistive device.      Past Medical History:   Diagnosis Date    Anemia     Anemia of renal disease     Anxiety     Chronic renal failure 01/10/2018    Depression     Essential hypertension     GERD (gastroesophageal reflux disease)     Gout     H pylori ulcer     Hyperlipidemia     Hypertension     Obesity     Secondary hyperparathyroidism of renal origin        Past Surgical History:   Procedure Laterality Date    declot Left 2/19/2019    Performed by Yuriy Gibson MD at [facility] CATH LAB    DIALYSIS FISTULA CREATION Left 02/2018    FISTULOGRAM Left 3/13/2019    Performed by Irineo Devlin MD at [facility] CATH LAB    FISTULOGRAM Left 12/19/2018    Performed by Javed Lopez MD at [facility] CATH LAB    HYSTERECTOMY  1971    TVH    PERMCATH REMOVAL-TUNNELED CVC REMOVAL N/A 4/23/2018    Performed by [facility]y Gibson [facility] at [facility] CATH LAB    PERMCATH REWIRE- TUNNELED CATH REWIRE N/A 3/21/2018    Performed by [facility] Gibson [facility] at [facility] CATH LAB    THROMBECTOMY, HEMODIALYSIS GRAFT OR FISTULA Left 12/13/2018    Performed by [facility]aved Lopez [facility] at [facility] CATH LAB    THROMBECTOMY, HEMODIALYSIS GRAFT OR FISTULA Left 11/9/2018    Performed by [facility] Gibson [facility] at [facility] CATH LAB    THROMBECTOMY, HEMODIALYSIS GRAFT OR FISTULA  Left 9/12/2018    Performed by [facility] at [facility] CATH LAB    THROMBECTOMY, HEMODIALYSIS GRAFT OR FISTULA N/A 9/6/2018    Performed by Y[facility]y Gibson [facility] at [facility] CATH LAB    THROMBECTOMY, HEMODIALYSIS GRAFT OR FISTULA Left 6/13/2018    Performed by Y[facility]y Gibson [facility] at [facility] CATH LAB    THROMBECTOMY, HEMODIALYSIS GRAFT OR FISTULA Left 6/11/2018    Performed by Yuriy Gibson MD at [facility] CATH LAB    TRANSPLANT, KIDNEY N/A 4/8/2019    Performed by Mathew Goodwin MD at [facility]    ULTRASOUND, DIAGNOSTIC Left 10/10/2018    Performed by Yuriy Gibson [facility] at [facility] CATH LAB    ULTRASOUND, DIAGNOSTIC Left 9/19/2018    Performed by Yuriy Gibson [facility] at [facility] CATH LAB    ULTRASOUND, DIAGNOSTIC Left 8/20/2018    Performed by Yuriy Gibson [facility] at [facility] CATH LAB    ULTRASOUND, DIAGNOSTIC Left 8/6/2018    Performed by Yuriy Gibson [facility] at [facility] CATH LAB    ULTRASOUND, DIAGNOSTIC Left 7/9/2018    Performed by Yuriy Gibson [facility] at [facility] CATH LAB    ULTRASOUND, DIAGNOSTIC Left 6/18/2018    Performed by Yuriy Gibson [facility] at [facility] CATH LAB       Family History   Problem Relation Age of Onset    Alcohol abuse Mother     No Known Problems Father     Hypertension Sister     Arthritis Sister     Heart disease Brother     Heart failure Brother     Heart failure Brother     Diabetes Cousin     Breast cancer Neg Hx     Colon cancer Neg Hx     Ovarian cancer Neg Hx        Review of patient's allergies indicates:   Allergen Reactions    Oxycodone Itching    Hydrocodone Itching         Current Outpatient Medications:     acetaminophen (TYLENOL) 325 mg Cap, Take by mouth., Disp: , Rfl:     amLODIPine (NORVASC) 10 MG tablet, Take 10 mg by mouth once daily., Disp: , Rfl:     bisacodyl (DULCOLAX) 5 mg EC tablet, Take 2 tablets (10 mg total) by mouth daily as needed for Constipation., Disp: , Rfl: 0    carvedilol (COREG) 12.5 MG tablet, Take 1 tablet (12.5 mg total) by mouth 2 (two) times daily with meals., Disp: 60  tablet, Rfl: 11    cinacalcet (SENSIPAR) 30 MG Tab, Take 1 tablet (30 mg total) by mouth daily with breakfast., Disp: 30 tablet, Rfl: 3    ciprofloxacin HCl (CIPRO) 500 MG tablet, Take 1 tablet (500 mg total) by mouth once daily. for 5 days, Disp: 5 tablet, Rfl: 0    cloNIDine (CATAPRES) 0.1 MG tablet, Take 1 tablet (0.1 mg total) by mouth 2 (two) times daily., Disp: 60 tablet, Rfl: 11    docusate sodium (COLACE) 100 MG capsule, Take 1 capsule (100 mg total) by mouth 2 (two) times daily as needed for Constipation., Disp: , Rfl: 0    famotidine (PEPCID) 20 MG tablet, Take 1 tablet (20 mg total) by mouth every evening., Disp: 30 tablet, Rfl: 1    linaclotide (LINZESS) 145 mcg Cap capsule, Take 1 capsule (145 mcg total) by mouth once daily., Disp: 30 capsule, Rfl: 11    multivitamin (THERAGRAN) tablet, Take 1 tablet by mouth once daily., Disp: , Rfl:     mycophenolate (CELLCEPT) 250 mg Cap, Take 2 capsules (500 mg total) by mouth 2 (two) times daily., Disp: 120 capsule, Rfl: 11    ondansetron (ZOFRAN) 4 MG tablet, Take 2 tablets (8 mg total) by mouth every 8 (eight) hours as needed for Nausea., Disp: 16 tablet, Rfl: 0    sodium bicarbonate 650 MG tablet, Take 2 tablets (1,300 mg total) by mouth 2 (two) times daily., Disp: 120 tablet, Rfl: 11    sulfamethoxazole-trimethoprim 400-80mg (BACTRIM,SEPTRA) 400-80 mg per tablet, Take 1 tablet by mouth every Mon, Wed, Fri. Stop: 4/7/2020, Disp: 12 tablet, Rfl: 11    tacrolimus (PROGRAF) 1 MG Cap, Take 5 capsules (5 mg total) by mouth every 12 (twelve) hours., Disp: 360 capsule, Rfl: 11    traMADol (ULTRAM) 50 mg tablet, Take 1 tablet (50 mg total) by mouth every 8 (eight) hours as needed for Pain., Disp: 30 tablet, Rfl: 0    valGANciclovir (VALCYTE) 450 mg Tab, Take 1 tablet (450 mg total) by mouth every Mon, Wed, Fri. Stop: 7/7/19, Disp: 12 tablet, Rfl: 2    zolpidem (AMBIEN) 10 mg Tab, Take 5 mg by mouth nightly as needed., Disp: , Rfl:     Current  "Facility-Administered Medications:     epoetin hillary injection 20,000 Units, 20,000 Units, Subcutaneous, Q7 Days, Taty Guevara MD, 20,000 Units at 04/17/19 1015    Review of Systems:  ROS:  Constitutional: no fever or chills  Eyes: no visual changes  ENT: no nasal congestion or sore throat  Respiratory: no cough or shortness of breath  Cardiovascular: no chest pain or palpitations, positive for HTN  Gastrointestinal: no nausea or vomiting, tolerating diet  Genitourinary: no hematuria or dysuria, positive for s/p kidney transplant 4/8/19  Integument/Breast: no rash or pruritis  Hematologic/Lymphatic: positive for antiphospholipid antibody positive, anemia, thrombocytopenia  Musculoskeletal: positive for left hip pain  Neurological: no seizures or tremors  Behavioral/Psych: no auditory or visual hallucinations  Endocrine: no heat or cold intolerance      PE:  BP (!) 173/97   Pulse 100   Ht 5' 3" (1.6 m)   Wt 83 kg (182 lb 14 oz)   LMP 08/08/1971   BMI 32.39 kg/m²   General: Pleasant, cooperative, NAD   HEENT: NCAT, sclera nonicteric   Lungs: Respirations are equal and unlabored.   Abdomen: Soft and non-tender.  CV: 2+ bilateral upper and lower extremity pulses.   Skin: Intact throughout LE with no rashes, erythema, or lesions  Extremities: No LE edema, NVI lower extremities    Hip Exam:  Left  110 degrees flexion with pain in left buttocks radiating down left leg  90 degrees extension   20 degrees internal rotation with pain in left buttocks radiating down left leg  25 degrees external rotation with pain in left buttocks radiating down left leg  25 degrees abduction with pain in left buttocks radiating down left leg  20 degrees adduction   0 flexion contracture    +SLR  -TTP over greater trochanteric bursa  +Severe TTP of sciatic nerve pathway    RADIOGRAPHS:  Xray of left hip taken on 4/9/19, reviewed in clinic by me today, demonstrates minimal narrowing of joint space without evidence of fracture or " dislocation.    ASSESSMENT/PLAN:     Plan: We discussed with the patient at length all the different treatment options available for arthrosis of the hip including anti-inflammatories, acetaminophen, rest, ice, lower extremity strengthening exercise, occasional cortisone injections for temporary relief, and finally total hip arthroplasty.   I will contact patient's transplant coordinator for further evaluation and treatment of patient.  Typically for sciatic nerve pain, steroid dose pack and physical therapy would be initial treatment of choice. However due to recent kidney transplant, I advised that treatment should be put on hold until discussing this with transplant team.  Patient agrees with plan.    Final Diagnosis: Left side low back pain with sciatic nerve involvement.

## 2019-05-01 ENCOUNTER — OFFICE VISIT (OUTPATIENT)
Dept: TRANSPLANT | Facility: CLINIC | Age: 69
End: 2019-05-01
Payer: MEDICARE

## 2019-05-01 VITALS
RESPIRATION RATE: 18 BRPM | HEIGHT: 63 IN | OXYGEN SATURATION: 99 % | TEMPERATURE: 99 F | SYSTOLIC BLOOD PRESSURE: 177 MMHG | BODY MASS INDEX: 32.43 KG/M2 | DIASTOLIC BLOOD PRESSURE: 85 MMHG | HEART RATE: 76 BPM | WEIGHT: 183 LBS

## 2019-05-01 DIAGNOSIS — Z51.81 ENCOUNTER FOR THERAPEUTIC DRUG MONITORING: Primary | ICD-10-CM

## 2019-05-01 DIAGNOSIS — Z94.0 KIDNEY REPLACED BY TRANSPLANT: ICD-10-CM

## 2019-05-01 PROCEDURE — 99999 PR PBB SHADOW E&M-EST. PATIENT-LVL IV: ICD-10-PCS | Mod: PBBFAC,,,

## 2019-05-01 PROCEDURE — 99999 PR PBB SHADOW E&M-EST. PATIENT-LVL IV: CPT | Mod: PBBFAC,,,

## 2019-05-01 PROCEDURE — 99214 OFFICE O/P EST MOD 30 MIN: CPT | Mod: 24,S$PBB,, | Performed by: TRANSPLANT SURGERY

## 2019-05-01 PROCEDURE — 99214 OFFICE O/P EST MOD 30 MIN: CPT | Mod: PBBFAC

## 2019-05-01 PROCEDURE — 99214 PR OFFICE/OUTPT VISIT, EST, LEVL IV, 30-39 MIN: ICD-10-PCS | Mod: 24,S$PBB,, | Performed by: TRANSPLANT SURGERY

## 2019-05-01 RX ORDER — METHYLPREDNISOLONE 4 MG/1
TABLET ORAL
Qty: 1 PACKAGE | Refills: 0 | Status: SHIPPED | OUTPATIENT
Start: 2019-05-01 | End: 2019-05-22

## 2019-05-01 NOTE — PROGRESS NOTES
Contacted patient's transplant team that is monitoring her care post-kidney transplant.  Dr. Guevara messaged me stating that a medrol dose pack would be fine to give Mrs. Summers.  She has an appointment with Dr. Guevara today (5/1/19) at 2:00pm.  She is to  Medrol dose pack from pharmacy today and start the medication tomorrow as prescribed.  She is to follow up as needed for her sciatic nerve pain.  Patient verbalized understanding.    Seymour Sherman PA-C  5/1/19

## 2019-05-01 NOTE — PROGRESS NOTES
Transplant Surgery  Kidney Transplant Recipient Follow-up    Referring Physician: Jd Carias  Current Nephrologist: Jd Carias    Subjective:     Chief Complaint: Paige Summers is a 68 y.o. year old Black or  female who is status post Kidney transplant performed on 4/8/2019.    ORGAN: LEFT KIDNEY   Disease Etiology: Hypertensive Nephrosclerosis  Donor Type: Living   Donor CMV Status:    Donor HBcAB:    Donor HCV Status:      History of Present Illness: She reports no concerns.  From a transplant perspective, she reports normal urination.  Paige is here for management of her immunosuppression medication.  Paige states that her immunosuppression is being well tolerated.  Hypertension is not present.    Review of Systems   Constitutional: Negative for chills and fever.   HENT: Negative for facial swelling and sore throat.    Eyes: Negative for redness and itching.   Respiratory: Negative for cough and shortness of breath.    Cardiovascular: Negative for chest pain and leg swelling.   Gastrointestinal: Negative for abdominal distention and abdominal pain.   Endocrine: Negative for polydipsia and polyphagia.   Genitourinary: Negative for dysuria and hematuria.   Musculoskeletal: Negative for back pain and myalgias.   Skin: Negative for pallor and rash.   Allergic/Immunologic: Positive for immunocompromised state. Negative for environmental allergies.   Neurological: Negative for light-headedness and headaches.   Hematological: Negative for adenopathy. Does not bruise/bleed easily.   Psychiatric/Behavioral: Negative for agitation and confusion.       Objective:     Physical Exam   Constitutional: She is oriented to person, place, and time. No distress.   HENT:   Head: Normocephalic and atraumatic.   Eyes: Pupils are equal, round, and reactive to light. Conjunctivae are normal.   Neck: Normal range of motion. Neck supple.   Cardiovascular: Normal rate and regular rhythm.   Pulmonary/Chest: Effort  normal and breath sounds normal.   Abdominal: Soft. Bowel sounds are normal. She exhibits no distension. There is no tenderness.       Musculoskeletal: Normal range of motion. She exhibits no edema.   Neurological: She is alert and oriented to person, place, and time.   Skin: Skin is warm and dry. She is not diaphoretic.   Psychiatric: She has a normal mood and affect. Her behavior is normal.     Lab Results   Component Value Date    CREATININE 2.7 (H) 04/29/2019    BUN 22 04/29/2019     Lab Results   Component Value Date    WBC 6.17 04/29/2019    HGB 8.9 (L) 04/29/2019    HCT 28.1 (L) 04/29/2019    HCT 28 (L) 04/08/2019     04/29/2019     Lab Results   Component Value Date    TACROLIMUS 11.2 04/29/2019         Assessment and Plan:        · S/P Kidney transplant.  · Chronic immunosuppressive medications for rejection prophylaxis at target.  Plan: no adjustment needed.  · Continue monitoring symptoms, labs and drug levels for drug-related toxicity and side effects.  · Renal hypertension at target.  · Staples removed by staff, superficial incision well healed, no attenuated areas or leaking from incision after staple removal. No additional drainage needed at this time.    Follow-up: Patient reminded to call with any health changes, since these can be early signs of significant complications.  Also, I advised the patient to be sure any new medications or changes of old medications are discussed with either a pharmacist, or physician knowledgeable with transplant to avoid rejection/drug toxicity related to significant drug interactions.    Palmer Peralta MD       [facility] Patient Status  Functional Status: 80% - Normal activity with effort: some symptoms of disease  Physical Capacity: No Limitations

## 2019-05-01 NOTE — H&P (VIEW-ONLY)
Transplant Surgery  Kidney Transplant Recipient Follow-up    Referring Physician: Jd Carias  Current Nephrologist: Jd Carias    Subjective:     Chief Complaint: Paige Summers is a 68 y.o. year old Black or  female who is status post Kidney transplant performed on 4/8/2019.    ORGAN: LEFT KIDNEY   Disease Etiology: Hypertensive Nephrosclerosis  Donor Type: Living   Donor CMV Status:    Donor HBcAB:    Donor HCV Status:      History of Present Illness: She reports no concerns.  From a transplant perspective, she reports normal urination.  Paige is here for management of her immunosuppression medication.  Paige states that her immunosuppression is being well tolerated.  Hypertension is not present.    Review of Systems   Constitutional: Negative for chills and fever.   HENT: Negative for facial swelling and sore throat.    Eyes: Negative for redness and itching.   Respiratory: Negative for cough and shortness of breath.    Cardiovascular: Negative for chest pain and leg swelling.   Gastrointestinal: Negative for abdominal distention and abdominal pain.   Endocrine: Negative for polydipsia and polyphagia.   Genitourinary: Negative for dysuria and hematuria.   Musculoskeletal: Negative for back pain and myalgias.   Skin: Negative for pallor and rash.   Allergic/Immunologic: Positive for immunocompromised state. Negative for environmental allergies.   Neurological: Negative for light-headedness and headaches.   Hematological: Negative for adenopathy. Does not bruise/bleed easily.   Psychiatric/Behavioral: Negative for agitation and confusion.       Objective:     Physical Exam   Constitutional: She is oriented to person, place, and time. No distress.   HENT:   Head: Normocephalic and atraumatic.   Eyes: Pupils are equal, round, and reactive to light. Conjunctivae are normal.   Neck: Normal range of motion. Neck supple.   Cardiovascular: Normal rate and regular rhythm.   Pulmonary/Chest: Effort  normal and breath sounds normal.   Abdominal: Soft. Bowel sounds are normal. She exhibits no distension. There is no tenderness.       Musculoskeletal: Normal range of motion. She exhibits no edema.   Neurological: She is alert and oriented to person, place, and time.   Skin: Skin is warm and dry. She is not diaphoretic.   Psychiatric: She has a normal mood and affect. Her behavior is normal.     Lab Results   Component Value Date    CREATININE 2.7 (H) 04/29/2019    BUN 22 04/29/2019     Lab Results   Component Value Date    WBC 6.17 04/29/2019    HGB 8.9 (L) 04/29/2019    HCT 28.1 (L) 04/29/2019    HCT 28 (L) 04/08/2019     04/29/2019     Lab Results   Component Value Date    TACROLIMUS 11.2 04/29/2019         Assessment and Plan:        · S/P Kidney transplant.  · Chronic immunosuppressive medications for rejection prophylaxis at target.  Plan: no adjustment needed.  · Continue monitoring symptoms, labs and drug levels for drug-related toxicity and side effects.  · Renal hypertension at target.  · Staples removed by staff, superficial incision well healed, no attenuated areas or leaking from incision after staple removal. No additional drainage needed at this time.    Follow-up: Patient reminded to call with any health changes, since these can be early signs of significant complications.  Also, I advised the patient to be sure any new medications or changes of old medications are discussed with either a pharmacist, or physician knowledgeable with transplant to avoid rejection/drug toxicity related to significant drug interactions.    Palmer Peralta MD       [facility] Patient Status  Functional Status: 80% - Normal activity with effort: some symptoms of disease  Physical Capacity: No Limitations

## 2019-05-02 ENCOUNTER — LAB VISIT (OUTPATIENT)
Dept: LAB | Facility: HOSPITAL | Age: 69
End: 2019-05-02
Attending: INTERNAL MEDICINE
Payer: MEDICARE

## 2019-05-02 DIAGNOSIS — Z94.0 KIDNEY REPLACED BY TRANSPLANT: ICD-10-CM

## 2019-05-02 LAB
ALBUMIN SERPL BCP-MCNC: 3 G/DL (ref 3.5–5.2)
ANION GAP SERPL CALC-SCNC: 8 MMOL/L (ref 8–16)
BASOPHILS # BLD AUTO: 0.02 K/UL (ref 0–0.2)
BASOPHILS NFR BLD: 0.3 % (ref 0–1.9)
BUN SERPL-MCNC: 24 MG/DL (ref 8–23)
CALCIUM SERPL-MCNC: 9.7 MG/DL (ref 8.7–10.5)
CHLORIDE SERPL-SCNC: 107 MMOL/L (ref 95–110)
CO2 SERPL-SCNC: 21 MMOL/L (ref 23–29)
CREAT SERPL-MCNC: 2.5 MG/DL (ref 0.5–1.4)
DIFFERENTIAL METHOD: ABNORMAL
EOSINOPHIL # BLD AUTO: 0.1 K/UL (ref 0–0.5)
EOSINOPHIL NFR BLD: 0.9 % (ref 0–8)
ERYTHROCYTE [DISTWIDTH] IN BLOOD BY AUTOMATED COUNT: 14.8 % (ref 11.5–14.5)
EST. GFR  (AFRICAN AMERICAN): 21.9 ML/MIN/1.73 M^2
EST. GFR  (NON AFRICAN AMERICAN): 19 ML/MIN/1.73 M^2
GLUCOSE SERPL-MCNC: 85 MG/DL (ref 70–110)
HCT VFR BLD AUTO: 28.3 % (ref 37–48.5)
HGB BLD-MCNC: 8.8 G/DL (ref 12–16)
IMM GRANULOCYTES # BLD AUTO: 0.04 K/UL (ref 0–0.04)
IMM GRANULOCYTES NFR BLD AUTO: 0.7 % (ref 0–0.5)
LYMPHOCYTES # BLD AUTO: 0.1 K/UL (ref 1–4.8)
LYMPHOCYTES NFR BLD: 1 % (ref 18–48)
MAGNESIUM SERPL-MCNC: 1.4 MG/DL (ref 1.6–2.6)
MCH RBC QN AUTO: 29.8 PG (ref 27–31)
MCHC RBC AUTO-ENTMCNC: 31.1 G/DL (ref 32–36)
MCV RBC AUTO: 96 FL (ref 82–98)
MONOCYTES # BLD AUTO: 0.4 K/UL (ref 0.3–1)
MONOCYTES NFR BLD: 6.3 % (ref 4–15)
NEUTROPHILS # BLD AUTO: 5.3 K/UL (ref 1.8–7.7)
NEUTROPHILS NFR BLD: 90.8 % (ref 38–73)
NRBC BLD-RTO: 0 /100 WBC
PHOSPHATE SERPL-MCNC: 2.3 MG/DL (ref 2.7–4.5)
PLATELET # BLD AUTO: 272 K/UL (ref 150–350)
PMV BLD AUTO: 9.9 FL (ref 9.2–12.9)
POTASSIUM SERPL-SCNC: 4.6 MMOL/L (ref 3.5–5.1)
RBC # BLD AUTO: 2.95 M/UL (ref 4–5.4)
SODIUM SERPL-SCNC: 136 MMOL/L (ref 136–145)
TACROLIMUS BLD-MCNC: 6.4 NG/ML (ref 5–15)
WBC # BLD AUTO: 5.85 K/UL (ref 3.9–12.7)

## 2019-05-02 PROCEDURE — 83735 ASSAY OF MAGNESIUM: CPT

## 2019-05-02 PROCEDURE — 85025 COMPLETE CBC W/AUTO DIFF WBC: CPT

## 2019-05-02 PROCEDURE — 80069 RENAL FUNCTION PANEL: CPT

## 2019-05-02 PROCEDURE — 80197 ASSAY OF TACROLIMUS: CPT

## 2019-05-02 PROCEDURE — 36415 COLL VENOUS BLD VENIPUNCTURE: CPT

## 2019-05-02 NOTE — PROGRESS NOTES
Kidney Post-Transplant Assessment    Referring Physician: Jd Carias  Current Nephrologist: Jd Carias    ORGAN: LEFT KIDNEY  Donor Type: living  PHS Increased Risk: no  Cold Ischemia: 189 mins  Induction Medications: campath - alemtuzumab (anti-cd52)    Subjective:     CC:  Reassessment of renal allograft function and management of chronic immunosuppression.    HPI:  Ms. Summers is a 68 y.o. year old Black or  female with ESRD 2/2 Hypertensive Nephrosclerosis who received a living kidney transplant on 4/8/19. Her most recent creatinine is 5. She takes mycophenolic acid and tacrolimus for maintenance immunosuppression.      post transplant course:  - S/p living unrelated kidney transplant 4/8/19, CMV +/+, WIT 30 min, CIT 3h 9 m  - DGF, resolved  - Pt was on Plavix for reoccurring AVF thrombus.      Interval Hx:    Current Outpatient Medications on File Prior to Visit   Medication Sig Dispense Refill    acetaminophen (TYLENOL) 325 mg Cap Take by mouth.      amLODIPine (NORVASC) 10 MG tablet Take 10 mg by mouth once daily.      bisacodyl (DULCOLAX) 5 mg EC tablet Take 2 tablets (10 mg total) by mouth daily as needed for Constipation.  0    carvedilol (COREG) 12.5 MG tablet Take 1 tablet (12.5 mg total) by mouth 2 (two) times daily with meals. 60 tablet 11    cinacalcet (SENSIPAR) 30 MG Tab Take 1 tablet (30 mg total) by mouth daily with breakfast. 30 tablet 3    cloNIDine (CATAPRES) 0.1 MG tablet Take 1 tablet (0.1 mg total) by mouth 2 (two) times daily. 60 tablet 11    docusate sodium (COLACE) 100 MG capsule Take 1 capsule (100 mg total) by mouth 2 (two) times daily as needed for Constipation.  0    famotidine (PEPCID) 20 MG tablet Take 1 tablet (20 mg total) by mouth every evening. 30 tablet 1    gabapentin (NEURONTIN) 300 MG capsule Take 1 capsule (300 mg total) by mouth 3 (three) times daily. for 10 days 30 capsule 0    linaclotide (LINZESS) 145 mcg Cap capsule Take 1 capsule  Patient is asymptomatic, afebrile and without leukocytosis.   Stop IVFs, encourage PO intake and hold off on antihypertensives  Avoid narcotics!  Monitor closely       (145 mcg total) by mouth once daily. 30 capsule 11    methylPREDNISolone (MEDROL DOSEPACK) 4 mg tablet use as directed 1 Package 0    multivitamin (THERAGRAN) tablet Take 1 tablet by mouth once daily.      mycophenolate (CELLCEPT) 250 mg Cap Take 2 capsules (500 mg total) by mouth 2 (two) times daily. 120 capsule 11    ondansetron (ZOFRAN) 4 MG tablet Take 2 tablets (8 mg total) by mouth every 8 (eight) hours as needed for Nausea. 16 tablet 0    sodium bicarbonate 650 MG tablet Take 2 tablets (1,300 mg total) by mouth 2 (two) times daily. 120 tablet 11    sulfamethoxazole-trimethoprim 400-80mg (BACTRIM,SEPTRA) 400-80 mg per tablet Take 1 tablet by mouth every Mon, Wed, Fri. Stop: 4/7/2020 12 tablet 11    tacrolimus (PROGRAF) 1 MG Cap Take 5 capsules (5 mg total) by mouth every 12 (twelve) hours. 360 capsule 11    traMADol (ULTRAM) 50 mg tablet Take 1 tablet (50 mg total) by mouth every 8 (eight) hours as needed for Pain. 30 tablet 0    valGANciclovir (VALCYTE) 450 mg Tab Take 1 tablet (450 mg total) by mouth every Mon, Wed, Fri. Stop: 7/7/19 12 tablet 2    zolpidem (AMBIEN) 10 mg Tab Take 5 mg by mouth nightly as needed.       Current Facility-Administered Medications on File Prior to Visit   Medication Dose Route Frequency Provider Last Rate Last Dose    epoetin hillary injection 20,000 Units  20,000 Units Subcutaneous Q7 Days Taty Guevara MD   20,000 Units at 04/17/19 1015        Review of Systems     Skin: no skin rash  CNS; no headaches, blurred vision, seizure, or syncope  ENT: No JVD,  Adenopathies,  nasal congestion. No oral lesions  Cardiac: No chest pain, dyspnea, claudication, edema or palpitations  Respiratory: No SOB, cough, hemoptysis   Gastro-intestinal: No diarrhea, constipation, abdominal pain, nausea, vomit. No ascitis  Genitourinary: no hematuria, dysuria, frequency, frequency  Musculoskeletal: Positive for Hip thigh pain, arthritis or vasculitic changes  Psych: alert awake, oriented,  No cranial nerves deficit.      Objective:       Physical Exam     /59      Head: normocephalic  Neck: No JVD, cervical axillary, or femoral adenopathies  Heart: no murmurs, Normal s1 and s2, No gallops, no rubs, No murmurs  Lungs; CTA, good respiratory effort, no crackles  Abdomen: soft, non tender, no splenomegaly or hepatomegaly, no massess, no bruits  Allograft area: Surgical wound with staple mild tenderness to be expected upper incision with small are of redness and possible fluctuated area ? Fluid collection. All staple present no open wound.  Extremities: No edema, skin rash, Left hip pain extending to posterior thigh tender to deep palpation proximal thigh and hip.   SNC: awake, alert oriented. Cranial nerves are intact, no focalized, sensitivity and strength preserved      Labs:  Lab Results   Component Value Date    WBC 4.74 05/06/2019    HGB 8.9 (L) 05/06/2019    HCT 28.1 (L) 05/06/2019     05/06/2019    K 4.2 05/06/2019     (H) 05/06/2019    CO2 21 (L) 05/06/2019    BUN 33 (H) 05/06/2019    CREATININE 2.2 (H) 05/06/2019    EGFRNONAA 22.2 (A) 05/06/2019    CALCIUM 9.3 05/06/2019    PHOS 2.0 (L) 05/06/2019    MG 1.4 (L) 05/06/2019    ALBUMIN 3.3 (L) 05/06/2019    ALBUMIN 3.3 (L) 05/06/2019    AST 13 05/06/2019    ALT 5 (L) 05/06/2019    UTPCR 0.46 (H) 05/06/2019    .0 (H) 05/06/2019    TACROLIMUS 8.6 05/06/2019       No results found for: EXTANC, EXTWBC, EXTSEGS, EXTPLATELETS, EXTHEMOGLOBI, EXTHEMATOCRI, EXTCREATININ, EXTSODIUM, EXTPOTASSIUM, EXTBUN, EXTCO2, EXTCALCIUM, EXTPHOSPHORU, EXTGLUCOSE, EXTALBUMIN, EXTAST, EXTALT, EXTBILITOTAL, EXTLIPASE, EXTAMYLASE    No results found for: EXTCYCLOSLVL, EXTSIROLIMUS, EXTTACROLVL, EXTPROTCRE, EXTPTHINTACT, EXTPROTEINUA, EXTWBCUA, EXTRBCUA    Labs were reviewed with the patient    Assessment:     No diagnosis found.    Plan:        1. CKD stage to be dermine: sCr is improving down to  2.2 mg/dL . making good amount of UOP ~ 2 lts daily  will continue follow up as per our center guidelines. She is schedule for labs this week. Education provided in appropriate fluid intake, potassium intake. Continue with oral hydration.        2. Immunosuppression: prograf level was addressed  Lab Results   Component Value Date    TACROLIMUS 8.6 05/06/2019    TACROLIMUS 6.4 05/02/2019    TACROLIMUS 11.2 04/29/2019   -  mg BID   Will closely monitor for toxicities, education provided about adherence to medicines and need to communicate any side effect to the transplant nurse or physician.      3. Hypertension management:   -  Continue with home blood pressure monitoring, low salt and healthy life discussed with the patient.  - Norvasc 10 mg daily   - coreg 25 mg BID     4. Metabolic Bone Disease/Secondary Hyperparathyroidism:  - Will monitor PTH, Vit D level, calcium.      5. Anemia: will continue monitoring as per our center guidelines.   - epogen 29071 units weekly   - check B12 and folate         Plan  - coreg 25 mg daily  - Mg oxide 800 mg BID  - check b12 and folate with next lab  - epogen 72383 units weekly till Hb >10  - Norvasc 10 mg daily

## 2019-05-05 ENCOUNTER — NURSE TRIAGE (OUTPATIENT)
Dept: ADMINISTRATIVE | Facility: CLINIC | Age: 69
End: 2019-05-05

## 2019-05-05 ENCOUNTER — HOSPITAL ENCOUNTER (EMERGENCY)
Facility: HOSPITAL | Age: 69
Discharge: HOME | End: 2019-05-05
Attending: EMERGENCY MEDICINE
Payer: MEDICARE

## 2019-05-05 VITALS
BODY MASS INDEX: 31.89 KG/M2 | DIASTOLIC BLOOD PRESSURE: 88 MMHG | WEIGHT: 180 LBS | TEMPERATURE: 99 F | HEART RATE: 68 BPM | SYSTOLIC BLOOD PRESSURE: 177 MMHG | HEIGHT: 63 IN | RESPIRATION RATE: 16 BRPM | OXYGEN SATURATION: 100 %

## 2019-05-05 DIAGNOSIS — M79.671 RIGHT FOOT PAIN: ICD-10-CM

## 2019-05-05 DIAGNOSIS — G62.9 NEUROPATHY: Primary | ICD-10-CM

## 2019-05-05 PROCEDURE — 99283 EMERGENCY DEPT VISIT LOW MDM: CPT | Mod: 25

## 2019-05-05 PROCEDURE — 99284 EMERGENCY DEPT VISIT MOD MDM: CPT | Mod: ,,, | Performed by: EMERGENCY MEDICINE

## 2019-05-05 PROCEDURE — 25000003 PHARM REV CODE 250: Performed by: EMERGENCY MEDICINE

## 2019-05-05 PROCEDURE — 99284 PR EMERGENCY DEPT VISIT,LEVEL IV: ICD-10-PCS | Mod: ,,, | Performed by: EMERGENCY MEDICINE

## 2019-05-05 RX ORDER — GABAPENTIN 300 MG/1
300 CAPSULE ORAL
Status: COMPLETED | OUTPATIENT
Start: 2019-05-05 | End: 2019-05-05

## 2019-05-05 RX ORDER — AMIODARONE HYDROCHLORIDE 200 MG/1
200 TABLET ORAL
Status: DISCONTINUED | OUTPATIENT
Start: 2019-05-05 | End: 2019-05-05

## 2019-05-05 RX ORDER — GABAPENTIN 300 MG/1
300 CAPSULE ORAL 3 TIMES DAILY
Qty: 30 CAPSULE | Refills: 0 | Status: SHIPPED | OUTPATIENT
Start: 2019-05-05 | End: 2019-08-09

## 2019-05-05 RX ORDER — MAGNESIUM SULFATE HEPTAHYDRATE 40 MG/ML
2 INJECTION, SOLUTION INTRAVENOUS
Status: DISCONTINUED | OUTPATIENT
Start: 2019-05-05 | End: 2019-05-05

## 2019-05-05 RX ORDER — TALC
500 POWDER (GRAM) TOPICAL ONCE
Status: DISCONTINUED | OUTPATIENT
Start: 2019-05-05 | End: 2019-05-05

## 2019-05-05 RX ORDER — FUROSEMIDE 10 MG/ML
20 INJECTION INTRAMUSCULAR; INTRAVENOUS
Status: DISCONTINUED | OUTPATIENT
Start: 2019-05-05 | End: 2019-05-05

## 2019-05-05 RX ADMIN — GABAPENTIN 300 MG: 300 CAPSULE ORAL at 10:05

## 2019-05-05 NOTE — ED TRIAGE NOTES
Patient came to the ED via EMS for increased pain and inability to put weight on her right leg.  Patient states she is currently being treated for sciatica and on day 4 of prednisone for the left leg.

## 2019-05-05 NOTE — TELEPHONE ENCOUNTER
Reason for Disposition   [1] SEVERE back pain (e.g., excruciating) AND [2] sudden onset AND [3] age > 60    Protocols used: BACK PAIN-A-AH    Caregiver calling regarding Sciatica Pain.  Caregiver states pain is in other leg now.  Care advice given.    BPA: N/A  Kidney Txp: 4/8/19

## 2019-05-05 NOTE — ED NOTES
Paige Summers, a 68 y.o. female presents to the ED via EMS with CC pain in her right leg, inability to stand.  Patient states she was being treated for sciatica and has taking 4 days of prednisone. Patient states she was received for left leg sciatica.  Patient has recently had kidney transplant on 4/8/19.  Left upper arm graft noted, very mild thrill and bruit.        Patient identifiers verified verbally with patient and correct for Paige Summers.    LOC/ APPEARANCE: The patient is AAOx4. Pt is speaking appropriately, no slurred speech. Bed low and locked with side rails up x2, call bell in pt reach.  MUSCULOSKELETAL: Spontaneous movement noted to all extremities. Hand  equal and leg strength strong +5 bilaterally. Patient with pain to the right leg with difficulty standing.    : No complaints of frequency, burning, urgency or blood in the urine. No complaints of incontinence.

## 2019-05-05 NOTE — ED NOTES
Attempting to find VS machine to obtain full set of vitals, this is a unmonitored room.  Patient takin to x-ray

## 2019-05-05 NOTE — ED PROVIDER NOTES
Encounter Date: 5/5/2019    SCRIBE #1 NOTE: IBecky, am scribing for, and in the presence of,  Dr. Mcguire. I have scribed the entire note.   SCRIBE #2 NOTE: I, Zohreh Bourgeois, am scribing for, and in the presence of,  Dr. Mcguire. I have scribed the remaining portions of the note not scribed by Scribe #1.     History     Chief Complaint   Patient presents with    Leg Pain     Hx of Sciatica and has been taking Steroids to treat x 4 days. Denies relief. Kidney transplant 4/8/19.      Time patient was seen by the provider: 10:08 AM      69 y/o female s/p living unrelated kidney transplant on 4/8/19, depression, anxiety, hypertension, and diabetes mellitus, presents with a chief complaint of right leg pain. Pt began complaining of left leg pain two weeks prior. Pain begins in ankle with radiation up her leg. She was diagnosed with sciatica and started on a medrol dose pack on 5/1. It helped the pain in the left leg, however she still has persistent paraesthesia to the left heel. Pt reports pain in the right lower extremity since yesterday. Pain begins in the plantar aspect of the right foot radiating to the heel, calf, and distal thigh. No weakness. There are mild paraesthesias in the heel. Pt reports symptoms are the same as sciatica in the left leg. She denies back pain. She had no relief with medrol dose pack, tramadol, or tylenol. Pt is allergic to norco. She has not been going to dialysis since the transplant and has had good urine output. She denies arthralgias, myalgias, or injury. On chart review pt had X-rays of the left hip and pelvis obtained two weeks prior as well as negative bilateral lower extremity venous ultrasounds.        The history is provided by the patient.     Review of patient's allergies indicates:   Allergen Reactions    Oxycodone Itching    Hydrocodone Itching     Past Medical History:   Diagnosis Date    Anemia     Anemia of renal disease     Anxiety     Chronic renal  failure 01/10/2018    Depression     Essential hypertension     GERD (gastroesophageal reflux disease)     Gout     H pylori ulcer     Hyperlipidemia     Hypertension     Obesity     Secondary hyperparathyroidism of renal origin      Past Surgical History:   Procedure Laterality Date    declot Left 2/19/2019    Per[facility] CATH LAB    DIALYSIS FISTULA CREATION Left 02/2018    FISTULOGRAM Left 3/13/2019    Performed by Irineo Devlin MD at [facility] CATH LAB    FISTULOGRAM Left 12/19/2018    Performed by Javed Lopez [facility] at [facility] CATH LAB    HYSTERECTOMY  1971    [facility]    PERMCATH REMOVAL-TUNNELED CVC REMOVAL N/A 4/23/2018    Performed by [facility] at [facility] CATH LAB    PERMCATH REWIRE- TUNNELED CATH REWIRE N/A 3/21/2018    Performed by [facility] at [facility] CATH LAB    THROMBECTOMY, HEMODIALYSIS GRAFT OR FISTULA Left 12/13/2018    Performed by [facility] at [facility] CATH LAB    THROMBECTOMY, HEMODIALYSIS GRAFT OR FISTULA Left 11/9/2018    Performed by [facility] at [facility] CATH LAB    THROMBECTOMY, HEMODIALYSIS GRAFT OR FISTULA Left 9/12/2018    Performed by [facility] at [facility] CATH LAB    THROMBECTOMY, HEMODIALYSIS GRAFT OR FISTULA N/A 9/6/2018    Performed by [facility] at [facility] CATH LAB    THROMBECTOMY, HEMODIALYSIS GRAFT OR FISTULA Left 6/13/2018    Performed by [facility] at [facility] CATH LAB    THROMBECTOMY, HEMODIALYSIS GRAFT OR FISTULA Left 6/11/2018    Performed by [facility] at [facility] CATH LAB    TRANSPLANT, KIDNEY N/A 4/8/2019    [facility] 2ND F[facility]    ULTRASOUND, DIAGNOSTIC Left 10/10/2018    Performed by [facility] at [facility] CATH LAB    ULTRASOUND, DIAGNOSTIC Left 9/19/2018    Performed by [facility] at [facility] CATH LAB    ULTRASOUND, DIAGNOSTIC Left 8/20/2018    Performed by [facility] at [facility] CATH LAB    ULTRASOUND, DIAGNOSTIC Left 8/6/2018    Performed by Yuriy  MD Paige at [facility] CATH LAB    ULTRASOUND, DIAGNOSTIC Left 7/9/2018    Performed by [facility] at [facility] CATH LAB    ULTRASOUND, DIAGNOSTIC Left 6/18/2018    Performed by [facility] at [facility] CATH LAB     Family History   Problem Relation Age of Onset    Alcohol abuse Mother     No Known Problems Father     Hypertension Sister     Arthritis Sister     Heart disease Brother     Heart failure Brother     Heart failure Brother     Diabetes Cousin     Breast cancer Neg Hx     Colon cancer Neg Hx     Ovarian cancer Neg Hx      Social History     Tobacco Use    Smoking status: Never Smoker    Smokeless tobacco: Never Used   Substance Use Topics    Alcohol use: No    Drug use: No     Review of Systems   Constitutional: Negative for fever.   HENT: Negative for sore throat.    Eyes: Negative for visual disturbance.   Respiratory: Negative for shortness of breath.    Cardiovascular: Negative for chest pain.   Gastrointestinal: Negative for nausea.   Genitourinary: Negative for dysuria.   Musculoskeletal: Negative for arthralgias, back pain and myalgias.        Right and left lower extremity pain.   Skin: Negative for rash.   Allergic/Immunologic: Positive for immunocompromised state.   Neurological: Negative for weakness.        Paraesthesias to right and left heels.       Physical Exam     Initial Vitals [05/05/19 0954]   BP Pulse Resp Temp SpO2   (!) 206/91 76 16 98.4 °F (36.9 °C) 100 %      MAP       --         Physical Exam    Nursing note and vitals reviewed.  Constitutional: She appears well-developed and well-nourished. She is not diaphoretic. No distress.   HENT:   Head: Normocephalic and atraumatic.   Eyes: EOM are normal. Pupils are equal, round, and reactive to light. Right eye exhibits no discharge. Left eye exhibits no discharge. No scleral icterus.   Neck: Normal range of motion. Neck supple. No JVD present.   Cardiovascular: Normal rate, regular rhythm, normal heart sounds and intact  distal pulses. Exam reveals no gallop and no friction rub.    No murmur heard.  Peripheral pulses intact.   Pulmonary/Chest: Breath sounds normal. No respiratory distress. She has no wheezes. She has no rhonchi. She has no rales. She exhibits no tenderness.   Abdominal: Soft. Bowel sounds are normal. She exhibits no distension and no mass. There is no tenderness. There is no rebound and no guarding.   Surgical incision in right lower quadrant with steri-strips in place, clean, dry, and intact.   Musculoskeletal: Normal range of motion. She exhibits no edema.   No calf asymmetry or tenderness. Full active range of motion of right lower extremity. Mild right SI joint tenderness. No midline lumbar tenderness.   Lymphadenopathy:     She has no cervical adenopathy.   Neurological: She is alert and oriented to person, place, and time. She has normal strength. No sensory deficit.   Skin: Skin is warm and dry. Capillary refill takes less than 2 seconds.   Psychiatric: She has a normal mood and affect.         ED Course   Procedures  Labs Reviewed - No data to display       Imaging Results          X-Ray Foot Complete Right (Final result)  Result time 05/05/19 10:43:47    Final result by Segun Baumann MD (05/05/19 10:43:47)                 Impression:      1. No acute displaced fractures.  See above.      Electronically signed by: Segun Baumann MD  Date:    05/05/2019  Time:    10:43             Narrative:    EXAMINATION:  XR FOOT COMPLETE 3 VIEW RIGHT; XR TIBIA FIBULA 2 VIEW RIGHT; XR ANKLE COMPLETE 3 VIEW RIGHT    CLINICAL HISTORY:  . Pain in right foot    TECHNIQUE:  AP, lateral and oblique views of the right ankle/foot and AP and lateral views of the tibia-fibula.    COMPARISON:  None.    FINDINGS:  No acute displaced fractures.  No suspicious lytic or blastic lesions.  No subluxation or dislocation.  Tibial plafond and talar dome are intact.  Ankle mortise is symmetric.  Midfoot degenerative changes noted.   Tarsometatarsal alignment appears preserved noting nonweightbearing films.  Accessory os peroneum identified.  Tricompartmental osteoarthritis of the right knee noted.  Possible suprapatellar knee joint effusion, not well evaluated due to technique.  There is a large plantar calcaneal spur.  Distal Achilles enthesopathy noted.  Vascular calcifications identified.                               X-Ray Ankle Complete Right (Final result)  Result time 05/05/19 10:43:47    Final result by Segun Baumann MD (05/05/19 10:43:47)                 Impression:      1. No acute displaced fractures.  See above.      Electronically signed by: Segun Baumann MD  Date:    05/05/2019  Time:    10:43             Narrative:    EXAMINATION:  XR FOOT COMPLETE 3 VIEW RIGHT; XR TIBIA FIBULA 2 VIEW RIGHT; XR ANKLE COMPLETE 3 VIEW RIGHT    CLINICAL HISTORY:  . Pain in right foot    TECHNIQUE:  AP, lateral and oblique views of the right ankle/foot and AP and lateral views of the tibia-fibula.    COMPARISON:  None.    FINDINGS:  No acute displaced fractures.  No suspicious lytic or blastic lesions.  No subluxation or dislocation.  Tibial plafond and talar dome are intact.  Ankle mortise is symmetric.  Midfoot degenerative changes noted.  Tarsometatarsal alignment appears preserved noting nonweightbearing films.  Accessory os peroneum identified.  Tricompartmental osteoarthritis of the right knee noted.  Possible suprapatellar knee joint effusion, not well evaluated due to technique.  There is a large plantar calcaneal spur.  Distal Achilles enthesopathy noted.  Vascular calcifications identified.                               X-Ray Tibia Fibula 2 View Right (Final result)  Result time 05/05/19 10:43:47    Final result by Segun Baumann MD (05/05/19 10:43:47)                 Impression:      1. No acute displaced fractures.  See above.      Electronically signed by: Segun Baumann MD  Date:    05/05/2019  Time:    10:43              Narrative:    EXAMINATION:  XR FOOT COMPLETE 3 VIEW RIGHT; XR TIBIA FIBULA 2 VIEW RIGHT; XR ANKLE COMPLETE 3 VIEW RIGHT    CLINICAL HISTORY:  . Pain in right foot    TECHNIQUE:  AP, lateral and oblique views of the right ankle/foot and AP and lateral views of the tibia-fibula.    COMPARISON:  None.    FINDINGS:  No acute displaced fractures.  No suspicious lytic or blastic lesions.  No subluxation or dislocation.  Tibial plafond and talar dome are intact.  Ankle mortise is symmetric.  Midfoot degenerative changes noted.  Tarsometatarsal alignment appears preserved noting nonweightbearing films.  Accessory os peroneum identified.  Tricompartmental osteoarthritis of the right knee noted.  Possible suprapatellar knee joint effusion, not well evaluated due to technique.  There is a large plantar calcaneal spur.  Distal Achilles enthesopathy noted.  Vascular calcifications identified.                                 Medical Decision Making:   History:   I obtained history from: someone other than patient.  Old Medical Records: I decided to obtain old medical records.  Initial Assessment:   69 y/o female s/p living unrelated kidney transplant on 4/8/19, depression, anxiety, hypertension, and diabetes mellitus, presents with a chief complaint of right leg pain.  Differential Diagnosis:   My initial differential diagnoses include but are not limited to: fracture, sprain, strain, sciatica, and neuropathy.  Clinical Tests:   Radiological Study: Ordered and Reviewed  ED Management:  No constitutional symptoms, no decreased urine output to suggest kidney failure. No myalgias to suggest electrolyte derangements. Will obtain plain films of distal right lower extremity. Will administer 300 mg gabapentin for presumed neuropathy given distal nature of the pt's complaints. Case discussed with transplant kidney surgery resident on call, Dr. Rhodes, who agrees with the plan. Pt has a follow up tomorrow in clinic with transplant  nephrologist Dr. Guevara.    Reassessment:  X-ray without acute process, symptoms inconsistent with compartment syndrome or peripheral arterial disease. Pt remains well-appearing with improved blood pressure. Symptoms overall concerning for neuropathy. Pt will be discharged home on course of gabapentin. Follow up with Dr. Guevara tomorrow.            Scribe Attestation:   Scribe #1: I performed the above scribed service and the documentation accurately describes the services I performed. I attest to the accuracy of the note.  Scribe #2: I performed the above scribed service and the documentation accurately describes the services I performed. I attest to the accuracy of the note.    Attending Attestation:           Physician Attestation for Scribe:      Comments: I, Dr. Jones Mcguire, personally performed the services described in this documentation. All medical record entries made by the scribe were at my direction and in my presence.  I have reviewed the chart and agree that the record reflects my personal performance and is accurate and complete. Jones Mcguire MD.  11:22 AM 05/05/2019                 Clinical Impression:       ICD-10-CM ICD-9-CM   1. Neuropathy G62.9 355.9   2. Right foot pain M79.671 729.5         Disposition:   Disposition: Discharged  Condition: Stable                        Jones Mcguire MD  05/05/19 1122

## 2019-05-06 ENCOUNTER — CLINICAL SUPPORT (OUTPATIENT)
Dept: TRANSPLANT | Facility: CLINIC | Age: 69
End: 2019-05-06
Payer: MEDICARE

## 2019-05-06 ENCOUNTER — LAB VISIT (OUTPATIENT)
Dept: LAB | Facility: HOSPITAL | Age: 69
End: 2019-05-06
Attending: INTERNAL MEDICINE
Payer: MEDICARE

## 2019-05-06 ENCOUNTER — OFFICE VISIT (OUTPATIENT)
Dept: TRANSPLANT | Facility: CLINIC | Age: 69
End: 2019-05-06
Payer: MEDICARE

## 2019-05-06 VITALS
DIASTOLIC BLOOD PRESSURE: 89 MMHG | BODY MASS INDEX: 32.03 KG/M2 | HEART RATE: 88 BPM | TEMPERATURE: 99 F | OXYGEN SATURATION: 100 % | HEIGHT: 63 IN | SYSTOLIC BLOOD PRESSURE: 187 MMHG | RESPIRATION RATE: 18 BRPM | WEIGHT: 180.75 LBS

## 2019-05-06 DIAGNOSIS — N25.81 SECONDARY HYPERPARATHYROIDISM OF RENAL ORIGIN: Chronic | ICD-10-CM

## 2019-05-06 DIAGNOSIS — Z91.89 AT RISK FOR OPPORTUNISTIC INFECTIONS: ICD-10-CM

## 2019-05-06 DIAGNOSIS — I10 ESSENTIAL HYPERTENSION: Chronic | ICD-10-CM

## 2019-05-06 DIAGNOSIS — Z94.0 KIDNEY REPLACED BY TRANSPLANT: ICD-10-CM

## 2019-05-06 DIAGNOSIS — Z79.60 LONG-TERM USE OF IMMUNOSUPPRESSANT MEDICATION: Primary | ICD-10-CM

## 2019-05-06 LAB
ALBUMIN SERPL BCP-MCNC: 3.3 G/DL (ref 3.5–5.2)
ALBUMIN SERPL BCP-MCNC: 3.3 G/DL (ref 3.5–5.2)
ALP SERPL-CCNC: 86 U/L (ref 55–135)
ALT SERPL W/O P-5'-P-CCNC: 5 U/L (ref 10–44)
ANION GAP SERPL CALC-SCNC: 9 MMOL/L (ref 8–16)
AST SERPL-CCNC: 13 U/L (ref 10–40)
BASOPHILS # BLD AUTO: 0.04 K/UL (ref 0–0.2)
BASOPHILS NFR BLD: 0.8 % (ref 0–1.9)
BILIRUB DIRECT SERPL-MCNC: 0.2 MG/DL (ref 0.1–0.3)
BILIRUB SERPL-MCNC: 0.3 MG/DL (ref 0.1–1)
BUN SERPL-MCNC: 33 MG/DL (ref 8–23)
CALCIUM SERPL-MCNC: 9.3 MG/DL (ref 8.7–10.5)
CHLORIDE SERPL-SCNC: 111 MMOL/L (ref 95–110)
CO2 SERPL-SCNC: 21 MMOL/L (ref 23–29)
CREAT SERPL-MCNC: 2.2 MG/DL (ref 0.5–1.4)
DIFFERENTIAL METHOD: ABNORMAL
EOSINOPHIL # BLD AUTO: 0 K/UL (ref 0–0.5)
EOSINOPHIL NFR BLD: 0.4 % (ref 0–8)
ERYTHROCYTE [DISTWIDTH] IN BLOOD BY AUTOMATED COUNT: 14.9 % (ref 11.5–14.5)
EST. GFR  (AFRICAN AMERICAN): 25.6 ML/MIN/1.73 M^2
EST. GFR  (NON AFRICAN AMERICAN): 22.2 ML/MIN/1.73 M^2
GLUCOSE SERPL-MCNC: 100 MG/DL (ref 70–110)
HCT VFR BLD AUTO: 28.1 % (ref 37–48.5)
HGB BLD-MCNC: 8.9 G/DL (ref 12–16)
IMM GRANULOCYTES # BLD AUTO: 0.06 K/UL (ref 0–0.04)
IMM GRANULOCYTES NFR BLD AUTO: 1.3 % (ref 0–0.5)
LYMPHOCYTES # BLD AUTO: 0.1 K/UL (ref 1–4.8)
LYMPHOCYTES NFR BLD: 1.1 % (ref 18–48)
MAGNESIUM SERPL-MCNC: 1.4 MG/DL (ref 1.6–2.6)
MCH RBC QN AUTO: 30 PG (ref 27–31)
MCHC RBC AUTO-ENTMCNC: 31.7 G/DL (ref 32–36)
MCV RBC AUTO: 95 FL (ref 82–98)
MONOCYTES # BLD AUTO: 0.3 K/UL (ref 0.3–1)
MONOCYTES NFR BLD: 6.8 % (ref 4–15)
NEUTROPHILS # BLD AUTO: 4.3 K/UL (ref 1.8–7.7)
NEUTROPHILS NFR BLD: 89.6 % (ref 38–73)
NRBC BLD-RTO: 0 /100 WBC
PHOSPHATE SERPL-MCNC: 2 MG/DL (ref 2.7–4.5)
PLATELET # BLD AUTO: 304 K/UL (ref 150–350)
PMV BLD AUTO: 9.7 FL (ref 9.2–12.9)
POTASSIUM SERPL-SCNC: 4.2 MMOL/L (ref 3.5–5.1)
PROT SERPL-MCNC: 7.1 G/DL (ref 6–8.4)
PTH-INTACT SERPL-MCNC: 352 PG/ML (ref 9–77)
RBC # BLD AUTO: 2.97 M/UL (ref 4–5.4)
SODIUM SERPL-SCNC: 141 MMOL/L (ref 136–145)
TACROLIMUS BLD-MCNC: 8.6 NG/ML (ref 5–15)
URATE SERPL-MCNC: 5.9 MG/DL (ref 2.4–5.7)
WBC # BLD AUTO: 4.74 K/UL (ref 3.9–12.7)

## 2019-05-06 PROCEDURE — 84550 ASSAY OF BLOOD/URIC ACID: CPT

## 2019-05-06 PROCEDURE — 99215 PR OFFICE/OUTPT VISIT, EST, LEVL V, 40-54 MIN: ICD-10-PCS | Mod: S$PBB,,, | Performed by: INTERNAL MEDICINE

## 2019-05-06 PROCEDURE — 80197 ASSAY OF TACROLIMUS: CPT

## 2019-05-06 PROCEDURE — 99999 PR PBB SHADOW E&M-EST. PATIENT-LVL III: CPT | Mod: PBBFAC,,, | Performed by: INTERNAL MEDICINE

## 2019-05-06 PROCEDURE — 87799 DETECT AGENT NOS DNA QUANT: CPT

## 2019-05-06 PROCEDURE — 99999 PR PBB SHADOW E&M-EST. PATIENT-LVL III: ICD-10-PCS | Mod: PBBFAC,,, | Performed by: INTERNAL MEDICINE

## 2019-05-06 PROCEDURE — 99999 PR PBB SHADOW E&M-EST. PATIENT-LVL III: CPT | Mod: PBBFAC,,,

## 2019-05-06 PROCEDURE — 84075 ASSAY ALKALINE PHOSPHATASE: CPT

## 2019-05-06 PROCEDURE — 99213 OFFICE O/P EST LOW 20 MIN: CPT | Mod: PBBFAC,27 | Performed by: INTERNAL MEDICINE

## 2019-05-06 PROCEDURE — 85025 COMPLETE CBC W/AUTO DIFF WBC: CPT

## 2019-05-06 PROCEDURE — 83735 ASSAY OF MAGNESIUM: CPT

## 2019-05-06 PROCEDURE — 80069 RENAL FUNCTION PANEL: CPT

## 2019-05-06 PROCEDURE — 83970 ASSAY OF PARATHORMONE: CPT

## 2019-05-06 PROCEDURE — 36415 COLL VENOUS BLD VENIPUNCTURE: CPT

## 2019-05-06 PROCEDURE — 99999 PR PBB SHADOW E&M-EST. PATIENT-LVL III: ICD-10-PCS | Mod: PBBFAC,,,

## 2019-05-06 PROCEDURE — 99215 OFFICE O/P EST HI 40 MIN: CPT | Mod: S$PBB,,, | Performed by: INTERNAL MEDICINE

## 2019-05-06 PROCEDURE — 99213 OFFICE O/P EST LOW 20 MIN: CPT | Mod: PBBFAC

## 2019-05-06 RX ORDER — LANOLIN ALCOHOL/MO/W.PET/CERES
800 CREAM (GRAM) TOPICAL 2 TIMES DAILY
Qty: 120 TABLET | Refills: 10 | COMMUNITY
Start: 2019-05-06 | End: 2019-06-04

## 2019-05-06 RX ORDER — CARVEDILOL 12.5 MG/1
25 TABLET ORAL 2 TIMES DAILY WITH MEALS
Qty: 120 TABLET | Refills: 11 | Status: SHIPPED | OUTPATIENT
Start: 2019-05-06 | End: 2023-04-21

## 2019-05-06 RX ORDER — AMLODIPINE BESYLATE 10 MG/1
10 TABLET ORAL DAILY
Qty: 30 TABLET | Refills: 11 | Status: SHIPPED | OUTPATIENT
Start: 2019-05-06 | End: 2019-10-14

## 2019-05-06 NOTE — PROGRESS NOTES
Post Clinic Note:    SW met with patient and granddaughter in post clinic to follow up regarding any needs post transplant and assess coping. Pt is a kidney transplant recipient from 4/8/2019. Patient reports currently staying in patient's home. Patient reports that she has been doing well post transplant and is happy to have her kidney. Patient expressed no psychosocial needs or concerns at this time, reports no issues with obtaining medications, and reports receiving medications from [facility] pharmacy. Patient reports knowing how to contact SW team if any additional needs arise and SW remains available at [phone].

## 2019-05-06 NOTE — PATIENT INSTRUCTIONS
Thank you for entrusting your transplant care to us, and visiting me today. Please:      - Feel free to call us with any concerns regarding your health care.  - Monitor your blood pressure and aim to keep < 140/90

## 2019-05-06 NOTE — LETTER
May 8, 2019        Jd Carias  [address]  [address]  [facility] [address]  Phone: [phone]  Fax: [phone]             [facility]  [address]  [facility] [address]  Phone: [phone]   Patient: Paige Summers   MR Number: [id number]   YOB: 1950   Date of Visit: 5/6/2019       Dear Dr. Jd Carias    Thank you for referring Paige Summers to me for evaluation. Attached you will find relevant portions of my assessment and plan of care.    If you have questions, please do not hesitate to call me. I look forward to following Paige Summers along with you.    Sincerely,    Taty Guevara MD    Enclosure    If you would like to receive this communication electronically, please contact [email or web address] or [phone] to request [employer] access.    [employer] is a tool which provides read-only access to select patient information with whom you have a relationship. Its easy to use and provides real time access to review your patients record including encounter summaries, notes, results, and demographic information.    If you feel you have received this communication in error or would no longer like to receive these types of communications, please e-mail [email or web address]

## 2019-05-06 NOTE — PROGRESS NOTES
Procrit will not be given today due to her Blood pressure.183/91 and repeat BP is 187/89.This is per Pharmacy Navid Flores. Procrit will be rescheduled.Patient also stated that she has not received the Norvasc RX. Problems addressed by Pharmacy.

## 2019-05-07 LAB
BKV DNA SERPL NAA+PROBE-ACNC: <125 COPIES/ML
BKV DNA SERPL NAA+PROBE-LOG#: <2.1 LOG (10) COPIES/ML
BKV DNA SERPL QL NAA+PROBE: NOT DETECTED

## 2019-05-09 ENCOUNTER — CLINICAL SUPPORT (OUTPATIENT)
Dept: TRANSPLANT | Facility: CLINIC | Age: 69
End: 2019-05-09
Payer: MEDICARE

## 2019-05-09 ENCOUNTER — HOSPITAL ENCOUNTER (OUTPATIENT)
Dept: UROLOGY | Facility: HOSPITAL | Age: 69
Discharge: HOME | End: 2019-05-09
Attending: TRANSPLANT SURGERY
Payer: MEDICARE

## 2019-05-09 VITALS
HEART RATE: 73 BPM | WEIGHT: 179.25 LBS | HEART RATE: 80 BPM | OXYGEN SATURATION: 99 % | HEIGHT: 63 IN | DIASTOLIC BLOOD PRESSURE: 71 MMHG | RESPIRATION RATE: 17 BRPM | SYSTOLIC BLOOD PRESSURE: 114 MMHG | TEMPERATURE: 98 F | TEMPERATURE: 98 F | WEIGHT: 179.44 LBS | BODY MASS INDEX: 31.76 KG/M2 | BODY MASS INDEX: 31.79 KG/M2 | HEIGHT: 63 IN | SYSTOLIC BLOOD PRESSURE: 131 MMHG | RESPIRATION RATE: 17 BRPM | DIASTOLIC BLOOD PRESSURE: 73 MMHG

## 2019-05-09 DIAGNOSIS — Z94.0 S/P LIVING-DONOR KIDNEY TRANSPLANTATION: Primary | ICD-10-CM

## 2019-05-09 DIAGNOSIS — Z94.0 KIDNEY REPLACED BY TRANSPLANT: ICD-10-CM

## 2019-05-09 DIAGNOSIS — D63.1 ANEMIA OF RENAL DISEASE: ICD-10-CM

## 2019-05-09 DIAGNOSIS — N18.9 ANEMIA OF RENAL DISEASE: ICD-10-CM

## 2019-05-09 DIAGNOSIS — N30.00 ACUTE CYSTITIS WITHOUT HEMATURIA: Primary | ICD-10-CM

## 2019-05-09 PROBLEM — I12.0 HYPERTENSION, RENAL DISEASE, STAGE 5 CHRONIC KIDNEY DISEASE OR END STAGE RENAL DISEASE: Status: RESOLVED | Noted: 2019-04-09 | Resolved: 2019-05-09

## 2019-05-09 PROCEDURE — 96372 THER/PROPH/DIAG INJ SC/IM: CPT | Mod: ,,, | Performed by: UROLOGY

## 2019-05-09 PROCEDURE — 99999 PR PBB SHADOW E&M-EST. PATIENT-LVL III: ICD-10-PCS | Mod: PBBFAC,,,

## 2019-05-09 PROCEDURE — 52310 CYSTOSCOPY AND TREATMENT: CPT | Mod: ,,, | Performed by: UROLOGY

## 2019-05-09 PROCEDURE — 52310 CYSTOSCOPY AND TREATMENT: CPT

## 2019-05-09 PROCEDURE — 87086 URINE CULTURE/COLONY COUNT: CPT

## 2019-05-09 PROCEDURE — 99213 OFFICE O/P EST LOW 20 MIN: CPT | Mod: PBBFAC,25

## 2019-05-09 PROCEDURE — 87186 SC STD MICRODIL/AGAR DIL: CPT

## 2019-05-09 PROCEDURE — 87088 URINE BACTERIA CULTURE: CPT

## 2019-05-09 PROCEDURE — 52310 PR CYSTOSCOPY,REMV CALCULUS,SIMPLE: ICD-10-PCS | Mod: ,,, | Performed by: UROLOGY

## 2019-05-09 PROCEDURE — 99999 PR PBB SHADOW E&M-EST. PATIENT-LVL III: CPT | Mod: PBBFAC,,,

## 2019-05-09 PROCEDURE — 96372 PR INJECTION,THERAP/PROPH/DIAG2ST, IM OR SUBCUT: ICD-10-PCS | Mod: ,,, | Performed by: UROLOGY

## 2019-05-09 PROCEDURE — 87077 CULTURE AEROBIC IDENTIFY: CPT

## 2019-05-09 RX ORDER — LIDOCAINE HYDROCHLORIDE 20 MG/ML
JELLY TOPICAL
Status: COMPLETED | OUTPATIENT
Start: 2019-05-09 | End: 2019-05-09

## 2019-05-09 RX ORDER — DOXYCYCLINE 100 MG/1
100 CAPSULE ORAL 2 TIMES DAILY
Qty: 6 CAPSULE | Refills: 0 | Status: SHIPPED | OUTPATIENT
Start: 2019-05-09 | End: 2019-05-12

## 2019-05-09 RX ORDER — CEFTRIAXONE 500 MG/1
500 INJECTION, POWDER, FOR SOLUTION INTRAMUSCULAR; INTRAVENOUS
Status: DISCONTINUED | OUTPATIENT
Start: 2019-05-09 | End: 2019-05-20

## 2019-05-09 RX ADMIN — CEFTRIAXONE 500 MG: 500 INJECTION, POWDER, FOR SOLUTION INTRAMUSCULAR; INTRAVENOUS at 10:05

## 2019-05-09 RX ADMIN — LIDOCAINE HYDROCHLORIDE: 20 JELLY TOPICAL at 10:05

## 2019-05-09 RX ADMIN — EPOETIN ALFA 20000 UNITS: 20000 SOLUTION INTRAVENOUS; SUBCUTANEOUS at 10:05

## 2019-05-09 NOTE — PROCEDURES
Date of Procedure: 05/09/2019    Procedure:                                                                        Cystourethroscopy with Successful Removal of transplant kidney Ureteral Stent(s)                                                                                    Pre-OP Diagnosis:                                                                transplant kidneyureteral stent                                 Post-OP Diagnosis:                                                                same                              Anesthesia:                                                                       Anesthesia Administered:                                                     Intraurethral instillation of 10 mL 2% lidocaine (Xylocaine) jelly.     Findings:                                                                         Double J stent in the ureteral orifice.                                - The stent is not encrusted.                                           Description of Procedure:                                                         Informed Consent:                                                            - Risks, benefits and alternatives of procedure discussed with               patient and informed consent obtained.                                       Patient Position:                                                            - Supine. Careful padding and pressure point care performed.                 Prep and Drape:                                                              - Patient prepped and draped in usual sterile fashion using povidone         iodine (Betadine).                                                           Instruments:                                                                 - Alligator forceps.                                                         - Flexible Cystoscope: With 0 degree lens 16 Fr.                             Procedure Details:                                                            - Cystoscope passed under vision into bladder.                               - Bladder and urethra examined in their entirety with findings as            above.                                                                       - Ureteral stent visualized in bladder, grasped and removed.            Complications:                                                                    No immediate complications.                                             Post-OP Plan:                                                                    transplant kidney team    Acute cystitis without hematuria  -     Urine culture  -     cefTRIAXone injection 500 mg  -     doxycycline (VIBRAMYCIN) 100 MG Cap; Take 1 capsule (100 mg total) by mouth 2 (two) times daily. for 6 doses  Dispense: 6 capsule; Refill: 0    Kidney replaced by transplant  -     CYSTOSCOPY W/ STENT REMOVAL; Standing  -     CYSTOSCOPY W/ STENT REMOVAL  -     lidocaine HCl 2% urojet

## 2019-05-09 NOTE — INTERVAL H&P NOTE
The patient has been examined and the H&P has been reviewed:    I concur with the findings and no changes have occurred since H&P was written.   Here for removal of ureteral stent from transplant kidney.        There are no hospital problems to display for this patient.

## 2019-05-09 NOTE — PATIENT INSTRUCTIONS

## 2019-05-11 LAB — BACTERIA UR CULT: NORMAL

## 2019-05-13 ENCOUNTER — CLINICAL SUPPORT (OUTPATIENT)
Dept: TRANSPLANT | Facility: CLINIC | Age: 69
End: 2019-05-13
Payer: MEDICARE

## 2019-05-13 ENCOUNTER — TELEPHONE (OUTPATIENT)
Dept: UROLOGY | Facility: CLINIC | Age: 69
End: 2019-05-13

## 2019-05-13 ENCOUNTER — LAB VISIT (OUTPATIENT)
Dept: LAB | Facility: HOSPITAL | Age: 69
End: 2019-05-13
Attending: INTERNAL MEDICINE
Payer: MEDICARE

## 2019-05-13 VITALS
RESPIRATION RATE: 18 BRPM | WEIGHT: 179.25 LBS | BODY MASS INDEX: 31.76 KG/M2 | SYSTOLIC BLOOD PRESSURE: 148 MMHG | DIASTOLIC BLOOD PRESSURE: 79 MMHG | TEMPERATURE: 98 F | HEART RATE: 81 BPM | OXYGEN SATURATION: 99 % | HEIGHT: 63 IN

## 2019-05-13 DIAGNOSIS — Z94.0 S/P LIVING-DONOR KIDNEY TRANSPLANTATION: Primary | ICD-10-CM

## 2019-05-13 DIAGNOSIS — Z94.0 KIDNEY REPLACED BY TRANSPLANT: ICD-10-CM

## 2019-05-13 DIAGNOSIS — N30.00 ACUTE CYSTITIS WITHOUT HEMATURIA: Primary | ICD-10-CM

## 2019-05-13 DIAGNOSIS — N18.9 ANEMIA OF RENAL DISEASE: ICD-10-CM

## 2019-05-13 DIAGNOSIS — D63.1 ANEMIA OF RENAL DISEASE: ICD-10-CM

## 2019-05-13 LAB
ALBUMIN SERPL BCP-MCNC: 3.4 G/DL (ref 3.5–5.2)
ANION GAP SERPL CALC-SCNC: 8 MMOL/L (ref 8–16)
BASOPHILS # BLD AUTO: 0.02 K/UL (ref 0–0.2)
BASOPHILS NFR BLD: 0.3 % (ref 0–1.9)
BUN SERPL-MCNC: 25 MG/DL (ref 8–23)
CALCIUM SERPL-MCNC: 9.5 MG/DL (ref 8.7–10.5)
CHLORIDE SERPL-SCNC: 112 MMOL/L (ref 95–110)
CO2 SERPL-SCNC: 22 MMOL/L (ref 23–29)
CREAT SERPL-MCNC: 1.8 MG/DL (ref 0.5–1.4)
DIFFERENTIAL METHOD: ABNORMAL
EOSINOPHIL # BLD AUTO: 0 K/UL (ref 0–0.5)
EOSINOPHIL NFR BLD: 0.5 % (ref 0–8)
ERYTHROCYTE [DISTWIDTH] IN BLOOD BY AUTOMATED COUNT: 15.5 % (ref 11.5–14.5)
EST. GFR  (AFRICAN AMERICAN): 32.6 ML/MIN/1.73 M^2
EST. GFR  (NON AFRICAN AMERICAN): 28.3 ML/MIN/1.73 M^2
GLUCOSE SERPL-MCNC: 96 MG/DL (ref 70–110)
HCT VFR BLD AUTO: 30.5 % (ref 37–48.5)
HGB BLD-MCNC: 9.5 G/DL (ref 12–16)
IMM GRANULOCYTES # BLD AUTO: 0.08 K/UL (ref 0–0.04)
IMM GRANULOCYTES NFR BLD AUTO: 1.3 % (ref 0–0.5)
LYMPHOCYTES # BLD AUTO: 0.1 K/UL (ref 1–4.8)
LYMPHOCYTES NFR BLD: 1.7 % (ref 18–48)
MAGNESIUM SERPL-MCNC: 1.1 MG/DL (ref 1.6–2.6)
MCH RBC QN AUTO: 29.6 PG (ref 27–31)
MCHC RBC AUTO-ENTMCNC: 31.1 G/DL (ref 32–36)
MCV RBC AUTO: 95 FL (ref 82–98)
MONOCYTES # BLD AUTO: 0.5 K/UL (ref 0.3–1)
MONOCYTES NFR BLD: 7.6 % (ref 4–15)
NEUTROPHILS # BLD AUTO: 5.3 K/UL (ref 1.8–7.7)
NEUTROPHILS NFR BLD: 88.6 % (ref 38–73)
NRBC BLD-RTO: 0 /100 WBC
PHOSPHATE SERPL-MCNC: 2.1 MG/DL (ref 2.7–4.5)
PLATELET # BLD AUTO: 304 K/UL (ref 150–350)
PMV BLD AUTO: 9.8 FL (ref 9.2–12.9)
POTASSIUM SERPL-SCNC: 4.8 MMOL/L (ref 3.5–5.1)
RBC # BLD AUTO: 3.21 M/UL (ref 4–5.4)
SODIUM SERPL-SCNC: 142 MMOL/L (ref 136–145)
TACROLIMUS BLD-MCNC: 13.4 NG/ML (ref 5–15)
WBC # BLD AUTO: 5.93 K/UL (ref 3.9–12.7)

## 2019-05-13 PROCEDURE — 99213 OFFICE O/P EST LOW 20 MIN: CPT | Mod: PBBFAC

## 2019-05-13 PROCEDURE — 85025 COMPLETE CBC W/AUTO DIFF WBC: CPT

## 2019-05-13 PROCEDURE — 80197 ASSAY OF TACROLIMUS: CPT

## 2019-05-13 PROCEDURE — 83735 ASSAY OF MAGNESIUM: CPT

## 2019-05-13 PROCEDURE — 99999 PR PBB SHADOW E&M-EST. PATIENT-LVL III: ICD-10-PCS | Mod: PBBFAC,,,

## 2019-05-13 PROCEDURE — 36415 COLL VENOUS BLD VENIPUNCTURE: CPT

## 2019-05-13 PROCEDURE — 99999 PR PBB SHADOW E&M-EST. PATIENT-LVL III: CPT | Mod: PBBFAC,,,

## 2019-05-13 PROCEDURE — 80069 RENAL FUNCTION PANEL: CPT

## 2019-05-13 RX ORDER — CIPROFLOXACIN 500 MG/1
500 TABLET ORAL 2 TIMES DAILY
Qty: 14 TABLET | Refills: 0 | Status: SHIPPED | OUTPATIENT
Start: 2019-05-13 | End: 2019-05-20 | Stop reason: CLARIF

## 2019-05-13 RX ORDER — CIPROFLOXACIN 250 MG/1
250 TABLET, FILM COATED ORAL 2 TIMES DAILY
Qty: 14 TABLET | Refills: 0 | Status: SHIPPED | OUTPATIENT
Start: 2019-05-13 | End: 2019-05-20

## 2019-05-13 RX ADMIN — ERYTHROPOIETIN 20000 UNITS: 20000 INJECTION, SOLUTION INTRAVENOUS; SUBCUTANEOUS at 10:05

## 2019-05-13 NOTE — TELEPHONE ENCOUNTER
Acute cystitis without hematuria  -     ciprofloxacin HCl (CIPRO) 500 MG tablet; Take 1 tablet (500 mg total) by mouth 2 (two) times daily. for 14 doses  Dispense: 14 tablet; Refill: 0  -     ciprofloxacin HCl (CIPRO) 250 MG tablet; Take 1 tablet (250 mg total) by mouth 2 (two) times daily. for 7 days  Dispense: 14 tablet; Refill: 0    please switch her abx to cipro based on her recent urine culture result.  Based on her kidney function, recommend to take cipro 250 mg BID for 7 days.

## 2019-05-13 NOTE — PROGRESS NOTES
please decrease prograf to 5/4 mg if it is a true trough and check the level with next lab. Thank you!

## 2019-05-16 DIAGNOSIS — D50.9 IRON DEFICIENCY ANEMIA, UNSPECIFIED IRON DEFICIENCY ANEMIA TYPE: Primary | ICD-10-CM

## 2019-05-16 RX ORDER — TACROLIMUS 1 MG/1
CAPSULE ORAL
Qty: 270 CAPSULE | Refills: 11 | Status: SHIPPED | OUTPATIENT
Start: 2019-05-16 | End: 2019-05-17

## 2019-05-16 NOTE — TELEPHONE ENCOUNTER
Call placed, labs reviewed by Dr. Guevara. Prograf dose decreased to 5/4. Patient continues Mag Ox 800mg bid. Continues Procrit 20k wkly. Will recheck Iron studies, B12 and Folate level. Cipro X 14 days started on 5/15 for UTI.

## 2019-05-17 RX ORDER — MYCOPHENOLATE MOFETIL 250 MG/1
500 CAPSULE ORAL 2 TIMES DAILY
Qty: 120 CAPSULE | Refills: 11 | Status: SHIPPED | OUTPATIENT
Start: 2019-05-17 | End: 2020-04-30

## 2019-05-17 RX ORDER — TACROLIMUS 1 MG/1
CAPSULE ORAL
Qty: 270 CAPSULE | Refills: 11 | Status: SHIPPED | OUTPATIENT
Start: 2019-05-17 | End: 2019-05-20

## 2019-05-17 NOTE — PROGRESS NOTES
Kidney Post-Transplant Assessment    Referring Physician: Jd Carias  Current Nephrologist: Jd Carias    ORGAN: LEFT KIDNEY  Donor Type: living  PHS Increased Risk: no  Cold Ischemia: 189 mins  Induction Medications: campath - alemtuzumab (anti-cd52)    Subjective:     CC:  Reassessment of renal allograft function and management of chronic immunosuppression.    HPI:  Ms. Summers is a 68 y.o. year old Black or  female with ESRD 2/2 Hypertensive Nephrosclerosis who received a living kidney transplant on 4/8/19. Her most recent creatinine is 5. She takes mycophenolic acid and tacrolimus for maintenance immunosuppression.      post transplant course:  - S/p living unrelated kidney transplant 4/8/19, CMV +/+, WIT 30 min, CIT 3h 9 m  - DGF, resolved  - Pt was on Plavix for reoccurring AVF thrombus      Interval Hx: she complains of ear pain - she think there is a bug there. she denies any fever, chills , ear secretion,  chest pain, shortness of breath, nausea/vomiting, dysuria, frequency, urgency, or pain over the allograft. Her BP readings are around 140-150      Current Outpatient Medications on File Prior to Visit   Medication Sig Dispense Refill    acetaminophen (TYLENOL) 325 mg Cap Take by mouth.      amLODIPine (NORVASC) 10 MG tablet Take 1 tablet (10 mg total) by mouth once daily. 30 tablet 11    bisacodyl (DULCOLAX) 5 mg EC tablet Take 2 tablets (10 mg total) by mouth daily as needed for Constipation.  0    carvedilol (COREG) 12.5 MG tablet Take 2 tablets (25 mg total) by mouth 2 (two) times daily with meals. 120 tablet 11    cinacalcet (SENSIPAR) 30 MG Tab Take 1 tablet (30 mg total) by mouth daily with breakfast. 30 tablet 3    ciprofloxacin HCl (CIPRO) 250 MG tablet Take 1 tablet (250 mg total) by mouth 2 (two) times daily. for 7 days 14 tablet 0    docusate sodium (COLACE) 100 MG capsule Take 1 capsule (100 mg total) by mouth 2 (two) times daily as needed for Constipation.  0     linaclotide (LINZESS) 145 mcg Cap capsule Take 1 capsule (145 mcg total) by mouth once daily. 30 capsule 11    magnesium oxide (MAG-OX) 400 mg (241.3 mg magnesium) tablet Take 2 tablets (800 mg total) by mouth 2 (two) times daily. 120 tablet 10    multivitamin (THERAGRAN) tablet Take 1 tablet by mouth once daily.      mycophenolate (CELLCEPT) 250 mg Cap Take 2 capsules (500 mg total) by mouth 2 (two) times daily. 120 capsule 11    ondansetron (ZOFRAN) 4 MG tablet Take 2 tablets (8 mg total) by mouth every 8 (eight) hours as needed for Nausea. 16 tablet 0    sodium bicarbonate 650 MG tablet Take 2 tablets (1,300 mg total) by mouth 2 (two) times daily. 120 tablet 11    sulfamethoxazole-trimethoprim 400-80mg (BACTRIM,SEPTRA) 400-80 mg per tablet Take 1 tablet by mouth every Mon, Wed, Fri. Stop: 4/7/2020 12 tablet 11    traMADol (ULTRAM) 50 mg tablet Take 1 tablet (50 mg total) by mouth every 8 (eight) hours as needed for Pain. 30 tablet 0    valGANciclovir (VALCYTE) 450 mg Tab Take 1 tablet (450 mg total) by mouth every Mon, Wed, Fri. Stop: 7/7/19 12 tablet 2    zolpidem (AMBIEN) 10 mg Tab Take 5 mg by mouth nightly as needed.      [DISCONTINUED] ciprofloxacin HCl (CIPRO) 500 MG tablet Take 1 tablet (500 mg total) by mouth 2 (two) times daily. for 14 doses 14 tablet 0    [DISCONTINUED] cloNIDine (CATAPRES) 0.1 MG tablet Take 1 tablet (0.1 mg total) by mouth 2 (two) times daily. 60 tablet 11    [DISCONTINUED] tacrolimus (PROGRAF) 1 MG Cap Take 5 capsules (5 mg total) by mouth once daily AND 4 capsules (4 mg total) every evening. 270 capsule 11    famotidine (PEPCID) 20 MG tablet Take 1 tablet (20 mg total) by mouth every evening. 30 tablet 1    gabapentin (NEURONTIN) 300 MG capsule Take 1 capsule (300 mg total) by mouth 3 (three) times daily. for 10 days 30 capsule 0    methylPREDNISolone (MEDROL DOSEPACK) 4 mg tablet use as directed 1 Package 0     Current Facility-Administered Medications on File  Prior to Visit   Medication Dose Route Frequency Provider Last Rate Last Dose    epoetin hillary injection 20,000 Units  20,000 Units Subcutaneous Q7 Days Taty Guevara MD   20,000 Units at 04/17/19 1015    epoetin hillary injection 20,000 Units  20,000 Units Subcutaneous Q7 Days Taty Guevara MD   20,000 Units at 05/09/19 1048    epoetin hillary injection 20,000 Units  20,000 Units Subcutaneous Q7 Days Taty Guevara MD   20,000 Units at 05/13/19 1045    [DISCONTINUED] cefTRIAXone injection 500 mg  500 mg Intramuscular Q24H Bran Petersen MD   500 mg at 05/09/19 1017        Review of Systems     Skin: no skin rash  CNS; no headaches, blurred vision, seizure, or syncope  ENT: No JVD,  Adenopathies,  nasal congestion. No oral lesions  Cardiac: No chest pain, dyspnea, claudication, edema or palpitations  Respiratory: No SOB, cough, hemoptysis   Gastro-intestinal: No diarrhea, constipation, abdominal pain, nausea, vomit. No ascitis  Genitourinary: no hematuria, dysuria, frequency, frequency  Musculoskeletal: Positive for Hip thigh pain, arthritis or vasculitic changes  Psych: alert awake, oriented, No cranial nerves deficit.      Objective:       Physical Exam   Vitals:    05/20/19 1417   BP: (!) 156/76   Pulse: 92   Temp: 96.3 °F (35.7 °C)         Head: normocephalic  Neck: No JVD, cervical axillary, or femoral adenopathies  Heart: no murmurs, Normal s1 and s2, No gallops, no rubs, No murmurs  Lungs; CTA, good respiratory effort, no crackles  Abdomen: soft, non tender, no splenomegaly or hepatomegaly, no massess, no bruits  Allograft area: healed wound  Extremities: No edema, skin rash, Left hip pain extending to posterior thigh tender to deep palpation proximal thigh and hip.   SNC: awake, alert oriented. Cranial nerves are intact, no focalized, sensitivity and strength preserved      Labs:  Lab Results   Component Value Date    WBC 5.84 05/20/2019    HGB 10.5 (L) 05/20/2019    HCT 32.9 (L) 05/20/2019    NA  141 05/20/2019    K 4.8 05/20/2019     05/20/2019    CO2 23 05/20/2019    BUN 22 05/20/2019    CREATININE 2.1 (H) 05/20/2019    EGFRNONAA 23.5 (A) 05/20/2019    CALCIUM 10.0 05/20/2019    PHOS 2.3 (L) 05/20/2019    MG 1.8 05/20/2019    ALBUMIN 3.6 05/20/2019    AST 13 05/06/2019    ALT 5 (L) 05/06/2019    UTPCR 0.46 (H) 05/06/2019    .0 (H) 05/06/2019    TACROLIMUS 12.6 05/20/2019       No results found for: EXTANC, EXTWBC, EXTSEGS, EXTPLATELETS, EXTHEMOGLOBI, EXTHEMATOCRI, EXTCREATININ, EXTSODIUM, EXTPOTASSIUM, EXTBUN, EXTCO2, EXTCALCIUM, EXTPHOSPHORU, EXTGLUCOSE, EXTALBUMIN, EXTAST, EXTALT, EXTBILITOTAL, EXTLIPASE, EXTAMYLASE    No results found for: EXTCYCLOSLVL, EXTSIROLIMUS, EXTTACROLVL, EXTPROTCRE, EXTPTHINTACT, EXTPROTEINUA, EXTWBCUA, EXTRBCUA    Labs were reviewed with the patient    Assessment:     1. Long-term use of immunosuppressant medication    2. Secondary hyperparathyroidism of renal origin    3. S/P living-donor kidney transplantation        Plan:        1. CKD stage to be dermine: sCr remaining to  2.2 mg/dL . making good amount of UOP ~ 2    2. Immunosuppression: prograf level was addressed  Lab Results   Component Value Date    TACROLIMUS 12.6 05/20/2019    TACROLIMUS 13.4 05/13/2019    TACROLIMUS 8.6 05/06/2019   -  mg BID  - Decrease prograf to 4 mg BID    Will closely monitor for toxicities, education provided about adherence to medicines and need to communicate any side effect to the transplant nurse or physician.      3. Hypertension management:   - Continue with home blood pressure monitoring, low salt and healthy life discussed with the patient.  - Norvasc 10 mg daily   - coreg 25 mg BID  - clonidine 0.1 mg TID     4. Metabolic Bone Disease/Secondary Hyperparathyroidism:  - on sensipar  - PTH improving  - Will monitor PTH, Vit D level, calcium.      5. Anemia: stable    6. Low Mg  - on Mg supplement      # UTI  - on ciprofloxacin for 14 days     # Ear pain  - no sigh   Of infection  - ENT appointment as soon as posssible.           Plan  - clonidine 0.1 mg TID  - ENT appointment as soon as possible  - DSA, Allosure, BK, UPCR, UA with next lab  - possible biopsy in future

## 2019-05-20 ENCOUNTER — TELEPHONE (OUTPATIENT)
Dept: OTOLARYNGOLOGY | Facility: CLINIC | Age: 69
End: 2019-05-20

## 2019-05-20 ENCOUNTER — LAB VISIT (OUTPATIENT)
Dept: LAB | Facility: HOSPITAL | Age: 69
End: 2019-05-20
Attending: INTERNAL MEDICINE
Payer: MEDICARE

## 2019-05-20 ENCOUNTER — OFFICE VISIT (OUTPATIENT)
Dept: TRANSPLANT | Facility: CLINIC | Age: 69
End: 2019-05-20
Payer: MEDICARE

## 2019-05-20 VITALS
WEIGHT: 179.69 LBS | BODY MASS INDEX: 31.84 KG/M2 | DIASTOLIC BLOOD PRESSURE: 76 MMHG | SYSTOLIC BLOOD PRESSURE: 156 MMHG | OXYGEN SATURATION: 100 % | HEART RATE: 92 BPM | TEMPERATURE: 96 F | HEIGHT: 63 IN

## 2019-05-20 DIAGNOSIS — N25.81 SECONDARY HYPERPARATHYROIDISM OF RENAL ORIGIN: Chronic | ICD-10-CM

## 2019-05-20 DIAGNOSIS — Z94.0 S/P LIVING-DONOR KIDNEY TRANSPLANTATION: ICD-10-CM

## 2019-05-20 DIAGNOSIS — D50.9 IRON DEFICIENCY ANEMIA, UNSPECIFIED IRON DEFICIENCY ANEMIA TYPE: ICD-10-CM

## 2019-05-20 DIAGNOSIS — Z79.60 LONG-TERM USE OF IMMUNOSUPPRESSANT MEDICATION: Primary | ICD-10-CM

## 2019-05-20 DIAGNOSIS — D63.1 ANEMIA OF RENAL DISEASE: ICD-10-CM

## 2019-05-20 DIAGNOSIS — Z94.0 KIDNEY REPLACED BY TRANSPLANT: ICD-10-CM

## 2019-05-20 DIAGNOSIS — N18.9 ANEMIA OF RENAL DISEASE: ICD-10-CM

## 2019-05-20 PROBLEM — D69.6 THROMBOCYTOPENIA, UNSPECIFIED: Status: RESOLVED | Noted: 2019-04-11 | Resolved: 2019-05-20

## 2019-05-20 LAB
ALBUMIN SERPL BCP-MCNC: 3.6 G/DL (ref 3.5–5.2)
ANION GAP SERPL CALC-SCNC: 8 MMOL/L (ref 8–16)
BASOPHILS # BLD AUTO: 0.03 K/UL (ref 0–0.2)
BASOPHILS NFR BLD: 0.5 % (ref 0–1.9)
BUN SERPL-MCNC: 22 MG/DL (ref 8–23)
CALCIUM SERPL-MCNC: 10 MG/DL (ref 8.7–10.5)
CHLORIDE SERPL-SCNC: 110 MMOL/L (ref 95–110)
CO2 SERPL-SCNC: 23 MMOL/L (ref 23–29)
CREAT SERPL-MCNC: 2.1 MG/DL (ref 0.5–1.4)
DIFFERENTIAL METHOD: ABNORMAL
EOSINOPHIL # BLD AUTO: 0.1 K/UL (ref 0–0.5)
EOSINOPHIL NFR BLD: 0.9 % (ref 0–8)
ERYTHROCYTE [DISTWIDTH] IN BLOOD BY AUTOMATED COUNT: 15.9 % (ref 11.5–14.5)
EST. GFR  (AFRICAN AMERICAN): 27.1 ML/MIN/1.73 M^2
EST. GFR  (NON AFRICAN AMERICAN): 23.5 ML/MIN/1.73 M^2
FERRITIN SERPL-MCNC: 179 NG/ML (ref 20–300)
FOLATE SERPL-MCNC: 9.1 NG/ML (ref 4–24)
GLUCOSE SERPL-MCNC: 102 MG/DL (ref 70–110)
HCT VFR BLD AUTO: 32.9 % (ref 37–48.5)
HGB BLD-MCNC: 10.5 G/DL (ref 12–16)
IMM GRANULOCYTES # BLD AUTO: 0.05 K/UL (ref 0–0.04)
IMM GRANULOCYTES NFR BLD AUTO: 0.9 % (ref 0–0.5)
IRON SERPL-MCNC: 33 UG/DL (ref 30–160)
LYMPHOCYTES # BLD AUTO: 0.1 K/UL (ref 1–4.8)
LYMPHOCYTES NFR BLD: 1.9 % (ref 18–48)
MAGNESIUM SERPL-MCNC: 1.8 MG/DL (ref 1.6–2.6)
MCH RBC QN AUTO: 30.1 PG (ref 27–31)
MCHC RBC AUTO-ENTMCNC: 31.9 G/DL (ref 32–36)
MCV RBC AUTO: 94 FL (ref 82–98)
MONOCYTES # BLD AUTO: 0.4 K/UL (ref 0.3–1)
MONOCYTES NFR BLD: 6.3 % (ref 4–15)
NEUTROPHILS # BLD AUTO: 5.2 K/UL (ref 1.8–7.7)
NEUTROPHILS NFR BLD: 89.5 % (ref 38–73)
NRBC BLD-RTO: 0 /100 WBC
PHOSPHATE SERPL-MCNC: 2.3 MG/DL (ref 2.7–4.5)
PLATELET # BLD AUTO: 299 K/UL (ref 150–350)
PMV BLD AUTO: 9.7 FL (ref 9.2–12.9)
POTASSIUM SERPL-SCNC: 4.8 MMOL/L (ref 3.5–5.1)
RBC # BLD AUTO: 3.49 M/UL (ref 4–5.4)
SATURATED IRON: 14 % (ref 20–50)
SODIUM SERPL-SCNC: 141 MMOL/L (ref 136–145)
TACROLIMUS BLD-MCNC: 12.6 NG/ML (ref 5–15)
TOTAL IRON BINDING CAPACITY: 232 UG/DL (ref 250–450)
TRANSFERRIN SERPL-MCNC: 157 MG/DL (ref 200–375)
VIT B12 SERPL-MCNC: 488 PG/ML (ref 210–950)
WBC # BLD AUTO: 5.84 K/UL (ref 3.9–12.7)

## 2019-05-20 PROCEDURE — 99215 OFFICE O/P EST HI 40 MIN: CPT | Mod: PBBFAC | Performed by: INTERNAL MEDICINE

## 2019-05-20 PROCEDURE — 83540 ASSAY OF IRON: CPT

## 2019-05-20 PROCEDURE — 83735 ASSAY OF MAGNESIUM: CPT

## 2019-05-20 PROCEDURE — 82728 ASSAY OF FERRITIN: CPT

## 2019-05-20 PROCEDURE — 80197 ASSAY OF TACROLIMUS: CPT

## 2019-05-20 PROCEDURE — 99215 PR OFFICE/OUTPT VISIT, EST, LEVL V, 40-54 MIN: ICD-10-PCS | Mod: S$PBB,,, | Performed by: INTERNAL MEDICINE

## 2019-05-20 PROCEDURE — 80069 RENAL FUNCTION PANEL: CPT

## 2019-05-20 PROCEDURE — 99999 PR PBB SHADOW E&M-EST. PATIENT-LVL V: CPT | Mod: PBBFAC,,, | Performed by: INTERNAL MEDICINE

## 2019-05-20 PROCEDURE — 85025 COMPLETE CBC W/AUTO DIFF WBC: CPT

## 2019-05-20 PROCEDURE — 99215 OFFICE O/P EST HI 40 MIN: CPT | Mod: S$PBB,,, | Performed by: INTERNAL MEDICINE

## 2019-05-20 PROCEDURE — 36415 COLL VENOUS BLD VENIPUNCTURE: CPT

## 2019-05-20 PROCEDURE — 82746 ASSAY OF FOLIC ACID SERUM: CPT

## 2019-05-20 PROCEDURE — 82607 VITAMIN B-12: CPT

## 2019-05-20 PROCEDURE — 99999 PR PBB SHADOW E&M-EST. PATIENT-LVL V: ICD-10-PCS | Mod: PBBFAC,,, | Performed by: INTERNAL MEDICINE

## 2019-05-20 RX ORDER — TACROLIMUS 1 MG/1
CAPSULE ORAL
Qty: 270 CAPSULE | Refills: 11 | Status: SHIPPED | OUTPATIENT
Start: 2019-05-20 | End: 2019-06-04

## 2019-05-20 RX ORDER — CLONIDINE HYDROCHLORIDE 0.1 MG/1
0.1 TABLET ORAL 3 TIMES DAILY
Qty: 90 TABLET | Refills: 11 | Status: ON HOLD | OUTPATIENT
Start: 2019-05-20 | End: 2021-09-02 | Stop reason: HOSPADM

## 2019-05-20 NOTE — PROGRESS NOTES
Please more water intake. Pending ferritin.  Please DSA, allosure and BK, UPCr with next lab.       Thank you!

## 2019-05-20 NOTE — LETTER
May 23, 2019        Jd Carias  [address]  [address]  [facility] [address]  Phone: [phone]  Fax: [phone]             [facility]  [address]  [facility] [address]  Phone: [phone]   Patient: Paige Summers   MR Number: [id number]   YOB: 1950   Date of Visit: 5/20/2019       Dear Dr. Jd Carias    Thank you for referring Paige Summers to me for evaluation. Attached you will find relevant portions of my assessment and plan of care.    If you have questions, please do not hesitate to call me. I look forward to following Paige Summers along with you.    Sincerely,    Taty Guevara MD    Enclosure    If you would like to receive this communication electronically, please contact [email or web address] or [phone] to request [employer] access.    [employer] is a tool which provides read-only access to select patient information with whom you have a relationship. Its easy to use and provides real time access to review your patients record including encounter summaries, notes, results, and demographic information.    If you feel you have received this communication in error or would no longer like to receive these types of communications, please e-mail [email or web address]

## 2019-05-20 NOTE — PATIENT INSTRUCTIONS
Thank you for entrusting your transplant care to us, and visiting me today. Please:      - Feel free to call us with any concerns regarding your health care.  - Monitor your blood pressure and aim to keep < 140/90  - clonidine 0.1 mg three times a day  - ENT doctor

## 2019-05-20 NOTE — TELEPHONE ENCOUNTER
Called and tried to make patient appt for today l/m for her to call office so we can get her in the morning

## 2019-05-21 ENCOUNTER — OFFICE VISIT (OUTPATIENT)
Dept: OTOLARYNGOLOGY | Facility: CLINIC | Age: 69
End: 2019-05-21
Payer: MEDICARE

## 2019-05-21 VITALS
WEIGHT: 177.94 LBS | BODY MASS INDEX: 31.53 KG/M2 | HEIGHT: 63 IN | HEART RATE: 84 BPM | SYSTOLIC BLOOD PRESSURE: 148 MMHG | DIASTOLIC BLOOD PRESSURE: 81 MMHG

## 2019-05-21 DIAGNOSIS — H61.21 IMPACTED CERUMEN OF RIGHT EAR: ICD-10-CM

## 2019-05-21 DIAGNOSIS — H60.543 ECZEMA OF EXTERNAL EAR, BILATERAL: Primary | ICD-10-CM

## 2019-05-21 PROCEDURE — 99213 OFFICE O/P EST LOW 20 MIN: CPT | Mod: 25,S$PBB,, | Performed by: NURSE PRACTITIONER

## 2019-05-21 PROCEDURE — 99999 PR PBB SHADOW E&M-EST. PATIENT-LVL III: ICD-10-PCS | Mod: PBBFAC,,, | Performed by: NURSE PRACTITIONER

## 2019-05-21 PROCEDURE — 99213 PR OFFICE/OUTPT VISIT, EST, LEVL III, 20-29 MIN: ICD-10-PCS | Mod: 25,S$PBB,, | Performed by: NURSE PRACTITIONER

## 2019-05-21 PROCEDURE — 69210 EAR CERUMEN REMOVAL: ICD-10-PCS | Mod: S$PBB,,, | Performed by: NURSE PRACTITIONER

## 2019-05-21 PROCEDURE — 69210 REMOVE IMPACTED EAR WAX UNI: CPT | Mod: PBBFAC | Performed by: NURSE PRACTITIONER

## 2019-05-21 PROCEDURE — 69210 REMOVE IMPACTED EAR WAX UNI: CPT | Mod: S$PBB,,, | Performed by: NURSE PRACTITIONER

## 2019-05-21 PROCEDURE — 99213 OFFICE O/P EST LOW 20 MIN: CPT | Mod: PBBFAC,25 | Performed by: NURSE PRACTITIONER

## 2019-05-21 PROCEDURE — 99999 PR PBB SHADOW E&M-EST. PATIENT-LVL III: CPT | Mod: PBBFAC,,, | Performed by: NURSE PRACTITIONER

## 2019-05-21 RX ORDER — ZOLPIDEM TARTRATE 5 MG/1
5 TABLET ORAL NIGHTLY PRN
Qty: 30 TABLET | Refills: 2 | Status: SHIPPED | OUTPATIENT
Start: 2019-05-21 | End: 2019-10-14

## 2019-05-21 RX ORDER — ZOLPIDEM TARTRATE 5 MG/1
5 TABLET ORAL NIGHTLY PRN
Qty: 30 TABLET | Refills: 2 | Status: SHIPPED | OUTPATIENT
Start: 2019-05-21 | End: 2019-05-21 | Stop reason: SDUPTHER

## 2019-05-21 RX ORDER — BETAMETHASONE VALERATE 0.1 %
LOTION (ML) TOPICAL 2 TIMES DAILY
Qty: 60 ML | Refills: 2 | Status: SHIPPED | OUTPATIENT
Start: 2019-05-21 | End: 2020-04-17

## 2019-05-21 NOTE — PROGRESS NOTES
"  Subjective:      Paige Summers is a 68 y.o. female who was self-referred for bug in ear.    Patient reports "bug in ear." She states she feels like there is something crawling in her right ear. She denies ear pain, ear drainage or change in hearing. She denies any ear surgery or trauma. She states her ears are constantly itching.      Past Medical History  She has a past medical history of Anemia, Anemia of renal disease, Anxiety, Chronic renal failure, Depression, Essential hypertension, GERD (gastroesophageal reflux disease), Gout, H pylori ulcer, Hyperlipidemia, Hypertension, Obesity, and Secondary hyperparathyroidism of renal origin.    Past Surgical History  She has a past surgical history that includes Dialysis fistula creation (Left, 02/2018); Thrombectomy of hemodialysis access site (Left, 6/11/2018); Thrombectomy of hemodialysis access site (Left, 6/13/2018); Diagnostic ultrasound (Left, 6/18/2018); Diagnostic ultrasound (Left, 7/9/2018); Diagnostic ultrasound (Left, 8/6/2018); Hysterectomy (1971); Diagnostic ultrasound (Left, 8/20/2018); Thrombectomy of hemodialysis access site (N/A, 9/6/2018); Thrombectomy of hemodialysis access site (Left, 9/12/2018); Diagnostic ultrasound (Left, 9/19/2018); Diagnostic ultrasound (Left, 10/10/2018); Thrombectomy of hemodialysis access site (Left, 11/9/2018); Thrombectomy of hemodialysis access site (Left, 12/13/2018); Fistulogram (Left, 12/19/2018); Declotting of arteriovenous graft (Left, 2/19/2019); Fistulogram (Left, 3/13/2019); and Kidney transplant (N/A, 4/8/2019).    Family History  Her family history includes Alcohol abuse in her mother; Arthritis in her sister; Diabetes in her cousin; Heart disease in her brother; Heart failure in her brother and brother; Hypertension in her sister; No Known Problems in her father.    Social History  She reports that she has never smoked. She has never used smokeless tobacco. She reports that she does not drink alcohol or " "use drugs.    Allergies  She is allergic to oxycodone and hydrocodone.    Medications  She has a current medication list which includes the following prescription(s): acetaminophen, amlodipine, bisacodyl, carvedilol, cinacalcet, clonidine, docusate sodium, famotidine, linaclotide, magnesium oxide, methylprednisolone, multivitamin, mycophenolate, ondansetron, sodium bicarbonate, sulfamethoxazole-trimethoprim 400-80mg, tacrolimus, tramadol, valganciclovir, zolpidem, betamethasone valerate 0.1%, and gabapentin, and the following Facility-Administered Medications: epoetin hillary, epoetin hillary, and epoetin hillary.    Review of Systems   Constitutional: Negative for chills, fever and unexpected weight change.   HENT: Negative for hearing loss, postnasal drip, rhinorrhea, sinus pressure, sneezing, sore throat, tinnitus and trouble swallowing.         Ear itching   Eyes: Negative for pain, itching and visual disturbance.   Respiratory: Negative for apnea and shortness of breath.    Cardiovascular: Negative for chest pain and palpitations.   Gastrointestinal: Negative for abdominal pain and nausea.   Endocrine: Negative for cold intolerance and heat intolerance.   Musculoskeletal: Negative for joint swelling and neck stiffness.   Skin: Negative for color change and rash.   Neurological: Negative for facial asymmetry and headaches.   Hematological: Negative for adenopathy. Does not bruise/bleed easily.   Psychiatric/Behavioral: Negative for agitation. The patient is not nervous/anxious.           Objective:     BP (!) 148/81   Pulse 84   Ht 5' 3" (1.6 m)   Wt 80.7 kg (177 lb 14.6 oz)   LMP 08/08/1971   BMI 31.52 kg/m²      Constitutional:   Vital signs are normal. She appears well-developed and well-nourished.     Head:  Normocephalic and atraumatic.     Ears:    Right Ear: No lacerations. No drainage, swelling or tenderness. No foreign bodies. No mastoid tenderness. Tympanic membrane is erythematous. Tympanic membrane is " not injected, not scarred, not perforated, not retracted and not bulging. Tympanic membrane mobility is normal. No middle ear effusion. No hemotympanum.   Left Ear: No lacerations. No drainage, swelling or tenderness. No foreign bodies. No mastoid tenderness. Tympanic membrane is erythematous. Tympanic membrane is not injected, not scarred, not perforated, not retracted and not bulging. Tympanic membrane mobility is normal.  No middle ear effusion. No hemotympanum.   Significant cerumen noted in right ear canal, removed under microscope.  Bilateral external ear canal erythema with scaling.   No insect of any type noted in ear.    Nose:  Nose normal including turbinates, nasal mucosa, sinuses and nasal septum.     Neck:  Neck normal without thyromegaly masses, asymmetry, normal tracheal structure, crepitus, and tenderness and no adenopathy.     Psychiatric:   She has a normal mood and affect.       Procedure    Cerumen removal performed.  See procedure note.        Data Reviewed    WBC (K/uL)   Date Value   05/20/2019 5.84     Platelets (K/uL)   Date Value   05/20/2019 299      Creatinine (mg/dL)   Date Value   05/20/2019 2.1 (H)     No results found for: TSH  Glucose (mg/dL)   Date Value   05/20/2019 102     Hemoglobin A1C (%)   Date Value   04/10/2019 5.1          Assessment:     1. Eczema of external ear, bilateral    2. Impacted cerumen of right ear         Plan:     I had a long discussion with the patient regarding her condition and the further workup and management options.    Paige was seen today for consult.    Diagnoses and all orders for this visit:    Eczema of external ear, bilateral  -     betamethasone valerate 0.1% (VALISONE) 0.1 % Lotn; Apply topically 2 (two) times daily. for 14 days    Impacted cerumen of right ear  -     Ear Cerumen Removal      RTC as needed.

## 2019-05-21 NOTE — PROCEDURES
Ear Cerumen Removal  Date/Time: 5/21/2019 8:08 AM  Performed by: Sara Phelps NP  Authorized by: Sara Phelps NP     Location details:  Right ear  Procedure type: curette    Cerumen  Removal Results:  Cerumen completely removed  Patient tolerance:  Patient tolerated the procedure well with no immediate complications     Procedure Note:    Patient was brought to the minor procedure room and using the operating microscope of the right ear canal which was cleaned of ceruminous debris. There was a significant cerumen impaction.  Using a combination of suction, curettes and cup forceps the patient's cerumen impaction was removed. Tympanic membrane intact. No insect of any kind noted in ear. Pt tolerated well. There were no complications.

## 2019-05-22 ENCOUNTER — OFFICE VISIT (OUTPATIENT)
Dept: NEPHROLOGY | Facility: CLINIC | Age: 69
End: 2019-05-22
Payer: MEDICARE

## 2019-05-22 VITALS
TEMPERATURE: 98 F | RESPIRATION RATE: 16 BRPM | WEIGHT: 177.69 LBS | HEART RATE: 77 BPM | SYSTOLIC BLOOD PRESSURE: 144 MMHG | HEIGHT: 63 IN | DIASTOLIC BLOOD PRESSURE: 75 MMHG | OXYGEN SATURATION: 98 % | BODY MASS INDEX: 31.48 KG/M2

## 2019-05-22 DIAGNOSIS — Z94.0 KIDNEY REPLACED BY TRANSPLANT: Primary | ICD-10-CM

## 2019-05-22 DIAGNOSIS — I10 ESSENTIAL HYPERTENSION: Chronic | ICD-10-CM

## 2019-05-22 PROCEDURE — 99999 PR PBB SHADOW E&M-EST. PATIENT-LVL III: ICD-10-PCS | Mod: PBBFAC,,,

## 2019-05-22 PROCEDURE — 99213 PR OFFICE/OUTPT VISIT, EST, LEVL III, 20-29 MIN: ICD-10-PCS | Mod: S$PBB,,, | Performed by: INTERNAL MEDICINE

## 2019-05-22 PROCEDURE — 99213 OFFICE O/P EST LOW 20 MIN: CPT | Mod: S$PBB,,, | Performed by: INTERNAL MEDICINE

## 2019-05-22 PROCEDURE — 99213 OFFICE O/P EST LOW 20 MIN: CPT | Mod: PBBFAC

## 2019-05-22 PROCEDURE — 99999 PR PBB SHADOW E&M-EST. PATIENT-LVL III: CPT | Mod: PBBFAC,,,

## 2019-05-22 NOTE — PROGRESS NOTES
[facility] Interventional Nephrology Follow-up Exam    Date:   05/22/2019 1:30 PM    HPI:  68 year old woman with ESRD (reversed by living donor kidney transplantation on 4/8), well known to the service, required extensive interventions in the past to maintain patency of her LUE straight arm AV graft.  Last underwent fistulogram with stenting of the outflow on 3/13/19.  Here for follow up exam and ultrasound.  She is now being followed by [facility], most recently her Cr was 2.1 (up from 1.8 the week prior), GFR approximately 30, urinating well and her BP is under control.  Her next appointment with Transplant service is [facility] 3.      Vitals:    05/22/19 1326   BP: (!) 144/75   Pulse: 77   Resp: 16   Temp: 98.1 °F (36.7 °C)       Exam:   ACCESS:  RUE straight arm AV graft with no thrill.    Ultrasound:  - no flow found on doppler    Impression/Plan:  Ms. Summers now has a clotted RUE AVG, with no idea of timeframe of when it thrombosed.  She states it was checked once in the hospital after transplantation and it felt like a good thrill, but since then she is unsure when it may have happened.  At this point, performing a thrombectomy to maintain patency of this graft is not necessary.  She will need to evaluation for new access placement once her GFR falls to 15-20 (depending on chronicity and speed with which it falls).  She does not need follow up in our clinic anymore for now.      Yuriy Gibson MD  [phone]

## 2019-05-23 ENCOUNTER — LAB VISIT (OUTPATIENT)
Dept: LAB | Facility: HOSPITAL | Age: 69
End: 2019-05-23
Attending: INTERNAL MEDICINE
Payer: MEDICARE

## 2019-05-23 DIAGNOSIS — Z94.0 KIDNEY REPLACED BY TRANSPLANT: ICD-10-CM

## 2019-05-23 DIAGNOSIS — Z94.0 KIDNEY REPLACED BY TRANSPLANT: Primary | ICD-10-CM

## 2019-05-23 LAB
ALBUMIN SERPL BCP-MCNC: 3.7 G/DL (ref 3.5–5.2)
ANION GAP SERPL CALC-SCNC: 7 MMOL/L (ref 8–16)
BASOPHILS # BLD AUTO: 0.03 K/UL (ref 0–0.2)
BASOPHILS NFR BLD: 0.4 % (ref 0–1.9)
BUN SERPL-MCNC: 18 MG/DL (ref 8–23)
CALCIUM SERPL-MCNC: 10.3 MG/DL (ref 8.7–10.5)
CHLORIDE SERPL-SCNC: 109 MMOL/L (ref 95–110)
CO2 SERPL-SCNC: 23 MMOL/L (ref 23–29)
CREAT SERPL-MCNC: 2.2 MG/DL (ref 0.5–1.4)
DIFFERENTIAL METHOD: ABNORMAL
EOSINOPHIL # BLD AUTO: 0.1 K/UL (ref 0–0.5)
EOSINOPHIL NFR BLD: 0.9 % (ref 0–8)
ERYTHROCYTE [DISTWIDTH] IN BLOOD BY AUTOMATED COUNT: 15.6 % (ref 11.5–14.5)
EST. GFR  (AFRICAN AMERICAN): 25.6 ML/MIN/1.73 M^2
EST. GFR  (NON AFRICAN AMERICAN): 22.2 ML/MIN/1.73 M^2
GLUCOSE SERPL-MCNC: 106 MG/DL (ref 70–110)
HCT VFR BLD AUTO: 32.4 % (ref 37–48.5)
HGB BLD-MCNC: 10.6 G/DL (ref 12–16)
IMM GRANULOCYTES # BLD AUTO: 0.03 K/UL (ref 0–0.04)
IMM GRANULOCYTES NFR BLD AUTO: 0.4 % (ref 0–0.5)
LYMPHOCYTES # BLD AUTO: 0.1 K/UL (ref 1–4.8)
LYMPHOCYTES NFR BLD: 1.6 % (ref 18–48)
MCH RBC QN AUTO: 29.9 PG (ref 27–31)
MCHC RBC AUTO-ENTMCNC: 32.7 G/DL (ref 32–36)
MCV RBC AUTO: 92 FL (ref 82–98)
MONOCYTES # BLD AUTO: 0.4 K/UL (ref 0.3–1)
MONOCYTES NFR BLD: 6.4 % (ref 4–15)
NEUTROPHILS # BLD AUTO: 6.2 K/UL (ref 1.8–7.7)
NEUTROPHILS NFR BLD: 90.3 % (ref 38–73)
NRBC BLD-RTO: 0 /100 WBC
PHOSPHATE SERPL-MCNC: 2.7 MG/DL (ref 2.7–4.5)
PLATELET # BLD AUTO: 292 K/UL (ref 150–350)
PMV BLD AUTO: 9.5 FL (ref 9.2–12.9)
POTASSIUM SERPL-SCNC: 4.6 MMOL/L (ref 3.5–5.1)
RBC # BLD AUTO: 3.54 M/UL (ref 4–5.4)
SODIUM SERPL-SCNC: 139 MMOL/L (ref 136–145)
TACROLIMUS BLD-MCNC: 13.7 NG/ML (ref 5–15)
WBC # BLD AUTO: 6.89 K/UL (ref 3.9–12.7)

## 2019-05-23 PROCEDURE — 86832 HLA CLASS I HIGH DEFIN QUAL: CPT | Mod: PO

## 2019-05-23 PROCEDURE — 86977 RBC SERUM PRETX INCUBJ/INHIB: CPT | Mod: PO

## 2019-05-23 PROCEDURE — 80197 ASSAY OF TACROLIMUS: CPT

## 2019-05-23 PROCEDURE — 86833 HLA CLASS II HIGH DEFIN QUAL: CPT | Mod: PO

## 2019-05-23 PROCEDURE — 80069 RENAL FUNCTION PANEL: CPT

## 2019-05-23 PROCEDURE — 36415 COLL VENOUS BLD VENIPUNCTURE: CPT

## 2019-05-23 PROCEDURE — 85025 COMPLETE CBC W/AUTO DIFF WBC: CPT

## 2019-05-23 NOTE — PROGRESS NOTES
Is that trough? If os, please hold prograf till Saturday morning and restart prograf at 3 mg BID on Saturday morning. Thank you!

## 2019-05-27 ENCOUNTER — LAB VISIT (OUTPATIENT)
Dept: LAB | Facility: HOSPITAL | Age: 69
End: 2019-05-27
Attending: INTERNAL MEDICINE
Payer: MEDICARE

## 2019-05-27 DIAGNOSIS — Z94.0 KIDNEY REPLACED BY TRANSPLANT: ICD-10-CM

## 2019-05-27 LAB
ALBUMIN SERPL BCP-MCNC: 3.7 G/DL (ref 3.5–5.2)
ANION GAP SERPL CALC-SCNC: 9 MMOL/L (ref 8–16)
BASOPHILS # BLD AUTO: 0.03 K/UL (ref 0–0.2)
BASOPHILS NFR BLD: 0.6 % (ref 0–1.9)
BUN SERPL-MCNC: 20 MG/DL (ref 8–23)
CALCIUM SERPL-MCNC: 9.9 MG/DL (ref 8.7–10.5)
CHLORIDE SERPL-SCNC: 111 MMOL/L (ref 95–110)
CO2 SERPL-SCNC: 21 MMOL/L (ref 23–29)
CREAT SERPL-MCNC: 1.7 MG/DL (ref 0.5–1.4)
DIFFERENTIAL METHOD: ABNORMAL
EOSINOPHIL # BLD AUTO: 0.1 K/UL (ref 0–0.5)
EOSINOPHIL NFR BLD: 1.9 % (ref 0–8)
ERYTHROCYTE [DISTWIDTH] IN BLOOD BY AUTOMATED COUNT: 15.2 % (ref 11.5–14.5)
EST. GFR  (AFRICAN AMERICAN): 35 ML/MIN/1.73 M^2
EST. GFR  (NON AFRICAN AMERICAN): 30.3 ML/MIN/1.73 M^2
GLUCOSE SERPL-MCNC: 111 MG/DL (ref 70–110)
HCT VFR BLD AUTO: 33.2 % (ref 37–48.5)
HGB BLD-MCNC: 10.7 G/DL (ref 12–16)
IMM GRANULOCYTES # BLD AUTO: 0.05 K/UL (ref 0–0.04)
IMM GRANULOCYTES NFR BLD AUTO: 1 % (ref 0–0.5)
LYMPHOCYTES # BLD AUTO: 0.1 K/UL (ref 1–4.8)
LYMPHOCYTES NFR BLD: 2.1 % (ref 18–48)
MAGNESIUM SERPL-MCNC: 1.7 MG/DL (ref 1.6–2.6)
MCH RBC QN AUTO: 29.6 PG (ref 27–31)
MCHC RBC AUTO-ENTMCNC: 32.2 G/DL (ref 32–36)
MCV RBC AUTO: 92 FL (ref 82–98)
MONOCYTES # BLD AUTO: 0.3 K/UL (ref 0.3–1)
MONOCYTES NFR BLD: 7 % (ref 4–15)
NEUTROPHILS # BLD AUTO: 4.2 K/UL (ref 1.8–7.7)
NEUTROPHILS NFR BLD: 87.4 % (ref 38–73)
NRBC BLD-RTO: 0 /100 WBC
PHOSPHATE SERPL-MCNC: 2.5 MG/DL (ref 2.7–4.5)
PLATELET # BLD AUTO: 261 K/UL (ref 150–350)
PMV BLD AUTO: 9.9 FL (ref 9.2–12.9)
POTASSIUM SERPL-SCNC: 4.4 MMOL/L (ref 3.5–5.1)
RBC # BLD AUTO: 3.62 M/UL (ref 4–5.4)
SODIUM SERPL-SCNC: 141 MMOL/L (ref 136–145)
TACROLIMUS BLD-MCNC: 3.2 NG/ML (ref 5–15)
WBC # BLD AUTO: 4.85 K/UL (ref 3.9–12.7)

## 2019-05-27 PROCEDURE — 36415 COLL VENOUS BLD VENIPUNCTURE: CPT

## 2019-05-27 PROCEDURE — 83735 ASSAY OF MAGNESIUM: CPT

## 2019-05-27 PROCEDURE — 80197 ASSAY OF TACROLIMUS: CPT

## 2019-05-27 PROCEDURE — 85025 COMPLETE CBC W/AUTO DIFF WBC: CPT

## 2019-05-27 PROCEDURE — 80069 RENAL FUNCTION PANEL: CPT

## 2019-05-28 LAB
CLASS I ANTIBODY COMMENTS - LUMINEX: NORMAL
CLASS II ANTIBODY COMMENTS - LUMINEX: NORMAL
DSA1 TESTING DATE: NORMAL
DSA12 TESTING DATE: NORMAL
DSA2 TESTING DATE: NORMAL
SERUM COLLECTION DT - LUMINEX CLASS I: NORMAL
SERUM COLLECTION DT - LUMINEX CLASS II: NORMAL

## 2019-05-30 ENCOUNTER — LAB VISIT (OUTPATIENT)
Dept: LAB | Facility: HOSPITAL | Age: 69
End: 2019-05-30
Attending: INTERNAL MEDICINE
Payer: MEDICARE

## 2019-05-30 DIAGNOSIS — Z94.0 KIDNEY REPLACED BY TRANSPLANT: ICD-10-CM

## 2019-05-30 LAB
ALBUMIN SERPL BCP-MCNC: 3.8 G/DL (ref 3.5–5.2)
ANION GAP SERPL CALC-SCNC: 8 MMOL/L (ref 8–16)
BUN SERPL-MCNC: 19 MG/DL (ref 8–23)
CALCIUM SERPL-MCNC: 10.3 MG/DL (ref 8.7–10.5)
CHLORIDE SERPL-SCNC: 110 MMOL/L (ref 95–110)
CO2 SERPL-SCNC: 22 MMOL/L (ref 23–29)
CREAT SERPL-MCNC: 1.8 MG/DL (ref 0.5–1.4)
EST. GFR  (AFRICAN AMERICAN): 32.6 ML/MIN/1.73 M^2
EST. GFR  (NON AFRICAN AMERICAN): 28.3 ML/MIN/1.73 M^2
GLUCOSE SERPL-MCNC: 117 MG/DL (ref 70–110)
PHOSPHATE SERPL-MCNC: 2 MG/DL (ref 2.7–4.5)
POTASSIUM SERPL-SCNC: 3.9 MMOL/L (ref 3.5–5.1)
SODIUM SERPL-SCNC: 140 MMOL/L (ref 136–145)
TACROLIMUS BLD-MCNC: 9.5 NG/ML (ref 5–15)

## 2019-05-30 PROCEDURE — 80197 ASSAY OF TACROLIMUS: CPT

## 2019-05-30 PROCEDURE — 80069 RENAL FUNCTION PANEL: CPT

## 2019-05-30 PROCEDURE — 36415 COLL VENOUS BLD VENIPUNCTURE: CPT

## 2019-06-03 ENCOUNTER — LAB VISIT (OUTPATIENT)
Dept: LAB | Facility: HOSPITAL | Age: 69
End: 2019-06-03
Attending: INTERNAL MEDICINE
Payer: MEDICARE

## 2019-06-03 ENCOUNTER — OFFICE VISIT (OUTPATIENT)
Dept: TRANSPLANT | Facility: CLINIC | Age: 69
End: 2019-06-03
Payer: MEDICARE

## 2019-06-03 VITALS
BODY MASS INDEX: 30.48 KG/M2 | RESPIRATION RATE: 20 BRPM | SYSTOLIC BLOOD PRESSURE: 142 MMHG | OXYGEN SATURATION: 100 % | HEART RATE: 74 BPM | HEIGHT: 63 IN | TEMPERATURE: 97 F | WEIGHT: 172 LBS | DIASTOLIC BLOOD PRESSURE: 75 MMHG

## 2019-06-03 DIAGNOSIS — D63.1 ANEMIA OF RENAL DISEASE: Chronic | ICD-10-CM

## 2019-06-03 DIAGNOSIS — G47.9 SLEEP DISTURBANCE: ICD-10-CM

## 2019-06-03 DIAGNOSIS — N25.81 SECONDARY HYPERPARATHYROIDISM OF RENAL ORIGIN: Chronic | ICD-10-CM

## 2019-06-03 DIAGNOSIS — N18.9 ANEMIA OF RENAL DISEASE: Chronic | ICD-10-CM

## 2019-06-03 DIAGNOSIS — Z29.89 PROPHYLACTIC IMMUNOTHERAPY: ICD-10-CM

## 2019-06-03 DIAGNOSIS — Z91.89 AT RISK FOR OPPORTUNISTIC INFECTIONS: ICD-10-CM

## 2019-06-03 DIAGNOSIS — Z94.0 KIDNEY REPLACED BY TRANSPLANT: ICD-10-CM

## 2019-06-03 DIAGNOSIS — I10 ESSENTIAL HYPERTENSION: Chronic | ICD-10-CM

## 2019-06-03 DIAGNOSIS — G62.89 OTHER POLYNEUROPATHY: ICD-10-CM

## 2019-06-03 DIAGNOSIS — Z94.0 S/P LIVING-DONOR KIDNEY TRANSPLANTATION: Primary | ICD-10-CM

## 2019-06-03 DIAGNOSIS — Z79.60 LONG-TERM USE OF IMMUNOSUPPRESSANT MEDICATION: ICD-10-CM

## 2019-06-03 PROBLEM — G62.9 PERIPHERAL NEUROPATHY: Status: ACTIVE | Noted: 2019-06-03

## 2019-06-03 LAB
ALBUMIN SERPL BCP-MCNC: 3.7 G/DL (ref 3.5–5.2)
ALBUMIN SERPL BCP-MCNC: 3.7 G/DL (ref 3.5–5.2)
ALP SERPL-CCNC: 100 U/L (ref 55–135)
ALT SERPL W/O P-5'-P-CCNC: 6 U/L (ref 10–44)
ANION GAP SERPL CALC-SCNC: 9 MMOL/L (ref 8–16)
AST SERPL-CCNC: 14 U/L (ref 10–40)
BASOPHILS # BLD AUTO: 0.03 K/UL (ref 0–0.2)
BASOPHILS NFR BLD: 0.5 % (ref 0–1.9)
BILIRUB DIRECT SERPL-MCNC: 0.2 MG/DL (ref 0.1–0.3)
BILIRUB SERPL-MCNC: 0.3 MG/DL (ref 0.1–1)
BUN SERPL-MCNC: 16 MG/DL (ref 8–23)
CALCIUM SERPL-MCNC: 10 MG/DL (ref 8.7–10.5)
CHLORIDE SERPL-SCNC: 113 MMOL/L (ref 95–110)
CO2 SERPL-SCNC: 21 MMOL/L (ref 23–29)
CREAT SERPL-MCNC: 1.5 MG/DL (ref 0.5–1.4)
DIFFERENTIAL METHOD: ABNORMAL
EOSINOPHIL # BLD AUTO: 0.1 K/UL (ref 0–0.5)
EOSINOPHIL NFR BLD: 0.9 % (ref 0–8)
ERYTHROCYTE [DISTWIDTH] IN BLOOD BY AUTOMATED COUNT: 14.9 % (ref 11.5–14.5)
EST. GFR  (AFRICAN AMERICAN): 40.7 ML/MIN/1.73 M^2
EST. GFR  (NON AFRICAN AMERICAN): 35.3 ML/MIN/1.73 M^2
GLUCOSE SERPL-MCNC: 107 MG/DL (ref 70–110)
HCT VFR BLD AUTO: 33 % (ref 37–48.5)
HGB BLD-MCNC: 10.8 G/DL (ref 12–16)
IMM GRANULOCYTES # BLD AUTO: 0.08 K/UL (ref 0–0.04)
IMM GRANULOCYTES NFR BLD AUTO: 1.4 % (ref 0–0.5)
LYMPHOCYTES # BLD AUTO: 0.2 K/UL (ref 1–4.8)
LYMPHOCYTES NFR BLD: 2.6 % (ref 18–48)
MAGNESIUM SERPL-MCNC: 1.3 MG/DL (ref 1.6–2.6)
MCH RBC QN AUTO: 29.5 PG (ref 27–31)
MCHC RBC AUTO-ENTMCNC: 32.7 G/DL (ref 32–36)
MCV RBC AUTO: 90 FL (ref 82–98)
MONOCYTES # BLD AUTO: 0.5 K/UL (ref 0.3–1)
MONOCYTES NFR BLD: 9.1 % (ref 4–15)
NEUTROPHILS # BLD AUTO: 5 K/UL (ref 1.8–7.7)
NEUTROPHILS NFR BLD: 85.5 % (ref 38–73)
NRBC BLD-RTO: 0 /100 WBC
PHOSPHATE SERPL-MCNC: 1.8 MG/DL (ref 2.7–4.5)
PLATELET # BLD AUTO: 279 K/UL (ref 150–350)
PMV BLD AUTO: 9.8 FL (ref 9.2–12.9)
POTASSIUM SERPL-SCNC: 3.8 MMOL/L (ref 3.5–5.1)
PROT SERPL-MCNC: 7.2 G/DL (ref 6–8.4)
RBC # BLD AUTO: 3.66 M/UL (ref 4–5.4)
SODIUM SERPL-SCNC: 143 MMOL/L (ref 136–145)
TACROLIMUS BLD-MCNC: 8.7 NG/ML (ref 5–15)
WBC # BLD AUTO: 5.83 K/UL (ref 3.9–12.7)

## 2019-06-03 PROCEDURE — 99215 OFFICE O/P EST HI 40 MIN: CPT | Mod: PBBFAC | Performed by: NURSE PRACTITIONER

## 2019-06-03 PROCEDURE — 99999 PR PBB SHADOW E&M-EST. PATIENT-LVL V: CPT | Mod: PBBFAC,,, | Performed by: NURSE PRACTITIONER

## 2019-06-03 PROCEDURE — 83735 ASSAY OF MAGNESIUM: CPT

## 2019-06-03 PROCEDURE — 84075 ASSAY ALKALINE PHOSPHATASE: CPT

## 2019-06-03 PROCEDURE — 85025 COMPLETE CBC W/AUTO DIFF WBC: CPT

## 2019-06-03 PROCEDURE — 99215 PR OFFICE/OUTPT VISIT, EST, LEVL V, 40-54 MIN: ICD-10-PCS | Mod: S$PBB,,, | Performed by: NURSE PRACTITIONER

## 2019-06-03 PROCEDURE — 36415 COLL VENOUS BLD VENIPUNCTURE: CPT

## 2019-06-03 PROCEDURE — 99215 OFFICE O/P EST HI 40 MIN: CPT | Mod: S$PBB,,, | Performed by: NURSE PRACTITIONER

## 2019-06-03 PROCEDURE — 99999 PR PBB SHADOW E&M-EST. PATIENT-LVL V: ICD-10-PCS | Mod: PBBFAC,,, | Performed by: NURSE PRACTITIONER

## 2019-06-03 PROCEDURE — 87799 DETECT AGENT NOS DNA QUANT: CPT

## 2019-06-03 PROCEDURE — 80069 RENAL FUNCTION PANEL: CPT

## 2019-06-03 PROCEDURE — 80197 ASSAY OF TACROLIMUS: CPT

## 2019-06-03 NOTE — PROGRESS NOTES
Kidney Post-Transplant Assessment    Referring Physician: Jd Carias  Current Nephrologist: Jd Carias    ORGAN: LEFT KIDNEY  Donor Type: living  PHS Increased Risk: no  Cold Ischemia: 189 mins  Induction Medications: campath - alemtuzumab (anti-cd52)    Subjective:     CC:  Reassessment of renal allograft function and management of chronic immunosuppression.    HPI:  Ms. Summers is a 68 y.o. year old Black or  female who received a living kidney transplant on 4/8/19. Her most recent creatinine is 1.8. She takes mycophenolate mofetil and tacrolimus for maintenance immunosuppression. Her post transplant course has been complicated by see notes below.  post transplant course:  - S/p living unrelated kidney transplant 4/8/19, CMV +/+, WIT 30 min, CIT 3h 9 m  - DGF, resolved  - Pt was on Plavix for reoccurring AVF thrombus, Plavix stopped 4/15/2019    Interval HX:  ENT f/u for right ear  C/o: diagnosed with Eczema to external ear canal and cerumen impaction   TX for UTI with Cipro x 14 days  S cr improved   5/23 DSA (-)  5/23 allosure pending   BP at home 120-140/80s  BP elevated. Pt has not taken BP meds this AM  Intake 2L +  UOP no problems reported    Reports HX peripheral neuropathy hopkins d sciatic nerve pain on left side   Reports ED a few weeks ago for peripheral neuropathy and pain and was given gabapentin for 10 days  Pt has not f/u with neurology for further assessment.    Ambien previously reduced to 5 mg nightly  And she is having problems sleeping.     Past Medical History:   Diagnosis Date    Anemia     Anemia of renal disease     Anxiety     Chronic renal failure 01/10/2018    Depression     Essential hypertension     GERD (gastroesophageal reflux disease)     Gout     H pylori ulcer     Hyperlipidemia     Hypertension     Obesity     Secondary hyperparathyroidism of renal origin        Review of Systems   Constitutional: Negative for activity change, appetite change,  "chills, fatigue, fever and unexpected weight change.   HENT: Negative for congestion, facial swelling, postnasal drip, rhinorrhea, sinus pressure, sore throat and trouble swallowing.    Eyes: Negative for pain, redness and visual disturbance.   Respiratory: Negative for cough, chest tightness, shortness of breath and wheezing.    Cardiovascular: Positive for leg swelling. Negative for chest pain and palpitations.   Gastrointestinal: Negative for abdominal pain, diarrhea, nausea and vomiting.   Genitourinary: Negative for dysuria, flank pain and urgency.   Musculoskeletal: Positive for gait problem. Negative for neck pain and neck stiffness.        Uses a walker for long distances    Skin: Negative for rash.   Allergic/Immunologic: Positive for immunocompromised state. Negative for environmental allergies and food allergies.   Neurological: Negative for dizziness, weakness, light-headedness and headaches.        Peripheral neuropathy   Psychiatric/Behavioral: Positive for sleep disturbance. Negative for agitation and confusion. The patient is not nervous/anxious.        Objective:   Blood pressure (!) 142/75, pulse 74, temperature 96.6 °F (35.9 °C), temperature source Oral, resp. rate 20, height 5' 3" (1.6 m), weight 78 kg (172 lb), last menstrual period 08/08/1971, SpO2 100 %.body mass index is 30.47 kg/m².    Physical Exam   Constitutional: She is oriented to person, place, and time. She appears well-developed and well-nourished.   HENT:   Head: Normocephalic.   Mouth/Throat: Oropharynx is clear and moist. No oropharyngeal exudate.   Eyes: Pupils are equal, round, and reactive to light. Conjunctivae and EOM are normal. No scleral icterus.   Neck: Normal range of motion. Neck supple.   Cardiovascular: Normal rate, regular rhythm and normal heart sounds.   Pulmonary/Chest: Effort normal and breath sounds normal.   Abdominal: Soft. Normal appearance and bowel sounds are normal. She exhibits no distension and no mass. " There is no splenomegaly or hepatomegaly. There is no tenderness. There is no rebound, no guarding, no CVA tenderness, no tenderness at McBurney's point and negative Smith's sign.       Musculoskeletal: Normal range of motion. She exhibits no edema.   Lymphadenopathy:     She has no cervical adenopathy.   Neurological: She is alert and oriented to person, place, and time. She exhibits normal muscle tone. Coordination normal.   Skin: Skin is warm and dry.   Psychiatric: She has a normal mood and affect. Her behavior is normal.   Vitals reviewed.      Labs:  Lab Results   Component Value Date    WBC 5.83 06/03/2019    HGB 10.8 (L) 06/03/2019    HCT 33.0 (L) 06/03/2019     06/03/2019    K 3.8 06/03/2019     (H) 06/03/2019    CO2 21 (L) 06/03/2019    BUN 16 06/03/2019    CREATININE 1.5 (H) 06/03/2019    EGFRNONAA 35.3 (A) 06/03/2019    CALCIUM 10.0 06/03/2019    PHOS 1.8 (L) 06/03/2019    MG 1.3 (L) 06/03/2019    ALBUMIN 3.7 06/03/2019    ALBUMIN 3.7 06/03/2019    AST 14 06/03/2019    ALT 6 (L) 06/03/2019    UTPCR 0.31 (H) 05/23/2019    .0 (H) 05/06/2019    TACROLIMUS 9.5 05/30/2019       No results found for: EXTANC, EXTWBC, EXTSEGS, EXTPLATELETS, EXTHEMOGLOBI, EXTHEMATOCRI, EXTCREATININ, EXTSODIUM, EXTPOTASSIUM, EXTBUN, EXTCO2, EXTCALCIUM, EXTPHOSPHORU, EXTGLUCOSE, EXTALBUMIN, EXTAST, EXTALT, EXTBILITOTAL, EXTLIPASE, EXTAMYLASE    No results found for: EXTCYCLOSLVL, EXTSIROLIMUS, EXTTACROLVL, EXTPROTCRE, EXTPTHINTACT, EXTPROTEINUA, EXTWBCUA, EXTRBCUA    Labs were reviewed with the patient    Assessment:     1. S/P living-donor kidney transplantation    2. Long-term use of immunosuppressant medication    3. At risk for opportunistic infections    4. Essential hypertension    5. Prophylactic immunotherapy    6. Anemia of renal disease    7. Secondary hyperparathyroidism of renal origin    8. Sleep disturbance    9. Other polyneuropathy        Plan:    repeat BP, Pt will take BP meds after clinic apt  when she eats breakfast  5/23 allosure pending  Neurology referral for peripheral neuropathy  Sleep disturbance--Ambien as prescribed, mgmt deferred to PCP, OK to take Melatonin    Follow-up:   1. CKD stage: 3,  improving  2. Immunosuppression:   Prograf trough 9.5, which is therapeutic target 8-1. Continue Prograf 4/4, [id number] Mg BID, and  Valcyte 450 mg MWF for CMV prophylaxis, Bactrim 400/80 mg MWF for PCP prophylaxis, Will continue to monitor for drug toxicities    5/23 DSA (-)  5/23 allosure pending  sCr improved    3. Allograft Function: improving. Continue good po hydration.       Lab Results   Component Value Date    CREATININE 1.5 (H) 06/03/2019 5/30/2019  POD 52   eGFR if African American >60 mL/min/1.73 m^2 32.6 (A)       4. Hypertension management: advise low salt diet and home BP monitoring    [id number] mg QD, Coreg 25 mg BID, Clonidine 0.1 mg TID         5. Metabolic Bone Disease/Secondary Hyperparathyroidism:stable  Will monitor PTH, CA and Vit D/guidelines,    Sensipar 30 mg QD,  Mg ox 800 mg BID, high phos diet encouraged , KPN  500  mg BID    Lab Results   Component Value Date    .0 (H) 05/06/2019    CALCIUM 10.0 06/03/2019    PHOS 1.8 (L) 06/03/2019 5/27/2019  POD 49   Magnesium 1.6 - 2.6 mg/dL 1.7       6. Electrolytes:  Will monitor /guidelines  Lab Results   Component Value Date     06/03/2019    K 3.8 06/03/2019     (H) 06/03/2019    CO2 21 (L) 06/03/2019   Sodium bicarb 1300 mg BID       7. Anemia: stable. No need for intervention    Will monitor /guidelines  Lab Results   Component Value Date    WBC 5.83 06/03/2019    HGB 10.8 (L) 06/03/2019    HCT 33.0 (L) 06/03/2019    MCV 90 06/03/2019     06/03/2019       8.  Cytopenias: no significant cytopenias will monitor as per our guidelines. Medicine list reviewed including potential causes of drug-induced cytopenias    9.Proteinuria: continue p/c ratio as per guidelines       5/23/2019  POD 45   Prot/Creat  Ratio, Ur 0.00 - 0.20 0.31 (A)     10. BK virus infection screening:  will continue to monitor/ guidelines    11. Weight education: provided during the clinic visit   Body mass index is 30.47 kg/m².          12.Patient safety education regarding immunosuppression including prophylaxis posttransplant for CMV, PCP : Education provided about vaccination and prevention of infections            Follow-up:   Clinic: return to transplant clinic weekly for the first month after transplant; every 2 weeks during months 2-3; then at 6-, 9-, 12-, 18-, 24-, and 36- months post-transplant to reassess for complications from immunosuppression toxicity and monitor for rejection.  Annually thereafter.    Labs: since patient remains at high risk for rejection and drug-related complications that warrant close monitoring, labs will be ordered as follows: continue twice weekly CBC, renal panel, and drug level for first month; then same labs once weekly through 3rd month post-transplant.  Urine for UA and protein/creatinine ratio monthly.  Serum BK - PCR at 1-, 3-, 6-, 9-, 12-, 18-, 24-, 36-, 48-, and 60 months post-transplant.  Hepatic panel at 1-, 2-, 3-, 6-, 9-, 12-, 18-, 24-, and 36- months post-transplant.    Tamanna Kemp NP       Education:   Material provided to the patient.  Patient reminded to call with any health changes since these can be early signs of significant complications.  Also, I advised the patient to be sure any new medications or changes of old medications are discussed with either a pharmacist or physician knowledgeable with transplant to avoid rejection/drug toxicity related to significant drug interactions.

## 2019-06-03 NOTE — LETTER
Kimberley 3, 2019        Jd Carias  [address]  [address]  [facility] [address]  Phone: [phone]  Fax: [phone]             [facility]  [address]  [facility] [address]  Phone: [phone]   Patient: Paige Summers   MR Number: [id number]   YOB: 1950   Date of Visit: 6/3/2019       Dear Dr. Jd Carias    Thank you for referring Paige Summers to me for evaluation. Attached you will find relevant portions of my assessment and plan of care.    If you have questions, please do not hesitate to call me. I look forward to following Paige Summers along with you.    Sincerely,    Tamanna Kemp, NP    Enclosure    If you would like to receive this communication electronically, please contact [email or web address] or [phone] to request [employer] access.    [employer] is a tool which provides read-only access to select patient information with whom you have a relationship. Its easy to use and provides real time access to review your patients record including encounter summaries, notes, results, and demographic information.    If you feel you have received this communication in error or would no longer like to receive these types of communications, please e-mail [email or web address]

## 2019-06-03 NOTE — PATIENT INSTRUCTIONS
Neurology referral for peripheral neuropathy  Sleep disturbance--Ambien as prescribed,  F/u with  PCP for management, It is OK to take Melatonin      It is my privilege to participate in your transplant care! Please be sure to let us know if you have any questions or concerns about your health care - we cannot help you if we do not know. Don't forget we are on call 24/7 for any emergencies.     Best Wishes,  Tamanna SUTTONC

## 2019-06-03 NOTE — PROGRESS NOTES
Phos low at 1.8 -->  on higher phos diet, if she has been taking  mg BID then increase to 750 mg TID. If she is having any symptoms of hypophosphatemia - muscle weakness, etc would direct her to ED

## 2019-06-03 NOTE — TELEPHONE ENCOUNTER
----- Message from Razia Flores MD sent at 6/3/2019 10:35 AM CDT -----  Phos low at 1.8 -->  on higher phos diet, if she has been taking  mg BID then increase to 750 mg TID. If she is having any symptoms of hypophosphatemia - muscle weakness, etc would direct her to ED

## 2019-06-04 NOTE — TELEPHONE ENCOUNTER
Call placed, labs reviewed by Dr. Fitzgerald. Mag Ox dose increased to 800 mg tid. Reminded to take 2hrs away from KPN dose. Patient reports that she continues taking Tacrolimus 4 mg bid. Level wnl, will adjust prescription to reflect current dose.  Will repeat labs on 6/12/19.

## 2019-06-05 RX ORDER — TACROLIMUS 1 MG/1
CAPSULE ORAL
Qty: 180 CAPSULE | Refills: 11 | Status: SHIPPED | OUTPATIENT
Start: 2019-06-05 | End: 2019-07-10

## 2019-06-05 RX ORDER — LANOLIN ALCOHOL/MO/W.PET/CERES
800 CREAM (GRAM) TOPICAL 3 TIMES DAILY
Qty: 270 TABLET | Refills: 10 | COMMUNITY
Start: 2019-06-05 | End: 2019-10-14

## 2019-06-10 ENCOUNTER — LAB VISIT (OUTPATIENT)
Dept: LAB | Facility: HOSPITAL | Age: 69
End: 2019-06-10
Attending: INTERNAL MEDICINE
Payer: MEDICARE

## 2019-06-10 DIAGNOSIS — Z94.0 KIDNEY REPLACED BY TRANSPLANT: ICD-10-CM

## 2019-06-10 LAB
ALBUMIN SERPL BCP-MCNC: 3.4 G/DL (ref 3.5–5.2)
ANION GAP SERPL CALC-SCNC: 10 MMOL/L (ref 8–16)
BASOPHILS # BLD AUTO: 0.04 K/UL (ref 0–0.2)
BASOPHILS NFR BLD: 0.8 % (ref 0–1.9)
BUN SERPL-MCNC: 19 MG/DL (ref 8–23)
CALCIUM SERPL-MCNC: 9.6 MG/DL (ref 8.7–10.5)
CHLORIDE SERPL-SCNC: 109 MMOL/L (ref 95–110)
CO2 SERPL-SCNC: 23 MMOL/L (ref 23–29)
CREAT SERPL-MCNC: 1.6 MG/DL (ref 0.5–1.4)
DIFFERENTIAL METHOD: ABNORMAL
EOSINOPHIL # BLD AUTO: 0.1 K/UL (ref 0–0.5)
EOSINOPHIL NFR BLD: 2.3 % (ref 0–8)
ERYTHROCYTE [DISTWIDTH] IN BLOOD BY AUTOMATED COUNT: 14.4 % (ref 11.5–14.5)
EST. GFR  (AFRICAN AMERICAN): 37.6 ML/MIN/1.73 M^2
EST. GFR  (NON AFRICAN AMERICAN): 32.6 ML/MIN/1.73 M^2
GLUCOSE SERPL-MCNC: 98 MG/DL (ref 70–110)
HCT VFR BLD AUTO: 31.9 % (ref 37–48.5)
HGB BLD-MCNC: 10.5 G/DL (ref 12–16)
IMM GRANULOCYTES # BLD AUTO: 0.12 K/UL (ref 0–0.04)
IMM GRANULOCYTES NFR BLD AUTO: 2.3 % (ref 0–0.5)
LYMPHOCYTES # BLD AUTO: 0.1 K/UL (ref 1–4.8)
LYMPHOCYTES NFR BLD: 2.7 % (ref 18–48)
MAGNESIUM SERPL-MCNC: 1.6 MG/DL (ref 1.6–2.6)
MCH RBC QN AUTO: 29.6 PG (ref 27–31)
MCHC RBC AUTO-ENTMCNC: 32.9 G/DL (ref 32–36)
MCV RBC AUTO: 90 FL (ref 82–98)
MONOCYTES # BLD AUTO: 0.5 K/UL (ref 0.3–1)
MONOCYTES NFR BLD: 9.1 % (ref 4–15)
NEUTROPHILS # BLD AUTO: 4.3 K/UL (ref 1.8–7.7)
NEUTROPHILS NFR BLD: 82.8 % (ref 38–73)
NRBC BLD-RTO: 0 /100 WBC
PHOSPHATE SERPL-MCNC: 2.7 MG/DL (ref 2.7–4.5)
PLATELET # BLD AUTO: 229 K/UL (ref 150–350)
PMV BLD AUTO: 10.4 FL (ref 9.2–12.9)
POTASSIUM SERPL-SCNC: 4.3 MMOL/L (ref 3.5–5.1)
RBC # BLD AUTO: 3.55 M/UL (ref 4–5.4)
SODIUM SERPL-SCNC: 142 MMOL/L (ref 136–145)
TACROLIMUS BLD-MCNC: 8.2 NG/ML (ref 5–15)
WBC # BLD AUTO: 5.18 K/UL (ref 3.9–12.7)

## 2019-06-10 PROCEDURE — 83735 ASSAY OF MAGNESIUM: CPT

## 2019-06-10 PROCEDURE — 85025 COMPLETE CBC W/AUTO DIFF WBC: CPT

## 2019-06-10 PROCEDURE — 80197 ASSAY OF TACROLIMUS: CPT

## 2019-06-10 PROCEDURE — 80069 RENAL FUNCTION PANEL: CPT

## 2019-06-10 PROCEDURE — 36415 COLL VENOUS BLD VENIPUNCTURE: CPT

## 2019-06-11 ENCOUNTER — TELEPHONE (OUTPATIENT)
Dept: TRANSPLANT | Facility: CLINIC | Age: 69
End: 2019-06-11

## 2019-06-17 ENCOUNTER — LAB VISIT (OUTPATIENT)
Dept: LAB | Facility: HOSPITAL | Age: 69
End: 2019-06-17
Attending: INTERNAL MEDICINE
Payer: MEDICARE

## 2019-06-17 DIAGNOSIS — Z94.0 KIDNEY REPLACED BY TRANSPLANT: ICD-10-CM

## 2019-06-17 LAB
ALBUMIN SERPL BCP-MCNC: 3.6 G/DL (ref 3.5–5.2)
ANION GAP SERPL CALC-SCNC: 10 MMOL/L (ref 8–16)
BASOPHILS # BLD AUTO: 0.05 K/UL (ref 0–0.2)
BASOPHILS NFR BLD: 1.2 % (ref 0–1.9)
BUN SERPL-MCNC: 20 MG/DL (ref 8–23)
CALCIUM SERPL-MCNC: 9.7 MG/DL (ref 8.7–10.5)
CHLORIDE SERPL-SCNC: 109 MMOL/L (ref 95–110)
CO2 SERPL-SCNC: 23 MMOL/L (ref 23–29)
CREAT SERPL-MCNC: 1.7 MG/DL (ref 0.5–1.4)
DIFFERENTIAL METHOD: ABNORMAL
EOSINOPHIL # BLD AUTO: 0.2 K/UL (ref 0–0.5)
EOSINOPHIL NFR BLD: 3.5 % (ref 0–8)
ERYTHROCYTE [DISTWIDTH] IN BLOOD BY AUTOMATED COUNT: 14.3 % (ref 11.5–14.5)
EST. GFR  (AFRICAN AMERICAN): 35 ML/MIN/1.73 M^2
EST. GFR  (NON AFRICAN AMERICAN): 30.3 ML/MIN/1.73 M^2
GLUCOSE SERPL-MCNC: 101 MG/DL (ref 70–110)
HCT VFR BLD AUTO: 33.8 % (ref 37–48.5)
HGB BLD-MCNC: 11 G/DL (ref 12–16)
IMM GRANULOCYTES # BLD AUTO: 0.1 K/UL (ref 0–0.04)
IMM GRANULOCYTES NFR BLD AUTO: 2.3 % (ref 0–0.5)
LYMPHOCYTES # BLD AUTO: 0.2 K/UL (ref 1–4.8)
LYMPHOCYTES NFR BLD: 4.6 % (ref 18–48)
MAGNESIUM SERPL-MCNC: 1.6 MG/DL (ref 1.6–2.6)
MCH RBC QN AUTO: 29.4 PG (ref 27–31)
MCHC RBC AUTO-ENTMCNC: 32.5 G/DL (ref 32–36)
MCV RBC AUTO: 90 FL (ref 82–98)
MONOCYTES # BLD AUTO: 0.4 K/UL (ref 0.3–1)
MONOCYTES NFR BLD: 8.8 % (ref 4–15)
NEUTROPHILS # BLD AUTO: 3.4 K/UL (ref 1.8–7.7)
NEUTROPHILS NFR BLD: 79.6 % (ref 38–73)
NRBC BLD-RTO: 0 /100 WBC
PHOSPHATE SERPL-MCNC: 3 MG/DL (ref 2.7–4.5)
PLATELET # BLD AUTO: 239 K/UL (ref 150–350)
PMV BLD AUTO: 10.1 FL (ref 9.2–12.9)
POTASSIUM SERPL-SCNC: 4.4 MMOL/L (ref 3.5–5.1)
RBC # BLD AUTO: 3.74 M/UL (ref 4–5.4)
SODIUM SERPL-SCNC: 142 MMOL/L (ref 136–145)
TACROLIMUS BLD-MCNC: 7.3 NG/ML (ref 5–15)
WBC # BLD AUTO: 4.31 K/UL (ref 3.9–12.7)

## 2019-06-17 PROCEDURE — 85025 COMPLETE CBC W/AUTO DIFF WBC: CPT

## 2019-06-17 PROCEDURE — 83735 ASSAY OF MAGNESIUM: CPT

## 2019-06-17 PROCEDURE — 80069 RENAL FUNCTION PANEL: CPT

## 2019-06-17 PROCEDURE — 36415 COLL VENOUS BLD VENIPUNCTURE: CPT

## 2019-06-17 PROCEDURE — 80197 ASSAY OF TACROLIMUS: CPT

## 2019-06-17 RX ORDER — FAMOTIDINE 20 MG/1
20 TABLET, FILM COATED ORAL NIGHTLY
Qty: 30 TABLET | Refills: 1 | Status: SHIPPED | OUTPATIENT
Start: 2019-06-17 | End: 2019-09-16 | Stop reason: SDUPTHER

## 2019-06-18 PROBLEM — N18.30 CKD (CHRONIC KIDNEY DISEASE) STAGE 3, GFR 30-59 ML/MIN: Status: ACTIVE | Noted: 2019-06-18

## 2019-06-18 NOTE — PROGRESS NOTES
Kidney Post-Transplant Assessment    Referring Physician: Jd Carias  Current Nephrologist: Jd Carias    ORGAN: LEFT KIDNEY  Donor Type: living  PHS Increased Risk: no  Cold Ischemia: 189 mins  Induction Medications: campath - alemtuzumab (anti-cd52)    Subjective:     CC:  Reassessment of renal allograft function and management of chronic immunosuppression.    HPI:  Ms. Summers is a 68 y.o. year old Black or  female who received a living kidney transplant on 4/8/19. Her most recent creatinine is 1.8. She takes mycophenolate mofetil, prednisone and tacrolimus for maintenance immunosuppression. Her post transplant course has been complicated by slow graft functioon. please see below for details of surgical procedure.  S/p living unrelated kidney transplant 4/8/19, CMV +/+, WIT 30 min, CIT 3h 9 m  - DGF, resolved    Surgery significant for intra op US w slightly decreased perfusion.   Kidney graft injected w verapamil in the artery, repositioned the kidney and clamped the distal iliac artery. The kidney remained partially pink. Kidney removed, previous arteriotomy and venotomy resected; and obtained arterial venous continuity. A small amount of iliac artery was resected and an end to end anastomosis was performed.  Donor kidney was reviewed on the back table, artery was cut back, and eliminated the pair of pants anastomosis. Then the main artery was cut back as there appeared to be an intimal abnormality. Venotomy was made, the vein irrigated, and an end renal to right external iliac vein anastomosis was created.  Arterial control was obtained with a vascular clamp.  Arteriotomy was made, the artery irrigated, and a reconstructed to right external iliac artery anastomosis was created.  The second artery was anastomosed end to side to distal ilac artery.  Reperfusion quality was fair.  Ultrasound, open and closed satisfactory.  There was an excellent pulse in the main renal artery, the  "segmental flow was visible.   Intraoperative urine production was observed.  patient initially with slow graft function.    She feels well.     She is using a walker. She has neuropathy and sciatic even before the transplant.    She had a walker before the transplant but did not need to use it    Now she is using the walker due to "neuropathy"    She went to the ER and was diagnosed with neuropathy.     Otherwise she feels well, she is progressing doing some home work, cooking and cleaning, more active and more mobile.    Good appetite, no chest pain or SOB     Current Outpatient Medications on File Prior to Visit   Medication Sig Dispense Refill    acetaminophen (TYLENOL) 325 mg Cap Take by mouth.      amLODIPine (NORVASC) 10 MG tablet Take 1 tablet (10 mg total) by mouth once daily. 30 tablet 11    bisacodyl (DULCOLAX) 5 mg EC tablet Take 2 tablets (10 mg total) by mouth daily as needed for Constipation.  0    carvedilol (COREG) 12.5 MG tablet Take 2 tablets (25 mg total) by mouth 2 (two) times daily with meals. 120 tablet 11    cinacalcet (SENSIPAR) 30 MG Tab Take 1 tablet (30 mg total) by mouth daily with breakfast. 30 tablet 3    cloNIDine (CATAPRES) 0.1 MG tablet Take 1 tablet (0.1 mg total) by mouth 3 (three) times daily. 90 tablet 11    docusate sodium (COLACE) 100 MG capsule Take 1 capsule (100 mg total) by mouth 2 (two) times daily as needed for Constipation.  0    famotidine (PEPCID) 20 MG tablet Take 1 tablet (20 mg total) by mouth every evening. 30 tablet 1    k phos di & mono-sod phos mono (K-PHOS-NEUTRAL) 250 mg Tab Take 3 tablets by mouth 3 (three) times daily. 270 tablet 11    linaclotide (LINZESS) 145 mcg Cap capsule Take 1 capsule (145 mcg total) by mouth once daily. 30 capsule 11    magnesium oxide (MAG-OX) 400 mg (241.3 mg magnesium) tablet Take 2 tablets (800 mg total) by mouth 3 (three) times daily. 270 tablet 10    multivitamin (THERAGRAN) tablet Take 1 tablet by mouth once daily. "      mycophenolate (CELLCEPT) 250 mg Cap Take 2 capsules (500 mg total) by mouth 2 (two) times daily. 120 capsule 11    ondansetron (ZOFRAN) 4 MG tablet Take 2 tablets (8 mg total) by mouth every 8 (eight) hours as needed for Nausea. 16 tablet 0    sodium bicarbonate 650 MG tablet Take 2 tablets (1,300 mg total) by mouth 2 (two) times daily. 120 tablet 11    sulfamethoxazole-trimethoprim 400-80mg (BACTRIM,SEPTRA) 400-80 mg per tablet Take 1 tablet by mouth every Mon, Wed, Fri. Stop: 4/7/2020 12 tablet 11    tacrolimus (PROGRAF) 1 MG Cap Take 3 capsules (3 mg total) by mouth every morning AND 3 capsules (3 mg total) every evening. 180 capsule 11    traMADol (ULTRAM) 50 mg tablet Take 1 tablet (50 mg total) by mouth every 8 (eight) hours as needed for Pain. 30 tablet 0    valGANciclovir (VALCYTE) 450 mg Tab Take 1 tablet (450 mg total) by mouth every Mon, Wed, Fri. Stop: 7/7/19 12 tablet 2    zolpidem (AMBIEN) 5 MG Tab Take 1 tablet (5 mg total) by mouth nightly as needed. 30 tablet 2    betamethasone valerate 0.1% (VALISONE) 0.1 % Lotn Apply topically 2 (two) times daily. for 14 days 60 mL 2    gabapentin (NEURONTIN) 300 MG capsule Take 1 capsule (300 mg total) by mouth 3 (three) times daily. for 10 days 30 capsule 0     Current Facility-Administered Medications on File Prior to Visit   Medication Dose Route Frequency Provider Last Rate Last Dose    epoetin hillary injection 20,000 Units  20,000 Units Subcutaneous Q7 Days Taty Guevara MD   20,000 Units at 05/09/19 1048    epoetin hillary injection 20,000 Units  20,000 Units Subcutaneous Q7 Days Taty Guevara MD   20,000 Units at 05/13/19 1045            Review of Systems      Skin: no skin rash  CNS; no headaches, blurred vision, seizure, or syncope  ENT: No JVD,  Adenopathies,  nasal congestion. No oral lesions  Cardiac: No chest pain, dyspnea, claudication, edema or palpitations  Respiratory: No SOB, cough, hemoptysis   Gastro-intestinal: No  diarrhea, constipation, abdominal pain, nausea, vomit. No ascitis  Genitourinary: no hematuria, dysuria, frequency, frequency  Musculoskeletal: joint pain, arthritis or vasculitic changes  Psych: alert awake, oriented, No cranial nerves deficit.    Objective:   Last menstrual period 08/08/1971.body mass index is unknown because there is no height or weight on file.    Physical Exam  Head: normocephalic  Neck: No JVD, cervical axillary, or femoral adenopathies  Heart: no murmurs, Normal s1 and s2, No gallops, no rubs, No murmurs  Lungs; CTA, good respiratory effort, no crackles  Abdomen: soft, non tender, no splenomegaly or hepatomegaly, no massess, no bruits  Extremities: No edema, skin rash, joint pain  SNC: awake, alert oriented. Cranial nerves are intact, no focalized, sensitivity and strength preserved    Labs:  Lab Results   Component Value Date    WBC 4.31 06/17/2019    HGB 11.0 (L) 06/17/2019    HCT 33.8 (L) 06/17/2019     06/17/2019    K 4.4 06/17/2019     06/17/2019    CO2 23 06/17/2019    BUN 20 06/17/2019    CREATININE 1.7 (H) 06/17/2019    EGFRNONAA 30.3 (A) 06/17/2019    CALCIUM 9.7 06/17/2019    PHOS 3.0 06/17/2019    MG 1.6 06/17/2019    ALBUMIN 3.6 06/17/2019    AST 14 06/03/2019    ALT 6 (L) 06/03/2019    UTPCR 0.29 (H) 06/03/2019    .0 (H) 05/06/2019    TACROLIMUS 7.3 06/17/2019       No results found for: EXTANC, EXTWBC, EXTSEGS, EXTPLATELETS, EXTHEMOGLOBI, EXTHEMATOCRI, EXTCREATININ, EXTSODIUM, EXTPOTASSIUM, EXTBUN, EXTCO2, EXTCALCIUM, EXTPHOSPHORU, EXTGLUCOSE, EXTALBUMIN, EXTAST, EXTALT, EXTBILITOTAL, EXTLIPASE, EXTAMYLASE    No results found for: EXTCYCLOSLVL, EXTSIROLIMUS, EXTTACROLVL, EXTPROTCRE, EXTPTHINTACT, EXTPROTEINUA, EXTWBCUA, EXTRBCUA    Labs were reviewed with the patient    Assessment:     1. At risk for opportunistic infections    2. CKD (chronic kidney disease) stage 3, GFR 30-59 ml/min    3. Anemia of renal disease    4. Essential hypertension    5. Long-term  use of immunosuppressant medication    6. S/P living-donor kidney transplantation    7. Secondary hyperparathyroidism of renal origin        Plan:        Referral to see neurology due to peripheral neuropathy    Will d/c KPN for now    Continue with same dose of prograf and MMF    Labs as per protocol    Encourage hydration    Creatinine around baseline , will monitor closely and if any further increase above our threshold will consider a kidney biopsy. He c PRA was 0%. No indication at the present time     Education provided    All questions answered         1. CKD stage 3 with stable creatinine. : will continue follow up as per our center guidelines. patient to continue close follow up with the local General nephrologist. Education provided in appropriate fluid intake, potassium intake. Continue with oral hydration.        2. Immunosuppression: same dose of prograf and MMF. Low Immunologic risk with 0% PRA  Lab Results   Component Value Date    TACROLIMUS 7.3 06/17/2019    TACROLIMUS 8.2 06/10/2019    TACROLIMUS 8.7 06/03/2019     No results found for: CYCLOSPORINE  @   Will closely monitor for toxicities, education provided about adherence to medicines and need to communicate any side effect to the transplant nurse or physician.    3. Allograft Function:stable at baseline for the patient. Continue follow up as per our guidelines and with the local General nephrologist. Communication will be sent today.  Lab Results   Component Value Date    CREATININE 1.7 (H) 06/17/2019    CREATININE 1.6 (H) 06/10/2019    CREATININE 1.5 (H) 06/03/2019     No results found for: AMYLASE, LIPASE    4. Hypertension management: well controlled  Continue with home blood pressure monitoring, low salt and healthy life discussed with the patient..    5. Metabolic Bone Disease/Secondary Hyperparathyroidism:calcium and phosphorus level discussed with the patient, patient will continue follow up with the general nephrologist for management  of metabolic bone disease   calcium and phosphorus as per our center protocol. Will monitor PTH, Vit D level, calcium.      Lab Results   Component Value Date    .0 (H) 05/06/2019    CALCIUM 9.7 06/17/2019    PHOS 3.0 06/17/2019    PHOS 2.7 06/10/2019    PHOS 1.8 (L) 06/03/2019       6. Electrolytes: reviewed with the patient, essentially within the normal range no need for acute changes today, will monitor as per our center guidelines.     Lab Results   Component Value Date     06/17/2019    K 4.4 06/17/2019     06/17/2019    CO2 23 06/17/2019    CO2 23 06/10/2019    CO2 21 (L) 06/03/2019       7. Anemia: h/hy is improved. No black stool.will continue monitoring as per our center guidelines. No indication for acute intervention today.     Lab Results   Component Value Date    WBC 4.31 06/17/2019    HGB 11.0 (L) 06/17/2019    HCT 33.8 (L) 06/17/2019    MCV 90 06/17/2019     06/17/2019       8.Proteinuria: will continue with pr/cr ratio as per our center guidelines  Lab Results   Component Value Date    PROTEINURINE 27 (H) 06/03/2019    CREATRANDUR 93.0 06/03/2019    UTPCR 0.29 (H) 06/03/2019    UTPCR 0.31 (H) 05/23/2019    UTPCR 0.46 (H) 05/06/2019        9. BK virus infection screening: will continue with urine or blood PCR as per our guidelines to prevent BK virus viremia and allograft dysfunction  Lab Results   Component Value Date    BKVIRUSPCRQB <125 06/03/2019         10. Weight education: provided during the clinic visit.   Body mass index is 31.05 kg/m².       11.Patient safety education regarding immunosuppression including prophylaxis posttransplant for CMV, PCP : Education provided about vaccination and prevention of infections.    12.  Cytopenias: no significant cytopenias will monitor as per our guidelines. Medicine list reviewed including potential causes of drug-induced cytopenias     Lab Results   Component Value Date    WBC 4.31 06/17/2019    HGB 11.0 (L) 06/17/2019    HCT  33.8 (L) 06/17/2019    MCV 90 06/17/2019     06/17/2019       13. Post-transplant Prophylaxis; CMV Infection, PJP and Candida mucosistis and other indicated for this particular patient. Valcyte and bactrim    I spoke with the patient for 30 minutes. More than half dedicated to counseling and education. All questions answered    Celestino Fitzgerald MD  Transplant Nephrology            Follow-up:   Clinic: return to transplant clinic weekly for the first month after transplant; every 2 weeks during months 2-3; then at 6-, 9-, 12-, 18-, 24-, and 36- months post-transplant to reassess for complications from immunosuppression toxicity and monitor for rejection.  Annually thereafter.    Labs: since patient remains at high risk for rejection and drug-related complications that warrant close monitoring, labs will be ordered as follows: continue twice weekly CBC, renal panel, and drug level for first month; then same labs once weekly through 3rd month post-transplant.  Urine for UA and protein/creatinine ratio monthly.  Serum BK - PCR at 1-, 3-, 6-, 9-, 12-, 18-, 24-, 36-, 48-, and 60 months post-transplant.  Hepatic panel at 1-, 2-, 3-, 6-, 9-, 12-, 18-, 24-, and 36- months post-transplant.    Celestino Fitzgerald MD       Education:   Material provided to the patient.  Patient reminded to call with any health changes since these can be early signs of significant complications.  Also, I advised the patient to be sure any new medications or changes of old medications are discussed with either a pharmacist or physician knowledgeable with transplant to avoid rejection/drug toxicity related to significant drug interactions.

## 2019-06-19 ENCOUNTER — OFFICE VISIT (OUTPATIENT)
Dept: TRANSPLANT | Facility: CLINIC | Age: 69
End: 2019-06-19
Payer: MEDICARE

## 2019-06-19 VITALS
OXYGEN SATURATION: 98 % | HEIGHT: 63 IN | TEMPERATURE: 98 F | DIASTOLIC BLOOD PRESSURE: 82 MMHG | BODY MASS INDEX: 31.05 KG/M2 | SYSTOLIC BLOOD PRESSURE: 172 MMHG | HEART RATE: 80 BPM | WEIGHT: 175.25 LBS

## 2019-06-19 DIAGNOSIS — Z91.89 AT RISK FOR OPPORTUNISTIC INFECTIONS: Primary | ICD-10-CM

## 2019-06-19 DIAGNOSIS — N25.81 SECONDARY HYPERPARATHYROIDISM OF RENAL ORIGIN: Chronic | ICD-10-CM

## 2019-06-19 DIAGNOSIS — I10 ESSENTIAL HYPERTENSION: Chronic | ICD-10-CM

## 2019-06-19 DIAGNOSIS — G90.9 PERIPHERAL AUTONOMIC NEUROPATHY: ICD-10-CM

## 2019-06-19 DIAGNOSIS — N18.30 CKD (CHRONIC KIDNEY DISEASE) STAGE 3, GFR 30-59 ML/MIN: ICD-10-CM

## 2019-06-19 DIAGNOSIS — D63.1 ANEMIA OF RENAL DISEASE: Chronic | ICD-10-CM

## 2019-06-19 DIAGNOSIS — Z94.0 S/P LIVING-DONOR KIDNEY TRANSPLANTATION: ICD-10-CM

## 2019-06-19 DIAGNOSIS — N18.9 ANEMIA OF RENAL DISEASE: Chronic | ICD-10-CM

## 2019-06-19 DIAGNOSIS — Z79.60 LONG-TERM USE OF IMMUNOSUPPRESSANT MEDICATION: ICD-10-CM

## 2019-06-19 PROCEDURE — 99999 PR PBB SHADOW E&M-EST. PATIENT-LVL III: CPT | Mod: PBBFAC,,, | Performed by: INTERNAL MEDICINE

## 2019-06-19 PROCEDURE — 99215 OFFICE O/P EST HI 40 MIN: CPT | Mod: S$PBB,,, | Performed by: INTERNAL MEDICINE

## 2019-06-19 PROCEDURE — 99213 OFFICE O/P EST LOW 20 MIN: CPT | Mod: PBBFAC | Performed by: INTERNAL MEDICINE

## 2019-06-19 PROCEDURE — 99215 PR OFFICE/OUTPT VISIT, EST, LEVL V, 40-54 MIN: ICD-10-PCS | Mod: S$PBB,,, | Performed by: INTERNAL MEDICINE

## 2019-06-19 PROCEDURE — 99999 PR PBB SHADOW E&M-EST. PATIENT-LVL III: ICD-10-PCS | Mod: PBBFAC,,, | Performed by: INTERNAL MEDICINE

## 2019-06-19 NOTE — LETTER
June 19, 2019        Jd Carias  [address]  [address]  [facility] [address]  Phone: [phone]  Fax: [phone]             [facility]  [address]  [facility] [address]  Phone: [phone]   Patient: Paige Summers   MR Number: [id number]   YOB: 1950   Date of Visit: 6/19/2019       Dear Dr. Jd Carias    Thank you for referring Paige Summers to me for evaluation. Attached you will find relevant portions of my assessment and plan of care.    If you have questions, please do not hesitate to call me. I look forward to following Paige Summers along with you.    Sincerely,    Celestino Fitzgerald MD    Enclosure    If you would like to receive this communication electronically, please contact [email or web address] or [phone] to request [employer] access.    [employer] is a tool which provides read-only access to select patient information with whom you have a relationship. Its easy to use and provides real time access to review your patients record including encounter summaries, notes, results, and demographic information.    If you feel you have received this communication in error or would no longer like to receive these types of communications, please e-mail [email or web address]

## 2019-06-24 ENCOUNTER — LAB VISIT (OUTPATIENT)
Dept: LAB | Facility: HOSPITAL | Age: 69
End: 2019-06-24
Attending: INTERNAL MEDICINE
Payer: MEDICARE

## 2019-06-24 DIAGNOSIS — Z94.0 KIDNEY REPLACED BY TRANSPLANT: ICD-10-CM

## 2019-06-24 LAB
ALBUMIN SERPL BCP-MCNC: 3.6 G/DL (ref 3.5–5.2)
ANION GAP SERPL CALC-SCNC: 9 MMOL/L (ref 8–16)
BASOPHILS # BLD AUTO: 0.05 K/UL (ref 0–0.2)
BASOPHILS NFR BLD: 0.8 % (ref 0–1.9)
BUN SERPL-MCNC: 20 MG/DL (ref 8–23)
CALCIUM SERPL-MCNC: 9.7 MG/DL (ref 8.7–10.5)
CHLORIDE SERPL-SCNC: 108 MMOL/L (ref 95–110)
CO2 SERPL-SCNC: 22 MMOL/L (ref 23–29)
CREAT SERPL-MCNC: 1.8 MG/DL (ref 0.5–1.4)
DIFFERENTIAL METHOD: ABNORMAL
EOSINOPHIL # BLD AUTO: 0.1 K/UL (ref 0–0.5)
EOSINOPHIL NFR BLD: 2.2 % (ref 0–8)
ERYTHROCYTE [DISTWIDTH] IN BLOOD BY AUTOMATED COUNT: 14.2 % (ref 11.5–14.5)
EST. GFR  (AFRICAN AMERICAN): 32.6 ML/MIN/1.73 M^2
EST. GFR  (NON AFRICAN AMERICAN): 28.3 ML/MIN/1.73 M^2
GLUCOSE SERPL-MCNC: 135 MG/DL (ref 70–110)
HCT VFR BLD AUTO: 33.2 % (ref 37–48.5)
HGB BLD-MCNC: 10.5 G/DL (ref 12–16)
IMM GRANULOCYTES # BLD AUTO: 0.11 K/UL (ref 0–0.04)
IMM GRANULOCYTES NFR BLD AUTO: 1.8 % (ref 0–0.5)
LYMPHOCYTES # BLD AUTO: 0.2 K/UL (ref 1–4.8)
LYMPHOCYTES NFR BLD: 3.8 % (ref 18–48)
MAGNESIUM SERPL-MCNC: 1.7 MG/DL (ref 1.6–2.6)
MCH RBC QN AUTO: 29.2 PG (ref 27–31)
MCHC RBC AUTO-ENTMCNC: 31.6 G/DL (ref 32–36)
MCV RBC AUTO: 93 FL (ref 82–98)
MONOCYTES # BLD AUTO: 0.4 K/UL (ref 0.3–1)
MONOCYTES NFR BLD: 5.8 % (ref 4–15)
NEUTROPHILS # BLD AUTO: 5.2 K/UL (ref 1.8–7.7)
NEUTROPHILS NFR BLD: 85.6 % (ref 38–73)
NRBC BLD-RTO: 0 /100 WBC
PHOSPHATE SERPL-MCNC: 2.4 MG/DL (ref 2.7–4.5)
PLATELET # BLD AUTO: 256 K/UL (ref 150–350)
PMV BLD AUTO: 9.9 FL (ref 9.2–12.9)
POTASSIUM SERPL-SCNC: 4 MMOL/L (ref 3.5–5.1)
RBC # BLD AUTO: 3.59 M/UL (ref 4–5.4)
SODIUM SERPL-SCNC: 139 MMOL/L (ref 136–145)
TACROLIMUS BLD-MCNC: 10.2 NG/ML (ref 5–15)
WBC # BLD AUTO: 6.04 K/UL (ref 3.9–12.7)

## 2019-06-24 PROCEDURE — 83735 ASSAY OF MAGNESIUM: CPT

## 2019-06-24 PROCEDURE — 80069 RENAL FUNCTION PANEL: CPT

## 2019-06-24 PROCEDURE — 36415 COLL VENOUS BLD VENIPUNCTURE: CPT

## 2019-06-24 PROCEDURE — 80197 ASSAY OF TACROLIMUS: CPT

## 2019-06-24 PROCEDURE — 85025 COMPLETE CBC W/AUTO DIFF WBC: CPT

## 2019-07-01 ENCOUNTER — LAB VISIT (OUTPATIENT)
Dept: LAB | Facility: HOSPITAL | Age: 69
End: 2019-07-01
Attending: INTERNAL MEDICINE
Payer: MEDICARE

## 2019-07-01 DIAGNOSIS — Z94.0 KIDNEY REPLACED BY TRANSPLANT: ICD-10-CM

## 2019-07-01 LAB
ALBUMIN SERPL BCP-MCNC: 3.7 G/DL (ref 3.5–5.2)
ALBUMIN SERPL BCP-MCNC: 3.7 G/DL (ref 3.5–5.2)
ALP SERPL-CCNC: 122 U/L (ref 55–135)
ALT SERPL W/O P-5'-P-CCNC: 7 U/L (ref 10–44)
ANION GAP SERPL CALC-SCNC: 8 MMOL/L (ref 8–16)
AST SERPL-CCNC: 15 U/L (ref 10–40)
BASOPHILS # BLD AUTO: 0.04 K/UL (ref 0–0.2)
BASOPHILS NFR BLD: 0.8 % (ref 0–1.9)
BILIRUB DIRECT SERPL-MCNC: 0.2 MG/DL (ref 0.1–0.3)
BILIRUB SERPL-MCNC: 0.3 MG/DL (ref 0.1–1)
BUN SERPL-MCNC: 18 MG/DL (ref 8–23)
CALCIUM SERPL-MCNC: 9.8 MG/DL (ref 8.7–10.5)
CHLORIDE SERPL-SCNC: 110 MMOL/L (ref 95–110)
CHOLEST SERPL-MCNC: 152 MG/DL (ref 120–199)
CHOLEST/HDLC SERPL: 3.3 {RATIO} (ref 2–5)
CO2 SERPL-SCNC: 24 MMOL/L (ref 23–29)
CREAT SERPL-MCNC: 1.5 MG/DL (ref 0.5–1.4)
DIFFERENTIAL METHOD: ABNORMAL
EOSINOPHIL # BLD AUTO: 0.1 K/UL (ref 0–0.5)
EOSINOPHIL NFR BLD: 2.6 % (ref 0–8)
ERYTHROCYTE [DISTWIDTH] IN BLOOD BY AUTOMATED COUNT: 14.2 % (ref 11.5–14.5)
EST. GFR  (AFRICAN AMERICAN): 40.7 ML/MIN/1.73 M^2
EST. GFR  (NON AFRICAN AMERICAN): 35.3 ML/MIN/1.73 M^2
GLUCOSE SERPL-MCNC: 108 MG/DL (ref 70–110)
HCT VFR BLD AUTO: 34.4 % (ref 37–48.5)
HDLC SERPL-MCNC: 46 MG/DL (ref 40–75)
HDLC SERPL: 30.3 % (ref 20–50)
HGB BLD-MCNC: 11 G/DL (ref 12–16)
IMM GRANULOCYTES # BLD AUTO: 0.1 K/UL (ref 0–0.04)
IMM GRANULOCYTES NFR BLD AUTO: 2 % (ref 0–0.5)
LDLC SERPL CALC-MCNC: 88.6 MG/DL (ref 63–159)
LYMPHOCYTES # BLD AUTO: 0.2 K/UL (ref 1–4.8)
LYMPHOCYTES NFR BLD: 4.4 % (ref 18–48)
MAGNESIUM SERPL-MCNC: 1.5 MG/DL (ref 1.6–2.6)
MCH RBC QN AUTO: 28.8 PG (ref 27–31)
MCHC RBC AUTO-ENTMCNC: 32 G/DL (ref 32–36)
MCV RBC AUTO: 90 FL (ref 82–98)
MONOCYTES # BLD AUTO: 0.3 K/UL (ref 0.3–1)
MONOCYTES NFR BLD: 5.8 % (ref 4–15)
NEUTROPHILS # BLD AUTO: 4.2 K/UL (ref 1.8–7.7)
NEUTROPHILS NFR BLD: 84.4 % (ref 38–73)
NONHDLC SERPL-MCNC: 106 MG/DL
NRBC BLD-RTO: 0 /100 WBC
PHOSPHATE SERPL-MCNC: 2.7 MG/DL (ref 2.7–4.5)
PLATELET # BLD AUTO: 268 K/UL (ref 150–350)
PMV BLD AUTO: 9.7 FL (ref 9.2–12.9)
POTASSIUM SERPL-SCNC: 4.3 MMOL/L (ref 3.5–5.1)
PROT SERPL-MCNC: 7.2 G/DL (ref 6–8.4)
RBC # BLD AUTO: 3.82 M/UL (ref 4–5.4)
SODIUM SERPL-SCNC: 142 MMOL/L (ref 136–145)
TACROLIMUS BLD-MCNC: 8.3 NG/ML (ref 5–15)
TRIGL SERPL-MCNC: 87 MG/DL (ref 30–150)
WBC # BLD AUTO: 4.99 K/UL (ref 3.9–12.7)

## 2019-07-01 PROCEDURE — 82247 BILIRUBIN TOTAL: CPT

## 2019-07-01 PROCEDURE — 80061 LIPID PANEL: CPT

## 2019-07-01 PROCEDURE — 84075 ASSAY ALKALINE PHOSPHATASE: CPT

## 2019-07-01 PROCEDURE — 87799 DETECT AGENT NOS DNA QUANT: CPT

## 2019-07-01 PROCEDURE — 80069 RENAL FUNCTION PANEL: CPT

## 2019-07-01 PROCEDURE — 83735 ASSAY OF MAGNESIUM: CPT

## 2019-07-01 PROCEDURE — 80197 ASSAY OF TACROLIMUS: CPT

## 2019-07-01 PROCEDURE — 85025 COMPLETE CBC W/AUTO DIFF WBC: CPT

## 2019-07-02 DIAGNOSIS — R20.0 LEFT LEG NUMBNESS: ICD-10-CM

## 2019-07-02 DIAGNOSIS — G62.9 NEUROPATHY: Primary | ICD-10-CM

## 2019-07-03 ENCOUNTER — OFFICE VISIT (OUTPATIENT)
Dept: TRANSPLANT | Facility: CLINIC | Age: 69
End: 2019-07-03
Payer: MEDICARE

## 2019-07-03 VITALS
SYSTOLIC BLOOD PRESSURE: 131 MMHG | WEIGHT: 174.63 LBS | RESPIRATION RATE: 17 BRPM | TEMPERATURE: 98 F | HEART RATE: 74 BPM | DIASTOLIC BLOOD PRESSURE: 73 MMHG | BODY MASS INDEX: 30.94 KG/M2 | HEIGHT: 63 IN | OXYGEN SATURATION: 98 %

## 2019-07-03 DIAGNOSIS — I10 ESSENTIAL HYPERTENSION: Chronic | ICD-10-CM

## 2019-07-03 DIAGNOSIS — E83.39 HYPOPHOSPHATEMIA: ICD-10-CM

## 2019-07-03 DIAGNOSIS — Z91.89 AT RISK FOR OPPORTUNISTIC INFECTIONS: ICD-10-CM

## 2019-07-03 DIAGNOSIS — N18.30 CKD (CHRONIC KIDNEY DISEASE) STAGE 3, GFR 30-59 ML/MIN: ICD-10-CM

## 2019-07-03 DIAGNOSIS — Z79.60 LONG-TERM USE OF IMMUNOSUPPRESSANT MEDICATION: ICD-10-CM

## 2019-07-03 DIAGNOSIS — N18.9 ANEMIA OF RENAL DISEASE: Chronic | ICD-10-CM

## 2019-07-03 DIAGNOSIS — Z94.0 S/P LIVING-DONOR KIDNEY TRANSPLANTATION: Primary | ICD-10-CM

## 2019-07-03 DIAGNOSIS — D63.1 ANEMIA OF RENAL DISEASE: Chronic | ICD-10-CM

## 2019-07-03 PROCEDURE — 99999 PR PBB SHADOW E&M-EST. PATIENT-LVL V: ICD-10-PCS | Mod: PBBFAC,,, | Performed by: NURSE PRACTITIONER

## 2019-07-03 PROCEDURE — 99999 PR PBB SHADOW E&M-EST. PATIENT-LVL V: CPT | Mod: PBBFAC,,, | Performed by: NURSE PRACTITIONER

## 2019-07-03 PROCEDURE — 99215 OFFICE O/P EST HI 40 MIN: CPT | Mod: PBBFAC | Performed by: NURSE PRACTITIONER

## 2019-07-03 PROCEDURE — 99215 OFFICE O/P EST HI 40 MIN: CPT | Mod: S$PBB,,, | Performed by: NURSE PRACTITIONER

## 2019-07-03 PROCEDURE — 99215 PR OFFICE/OUTPT VISIT, EST, LEVL V, 40-54 MIN: ICD-10-PCS | Mod: S$PBB,,, | Performed by: NURSE PRACTITIONER

## 2019-07-03 RX ORDER — SULFAMETHOXAZOLE AND TRIMETHOPRIM 400; 80 MG/1; MG/1
1 TABLET ORAL DAILY
Qty: 30 TABLET | Refills: 11 | Status: SHIPPED | OUTPATIENT
Start: 2019-07-03 | End: 2020-04-08

## 2019-07-03 NOTE — PROGRESS NOTES
Kidney Post-Transplant Assessment    Referring Physician: Jd Carias  Current Nephrologist: Jd Carias    ORGAN: LEFT KIDNEY  Donor Type: living  PHS Increased Risk: no  Cold Ischemia: 189 mins  Induction Medications: campath - alemtuzumab (anti-cd52)    Subjective:     CC:  Reassessment of renal allograft function and management of chronic immunosuppression.    HPI:  Ms. Summers is a 68 y.o. year old Black or  female who received a living kidney transplant on 4/8/19. Her most recent creatinine is 1.8. She takes mycophenolate mofetil and tacrolimus for maintenance immunosuppression. Her post transplant course has been complicated by see notes below.  post transplant course:  - S/p living unrelated kidney transplant 4/8/19, CMV +/+, WIT 30 min, CIT 3h 9 m  - DGF, resolved  - Pt was on Plavix for reoccurring AVF thrombus, Plavix stopped 4/15/2019   5/23 DSA (-)        Interval HX:   HX peripheral neuropathy with sciatic nerve pain on left side , neurology apt scheduled on 7/12/2019    BP at home 120-140/80s     Intake 2L +  UOP no problems reported  Over all Is doing well.        Past Medical History:   Diagnosis Date    Anemia     Anemia of renal disease     Anxiety     Chronic renal failure 01/10/2018    CKD (chronic kidney disease) stage 3, GFR 30-59 ml/min 6/18/2019    Depression     Essential hypertension     GERD (gastroesophageal reflux disease)     Gout     H pylori ulcer     Hyperlipidemia     Hypertension     Obesity     Secondary hyperparathyroidism of renal origin        Review of Systems   Constitutional: Negative for activity change, appetite change, chills, fatigue, fever and unexpected weight change.   HENT: Negative for congestion, facial swelling, postnasal drip, rhinorrhea, sinus pressure, sore throat and trouble swallowing.    Eyes: Negative for pain, redness and visual disturbance.   Respiratory: Negative for cough, chest tightness, shortness of breath and  "wheezing.    Cardiovascular: Negative for chest pain and palpitations.   Gastrointestinal: Negative for abdominal pain, diarrhea, nausea and vomiting.   Genitourinary: Negative for dysuria, flank pain and urgency.   Musculoskeletal: Positive for gait problem. Negative for neck pain and neck stiffness.        Uses a cane or walker for long distances    Skin: Negative for rash.   Allergic/Immunologic: Positive for immunocompromised state. Negative for environmental allergies and food allergies.   Neurological: Negative for dizziness, weakness, light-headedness and headaches.        Peripheral neuropathy   Psychiatric/Behavioral: Positive for sleep disturbance. Negative for agitation and confusion. The patient is not nervous/anxious.        Objective:   Blood pressure 131/73, pulse 74, temperature 98.3 °F (36.8 °C), temperature source Oral, resp. rate 17, height 5' 3" (1.6 m), weight 79.2 kg (174 lb 9.7 oz), last menstrual period 08/08/1971, SpO2 98 %.body mass index is 30.93 kg/m².    Physical Exam   Constitutional: She is oriented to person, place, and time. She appears well-developed and well-nourished.   HENT:   Head: Normocephalic.   Mouth/Throat: Oropharynx is clear and moist. No oropharyngeal exudate.   Eyes: Pupils are equal, round, and reactive to light. Conjunctivae and EOM are normal. No scleral icterus.   Neck: Normal range of motion. Neck supple.   Cardiovascular: Normal rate, regular rhythm and normal heart sounds.   Pulmonary/Chest: Effort normal and breath sounds normal.   Abdominal: Soft. Normal appearance and bowel sounds are normal. She exhibits no distension and no mass. There is no splenomegaly or hepatomegaly. There is no tenderness. There is no rebound, no guarding, no CVA tenderness, no tenderness at McBurney's point and negative Smith's sign.       Musculoskeletal: Normal range of motion. She exhibits no edema.   Lymphadenopathy:     She has no cervical adenopathy.   Neurological: She is alert " and oriented to person, place, and time. She exhibits normal muscle tone. Coordination normal.   Skin: Skin is warm and dry.   Psychiatric: She has a normal mood and affect. Her behavior is normal.   Vitals reviewed.      Labs:  Lab Results   Component Value Date    WBC 4.99 07/01/2019    HGB 11.0 (L) 07/01/2019    HCT 34.4 (L) 07/01/2019     07/01/2019    K 4.3 07/01/2019     07/01/2019    CO2 24 07/01/2019    BUN 18 07/01/2019    CREATININE 1.5 (H) 07/01/2019    EGFRNONAA 35.3 (A) 07/01/2019    CALCIUM 9.8 07/01/2019    PHOS 2.7 07/01/2019    MG 1.5 (L) 07/01/2019    ALBUMIN 3.7 07/01/2019    ALBUMIN 3.7 07/01/2019    AST 15 07/01/2019    ALT 7 (L) 07/01/2019    UTPCR 0.19 07/01/2019    .0 (H) 05/06/2019    TACROLIMUS 8.3 07/01/2019       No results found for: EXTANC, EXTWBC, EXTSEGS, EXTPLATELETS, EXTHEMOGLOBI, EXTHEMATOCRI, EXTCREATININ, EXTSODIUM, EXTPOTASSIUM, EXTBUN, EXTCO2, EXTCALCIUM, EXTPHOSPHORU, EXTGLUCOSE, EXTALBUMIN, EXTAST, EXTALT, EXTBILITOTAL, EXTLIPASE, EXTAMYLASE    No results found for: EXTCYCLOSLVL, EXTSIROLIMUS, EXTTACROLVL, EXTPROTCRE, EXTPTHINTACT, EXTPROTEINUA, EXTWBCUA, EXTRBCUA    Labs were reviewed with the patient    Assessment:     1. S/P living-donor kidney transplantation    2. CKD (chronic kidney disease) stage 3, GFR 30-59 ml/min    3. Essential hypertension    4. At risk for opportunistic infections    5. Long-term use of immunosuppressant medication    6. Anemia of renal disease    7. Hypophosphatemia        Plan:    valcyte completed on  7/7  Increase Bactrim dosing to daily.    Neurology apt scheduled  for peripheral neuropathy on 7/12/2019       Follow-up:   1. CKD stage: 3,  improving  2. Immunosuppression:   Prograf trough 8.3, which is therapeutic target 8-10. Continue Prograf 3/3, [id number] Mg BID, and  Valcyte  Completed on 7/7,  Increase dose to daily-->Bactrim 400/80 mg QD for PCP prophylaxis, Will continue to monitor for drug toxicities     3. Allograft  Function: improving. Continue good po hydration.   Lab Results   Component Value Date    CREATININE 1.5 (H) 07/01/2019 7/1/2019  POD 84   eGFR if African American >60 mL/min/1.73 m^2 40.7 (A)       4. Hypertension management: advise low salt diet and home BP monitoring    [id number] mg QD, Coreg 25 mg BID, Clonidine 0.1 mg TID         5. Metabolic Bone Disease/Secondary Hyperparathyroidism:stable  Will monitor PTH, CA and Vit D/guidelines,    Sensipar 30 mg QD,  Mg ox 800 mg TID, high phos diet encouraged , KPN  750  mg TID    Lab Results   Component Value Date    .0 (H) 05/06/2019    CALCIUM 9.8 07/01/2019    PHOS 2.7 07/01/2019 7/1/2019  POD 84   Magnesium 1.6 - 2.6 mg/dL 1.5 (A)       6. Electrolytes:  Will monitor /guidelines  Lab Results   Component Value Date     07/01/2019    K 4.3 07/01/2019     07/01/2019    CO2 24 07/01/2019   Sodium bicarb 1300 mg BID       7. Anemia: stable. No need for intervention    Will monitor /guidelines  Lab Results   Component Value Date    WBC 4.99 07/01/2019    HGB 11.0 (L) 07/01/2019    HCT 34.4 (L) 07/01/2019    MCV 90 07/01/2019     07/01/2019       8.  Cytopenias: no significant cytopenias will monitor as per our guidelines. Medicine list reviewed including potential causes of drug-induced cytopenias    9.Proteinuria: continue p/c ratio as per guidelines         7/1/2019  POD 84   Prot/Creat Ratio, Ur 0.00 - 0.20 0.19       10. BK virus infection screening:  will continue to monitor/ guidelines      7/1/2019  POD 84   BK Virus DNA, Blood Not detected Not detected       7/1/2019  POD 84   BK Virus DNA PCR, Quant, Blood <125 Copies/mL <125     11. Weight education: provided during the clinic visit   Body mass index is 30.93 kg/m².          12.Patient safety education regarding immunosuppression including prophylaxis posttransplant for CMV, PCP : Education provided about vaccination and prevention of infections            Follow-up:    Clinic: return to transplant clinic weekly for the first month after transplant; every 2 weeks during months 2-3; then at 6-, 9-, 12-, 18-, 24-, and 36- months post-transplant to reassess for complications from immunosuppression toxicity and monitor for rejection.  Annually thereafter.    Labs: since patient remains at high risk for rejection and drug-related complications that warrant close monitoring, labs will be ordered as follows: continue twice weekly CBC, renal panel, and drug level for first month; then same labs once weekly through 3rd month post-transplant.  Urine for UA and protein/creatinine ratio monthly.  Serum BK - PCR at 1-, 3-, 6-, 9-, 12-, 18-, 24-, 36-, 48-, and 60 months post-transplant.  Hepatic panel at 1-, 2-, 3-, 6-, 9-, 12-, 18-, 24-, and 36- months post-transplant.    Tamanna Kemp NP       Education:   Material provided to the patient.  Patient reminded to call with any health changes since these can be early signs of significant complications.  Also, I advised the patient to be sure any new medications or changes of old medications are discussed with either a pharmacist or physician knowledgeable with transplant to avoid rejection/drug toxicity related to significant drug interactions.

## 2019-07-03 NOTE — LETTER
July 3, 2019        Jd Carias  [address]  [address]  [facility] [address]  Phone: [phone]  Fax: [phone]             [facility]  [address]  [facility] [address]  Phone: [phone]   Patient: Paige Summers   MR Number: [id number]   YOB: 1950   Date of Visit: 7/3/2019       Dear Dr. Jd Carias    Thank you for referring Paige Summers to me for evaluation. Attached you will find relevant portions of my assessment and plan of care.    If you have questions, please do not hesitate to call me. I look forward to following Paige Summers along with you.    Sincerely,    Tamanna Kemp, NP    Enclosure    If you would like to receive this communication electronically, please contact [email or web address] or [phone] to request [facility] Link access.    [facility] Link is a tool which provides read-only access to select patient information with whom you have a relationship. Its easy to use and provides real time access to review your patients record including encounter summaries, notes, results, and demographic information.    If you feel you have received this communication in error or would no longer like to receive these types of communications, please e-mail [email or web address]

## 2019-07-08 ENCOUNTER — LAB VISIT (OUTPATIENT)
Dept: LAB | Facility: HOSPITAL | Age: 69
End: 2019-07-08
Attending: INTERNAL MEDICINE
Payer: MEDICARE

## 2019-07-08 DIAGNOSIS — Z94.0 KIDNEY REPLACED BY TRANSPLANT: ICD-10-CM

## 2019-07-08 LAB
ALBUMIN SERPL BCP-MCNC: 3.8 G/DL (ref 3.5–5.2)
ANION GAP SERPL CALC-SCNC: 8 MMOL/L (ref 8–16)
BASOPHILS # BLD AUTO: 0.04 K/UL (ref 0–0.2)
BASOPHILS NFR BLD: 0.8 % (ref 0–1.9)
BUN SERPL-MCNC: 17 MG/DL (ref 8–23)
CALCIUM SERPL-MCNC: 10 MG/DL (ref 8.7–10.5)
CHLORIDE SERPL-SCNC: 108 MMOL/L (ref 95–110)
CO2 SERPL-SCNC: 23 MMOL/L (ref 23–29)
CREAT SERPL-MCNC: 1.5 MG/DL (ref 0.5–1.4)
DIFFERENTIAL METHOD: ABNORMAL
EOSINOPHIL # BLD AUTO: 0.1 K/UL (ref 0–0.5)
EOSINOPHIL NFR BLD: 2.1 % (ref 0–8)
ERYTHROCYTE [DISTWIDTH] IN BLOOD BY AUTOMATED COUNT: 13.8 % (ref 11.5–14.5)
EST. GFR  (AFRICAN AMERICAN): 40.7 ML/MIN/1.73 M^2
EST. GFR  (NON AFRICAN AMERICAN): 35.3 ML/MIN/1.73 M^2
GLUCOSE SERPL-MCNC: 105 MG/DL (ref 70–110)
HCT VFR BLD AUTO: 33.5 % (ref 37–48.5)
HGB BLD-MCNC: 11 G/DL (ref 12–16)
IMM GRANULOCYTES # BLD AUTO: 0.05 K/UL (ref 0–0.04)
IMM GRANULOCYTES NFR BLD AUTO: 1 % (ref 0–0.5)
LYMPHOCYTES # BLD AUTO: 0.3 K/UL (ref 1–4.8)
LYMPHOCYTES NFR BLD: 6.5 % (ref 18–48)
MAGNESIUM SERPL-MCNC: 1.6 MG/DL (ref 1.6–2.6)
MCH RBC QN AUTO: 29.6 PG (ref 27–31)
MCHC RBC AUTO-ENTMCNC: 32.8 G/DL (ref 32–36)
MCV RBC AUTO: 90 FL (ref 82–98)
MONOCYTES # BLD AUTO: 0.4 K/UL (ref 0.3–1)
MONOCYTES NFR BLD: 9.2 % (ref 4–15)
NEUTROPHILS # BLD AUTO: 3.8 K/UL (ref 1.8–7.7)
NEUTROPHILS NFR BLD: 80.4 % (ref 38–73)
NRBC BLD-RTO: 0 /100 WBC
PHOSPHATE SERPL-MCNC: 2.8 MG/DL (ref 2.7–4.5)
PLATELET # BLD AUTO: 272 K/UL (ref 150–350)
PMV BLD AUTO: 10.2 FL (ref 9.2–12.9)
POTASSIUM SERPL-SCNC: 4.3 MMOL/L (ref 3.5–5.1)
RBC # BLD AUTO: 3.72 M/UL (ref 4–5.4)
SODIUM SERPL-SCNC: 139 MMOL/L (ref 136–145)
TACROLIMUS BLD-MCNC: 15.4 NG/ML (ref 5–15)
WBC # BLD AUTO: 4.78 K/UL (ref 3.9–12.7)

## 2019-07-08 PROCEDURE — 80197 ASSAY OF TACROLIMUS: CPT

## 2019-07-08 PROCEDURE — 36415 COLL VENOUS BLD VENIPUNCTURE: CPT

## 2019-07-08 PROCEDURE — 80069 RENAL FUNCTION PANEL: CPT

## 2019-07-08 PROCEDURE — 85025 COMPLETE CBC W/AUTO DIFF WBC: CPT

## 2019-07-08 PROCEDURE — 83735 ASSAY OF MAGNESIUM: CPT

## 2019-07-10 RX ORDER — TACROLIMUS 1 MG/1
CAPSULE ORAL
Qty: 120 CAPSULE | Refills: 11 | Status: SHIPPED | OUTPATIENT
Start: 2019-07-10 | End: 2019-08-06

## 2019-07-15 ENCOUNTER — LAB VISIT (OUTPATIENT)
Dept: LAB | Facility: HOSPITAL | Age: 69
End: 2019-07-15
Attending: INTERNAL MEDICINE
Payer: MEDICARE

## 2019-07-15 DIAGNOSIS — Z94.0 KIDNEY REPLACED BY TRANSPLANT: ICD-10-CM

## 2019-07-15 LAB
ALBUMIN SERPL BCP-MCNC: 3.7 G/DL (ref 3.5–5.2)
ANION GAP SERPL CALC-SCNC: 9 MMOL/L (ref 8–16)
BUN SERPL-MCNC: 19 MG/DL (ref 8–23)
CALCIUM SERPL-MCNC: 10 MG/DL (ref 8.7–10.5)
CHLORIDE SERPL-SCNC: 108 MMOL/L (ref 95–110)
CO2 SERPL-SCNC: 24 MMOL/L (ref 23–29)
CREAT SERPL-MCNC: 1.7 MG/DL (ref 0.5–1.4)
EST. GFR  (AFRICAN AMERICAN): 35 ML/MIN/1.73 M^2
EST. GFR  (NON AFRICAN AMERICAN): 30.3 ML/MIN/1.73 M^2
GLUCOSE SERPL-MCNC: 124 MG/DL (ref 70–110)
PHOSPHATE SERPL-MCNC: 2.9 MG/DL (ref 2.7–4.5)
POTASSIUM SERPL-SCNC: 4.5 MMOL/L (ref 3.5–5.1)
SODIUM SERPL-SCNC: 141 MMOL/L (ref 136–145)
TACROLIMUS BLD-MCNC: 9.1 NG/ML (ref 5–15)

## 2019-07-15 PROCEDURE — 80197 ASSAY OF TACROLIMUS: CPT

## 2019-07-15 PROCEDURE — 36415 COLL VENOUS BLD VENIPUNCTURE: CPT

## 2019-07-15 PROCEDURE — 80069 RENAL FUNCTION PANEL: CPT

## 2019-07-18 ENCOUNTER — HOSPITAL ENCOUNTER (OUTPATIENT)
Dept: RADIOLOGY | Facility: HOSPITAL | Age: 69
Discharge: HOME | End: 2019-07-18
Attending: PSYCHIATRY & NEUROLOGY
Payer: MEDICARE

## 2019-07-18 ENCOUNTER — OFFICE VISIT (OUTPATIENT)
Dept: NEUROLOGY | Facility: CLINIC | Age: 69
End: 2019-07-18
Payer: MEDICARE

## 2019-07-18 VITALS
DIASTOLIC BLOOD PRESSURE: 76 MMHG | BODY MASS INDEX: 31.29 KG/M2 | HEIGHT: 63 IN | SYSTOLIC BLOOD PRESSURE: 146 MMHG | HEART RATE: 78 BPM | WEIGHT: 176.56 LBS

## 2019-07-18 DIAGNOSIS — R20.2 LEFT LEG PARESTHESIAS: ICD-10-CM

## 2019-07-18 DIAGNOSIS — R20.2 LEFT LEG PARESTHESIAS: Primary | ICD-10-CM

## 2019-07-18 DIAGNOSIS — Z94.0 S/P LIVING-DONOR KIDNEY TRANSPLANTATION: ICD-10-CM

## 2019-07-18 PROCEDURE — 99999 PR PBB SHADOW E&M-EST. PATIENT-LVL V: CPT | Mod: PBBFAC,,, | Performed by: PSYCHIATRY & NEUROLOGY

## 2019-07-18 PROCEDURE — 99203 OFFICE O/P NEW LOW 30 MIN: CPT | Mod: S$PBB,,, | Performed by: PSYCHIATRY & NEUROLOGY

## 2019-07-18 PROCEDURE — 99203 PR OFFICE/OUTPT VISIT, NEW, LEVL III, 30-44 MIN: ICD-10-PCS | Mod: S$PBB,,, | Performed by: PSYCHIATRY & NEUROLOGY

## 2019-07-18 PROCEDURE — 72110 X-RAY EXAM L-2 SPINE 4/>VWS: CPT | Mod: TC,FY

## 2019-07-18 PROCEDURE — 99999 PR PBB SHADOW E&M-EST. PATIENT-LVL V: ICD-10-PCS | Mod: PBBFAC,,, | Performed by: PSYCHIATRY & NEUROLOGY

## 2019-07-18 PROCEDURE — 72110 X-RAY EXAM L-2 SPINE 4/>VWS: CPT | Mod: 26,,, | Performed by: RADIOLOGY

## 2019-07-18 PROCEDURE — 99215 OFFICE O/P EST HI 40 MIN: CPT | Mod: PBBFAC,25,PO | Performed by: PSYCHIATRY & NEUROLOGY

## 2019-07-18 PROCEDURE — 72110 XR LUMBAR SPINE 5 VIEW WITH FLEX AND EXT: ICD-10-PCS | Mod: 26,,, | Performed by: RADIOLOGY

## 2019-07-18 NOTE — LETTER
July 24, 2019      Celestino Fitzgerald MD  [address]  [facility] [address]           [facility]  [address], [address]  [facility] [address]  Phone: [phone]  Fax: [phone]          Patient: Paige Summers   MR Number: [id number]   YOB: 1950   Date of Visit: 7/18/2019       Dear Dr. Celestino Fitzgerald:    Thank you for referring Paige Summers to me for evaluation. Attached you will find relevant portions of my assessment and plan of care.    If you have questions, please do not hesitate to call me. I look forward to following Paige Summers along with you.    Sincerely,    Jason Dalton  CC:  No Recipients    If you would like to receive this communication electronically, please contact [email or web address] or [phone] to request more information on [employer] Link access.    For providers and/or their staff who would like to refer a patient to [facility], please contact us through our one-stop-shop provider referral line, [facility], at [phone].    If you feel you have received this communication in error or would no longer like to receive these types of communications, please e-mail [email or web address]

## 2019-07-18 NOTE — PROGRESS NOTES
Neurology Clinic Visit  Primary Care Provider: Destiny Villa MD   Referring Provider: Celestino Fitzgerald MD   Date of Visit: 07/18/2019       chief complaint:   Chief Complaint   Patient presents with    Consult       History of Present Illness  Paige Summers is a 68 y.o. right handed female I have been asked to consult for evaluation of paresthesias in left leg. She reports paresthesia in left lateral leg that went from back to foot about 1 year ago. She reports she was told she had sciatica. However, after her transplant in 4/2019 she noticed digits 4-5 and heel on left were numb. She denies lower back pain. She was started on prednisone for her kidney transplant which seems to eased the pain but she still has an uncomfortable feeling on bottom of left foot. She was given a trial of gabapentin in the past but she is unsure if it helped.     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    S/P living-donor kidney transplantation     Prophylactic immunotherapy     Long-term use of immunosuppressant medication     At risk for opportunistic infections     Anemia of renal disease     Secondary hyperparathyroidism of renal origin     Antiphospholipid antibody positive 12/05/2018    Mechanical complication of vascular device 09/12/2018     Past Medical History:   Diagnosis Date    Anemia     Anemia of renal disease     Anxiety     Chronic renal failure 01/10/2018    CKD (chronic kidney disease) stage 3, GFR 30-59 ml/min 6/18/2019    Depression     Essential hypertension     GERD (gastroesophageal reflux disease)     Gout     H pylori ulcer     Hyperlipidemia     Hypertension     Obesity     Secondary  hyperparathyroidism of renal origin      Past Surgical History:   Procedure Laterality Date    declot Left 2/19/2019    [facility] CATH LAB    DIALYSIS FISTULA CREATION Left 02/2018    FISTULOGRAM Left 3/13/2019    Per[facility] by Ir[facility]o Devlin [facility] at [facility] CATH LAB    FISTULOGRAM Left 12/19/2018    Performed by Javed Lopez MD at [facility] CATH LAB    HYSTERECTOMY  1971    TVH    PERMCATH REMOVAL-TUNNELED CVC REMOVAL N/A 4/23/2018    [facility] CATH LAB    PERMCATH REWIRE- TUNNELED CATH REWIRE N/A 3/21/2018    Performed by [facility] at [facility] CATH LAB    THROMBECTOMY, HEMODIALYSIS GRAFT OR FISTULA Left 12/13/2018    Performed by Javed Lopez MD at [facility] CATH LAB    THROMBECTOMY, HEMODIALYSIS GRAFT OR FISTULA Left 11/9/2018    [facility] by [facility] CATH LAB    THROMBECTOMY, HEMODIALYSIS GRAFT OR FISTULA Left 9/12/2018    [facility] by [facility] CATH LAB    THROMBECTOMY, HEMODIALYSIS GRAFT OR FISTULA N/A 9/6/2018    Per[facility] by [facility] CATH LAB    THROMBECTOMY, HEMODIALYSIS GRAFT OR FISTULA Left 6/13/2018    Per[facility] by [facility] CATH LAB    THROMBECTOMY, HEMODIALYSIS GRAFT OR FISTULA Left 6/11/2018    Performed by [facility] Gibson [facility] at [facility] CATH LAB    TRANSPLANT, KIDNEY N/A 4/8/2019    Performed by [facility]w [facility] at [facility] OR [address] [place] [address]    ULTRASOUND, DIAGNOSTIC Left 10/10/2018    Performed by [facility] CATH LAB    ULTRASOUND, DIAGNOSTIC Left 9/19/2018    Performed by [facility] Gibson [facility] at [facility] CATH LAB    ULTRASOUND, DIAGNOSTIC Left 8/20/2018    Performed by [facility] Gibson [facility] at [facility] CATH LAB    ULTRASOUND, DIAGNOSTIC Left 8/6/2018    Performed by [facility] at [facility] CATH LAB    ULTRASOUND, DIAGNOSTIC Left 7/9/2018    Performed by [facility] Gibson [facility] at [facility] CATH LAB    ULTRASOUND, DIAGNOSTIC Left 6/18/2018    Performed by Yuriy Gibson MD at  [facility] [place] CATH LAB     Family History   Problem Relation Age of Onset    Alcohol abuse Mother     No Known Problems Father     Hypertension Sister     Arthritis Sister     Heart disease Brother     Heart failure Brother     Heart failure Brother     Diabetes Cousin     Breast cancer Neg Hx     Colon cancer Neg Hx     Ovarian cancer Neg Hx          Current Outpatient Medications   Medication Sig    acetaminophen (TYLENOL) 325 mg Cap Take by mouth.    amLODIPine (NORVASC) 10 MG tablet Take 1 tablet (10 mg total) by mouth once daily.    bisacodyl (DULCOLAX) 5 mg EC tablet Take 2 tablets (10 mg total) by mouth daily as needed for Constipation.    carvedilol (COREG) 12.5 MG tablet Take 2 tablets (25 mg total) by mouth 2 (two) times daily with meals.    cinacalcet (SENSIPAR) 30 MG Tab Take 1 tablet (30 mg total) by mouth daily with breakfast.    cloNIDine (CATAPRES) 0.1 MG tablet Take 1 tablet (0.1 mg total) by mouth 3 (three) times daily.    docusate sodium (COLACE) 100 MG capsule Take 1 capsule (100 mg total) by mouth 2 (two) times daily as needed for Constipation.    famotidine (PEPCID) 20 MG tablet Take 1 tablet (20 mg total) by mouth every evening.    k phos di & mono-sod phos mono (K-PHOS-NEUTRAL) 250 mg Tab Take 3 tablets by mouth 3 (three) times daily.    linaCLOtide (LINZESS) 145 mcg Cap capsule Take 1 capsule (145 mcg total) by mouth once daily.    magnesium oxide (MAG-OX) 400 mg (241.3 mg magnesium) tablet Take 2 tablets (800 mg total) by mouth 3 (three) times daily.    multivitamin (THERAGRAN) tablet Take 1 tablet by mouth once daily.    mycophenolate (CELLCEPT) 250 mg Cap Take 2 capsules (500 mg total) by mouth 2 (two) times daily.    sodium bicarbonate 650 MG tablet Take 2 tablets (1,300 mg total) by mouth 2 (two) times daily.    sulfamethoxazole-trimethoprim 400-80mg (BACTRIM,SEPTRA) 400-80 mg per tablet Take 1 tablet by mouth once daily. Stop: 4/7/2020    tacrolimus (PROGRAF) 1 MG Cap  Take 2 capsules (2 mg total) by mouth every morning AND 2 capsules (2 mg total) every evening.    traMADol (ULTRAM) 50 mg tablet Take 1 tablet (50 mg total) by mouth every 8 (eight) hours as needed for Pain.    zolpidem (AMBIEN) 5 MG Tab Take 1 tablet (5 mg total) by mouth nightly as needed.    betamethasone valerate 0.1% (VALISONE) 0.1 % Lotn Apply topically 2 (two) times daily. for 14 days    gabapentin (NEURONTIN) 300 MG capsule Take 1 capsule (300 mg total) by mouth 3 (three) times daily. for 10 days    valGANciclovir (VALCYTE) 450 mg Tab Take 1 tablet (450 mg total) by mouth every Mon, Wed, Fri. Stop: 7/7/19     No current facility-administered medications for this visit.        Review of patient's allergies indicates:   Allergen Reactions    Oxycodone Itching    Hydrocodone Itching     Social History     Socioeconomic History    Marital status:      Spouse name: Not on file    Number of children: Not on file    Years of education: Not on file    Highest education level: Not on file   Occupational History    Not on file   Social Needs    Financial resource strain: Not on file    Food insecurity:     Worry: Not on file     Inability: Not on file    Transportation needs:     Medical: Not on file     Non-medical: Not on file   Tobacco Use    Smoking status: Never Smoker    Smokeless tobacco: Never Used   Substance and Sexual Activity    Alcohol use: No    Drug use: No    Sexual activity: Never     Partners: Male     Comment: Celibate since 2010   Lifestyle    Physical activity:     Days per week: Not on file     Minutes per session: Not on file    Stress: Not on file   Relationships    Social connections:     Talks on phone: Not on file     Gets together: Not on file     Attends Rastafarian service: Not on file     Active member of club or organization: Not on file     Attends meetings of clubs or organizations: Not on file     Relationship status: Not on file   Other Topics Concern  "   Not on file   Social History Narrative    Not on file       Review of Systems  Constitutional: negative  Eyes: negative  Ears, nose, mouth, throat, and face: negative  Respiratory: negative  Cardiovascular: negative  Gastrointestinal: negative  Genitourinary:negative  Integument/breast: negative  Hematologic/lymphatic: negative  Musculoskeletal:negative  Neurological: negative  Behavioral/Psych: negative  Endocrine: negative  Allergic/Immunologic: negative    Objective:  Vital signs in last 24 hours:    Vitals:    07/18/19 0903   BP: (!) 146/76   BP Location: Right arm   Patient Position: Sitting   BP Method: Large (Automatic)   Pulse: 78   Weight: 80.1 kg (176 lb 9.4 oz)   Height: 5' 3" (1.6 m)       Body mass index is 31.28 kg/m².     General: no acute distress, well nourished, well-groomed  CVS: RRR, no murmur, gallops or rubs  Respiratory: Clear to ausculation  Extremities: no edema    Neurological Examination:    HIGHER INTEGRATIVE FUNCTIONS:  -Normal attention span and concentration; immediately responds to questions and commands  -Oriented to time, place and person  -Recent and remote memory intact  -Language normal (no aphasia or dysarthria)  -Normal fund of knowledge    CN:  -PERRLA, visual fields full, unable to visualize optic discs due to small pupils on fundus exam   -EOMI with normal saccades and smooth pursuit  -Facial sensation intact bilaterally  -Facial strength/movement intact bilaterally  -Hearing intact to voice  -Palate elevates symmetrically  -Normal shoulder shrug and head turn  -Tongue protrudes midline    MOTOR: (left/right graded 1-5)  -UE: 5/5 deltoids; 5/5 biceps, triceps; 5/5 wrist flexors, extensors; 5/5 interosseous; 5/5   -LEs: 5/5 hip flexion, extension; 5/5 knee flexion, extension; 5/5 ankle flexion, extension  -Tone: normal  -No pronator drift, no orbiting    SENSORY:  -Light touch, temperature sensation intact bilaterally    REFLEXES:  -2+ upper and lower " bilaterally  -Flexor plantar reflex bilaterally  -No clonus    COORDINATION:  -FNF, HKS, LESLI intact bilaterally    GAIT:  -Normal casual gait         Assessment/Plan:    1. Left leg paresthesia, suspect left lumbosacral radiculopathy  2. S/p kidney transplant, per transplant team    Plan:  EMG/NCS on left limb  Consider trial of gabapentin if ok with transplant team.  Xray of lumbar spine    Side effects discussed with patient. SHe understood.  I discussed assessment and plan with patient and answered the questions that she had.     Part of patient note might have been created using Dragon Dictation.  Any errors in syntax or even information may not have been identified and edited on initial review prior to signing this note.

## 2019-07-22 ENCOUNTER — CLINICAL SUPPORT (OUTPATIENT)
Dept: REHABILITATION | Facility: HOSPITAL | Age: 69
End: 2019-07-22
Attending: PSYCHIATRY & NEUROLOGY
Payer: MEDICARE

## 2019-07-22 DIAGNOSIS — R29.898 LEG WEAKNESS, BILATERAL: ICD-10-CM

## 2019-07-22 PROCEDURE — 97110 THERAPEUTIC EXERCISES: CPT | Mod: PO

## 2019-07-22 PROCEDURE — 97163 PT EVAL HIGH COMPLEX 45 MIN: CPT | Mod: PO

## 2019-07-22 NOTE — PLAN OF CARE
[facility]  Physical Therapy Initial Evaluation    Name: Paige Summers  Clinic Number: [id number]    Therapy Diagnosis:   Encounter Diagnosis   Name Primary?    Leg weakness, bilateral      Physician: Medardo Leslie MD    Physician Orders: PT Eval and Treat   Medical Diagnosis from Referral: R20.2 (ICD-10-CM) - Left leg paresthesias  Evaluation Date: 7/22/2019  Authorization Period Expiration: 7/17/2020  Plan of Care Expiration: 9/20/19  Visit # / Visits authorized: 1/ 1    Time In: 1105 AM  Time Out: 1155 AM  Total Billable Time: 50 minutes    Precautions: Standard, s/p kidney transplant with immunosuppression drugs    Subjective   Date of onset: 7/18/19  History of current condition - Paige reports: she recently had a kidney transplant in April. Since that time, she has had numbness and weakness in her L leg. She states she did have pain prior to they transplant; however, it was not as bad she thought it was due to dialysis. She was given a trial with prednisone which did help a little, but it did not completely resolve symptoms. Her pain starts at her L buttock and travels all the way down to her toes. Her pain is constant.      Medical History:   Past Medical History:   Diagnosis Date    Anemia     Anemia of renal disease     Anxiety     Chronic renal failure 01/10/2018    CKD (chronic kidney disease) stage 3, GFR 30-59 ml/min 6/18/2019    Depression     Essential hypertension     GERD (gastroesophageal reflux disease)     Gout     H pylori ulcer     Hyperlipidemia     Hypertension     Obesity     Secondary hyperparathyroidism of renal origin        Surgical History:   Paige Summers  has a past surgical history that includes Dialysis fistula creation (Left, 02/2018); Thrombectomy of hemodialysis access site (Left, 6/11/2018); Thrombectomy of hemodialysis access site (Left, 6/13/2018); Diagnostic ultrasound (Left, 6/18/2018); Diagnostic ultrasound (Left,  "7/9/2018); Diagnostic ultrasound (Left, 8/6/2018); Hysterectomy (1971); Diagnostic ultrasound (Left, 8/20/2018); Thrombectomy of hemodialysis access site (N/A, 9/6/2018); Thrombectomy of hemodialysis access site (Left, 9/12/2018); Diagnostic ultrasound (Left, 9/19/2018); Diagnostic ultrasound (Left, 10/10/2018); Thrombectomy of hemodialysis access site (Left, 11/9/2018); Thrombectomy of hemodialysis access site (Left, 12/13/2018); Fistulogram (Left, 12/19/2018); Declotting of arteriovenous graft (Left, 2/19/2019); Fistulogram (Left, 3/13/2019); and Kidney transplant (N/A, 4/8/2019).    Medications:   Paige has a current medication list which includes the following prescription(s): acetaminophen, amlodipine, betamethasone valerate 0.1%, bisacodyl, carvedilol, cinacalcet, clonidine, docusate sodium, famotidine, gabapentin, k phos di & mono-sod phos mono, linaclotide, magnesium oxide, multivitamin, mycophenolate, sodium bicarbonate, sulfamethoxazole-trimethoprim 400-80mg, tacrolimus, tramadol, valganciclovir, and zolpidem.    Allergies:   Review of patient's allergies indicates:   Allergen Reactions    Oxycodone Itching    Hydrocodone Itching        Imaging, none taken    Prior Therapy: No  Social History: Pt lives with a great grandchild with four steps to enter. She gets help from her grandaughter and a caregiver. She was using a cane, but her grand daughter broke it. She needs to get a new.  Occupation: Retired  Prior Level of Function: not as much pain with standing and walking  Current Level of Function: unable to stand and walk for greater than 5-10 minutes    Pain:  Current 5/10, worst 8/10, best 5/10   Location: left back , lower legs and upper legs  Description: Aching, Tingling and numb  Aggravating Factors: at end of the day, standing, bending, walking  Easing Factors: steriod medication, heat, massage    Pts goals: to be able to "get up and go"    Objective     Observation: Increased lumbar lordosis with " HFRS posture    Ambulates unsteady with deviating path, holding on to granddaughter for support, slow chiquis    Lumbar Range of Motion:    Degrees Pain   Flexion 75%   Increased L buttock pain        Extension 25%   No pain        Left Side Bending 50% No pain        Right Side Bending 50% No pain        Left rotation   50% No pain        Right Rotation   50% No pain           B hip PROM: Unable to flex L hip >90 deg (pain in L buttock)  B reduction in hip ER    Lower Extremity Strength  Right LE  Left LE    Knee extension: 4+/5 Knee extension: 4-/5   Knee flexion: 5/5 Knee flexion: 4-/5   Hip flexion: 5/5 Hip flexion: 3+/5   Hip extension:  NT Hip extension: NT   Hip abduction: seated 3+/5 Hip abduction: 3+/5   Hip adduction: seated 3+/5 Hip adduction 3+/5   Ankle dorsiflexion: 5/5 Ankle dorsiflexion: 3+/5   Ankle plantarflexion: 5/5 Ankle plantarflexion: 2/5     Special Tests:  -Piriformis Test: positive L  -Distraction: Increase L leg pain    Neuro Dynamic Testing:    Sciatic nerve:      SLR: positive 60 deg on L      Joint Mobility: NT    Palpation: + TTP gluteals ulysses near ischial tuberosity, hamstrings, and gastroc in sciatic nerve distribution      Flexibility:   Popliteal Angle: R = -25 degrees ; L = unable to test due to neural tension        CMS Impairment/Limitation/Restriction for FOTO Leg Survey    Therapist reviewed FOTO scores for Paige Summers on 7/22/2019.   FOTO documents entered into [employer] - see Media section.    Limitation Score: 68%  Category: Mobility    Current : CL = least 60% but < 80% impaired, limited or restricted  Goal: CK = at least 40% but < 60% impaired, limited or restricted         TREATMENT   Treatment Time In: 1145 AM  Treatment Time Out: 1155 AM  Total Treatment time separate from Evaluation: 10 minutes    Paige received therapeutic exercises to develop ROM and flexibility for 10 minutes including:  Piriformis strech KTOS 3 x 30s  Piriformis stretch figure 4 3 x 30s  Seated  sciatic nerve glide x 10    Home Exercises and Patient Education Provided    Education provided:   - PT Plan of Care    Written Home Exercises Provided: yes.  Exercises were reviewed and Paige was able to demonstrate them prior to the end of the session.  Paige demonstrated good  understanding of the education provided.     See EMR under Patient Instructions for exercises provided 7/22/2019.    Assessment   Paige is a 68 y.o. female referred to outpatient Physical Therapy with a medical diagnosis of left leg paresthesias. Pt presents with decreased lumbar and L hip ROM, decreased L LE strength, + special testing for sciatic neural tension, + tenderness to palpation along sciatic nerve distribution, and significantly limited functional mobility due to above deficits.    Pt prognosis is Fair.   Pt will benefit from skilled outpatient Physical Therapy to address the deficits stated above and in the chart below, provide pt/family education, and to maximize pt's level of independence.     Plan of care discussed with patient: Yes  Pt's spiritual, cultural and educational needs considered and patient is agreeable to the plan of care and goals as stated below:     Anticipated Barriers for therapy: none    Medical Necessity is demonstrated by the following  History  Co-morbidities and personal factors that may impact the plan of care Co-morbidities:   HTN and anemia hyperparathyroidism, s/p recent kidney transplant on imunosuppresive therapy    Personal Factors:   lifestyle     high   Examination  Body Structures and Functions, activity limitations and participation restrictions that may impact the plan of care Body Regions:   back  lower extremities    Body Systems:    gross symmetry  ROM  strength  gross coordinated movement  gait  transfers    Participation Restrictions:   Limited with all household and community mobility     Activity limitations:   Learning and applying knowledge  no deficits    General Tasks and  Commands  no deficits    Communication  no deficits    Mobility  lifting and carrying objects  walking    Self care  washing oneself (bathing, drying, washing hands)  caring for body parts (brushing teeth, shaving, grooming)  toileting  dressing  eating  looking after one's health    Domestic Life  shopping  cooking  doing house work (cleaning house, washing dishes, laundry)    Interactions/Relationships  family relationships    Life Areas  no deficits    Community and Social Life  community life  recreation and leisure  Temple and spirituality         high   Clinical Presentation unstable clinical presentation with unpredictable characteristics high   Decision Making/ Complexity Score: high     Goals:  Short Term Goals:  4 weeks     - Pt to increase lumbar ROM by at least 25% degrees in order to show improved mobility.  - Pt to be independent and consistent with issued HEP in order to promote carryover between therapy sessions.  - Pt will be able to perform and hold an ab brace for 10 reps of 10 second hold with normal breathing and no cuing in order to demo improved core recruitment.  - Pt will report a 25% reduction in pain and symptoms since SOC in order to improve ability to tolerate standing and walking.    Long Term Goals: 8 weeks    -Pt will be able to stand and walk 200' with AD as needed without episodes of instability or leg giving way in order to perform ADLs and take care of personal needs at home.  -Pt will increase B hip abduction and flexion strength by 1 ms grade in order to increase tolerance to ADLs and functional activities.  -Pt will report at CK level (40-60% impaired) on FOTO score for low back pain disability to demonstrate decrease in disability and improvement in back pain.  - Pt to be Independent with updated HEP in order to maintain gains following discharge from PT.  -Pt will have a negative SLR in order to show improved neural tension.      Plan   Plan of care Certification: 7/22/2019  to 9/20/19.    Outpatient Physical Therapy 2 times weekly for 8 weeks to include the following interventions: Manual Therapy, Moist Heat/ Ice, Neuromuscular Re-ed, Patient Education, Self Care, Therapeutic Activites and Therapeutic Exercise.     Ashley Holstein, CINTHYA

## 2019-08-05 ENCOUNTER — LAB VISIT (OUTPATIENT)
Dept: LAB | Facility: HOSPITAL | Age: 69
End: 2019-08-05
Attending: INTERNAL MEDICINE
Payer: MEDICARE

## 2019-08-05 DIAGNOSIS — Z94.0 KIDNEY REPLACED BY TRANSPLANT: ICD-10-CM

## 2019-08-05 LAB
ALBUMIN SERPL BCP-MCNC: 3.9 G/DL (ref 3.5–5.2)
ANION GAP SERPL CALC-SCNC: 9 MMOL/L (ref 8–16)
BASOPHILS # BLD AUTO: 0.03 K/UL (ref 0–0.2)
BASOPHILS NFR BLD: 0.5 % (ref 0–1.9)
BUN SERPL-MCNC: 27 MG/DL (ref 8–23)
CALCIUM SERPL-MCNC: 10.3 MG/DL (ref 8.7–10.5)
CHLORIDE SERPL-SCNC: 109 MMOL/L (ref 95–110)
CO2 SERPL-SCNC: 22 MMOL/L (ref 23–29)
CREAT SERPL-MCNC: 2 MG/DL (ref 0.5–1.4)
DIFFERENTIAL METHOD: ABNORMAL
EOSINOPHIL # BLD AUTO: 0.2 K/UL (ref 0–0.5)
EOSINOPHIL NFR BLD: 2.7 % (ref 0–8)
ERYTHROCYTE [DISTWIDTH] IN BLOOD BY AUTOMATED COUNT: 13.8 % (ref 11.5–14.5)
EST. GFR  (AFRICAN AMERICAN): 28.7 ML/MIN/1.73 M^2
EST. GFR  (NON AFRICAN AMERICAN): 24.9 ML/MIN/1.73 M^2
GLUCOSE SERPL-MCNC: 93 MG/DL (ref 70–110)
HCT VFR BLD AUTO: 35.8 % (ref 37–48.5)
HGB BLD-MCNC: 11.4 G/DL (ref 12–16)
IMM GRANULOCYTES # BLD AUTO: 0.05 K/UL (ref 0–0.04)
IMM GRANULOCYTES NFR BLD AUTO: 0.9 % (ref 0–0.5)
LYMPHOCYTES # BLD AUTO: 0.4 K/UL (ref 1–4.8)
LYMPHOCYTES NFR BLD: 6.8 % (ref 18–48)
MAGNESIUM SERPL-MCNC: 1.7 MG/DL (ref 1.6–2.6)
MCH RBC QN AUTO: 28.7 PG (ref 27–31)
MCHC RBC AUTO-ENTMCNC: 31.8 G/DL (ref 32–36)
MCV RBC AUTO: 90 FL (ref 82–98)
MONOCYTES # BLD AUTO: 0.8 K/UL (ref 0.3–1)
MONOCYTES NFR BLD: 14 % (ref 4–15)
NEUTROPHILS # BLD AUTO: 4.2 K/UL (ref 1.8–7.7)
NEUTROPHILS NFR BLD: 75.1 % (ref 38–73)
NRBC BLD-RTO: 0 /100 WBC
PHOSPHATE SERPL-MCNC: 2.6 MG/DL (ref 2.7–4.5)
PLATELET # BLD AUTO: 232 K/UL (ref 150–350)
PMV BLD AUTO: 9.6 FL (ref 9.2–12.9)
POTASSIUM SERPL-SCNC: 4.5 MMOL/L (ref 3.5–5.1)
RBC # BLD AUTO: 3.97 M/UL (ref 4–5.4)
SODIUM SERPL-SCNC: 140 MMOL/L (ref 136–145)
TACROLIMUS BLD-MCNC: 9.2 NG/ML (ref 5–15)
WBC # BLD AUTO: 5.56 K/UL (ref 3.9–12.7)

## 2019-08-05 PROCEDURE — 80197 ASSAY OF TACROLIMUS: CPT

## 2019-08-05 PROCEDURE — 85025 COMPLETE CBC W/AUTO DIFF WBC: CPT

## 2019-08-05 PROCEDURE — 83735 ASSAY OF MAGNESIUM: CPT

## 2019-08-05 PROCEDURE — 80069 RENAL FUNCTION PANEL: CPT

## 2019-08-05 PROCEDURE — 36415 COLL VENOUS BLD VENIPUNCTURE: CPT

## 2019-08-05 NOTE — PROGRESS NOTES
Cr worsening. Please advise more fluid intake and check renal panel in few days, check BK and UPCR and DSA with next lab. Thank you!

## 2019-08-07 RX ORDER — TACROLIMUS 1 MG/1
CAPSULE ORAL
Qty: 90 CAPSULE | Refills: 11 | Status: SHIPPED | OUTPATIENT
Start: 2019-08-07 | End: 2019-09-09

## 2019-08-07 NOTE — TELEPHONE ENCOUNTER
Per Dr. Leslie. I called patient to ask if her transplant team says it ok for her take Gabapentin.  She stated that she was taking the medication before. But she will call them and have some call us to confirm.

## 2019-08-08 ENCOUNTER — LAB VISIT (OUTPATIENT)
Dept: LAB | Facility: HOSPITAL | Age: 69
End: 2019-08-08
Attending: INTERNAL MEDICINE
Payer: MEDICARE

## 2019-08-08 DIAGNOSIS — Z94.0 KIDNEY REPLACED BY TRANSPLANT: ICD-10-CM

## 2019-08-08 LAB
ALBUMIN SERPL BCP-MCNC: 3.8 G/DL (ref 3.5–5.2)
ANION GAP SERPL CALC-SCNC: 9 MMOL/L (ref 8–16)
BUN SERPL-MCNC: 18 MG/DL (ref 8–23)
CALCIUM SERPL-MCNC: 9.8 MG/DL (ref 8.7–10.5)
CHLORIDE SERPL-SCNC: 108 MMOL/L (ref 95–110)
CO2 SERPL-SCNC: 22 MMOL/L (ref 23–29)
CREAT SERPL-MCNC: 1.7 MG/DL (ref 0.5–1.4)
EST. GFR  (AFRICAN AMERICAN): 35 ML/MIN/1.73 M^2
EST. GFR  (NON AFRICAN AMERICAN): 30.3 ML/MIN/1.73 M^2
GLUCOSE SERPL-MCNC: 113 MG/DL (ref 70–110)
PHOSPHATE SERPL-MCNC: 2.8 MG/DL (ref 2.7–4.5)
POTASSIUM SERPL-SCNC: 4.4 MMOL/L (ref 3.5–5.1)
SODIUM SERPL-SCNC: 139 MMOL/L (ref 136–145)

## 2019-08-08 PROCEDURE — 86977 RBC SERUM PRETX INCUBJ/INHIB: CPT | Mod: 59,PO

## 2019-08-08 PROCEDURE — 87799 DETECT AGENT NOS DNA QUANT: CPT

## 2019-08-08 PROCEDURE — 86833 HLA CLASS II HIGH DEFIN QUAL: CPT | Mod: PO

## 2019-08-08 PROCEDURE — 80069 RENAL FUNCTION PANEL: CPT

## 2019-08-08 PROCEDURE — 86832 HLA CLASS I HIGH DEFIN QUAL: CPT | Mod: PO

## 2019-08-08 RX ORDER — GABAPENTIN 300 MG/1
300 CAPSULE ORAL 3 TIMES DAILY
Qty: 30 CAPSULE | Refills: 0 | OUTPATIENT
Start: 2019-08-08 | End: 2019-08-18

## 2019-08-09 ENCOUNTER — TELEPHONE (OUTPATIENT)
Dept: TRANSPLANT | Facility: CLINIC | Age: 69
End: 2019-08-09

## 2019-08-09 DIAGNOSIS — M79.2 NEUROPATHIC PAIN: Primary | ICD-10-CM

## 2019-08-09 RX ORDER — GABAPENTIN 300 MG/1
300 CAPSULE ORAL NIGHTLY
Qty: 30 CAPSULE | Refills: 1 | Status: SHIPPED | OUTPATIENT
Start: 2019-08-09 | End: 2019-08-27 | Stop reason: SDUPTHER

## 2019-08-13 ENCOUNTER — HOSPITAL ENCOUNTER (OUTPATIENT)
Dept: RADIOLOGY | Facility: HOSPITAL | Age: 69
Discharge: HOME | End: 2019-08-13
Attending: OBSTETRICS & GYNECOLOGY
Payer: MEDICARE

## 2019-08-13 ENCOUNTER — TELEPHONE (OUTPATIENT)
Dept: TRANSPLANT | Facility: CLINIC | Age: 69
End: 2019-08-13

## 2019-08-13 DIAGNOSIS — Z12.31 SCREENING MAMMOGRAM, ENCOUNTER FOR: ICD-10-CM

## 2019-08-13 PROCEDURE — 77063 BREAST TOMOSYNTHESIS BI: CPT | Mod: 26,,, | Performed by: RADIOLOGY

## 2019-08-13 PROCEDURE — 77067 SCR MAMMO BI INCL CAD: CPT | Mod: TC,PO

## 2019-08-13 PROCEDURE — 77063 MAMMO DIGITAL SCREENING BILAT WITH TOMOSYNTHESIS_CAD: ICD-10-PCS | Mod: 26,,, | Performed by: RADIOLOGY

## 2019-08-13 PROCEDURE — 77067 SCR MAMMO BI INCL CAD: CPT | Mod: 26,,, | Performed by: RADIOLOGY

## 2019-08-13 PROCEDURE — 77067 MAMMO DIGITAL SCREENING BILAT WITH TOMOSYNTHESIS_CAD: ICD-10-PCS | Mod: 26,,, | Performed by: RADIOLOGY

## 2019-08-27 ENCOUNTER — OFFICE VISIT (OUTPATIENT)
Dept: NEUROLOGY | Facility: CLINIC | Age: 69
End: 2019-08-27
Payer: MEDICARE

## 2019-08-27 VITALS
WEIGHT: 175.25 LBS | BODY MASS INDEX: 31.05 KG/M2 | HEIGHT: 63 IN | DIASTOLIC BLOOD PRESSURE: 89 MMHG | HEART RATE: 87 BPM | SYSTOLIC BLOOD PRESSURE: 151 MMHG

## 2019-08-27 DIAGNOSIS — F41.9 ANXIETY: Primary | ICD-10-CM

## 2019-08-27 DIAGNOSIS — M79.2 NEUROPATHIC PAIN: ICD-10-CM

## 2019-08-27 PROCEDURE — 99999 PR PBB SHADOW E&M-EST. PATIENT-LVL IV: ICD-10-PCS | Mod: PBBFAC,,, | Performed by: PSYCHIATRY & NEUROLOGY

## 2019-08-27 PROCEDURE — 99214 PR OFFICE/OUTPT VISIT, EST, LEVL IV, 30-39 MIN: ICD-10-PCS | Mod: S$PBB,,, | Performed by: PSYCHIATRY & NEUROLOGY

## 2019-08-27 PROCEDURE — 99214 OFFICE O/P EST MOD 30 MIN: CPT | Mod: PBBFAC,PO | Performed by: PSYCHIATRY & NEUROLOGY

## 2019-08-27 PROCEDURE — 99214 OFFICE O/P EST MOD 30 MIN: CPT | Mod: S$PBB,,, | Performed by: PSYCHIATRY & NEUROLOGY

## 2019-08-27 PROCEDURE — 99999 PR PBB SHADOW E&M-EST. PATIENT-LVL IV: CPT | Mod: PBBFAC,,, | Performed by: PSYCHIATRY & NEUROLOGY

## 2019-08-27 RX ORDER — GABAPENTIN 300 MG/1
300 CAPSULE ORAL 2 TIMES DAILY
Qty: 60 CAPSULE | Refills: 1 | Status: SHIPPED | OUTPATIENT
Start: 2019-08-27 | End: 2019-08-27 | Stop reason: CLARIF

## 2019-08-27 NOTE — PATIENT INSTRUCTIONS
Followup in 6-8 weeks.     We discussed with will prescribe cymbalta 20mg daily if improved by transplant team.

## 2019-08-29 NOTE — PROGRESS NOTES
Neurology Clinic Visit  Primary Care Provider: Destiny Villa MD   Referring Provider: No ref. provider found   Date of Visit: 08/27/2019     chief complaint:   Chief Complaint   Patient presents with    Follow-up     1 month       Interval history  Patient is here for followup. Reports pain is better on gabapentin 300mg qhs. However, she reports she has significant anxiety and also feels that sometimes she panics. She was on Cymbalta in the past and states her symptoms were very well control when she was on that. She felt like a completely new person but she has not had cymbalta since change her PCP. Most recent PCP prescribed vistaril but she does not feel like this helps her anxiety.      History of Present Illness 7/18/2019  Paige Summers is a 68 y.o. right handed female I have been asked to consult for evaluation of paresthesias in left leg. She reports paresthesia in left lateral leg that went from back to foot about 1 year ago. She reports she was told she had sciatica. However, after her transplant in 4/2019 she noticed digits 4-5 and heel on left were numb. She denies lower back pain. She was started on prednisone for her kidney transplant which seems to eased the pain but she still has an uncomfortable feeling on bottom of left foot. She was given a trial of gabapentin in the past but she is unsure if it helped.         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    S/P living-donor kidney transplantation     Prophylactic immunotherapy     Long-term use of immunosuppressant medication     At risk for opportunistic infections     Anemia of renal disease     Secondary  hyperparathyroidism of renal origin     Antiphospholipid antibody positive 12/05/2018    Mechanical complication of vascular device 09/12/2018     Past Medical History:   Diagnosis Date    Anemia     Anemia of renal disease     Anxiety     Chronic renal failure 01/10/2018    CKD (chronic kidney disease) stage 3, GFR 30-59 ml/min 6/18/2019    Depression     Essential hypertension     GERD (gastroesophageal reflux disease)     Gout     H pylori ulcer     Hyperlipidemia     Hypertension     Obesity     Secondary hyperparathyroidism of renal origin      Past Surgical History:   Procedure Laterality Date    declot Left 2/19/2019    Performed by Yuriy Gibson MD at [facility] [place] [place] [facility] CATH LAB    DIALYSIS FISTULA CREATION Left 02/2018    FISTULOGRAM Left 3/13/2019    Performed by Irineo Devlin MD at [facility], [place] [place] [facility] CATH LAB    FISTULOGRAM Left 12/19/2018    Performed by Javed Lopez MD at [facility] [place] [facility] CATH LAB    HYSTERECTOMY  1971    H    PERMCATH REMOVAL-TUNNELED CVC REMOVAL N/A 4/23/2018    Performed by Yuriy Gibson MD at [facility] [place] [facility] CATH LAB    PERMCATH REWIRE- TUNNELED CATH REWIRE N/A 3/21/2018    Performed by Yuriy Gibson MD at [facility] [place] [facility] CATH LAB    THROMBECTOMY, HEMODIALYSIS GRAFT OR FISTULA Left 12/13/2018    Performed by Javed Lopez MD at [facility], [place] [place] [facility] CATH LAB    THROMBECTOMY, HEMODIALYSIS GRAFT OR FISTULA Left 11/9/2018    Performed by Yuriy Gibson MD at [facility], [place] [place] [facility] CATH LAB    THROMBECTOMY, HEMODIALYSIS GRAFT OR FISTULA Left 9/12/2018    Performed by Yuriy Gibson MD at [facility], [place] [place] [facility] CATH LAB    THROMBECTOMY, HEMODIALYSIS GRAFT OR FISTULA N/A 9/6/2018    Performed by Yuriy Gibson MD at [facility], [place] [place] [facility] CATH LAB    THROMBECTOMY, HEMODIALYSIS GRAFT OR FISTULA Left 6/13/2018    Performed by Yuriy Gibson MD at [facility], [place] [place] [facility] CATH LAB    THROMBECTOMY, HEMODIALYSIS GRAFT OR FISTULA Left 6/11/2018    Performed by Yuriy Gibson MD at [facility], [place] [place] [facility] CATH LAB    TRANSPLANT, KIDNEY N/A 4/8/2019    Performed by Mathew Goodwin MD at [facility] OR [address] [place] [address]    ULTRASOUND,  DIAGNOSTIC Left 10/10/2018    Performed by [facility] Gibson MD at [facility] CATH LAB    ULTRASOUND, DIAGNOSTIC Left 9/19/2018    Performed by Yuriy Gibson MD at [facility] CATH LAB    ULTRASOUND, DIAGNOSTIC Left 8/20/2018    Performed by Yuriy Gibson MD at [facility] CATH LAB    ULTRASOUND, DIAGNOSTIC Left 8/6/2018    Performed by Yuri[facility] Gibson MD at [facility] CATH LAB    ULTRASOUND, DIAGNOSTIC Left 7/9/2018    Performed by Yuriy Gibson MD at [facility] CATH LAB    ULTRASOUND, DIAGNOSTIC Left 6/18/2018    Performed by Yuriy Gibson MD at [facility] CATH LAB     Family History   Problem Relation Age of Onset    Alcohol abuse Mother     No Known Problems Father     Hypertension Sister     Arthritis Sister     Heart disease Brother     Heart failure Brother     Heart failure Brother     Diabetes Cousin     Breast cancer Neg Hx     Colon cancer Neg Hx     Ovarian cancer Neg Hx          Current Outpatient Medications   Medication Sig    acetaminophen (TYLENOL) 325 mg Cap Take by mouth.    amLODIPine (NORVASC) 10 MG tablet Take 1 tablet (10 mg total) by mouth once daily.    bisacodyl (DULCOLAX) 5 mg EC tablet Take 2 tablets (10 mg total) by mouth daily as needed for Constipation.    carvedilol (COREG) 12.5 MG tablet Take 2 tablets (25 mg total) by mouth 2 (two) times daily with meals.    cinacalcet (SENSIPAR) 30 MG Tab Take 1 tablet (30 mg total) by mouth daily with breakfast.    cloNIDine (CATAPRES) 0.1 MG tablet Take 1 tablet (0.1 mg total) by mouth 3 (three) times daily.    docusate sodium (COLACE) 100 MG capsule Take 1 capsule (100 mg total) by mouth 2 (two) times daily as needed for Constipation.    famotidine (PEPCID) 20 MG tablet Take 1 tablet (20 mg total) by mouth every evening.    hydrOXYzine pamoate (VISTARIL) 25 MG Cap Take 1 capsule (25 mg total) by mouth every 8 (eight) hours.    linaCLOtide (LINZESS) 145 mcg Cap capsule Take 1 capsule (145 mcg total) by mouth once daily.    magnesium oxide (MAG-OX) 400 mg  (241.3 mg magnesium) tablet Take 2 tablets (800 mg total) by mouth 3 (three) times daily.    multivitamin (THERAGRAN) tablet Take 1 tablet by mouth once daily.    mycophenolate (CELLCEPT) 250 mg Cap Take 2 capsules (500 mg total) by mouth 2 (two) times daily.    sodium bicarbonate 650 MG tablet Take 2 tablets (1,300 mg total) by mouth 2 (two) times daily.    sulfamethoxazole-trimethoprim 400-80mg (BACTRIM,SEPTRA) 400-80 mg per tablet Take 1 tablet by mouth once daily. Stop: 4/7/2020    tacrolimus (PROGRAF) 1 MG Cap Take 2 capsules (2 mg total) by mouth every morning AND 1 capsule (1 mg total) every evening.    traMADol (ULTRAM) 50 mg tablet Take 1 tablet (50 mg total) by mouth every 8 (eight) hours as needed for Pain.    zolpidem (AMBIEN) 5 MG Tab Take 1 tablet (5 mg total) by mouth nightly as needed.    betamethasone valerate 0.1% (VALISONE) 0.1 % Lotn Apply topically 2 (two) times daily. for 14 days    k phos di & mono-sod phos mono (K-PHOS-NEUTRAL) 250 mg Tab Take 3 tablets by mouth 3 (three) times daily.    valGANciclovir (VALCYTE) 450 mg Tab Take 1 tablet (450 mg total) by mouth every Mon, Wed, Fri. Stop: 7/7/19     No current facility-administered medications for this visit.          Review of patient's allergies indicates:   Allergen Reactions    Oxycodone Itching    Hydrocodone Itching     Social History     Socioeconomic History    Marital status:      Spouse name: Not on file    Number of children: Not on file    Years of education: Not on file    Highest education level: Not on file   Occupational History    Not on file   Social Needs    Financial resource strain: Not on file    Food insecurity:     Worry: Not on file     Inability: Not on file    Transportation needs:     Medical: Not on file     Non-medical: Not on file   Tobacco Use    Smoking status: Never Smoker    Smokeless tobacco: Never Used   Substance and Sexual Activity    Alcohol use: No    Drug use: No     "Sexual activity: Never     Partners: Male     Comment: Celibate since 2010   Lifestyle    Physical activity:     Days per week: Not on file     Minutes per session: Not on file    Stress: Not on file   Relationships    Social connections:     Talks on phone: Not on file     Gets together: Not on file     Attends Synagogue service: Not on file     Active member of club or organization: Not on file     Attends meetings of clubs or organizations: Not on file     Relationship status: Not on file   Other Topics Concern    Not on file   Social History Narrative    Not on file       Review of Systems  Constitutional: negative  Eyes: negative  Ears, nose, mouth, throat, and face: negative  Respiratory: negative  Cardiovascular: negative  Gastrointestinal: negative  Genitourinary:negative  Integument/breast: negative  Hematologic/lymphatic: negative  Musculoskeletal:negative  Neurological: negative  Behavioral/Psych: negative  Endocrine: negative  Allergic/Immunologic: negative    Objective:  Vital signs in last 24 hours:    Vitals:    08/27/19 1018   BP: (!) 151/89   Pulse: 87   Weight: 79.5 kg (175 lb 4.3 oz)   Height: 5' 3" (1.6 m)       Body mass index is 31.05 kg/m².       General: no acute distress, well nourished, well-groomed  CVS: RRR, no murmur, gallops or rubs  Respiratory: Clear to ausculation  Extremities: no edema     Neurological Examination:     HIGHER INTEGRATIVE FUNCTIONS:  -Normal attention span and concentration; immediately responds to questions and commands  -Oriented to time, place and person  -Recent and remote memory intact  -Language normal (no aphasia or dysarthria)  -Normal fund of knowledge     CN:  -PERRLA, visual fields full, unable to visualize optic discs due to small pupils on fundus exam   -EOMI with normal saccades and smooth pursuit  -Facial sensation intact bilaterally  -Facial strength/movement intact bilaterally  -Hearing intact to voice  -Palate elevates symmetrically  -Normal " shoulder shrug and head turn  -Tongue protrudes midline     MOTOR: (left/right graded 1-5)  -UE: 5/5 deltoids; 5/5 biceps, triceps; 5/5 wrist flexors, extensors; 5/5 interosseous; 5/5   -LEs: 5/5 hip flexion, extension; 5/5 knee flexion, extension; 5/5 ankle flexion, extension  -Tone: normal  -No pronator drift, no orbiting     SENSORY:  -Light touch, temperature sensation intact bilaterally     REFLEXES:  -2+ upper and lower bilaterally  -Flexor plantar reflex bilaterally  -No clonus     COORDINATION:  -FNF, HKS, LESLI intact bilaterally     GAIT:  -Normal casual gait            Assessment/Plan:     1. Left leg paresthesia, suspect left lumbosacral radiculopathy  2. S/p kidney transplant, per transplant team  3. Anxiety     Plan:  EMG/NCS on left limb pending  Will consider Cymbalta for neuropathic pain and anxiety. She felt that this worked well for her in the past. If ok with transplant team to start, we will stop gabapentin and start cymbalta 20mg daily. Side effects were discussed with patient and she understood.        I discussed assessment and plan with patient and answered the questions that she had.      Part of patient note might have been created using Dragon Dictation.  Any errors in syntax or even information may not have been identified and edited on initial review prior to signing this note.

## 2019-09-09 ENCOUNTER — LAB VISIT (OUTPATIENT)
Dept: LAB | Facility: HOSPITAL | Age: 69
End: 2019-09-09
Attending: INTERNAL MEDICINE
Payer: MEDICARE

## 2019-09-09 DIAGNOSIS — Z94.0 S/P LIVING-DONOR KIDNEY TRANSPLANTATION: Primary | ICD-10-CM

## 2019-09-09 DIAGNOSIS — Z94.0 KIDNEY REPLACED BY TRANSPLANT: ICD-10-CM

## 2019-09-09 LAB
ALBUMIN SERPL BCP-MCNC: 4.1 G/DL (ref 3.5–5.2)
ANION GAP SERPL CALC-SCNC: 11 MMOL/L (ref 8–16)
BASOPHILS # BLD AUTO: 0.05 K/UL (ref 0–0.2)
BASOPHILS NFR BLD: 0.9 % (ref 0–1.9)
BUN SERPL-MCNC: 22 MG/DL (ref 8–23)
CALCIUM SERPL-MCNC: 10.3 MG/DL (ref 8.7–10.5)
CHLORIDE SERPL-SCNC: 108 MMOL/L (ref 95–110)
CO2 SERPL-SCNC: 21 MMOL/L (ref 23–29)
CREAT SERPL-MCNC: 1.6 MG/DL (ref 0.5–1.4)
DIFFERENTIAL METHOD: ABNORMAL
EOSINOPHIL # BLD AUTO: 0.2 K/UL (ref 0–0.5)
EOSINOPHIL NFR BLD: 3.6 % (ref 0–8)
ERYTHROCYTE [DISTWIDTH] IN BLOOD BY AUTOMATED COUNT: 13.6 % (ref 11.5–14.5)
EST. GFR  (AFRICAN AMERICAN): 37.6 ML/MIN/1.73 M^2
EST. GFR  (NON AFRICAN AMERICAN): 32.6 ML/MIN/1.73 M^2
GLUCOSE SERPL-MCNC: 97 MG/DL (ref 70–110)
HCT VFR BLD AUTO: 37.1 % (ref 37–48.5)
HGB BLD-MCNC: 11.4 G/DL (ref 12–16)
IMM GRANULOCYTES # BLD AUTO: 0.04 K/UL (ref 0–0.04)
IMM GRANULOCYTES NFR BLD AUTO: 0.7 % (ref 0–0.5)
LYMPHOCYTES # BLD AUTO: 1 K/UL (ref 1–4.8)
LYMPHOCYTES NFR BLD: 17 % (ref 18–48)
MAGNESIUM SERPL-MCNC: 1.8 MG/DL (ref 1.6–2.6)
MCH RBC QN AUTO: 27.1 PG (ref 27–31)
MCHC RBC AUTO-ENTMCNC: 30.7 G/DL (ref 32–36)
MCV RBC AUTO: 88 FL (ref 82–98)
MONOCYTES # BLD AUTO: 0.8 K/UL (ref 0.3–1)
MONOCYTES NFR BLD: 14.5 % (ref 4–15)
NEUTROPHILS # BLD AUTO: 3.5 K/UL (ref 1.8–7.7)
NEUTROPHILS NFR BLD: 63.3 % (ref 38–73)
NRBC BLD-RTO: 0 /100 WBC
PHOSPHATE SERPL-MCNC: 2.9 MG/DL (ref 2.7–4.5)
PLATELET # BLD AUTO: 266 K/UL (ref 150–350)
PMV BLD AUTO: 9.8 FL (ref 9.2–12.9)
POTASSIUM SERPL-SCNC: 4.5 MMOL/L (ref 3.5–5.1)
RBC # BLD AUTO: 4.2 M/UL (ref 4–5.4)
SODIUM SERPL-SCNC: 140 MMOL/L (ref 136–145)
TACROLIMUS BLD-MCNC: 4.3 NG/ML (ref 5–15)
WBC # BLD AUTO: 5.59 K/UL (ref 3.9–12.7)

## 2019-09-09 PROCEDURE — 80197 ASSAY OF TACROLIMUS: CPT

## 2019-09-09 PROCEDURE — 83735 ASSAY OF MAGNESIUM: CPT

## 2019-09-09 PROCEDURE — 36415 COLL VENOUS BLD VENIPUNCTURE: CPT

## 2019-09-09 PROCEDURE — 85025 COMPLETE CBC W/AUTO DIFF WBC: CPT

## 2019-09-09 PROCEDURE — 80069 RENAL FUNCTION PANEL: CPT

## 2019-09-09 RX ORDER — TACROLIMUS 1 MG/1
CAPSULE ORAL
Qty: 120 CAPSULE | Refills: 11 | Status: SHIPPED | OUTPATIENT
Start: 2019-09-09 | End: 2019-09-18

## 2019-09-09 NOTE — TELEPHONE ENCOUNTER
----- Message from Taty Guevara MD sent at 9/9/2019  2:54 PM CDT -----  Please prograf to 2 mg BID.

## 2019-09-17 RX ORDER — FAMOTIDINE 20 MG/1
20 TABLET, FILM COATED ORAL NIGHTLY
Qty: 30 TABLET | Refills: 6 | Status: SHIPPED | OUTPATIENT
Start: 2019-09-17 | End: 2019-10-14

## 2019-09-17 RX ORDER — CINACALCET 30 MG/1
30 TABLET, FILM COATED ORAL
Qty: 30 TABLET | Refills: 6 | Status: SHIPPED | OUTPATIENT
Start: 2019-09-17 | End: 2020-01-22

## 2019-09-18 ENCOUNTER — LAB VISIT (OUTPATIENT)
Dept: LAB | Facility: HOSPITAL | Age: 69
End: 2019-09-18
Attending: INTERNAL MEDICINE
Payer: MEDICARE

## 2019-09-18 DIAGNOSIS — Z94.0 S/P LIVING-DONOR KIDNEY TRANSPLANTATION: ICD-10-CM

## 2019-09-18 DIAGNOSIS — Z94.0 KIDNEY REPLACED BY TRANSPLANT: ICD-10-CM

## 2019-09-18 LAB — TACROLIMUS BLD-MCNC: 4.3 NG/ML (ref 5–15)

## 2019-09-18 PROCEDURE — 80197 ASSAY OF TACROLIMUS: CPT

## 2019-09-18 PROCEDURE — 36415 COLL VENOUS BLD VENIPUNCTURE: CPT

## 2019-09-18 NOTE — TELEPHONE ENCOUNTER
Call placed, labs reviewed by Dr. Guevara. Prograf level low, dose increased to 3/2.  Message left on voicemail.

## 2019-09-19 RX ORDER — TACROLIMUS 1 MG/1
CAPSULE ORAL
Qty: 150 CAPSULE | Refills: 11 | Status: SHIPPED | OUTPATIENT
Start: 2019-09-19 | End: 2020-06-23

## 2019-10-07 ENCOUNTER — TELEPHONE (OUTPATIENT)
Dept: TRANSPLANT | Facility: CLINIC | Age: 69
End: 2019-10-07

## 2019-10-07 ENCOUNTER — LAB VISIT (OUTPATIENT)
Dept: LAB | Facility: HOSPITAL | Age: 69
End: 2019-10-07
Attending: INTERNAL MEDICINE
Payer: MEDICARE

## 2019-10-07 DIAGNOSIS — Z94.0 KIDNEY REPLACED BY TRANSPLANT: ICD-10-CM

## 2019-10-07 LAB
ALBUMIN SERPL BCP-MCNC: 4.1 G/DL (ref 3.5–5.2)
ALBUMIN SERPL BCP-MCNC: 4.1 G/DL (ref 3.5–5.2)
ALP SERPL-CCNC: 130 U/L (ref 55–135)
ALT SERPL W/O P-5'-P-CCNC: 12 U/L (ref 10–44)
ANION GAP SERPL CALC-SCNC: 6 MMOL/L (ref 8–16)
AST SERPL-CCNC: 21 U/L (ref 10–40)
BASOPHILS # BLD AUTO: 0.04 K/UL (ref 0–0.2)
BASOPHILS NFR BLD: 0.9 % (ref 0–1.9)
BILIRUB DIRECT SERPL-MCNC: 0.2 MG/DL (ref 0.1–0.3)
BILIRUB SERPL-MCNC: 0.4 MG/DL (ref 0.1–1)
BUN SERPL-MCNC: 19 MG/DL (ref 8–23)
CALCIUM SERPL-MCNC: 10.4 MG/DL (ref 8.7–10.5)
CHLORIDE SERPL-SCNC: 109 MMOL/L (ref 95–110)
CO2 SERPL-SCNC: 25 MMOL/L (ref 23–29)
CREAT SERPL-MCNC: 1.3 MG/DL (ref 0.5–1.4)
DIFFERENTIAL METHOD: ABNORMAL
EOSINOPHIL # BLD AUTO: 0.1 K/UL (ref 0–0.5)
EOSINOPHIL NFR BLD: 2.2 % (ref 0–8)
ERYTHROCYTE [DISTWIDTH] IN BLOOD BY AUTOMATED COUNT: 14.3 % (ref 11.5–14.5)
EST. GFR  (AFRICAN AMERICAN): 48.4 ML/MIN/1.73 M^2
EST. GFR  (NON AFRICAN AMERICAN): 42 ML/MIN/1.73 M^2
GLUCOSE SERPL-MCNC: 106 MG/DL (ref 70–110)
HCT VFR BLD AUTO: 36.8 % (ref 37–48.5)
HGB BLD-MCNC: 11.7 G/DL (ref 12–16)
IMM GRANULOCYTES # BLD AUTO: 0.04 K/UL (ref 0–0.04)
IMM GRANULOCYTES NFR BLD AUTO: 0.9 % (ref 0–0.5)
LYMPHOCYTES # BLD AUTO: 0.9 K/UL (ref 1–4.8)
LYMPHOCYTES NFR BLD: 20 % (ref 18–48)
MAGNESIUM SERPL-MCNC: 1.6 MG/DL (ref 1.6–2.6)
MCH RBC QN AUTO: 28.7 PG (ref 27–31)
MCHC RBC AUTO-ENTMCNC: 31.8 G/DL (ref 32–36)
MCV RBC AUTO: 90 FL (ref 82–98)
MONOCYTES # BLD AUTO: 0.6 K/UL (ref 0.3–1)
MONOCYTES NFR BLD: 12.3 % (ref 4–15)
NEUTROPHILS # BLD AUTO: 2.9 K/UL (ref 1.8–7.7)
NEUTROPHILS NFR BLD: 63.7 % (ref 38–73)
NRBC BLD-RTO: 0 /100 WBC
PHOSPHATE SERPL-MCNC: 2.8 MG/DL (ref 2.7–4.5)
PLATELET # BLD AUTO: 247 K/UL (ref 150–350)
PMV BLD AUTO: 10 FL (ref 9.2–12.9)
POTASSIUM SERPL-SCNC: 4.1 MMOL/L (ref 3.5–5.1)
PROT SERPL-MCNC: 7.4 G/DL (ref 6–8.4)
RBC # BLD AUTO: 4.08 M/UL (ref 4–5.4)
SODIUM SERPL-SCNC: 140 MMOL/L (ref 136–145)
TACROLIMUS BLD-MCNC: 6.2 NG/ML (ref 5–15)
WBC # BLD AUTO: 4.55 K/UL (ref 3.9–12.7)

## 2019-10-07 PROCEDURE — 84155 ASSAY OF PROTEIN SERUM: CPT

## 2019-10-07 PROCEDURE — 80069 RENAL FUNCTION PANEL: CPT

## 2019-10-07 PROCEDURE — 83735 ASSAY OF MAGNESIUM: CPT

## 2019-10-07 PROCEDURE — 36415 COLL VENOUS BLD VENIPUNCTURE: CPT

## 2019-10-07 PROCEDURE — 87799 DETECT AGENT NOS DNA QUANT: CPT

## 2019-10-07 PROCEDURE — 80197 ASSAY OF TACROLIMUS: CPT

## 2019-10-07 PROCEDURE — 85025 COMPLETE CBC W/AUTO DIFF WBC: CPT

## 2019-10-08 ENCOUNTER — OFFICE VISIT (OUTPATIENT)
Dept: NEUROLOGY | Facility: CLINIC | Age: 69
End: 2019-10-08
Payer: MEDICARE

## 2019-10-08 VITALS
HEART RATE: 81 BPM | HEIGHT: 63 IN | SYSTOLIC BLOOD PRESSURE: 150 MMHG | BODY MASS INDEX: 30.62 KG/M2 | DIASTOLIC BLOOD PRESSURE: 90 MMHG | WEIGHT: 172.81 LBS

## 2019-10-08 DIAGNOSIS — R20.2 LEFT LEG PARESTHESIAS: ICD-10-CM

## 2019-10-08 DIAGNOSIS — F41.9 ANXIETY: Primary | ICD-10-CM

## 2019-10-08 PROCEDURE — 99999 PR PBB SHADOW E&M-EST. PATIENT-LVL IV: ICD-10-PCS | Mod: PBBFAC,,, | Performed by: PSYCHIATRY & NEUROLOGY

## 2019-10-08 PROCEDURE — 99999 PR PBB SHADOW E&M-EST. PATIENT-LVL IV: CPT | Mod: PBBFAC,,, | Performed by: PSYCHIATRY & NEUROLOGY

## 2019-10-08 PROCEDURE — 99213 PR OFFICE/OUTPT VISIT, EST, LEVL III, 20-29 MIN: ICD-10-PCS | Mod: S$PBB,,, | Performed by: PSYCHIATRY & NEUROLOGY

## 2019-10-08 PROCEDURE — 99213 OFFICE O/P EST LOW 20 MIN: CPT | Mod: S$PBB,,, | Performed by: PSYCHIATRY & NEUROLOGY

## 2019-10-08 PROCEDURE — 99214 OFFICE O/P EST MOD 30 MIN: CPT | Mod: PBBFAC,PO | Performed by: PSYCHIATRY & NEUROLOGY

## 2019-10-08 RX ORDER — DULOXETIN HYDROCHLORIDE 20 MG/1
20 CAPSULE, DELAYED RELEASE ORAL 2 TIMES DAILY
Qty: 60 CAPSULE | Refills: 4 | Status: SHIPPED | OUTPATIENT
Start: 2019-10-08 | End: 2019-11-20 | Stop reason: SDUPTHER

## 2019-10-08 NOTE — PROGRESS NOTES
Neurology Clinic Visit  Primary Care Provider: Destiny Villa MD   Referring Provider: No ref. provider found   Date of Visit: 10/08/2019       chief complaint:   Chief Complaint   Patient presents with    Follow-up     6 weeks       Interval history   Patient is here for follow-up on anxiety and left leg paresthesias.  Cymbalta was recommended last visit a therefore she visited her PCP who started the medication.  She is currently on 20 mg daily.  She reports that her anxiety is much improved and she is no longer having any bouts of panic. She has not noticed much significant improvement in her left leg pain that she states goes from the back of her left posterior leg down into her foot.  She still reports difficulty with sleep but takes Ambien sometimes which helps however she has noticed that when she takes Ambien her family tells her that she is more forgetful.    Interval history 08/27/2019  Patient is here for followup. Reports pain is better on gabapentin 300mg qhs. However, she reports she has significant anxiety and also feels that sometimes she panics. She was on Cymbalta in the past and states her symptoms were very well control when she was on that. She felt like a completely new person but she has not had cymbalta since change her PCP. Most recent PCP prescribed vistaril but she does not feel like this helps her anxiety.      History of Present Illness 7/18/2019  Paige Summers is a 68 y.o. right handed female I have been asked to consult for evaluation of paresthesias in left leg. She reports paresthesia in left lateral leg that went from back to foot about 1 year ago. She reports she was told she had sciatica. However, after her transplant in 4/2019 she noticed digits 4-5 and heel on left were numb. She denies lower back pain. She was started on prednisone for her kidney transplant which seems to eased the pain but she still has an uncomfortable feeling on bottom of left foot. She was given a  trial of gabapentin in the past but she is unsure if it helped.         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    S/P living-donor kidney transplantation     Prophylactic immunotherapy     Long-term use of immunosuppressant medication     At risk for opportunistic infections     Anemia of renal disease     Secondary hyperparathyroidism of renal origin     Antiphospholipid antibody positive 12/05/2018    Mechanical complication of vascular device 09/12/2018     Past Medical History:   Diagnosis Date    Anemia     Anemia of renal disease     Anxiety     Chronic renal failure 01/10/2018    CKD (chronic kidney disease) stage 3, GFR 30-59 ml/min 6/18/2019    Depression     Essential hypertension     GERD (gastroesophageal reflux disease)     Gout     H pylori ulcer     Hyperlipidemia     Hypertension     Obesity     Secondary hyperparathyroidism of renal origin      Past Surgical History:   Procedure Laterality Date    DECLOTTING OF ARTERIOVENOUS GRAFT Left 2/19/2019    Procedure: declot;  Surgeon: Yuriy Gibson MD;  Location: [facility] CATH LAB;  Service: Nephrology;  Laterality: Left;    DIAGNOSTIC ULTRASOUND Left 6/18/2018    Procedure: ULTRASOUND, DIAGNOSTIC;  Surgeon: Yuriy Gibson MD;  Location: [facility] CATH LAB;  Service: Nephrology;  Laterality: Left;    DIAGNOSTIC ULTRASOUND Left 7/9/2018    Procedure: ULTRASOUND, DIAGNOSTIC;  Surgeon: Yuriy Gibson MD;  Location: [facility] CATH LAB;  Service: Nephrology;  Laterality: Left;    DIAGNOSTIC ULTRASOUND Left 8/6/2018    Procedure: ULTRASOUND, DIAGNOSTIC;  Surgeon: Yuriy Gibson MD;  Location: [facility] CATH LAB;  Service:  Nephrology;  Laterality: Left;    DIAGNOSTIC ULTRASOUND Left 8/20/2018    Procedure: ULTRASOUND, DIAGNOSTIC;  Surgeon: Yuriy Gibson MD;  Location: [facility] CATH LAB;  Service: Nephrology;  Laterality: Left;    DIAGNOSTIC ULTRASOUND Left 9/19/2018    Procedure: ULTRASOUND, DIAGNOSTIC;  Surgeon: Yuriy Gibson MD;  Location: [facility] CATH LAB;  Service: Nephrology;  Laterality: Left;  suture removal. coming for 10AM    DIAGNOSTIC ULTRASOUND Left 10/10/2018    Procedure: ULTRASOUND, DIAGNOSTIC;  Surgeon: Yuriy Gibson MD;  Location: [facility] CATH LAB;  Service: Nephrology;  Laterality: Left;    DIALYSIS FISTULA CREATION Left 02/2018    FISTULOGRAM Left 12/19/2018    Procedure: FISTULOGRAM;  Surgeon: Javed Lopez MD;  Location: [facility] CATH LAB;  Service: Nephrology;  Laterality: Left;  f/u 1week    FISTULOGRAM Left 3/13/2019    Procedure: FISTULOGRAM;  Surgeon: Irineo Devlin MD;  Location: [facility] CATH LAB;  Service: Nephrology;  Laterality: Left;    HYSTERECTOMY  1971    TVH    KIDNEY TRANSPLANT N/A 4/8/2019    Procedure: TRANSPLANT, KIDNEY;  Surgeon: Mathew Goodwin MD;  Location: [facility] OR [address] [place] [address];  Service: Transplant;  Laterality: N/A;    THROMBECTOMY OF HEMODIALYSIS ACCESS SITE Left 6/11/2018    Procedure: THROMBECTOMY, HEMODIALYSIS GRAFT OR FISTULA;  Surgeon: Yuriy Gibson MD;  Location: [facility] CATH LAB;  Service: Nephrology;  Laterality: Left;    THROMBECTOMY OF HEMODIALYSIS ACCESS SITE Left 6/13/2018    Procedure: THROMBECTOMY, HEMODIALYSIS GRAFT OR FISTULA;  Surgeon: Yuriy Gibson MD;  Location: [facility] CATH LAB;  Service: Nephrology;  Laterality: Left;    THROMBECTOMY OF HEMODIALYSIS ACCESS SITE N/A 9/6/2018    Procedure: THROMBECTOMY, HEMODIALYSIS GRAFT OR FISTULA;  Surgeon: Yuriy Gibson MD;  Location: [facility] CATH LAB;  Service: Nephrology;  Laterality: N/A;  PT coming in for noon says shes NPO    THROMBECTOMY OF HEMODIALYSIS ACCESS SITE Left 9/12/2018    Procedure: THROMBECTOMY, HEMODIALYSIS GRAFT OR  FISTULA;  Surgeon: Yuriy Gibson MD;  Location: [facility] CATH LAB;  Service: Nephrology;  Laterality: Left;    THROMBECTOMY OF HEMODIALYSIS ACCESS SITE Left 11/9/2018    Procedure: THROMBECTOMY, HEMODIALYSIS GRAFT OR FISTULA;  Surgeon: Yuriy Gibson MD;  Location: [facility] CATH LAB;  Service: Nephrology;  Laterality: Left;    THROMBECTOMY OF HEMODIALYSIS ACCESS SITE Left 12/13/2018    Procedure: THROMBECTOMY, HEMODIALYSIS GRAFT OR FISTULA;  Surgeon: Javed Lopez MD;  Location: [facility] CATH LAB;  Service: Nephrology;  Laterality: Left;     Family History   Problem Relation Age of Onset    Alcohol abuse Mother     No Known Problems Father     Hypertension Sister     Arthritis Sister     Heart disease Brother     Heart failure Brother     Heart failure Brother     Diabetes Cousin     Breast cancer Neg Hx     Colon cancer Neg Hx     Ovarian cancer Neg Hx          Current Outpatient Medications   Medication Sig    acetaminophen (TYLENOL) 325 mg Cap Take by mouth.    amLODIPine (NORVASC) 10 MG tablet Take 1 tablet (10 mg total) by mouth once daily.    bisacodyl (DULCOLAX) 5 mg EC tablet Take 2 tablets (10 mg total) by mouth daily as needed for Constipation.    carvedilol (COREG) 12.5 MG tablet Take 2 tablets (25 mg total) by mouth 2 (two) times daily with meals.    cinacalcet (SENSIPAR) 30 MG Tab Take 1 tablet (30 mg total) by mouth daily with breakfast.    cloNIDine (CATAPRES) 0.1 MG tablet Take 1 tablet (0.1 mg total) by mouth 3 (three) times daily.    docusate sodium (COLACE) 100 MG capsule Take 1 capsule (100 mg total) by mouth 2 (two) times daily as needed for Constipation.    famotidine (PEPCID) 20 MG tablet Take 1 tablet (20 mg total) by mouth every evening.    hydrOXYzine pamoate (VISTARIL) 25 MG Cap Take 1 capsule (25 mg total) by mouth every 8 (eight) hours.    k phos di & mono-sod phos mono (K-PHOS-NEUTRAL) 250 mg Tab Take 3 tablets by mouth 3 (three) times daily.    linaCLOtide (LINZESS) 145  mcg Cap capsule Take 1 capsule (145 mcg total) by mouth once daily.    magnesium oxide (MAG-OX) 400 mg (241.3 mg magnesium) tablet Take 2 tablets (800 mg total) by mouth 3 (three) times daily.    multivitamin (THERAGRAN) tablet Take 1 tablet by mouth once daily.    mycophenolate (CELLCEPT) 250 mg Cap Take 2 capsules (500 mg total) by mouth 2 (two) times daily.    sodium bicarbonate 650 MG tablet Take 2 tablets (1,300 mg total) by mouth 2 (two) times daily.    sulfamethoxazole-trimethoprim 400-80mg (BACTRIM,SEPTRA) 400-80 mg per tablet Take 1 tablet by mouth once daily. Stop: 4/7/2020    tacrolimus (PROGRAF) 1 MG Cap Take 3 capsules (3 mg total) by mouth every morning AND 2 capsules (2 mg total) every evening.    traMADol (ULTRAM) 50 mg tablet Take 1 tablet (50 mg total) by mouth every 8 (eight) hours as needed for Pain.    zolpidem (AMBIEN) 5 MG Tab Take 1 tablet (5 mg total) by mouth nightly as needed.    betamethasone valerate 0.1% (VALISONE) 0.1 % Lotn Apply topically 2 (two) times daily. for 14 days    DULoxetine (CYMBALTA) 20 MG capsule Take 1 capsule (20 mg total) by mouth 2 (two) times daily.    valGANciclovir (VALCYTE) 450 mg Tab Take 1 tablet (450 mg total) by mouth every Mon, Wed, Fri. Stop: 7/7/19     No current facility-administered medications for this visit.        Review of patient's allergies indicates:   Allergen Reactions    Oxycodone Itching    Hydrocodone Itching     Social History     Socioeconomic History    Marital status:      Spouse name: Not on file    Number of children: Not on file    Years of education: Not on file    Highest education level: Not on file   Occupational History    Not on file   Social Needs    Financial resource strain: Not on file    Food insecurity:     Worry: Not on file     Inability: Not on file    Transportation needs:     Medical: Not on file     Non-medical: Not on file   Tobacco Use    Smoking status: Never Smoker    Smokeless  "tobacco: Never Used   Substance and Sexual Activity    Alcohol use: No    Drug use: No    Sexual activity: Never     Partners: Male     Comment: Celibate since 2010   Lifestyle    Physical activity:     Days per week: Not on file     Minutes per session: Not on file    Stress: Not on file   Relationships    Social connections:     Talks on phone: Not on file     Gets together: Not on file     Attends Zoroastrian service: Not on file     Active member of club or organization: Not on file     Attends meetings of clubs or organizations: Not on file     Relationship status: Not on file   Other Topics Concern    Not on file   Social History Narrative    Not on file       Review of Systems    Constitutional: negative  Eyes: negative  Ears, nose, mouth, throat, and face: negative  Respiratory: negative  Cardiovascular: negative  Gastrointestinal: negative  Genitourinary:negative  Integument/breast: negative  Hematologic/lymphatic: negative  Musculoskeletal:negative  Neurological: negative  Behavioral/Psych: negative  Endocrine: negative  Allergic/Immunologic: negative    Objective:  Vital signs in last 24 hours:    Vitals:    10/08/19 1019   BP: (!) 150/90   Pulse: 81   Weight: 78.4 kg (172 lb 13.5 oz)   Height: 5' 3" (1.6 m)       Body mass index is 30.62 kg/m².          General: no acute distress, well nourished, well-groomed  CVS: RRR, no murmur, gallops or rubs  Respiratory: Clear to ausculation  Extremities: no edema     Neurological Examination:     HIGHER INTEGRATIVE FUNCTIONS:  -Normal attention span and concentration; immediately responds to questions and commands  -Oriented to time, place and person  -Recent and remote memory intact  -Language normal (no aphasia or dysarthria)  -Normal fund of knowledge     CN:  -PERRLA, visual fields full, unable to visualize optic discs due to small pupils on fundus exam   -EOMI with normal saccades and smooth pursuit  -Facial sensation intact bilaterally  -Facial " strength/movement intact bilaterally  -Hearing intact to voice  -Palate elevates symmetrically  -Normal shoulder shrug and head turn  -Tongue protrudes midline     MOTOR: (left/right graded 1-5)  -UE: 5/5 deltoids; 5/5 biceps, triceps; 5/5 wrist flexors, extensors; 5/5 interosseous; 5/5   -LEs: 5/5 hip flexion, extension; 5/5 knee flexion, extension; 5/5 ankle flexion, extension  -Tone: normal  -No pronator drift, no orbiting     SENSORY:  -Light touch, temperature sensation intact bilaterally; mild decrease vibration in left foot>right     REFLEXES:  -2+ upper and lower bilaterally; absent reflex in left ankle  -Flexor plantar reflex bilaterally  -No clonus     COORDINATION:  -FNF, HKS, LESLI intact bilaterally     GAIT:  -Normal casual gait     Imaging  X-ray lumbar spine:  July 18, 2019  FINDINGS:  Stable grade 1 anterolisthesis of L4 on L5.  No evidence of dynamic instability.  No evidence of pars defects.  Vacuum disc phenomena at L5-S1.  Multilevel intervertebral disc space height loss most severe at L4-L5 and L5-S1.  Multilevel degenerative endplate changes.  Lower lumbar facet arthrosis.  No acute, displaced fracture or aggressive osseous abnormality.  Vertebral body heights are maintained.  Soft tissues are unremarkable.      Impression       Multilevel lumbar spondylosis with stable grade 1 anterolisthesis of L4 on L5.         Assessment/Plan:     1. Left leg paresthesia, suspect left lumbosacral radiculopathy  2. S/p kidney transplant, per transplant team  3. Anxiety, much improved     Plan:  EMG/NCS on left limb  scheduled for October the 30 of 2019  Increase Cymbalta to 40 mg daily. Side effects were discussed with patient and she understood.      I discussed assessment and plan with patient and answered the questions that she had.      Part of patient note might have been created using Dragon Dictation.  Any errors in syntax or even information may not have been identified and edited on initial  review prior to signing this note.

## 2019-10-11 NOTE — PROGRESS NOTES
Kidney Post-Transplant Assessment    Referring Physician: Jd Carias  Current Nephrologist: Jd Carias    ORGAN: LEFT KIDNEY  Donor Type: living  PHS Increased Risk: no  Cold Ischemia: 189 mins  Induction Medications: campath - alemtuzumab (anti-cd52)    Subjective:     CC:  Reassessment of renal allograft function and management of chronic immunosuppression.    HPI:  Ms. Summers is a 68 y.o. year old Black or  female with ESRD 2/2 Hypertensive Nephrosclerosis who received a living kidney transplant on 4/8/19. Her most recent creatinine is 5. She takes mycophenolic acid and tacrolimus for maintenance immunosuppression.      post transplant course:  - S/p living unrelated kidney transplant 4/8/19, CMV +/+, WIT 30 min, CIT 3h 9 m  - DGF, resolved  - Pt was on Plavix for reoccurring AVF thrombus      Interval Hx: she complains of left hip pain- plan to see neurologist/orthopedics.  she denies any fever, chills , ear secretion,  chest pain, shortness of breath, nausea/vomiting, dysuria, frequency, urgency, or pain over the allograft. Most of her BP readings are around 130-140. She had two episodes of presyncope due to low BP last month. Her BP parameter was changed and did not have any low BP after that.       Current Outpatient Medications on File Prior to Visit   Medication Sig Dispense Refill    acetaminophen (TYLENOL) 325 mg Cap Take by mouth.      bisacodyl (DULCOLAX) 5 mg EC tablet Take 2 tablets (10 mg total) by mouth daily as needed for Constipation.  0    carvedilol (COREG) 12.5 MG tablet Take 2 tablets (25 mg total) by mouth 2 (two) times daily with meals. 120 tablet 11    cinacalcet (SENSIPAR) 30 MG Tab Take 1 tablet (30 mg total) by mouth daily with breakfast. 30 tablet 6    cloNIDine (CATAPRES) 0.1 MG tablet Take 1 tablet (0.1 mg total) by mouth 3 (three) times daily. 90 tablet 11    docusate sodium (COLACE) 100 MG capsule Take 1 capsule (100 mg total) by mouth 2 (two) times  daily as needed for Constipation.  0    DULoxetine (CYMBALTA) 20 MG capsule Take 1 capsule (20 mg total) by mouth 2 (two) times daily. 60 capsule 4    linaCLOtide (LINZESS) 145 mcg Cap capsule Take 1 capsule (145 mcg total) by mouth once daily. 30 capsule 11    multivitamin (THERAGRAN) tablet Take 1 tablet by mouth once daily.      mycophenolate (CELLCEPT) 250 mg Cap Take 2 capsules (500 mg total) by mouth 2 (two) times daily. 120 capsule 11    sulfamethoxazole-trimethoprim 400-80mg (BACTRIM,SEPTRA) 400-80 mg per tablet Take 1 tablet by mouth once daily. Stop: 4/7/2020 30 tablet 11    tacrolimus (PROGRAF) 1 MG Cap Take 3 capsules (3 mg total) by mouth every morning AND 2 capsules (2 mg total) every evening. 150 capsule 11    [DISCONTINUED] amLODIPine (NORVASC) 10 MG tablet Take 1 tablet (10 mg total) by mouth once daily. 30 tablet 11    [DISCONTINUED] famotidine (PEPCID) 20 MG tablet Take 1 tablet (20 mg total) by mouth every evening. 30 tablet 6    [DISCONTINUED] magnesium oxide (MAG-OX) 400 mg (241.3 mg magnesium) tablet Take 2 tablets (800 mg total) by mouth 3 (three) times daily. 270 tablet 10    [DISCONTINUED] sodium bicarbonate 650 MG tablet Take 2 tablets (1,300 mg total) by mouth 2 (two) times daily. 120 tablet 11    betamethasone valerate 0.1% (VALISONE) 0.1 % Lotn Apply topically 2 (two) times daily. for 14 days 60 mL 2    hydrOXYzine pamoate (VISTARIL) 25 MG Cap Take 1 capsule (25 mg total) by mouth every 8 (eight) hours. (Patient not taking: Reported on 10/14/2019) 90 capsule 2    k phos di & mono-sod phos mono (K-PHOS-NEUTRAL) 250 mg Tab Take 3 tablets by mouth 3 (three) times daily. (Patient not taking: Reported on 10/14/2019) 270 tablet 11    traMADol (ULTRAM) 50 mg tablet Take 1 tablet (50 mg total) by mouth every 8 (eight) hours as needed for Pain. (Patient not taking: Reported on 10/14/2019) 30 tablet 0    valGANciclovir (VALCYTE) 450 mg Tab Take 1 tablet (450 mg total) by mouth  every Mon, Wed, Fri. Stop: 7/7/19 12 tablet 2    zolpidem (AMBIEN) 5 MG Tab Take 1 tablet (5 mg total) by mouth nightly as needed. 30 tablet 2     No current facility-administered medications on file prior to visit.         Review of Systems     Skin: no skin rash  CNS; no headaches, blurred vision, seizure, or syncope  ENT: No JVD,  Adenopathies,  nasal congestion. No oral lesions  Cardiac: No chest pain, dyspnea, claudication, edema or palpitations  Respiratory: No SOB, cough, hemoptysis   Gastro-intestinal: No diarrhea, constipation, abdominal pain, nausea, vomit. No ascitis  Genitourinary: no hematuria, dysuria, frequency, frequency  Musculoskeletal: Positive for Hip thigh pain, arthritis or vasculitic changes  Psych: alert awake, oriented, No cranial nerves deficit.      Objective:       Physical Exam   Vitals:    10/14/19 1011   BP: 132/64   Pulse: 75   Resp: 16   Temp: 98.6 °F (37 °C)         Head: normocephalic  Neck: No JVD, cervical axillary, or femoral adenopathies  Heart: no murmurs, Normal s1 and s2, No gallops, no rubs, No murmurs  Lungs; CTA, good respiratory effort, no crackles  Abdomen: soft, non tender, + surgical scar  Extremities: No edema, skin rash, Left hip pain extending to posterior thigh tender to deep palpation proximal thigh and hip.   SNC: awake, alert oriented. Cranial nerves are intact, no focalized, sensitivity and strength preserved      Labs:  Lab Results   Component Value Date    WBC 4.55 10/07/2019    HGB 11.7 (L) 10/07/2019    HCT 36.8 (L) 10/07/2019     10/07/2019    K 4.1 10/07/2019     10/07/2019    CO2 25 10/07/2019    BUN 19 10/07/2019    CREATININE 1.3 10/07/2019    EGFRNONAA 42.0 (A) 10/07/2019    CALCIUM 10.4 10/07/2019    PHOS 2.8 10/07/2019    MG 1.6 10/07/2019    ALBUMIN 4.1 10/07/2019    ALBUMIN 4.1 10/07/2019    AST 21 10/07/2019    ALT 12 10/07/2019    UTPCR 0.22 (H) 10/07/2019    .0 (H) 05/06/2019    TACROLIMUS 6.2 10/07/2019       No results  found for: EXTANC, EXTWBC, EXTSEGS, EXTPLATELETS, EXTHEMOGLOBI, EXTHEMATOCRI, EXTCREATININ, EXTSODIUM, EXTPOTASSIUM, EXTBUN, EXTCO2, EXTCALCIUM, EXTPHOSPHORU, EXTGLUCOSE, EXTALBUMIN, EXTAST, EXTALT, EXTBILITOTAL, EXTLIPASE, EXTAMYLASE    No results found for: EXTCYCLOSLVL, EXTSIROLIMUS, EXTTACROLVL, EXTPROTCRE, EXTPTHINTACT, EXTPROTEINUA, EXTWBCUA, EXTRBCUA    Labs were reviewed with the patient    Assessment:     1. Long-term use of immunosuppressant medication    2. Secondary hyperparathyroidism of renal origin    3. CKD (chronic kidney disease) stage 3, GFR 30-59 ml/min    4. S/P living-donor kidney transplantation        Plan:        1. CKD stage to be dermine: sCr remaining to  1.3 mg/dL . making good amount of UOP ~ 2    2. Immunosuppression: prograf level was addressed  -  mg BID  - Decrease prograf to 3/2 mg    Will closely monitor for toxicities, education provided about adherence to medicines and need to communicate any side effect to the transplant nurse or physician.      3. Hypertension management:  Had 2 low BP readings with presyncope   - Continue with home blood pressure monitoring, low salt and healthy life discussed with the patient.  -  Stop Norvasc 10 mg daily   - coreg 12.5 mg BID  - clonidine 0.1 mg TID     4. Metabolic Bone Disease/Secondary Hyperparathyroidism:  - on sensipar  - PTH improving  - Will monitor PTH, Vit D level, calcium.      5. Anemia: stable    6. Low Mg  - on Mg supplement      # UTI  - on ciprofloxacin for 14 days     # left hip pain  - Ortho appointment          Plan  - stop norvasc  - call for BP readings in few days  - stop pepcid  - decrease NaHCO3 to 650 mg BID  - PTH and vitamin D with next lab

## 2019-10-14 ENCOUNTER — OFFICE VISIT (OUTPATIENT)
Dept: TRANSPLANT | Facility: CLINIC | Age: 69
End: 2019-10-14
Payer: MEDICARE

## 2019-10-14 VITALS
OXYGEN SATURATION: 100 % | BODY MASS INDEX: 30.39 KG/M2 | WEIGHT: 171.5 LBS | DIASTOLIC BLOOD PRESSURE: 64 MMHG | HEIGHT: 63 IN | RESPIRATION RATE: 16 BRPM | SYSTOLIC BLOOD PRESSURE: 132 MMHG | TEMPERATURE: 99 F | HEART RATE: 75 BPM

## 2019-10-14 DIAGNOSIS — N18.30 CKD (CHRONIC KIDNEY DISEASE) STAGE 3, GFR 30-59 ML/MIN: ICD-10-CM

## 2019-10-14 DIAGNOSIS — Z94.0 S/P LIVING-DONOR KIDNEY TRANSPLANTATION: ICD-10-CM

## 2019-10-14 DIAGNOSIS — Z79.60 LONG-TERM USE OF IMMUNOSUPPRESSANT MEDICATION: Primary | ICD-10-CM

## 2019-10-14 DIAGNOSIS — N25.81 SECONDARY HYPERPARATHYROIDISM OF RENAL ORIGIN: Chronic | ICD-10-CM

## 2019-10-14 PROBLEM — T82.868A AV FISTULA THROMBOSIS: Status: RESOLVED | Noted: 2019-04-09 | Resolved: 2019-10-14

## 2019-10-14 PROCEDURE — 99215 OFFICE O/P EST HI 40 MIN: CPT | Mod: S$PBB,,, | Performed by: INTERNAL MEDICINE

## 2019-10-14 PROCEDURE — 99999 PR PBB SHADOW E&M-EST. PATIENT-LVL V: ICD-10-PCS | Mod: PBBFAC,,, | Performed by: INTERNAL MEDICINE

## 2019-10-14 PROCEDURE — 99215 OFFICE O/P EST HI 40 MIN: CPT | Mod: PBBFAC | Performed by: INTERNAL MEDICINE

## 2019-10-14 PROCEDURE — 99999 PR PBB SHADOW E&M-EST. PATIENT-LVL V: CPT | Mod: PBBFAC,,, | Performed by: INTERNAL MEDICINE

## 2019-10-14 PROCEDURE — 99215 PR OFFICE/OUTPT VISIT, EST, LEVL V, 40-54 MIN: ICD-10-PCS | Mod: S$PBB,,, | Performed by: INTERNAL MEDICINE

## 2019-10-14 RX ORDER — LANOLIN ALCOHOL/MO/W.PET/CERES
800 CREAM (GRAM) TOPICAL 2 TIMES DAILY
Qty: 270 TABLET | Refills: 10 | Status: ON HOLD | COMMUNITY
Start: 2019-10-14 | End: 2021-09-02 | Stop reason: HOSPADM

## 2019-10-14 RX ORDER — SODIUM BICARBONATE 650 MG/1
650 TABLET ORAL 2 TIMES DAILY
Qty: 60 TABLET | Refills: 11 | Status: SHIPPED | OUTPATIENT
Start: 2019-10-14 | End: 2023-04-21

## 2019-10-14 NOTE — PATIENT INSTRUCTIONS
Thank you for entrusting your transplant care to us, and visiting me today. Please:      - Feel free to call us with any concerns regarding your health care.  - Monitor your blood pressure and aim to keep < 140/90  - Avoid nonsteroidal anti-inflamatory drugs (NSAIDS): ibuprofen (Advil, Motrin), naproxen (Aleve, Naprosyn), Indomethacin (Indocin).   - Check any new medications (over the counter or prescription) to make sure they will not affect your new kidney.   - See your nephrologist regularly and follow his/her recommendations.  - stop norvasc  - call for BP readings in few days  - stop pepcid

## 2019-10-14 NOTE — LETTER
October 14, 2019        dJ Carias  [address]  [address]  [facility] [address]  Phone: [phone]  Fax: [phone]             [facility]  [address]  [facility] [address]  Phone: [phone]   Patient: Paige Summers   MR Number: [id number]   YOB: 1950   Date of Visit: 10/14/2019       Dear Dr. Jd Carias    Thank you for referring Paige Summers to me for evaluation. Attached you will find relevant portions of my assessment and plan of care.    If you have questions, please do not hesitate to call me. I look forward to following Paige Summers along with you.    Sincerely,    Taty Guevara MD    Enclosure    If you would like to receive this communication electronically, please contact [email or web address] or [phone] to request GetBulb Link access.    GetBul[employer] Link is a tool which provides read-only access to select patient information with whom you have a relationship. Its easy to use and provides real time access to review your patients record including encounter summaries, notes, results, and demographic information.    If you feel you have received this communication in error or would no longer like to receive these types of communications, please e-mail [email or web address]

## 2019-10-17 ENCOUNTER — DOCUMENTATION ONLY (OUTPATIENT)
Dept: REHABILITATION | Facility: HOSPITAL | Age: 69
End: 2019-10-17

## 2019-10-17 DIAGNOSIS — R29.898 LEG WEAKNESS, BILATERAL: ICD-10-CM

## 2019-10-17 NOTE — PROGRESS NOTES
Outpatient Therapy Discharge Summary     Name: Paige Mendezbard  Clinic Number: [id number]    Therapy Diagnosis: No diagnosis found.  Physician: No ref. provider found    Physician Orders: PT Eval and Treat   Medical Diagnosis from Referral: R20.2 (ICD-10-CM) - Left leg paresthesias  Evaluation Date: 7/22/2019  Authorization Period Expiration: 7/17/2020  Plan of Care Expiration: 9/20/19      Date of Last visit: 7/22/19  Total Visits Received: 1      Assessment    Goals: Short Term Goals:  4 weeks     - Pt to increase lumbar ROM by at least 25% degrees in order to show improved mobility.  - Pt to be independent and consistent with issued HEP in order to promote carryover between therapy sessions.  - Pt will be able to perform and hold an ab brace for 10 reps of 10 second hold with normal breathing and no cuing in order to demo improved core recruitment.  - Pt will report a 25% reduction in pain and symptoms since SOC in order to improve ability to tolerate standing and walking.     Long Term Goals: 8 weeks     -Pt will be able to stand and walk 200' with AD as needed without episodes of instability or leg giving way in order to perform ADLs and take care of personal needs at home.  -Pt will increase B hip abduction and flexion strength by 1 ms grade in order to increase tolerance to ADLs and functional activities.  -Pt will report at CK level (40-60% impaired) on FOTO score for low back pain disability to demonstrate decrease in disability and improvement in back pain.  - Pt to be Independent with updated HEP in order to maintain gains following discharge from PT.  -Pt will have a negative SLR in order to show improved neural tension.       Discharge reason: Patient has not attended therapy since initial eval    Plan   This patient is discharged from Physical Therapy

## 2019-10-30 ENCOUNTER — PROCEDURE VISIT (OUTPATIENT)
Dept: NEUROLOGY | Facility: CLINIC | Age: 69
End: 2019-10-30
Payer: MEDICARE

## 2019-10-30 DIAGNOSIS — R20.2 LEFT LEG PARESTHESIAS: ICD-10-CM

## 2019-10-30 DIAGNOSIS — G60.3 IDIOPATHIC PROGRESSIVE NEUROPATHY: Primary | ICD-10-CM

## 2019-10-30 PROCEDURE — 95886 MUSC TEST DONE W/N TEST COMP: CPT | Mod: 26,S$PBB,, | Performed by: NEUROMUSCULOSKELETAL MEDICINE & OMM

## 2019-10-30 PROCEDURE — 95886 MUSC TEST DONE W/N TEST COMP: CPT | Mod: PBBFAC,PO | Performed by: NEUROMUSCULOSKELETAL MEDICINE & OMM

## 2019-10-30 PROCEDURE — 95910 NRV CNDJ TEST 7-8 STUDIES: CPT | Mod: PBBFAC,PO | Performed by: NEUROMUSCULOSKELETAL MEDICINE & OMM

## 2019-10-30 PROCEDURE — 95908 PR NERVE CONDUCTION STUDY; 3-4 STUDIES: ICD-10-PCS | Mod: 26,S$PBB,, | Performed by: NEUROMUSCULOSKELETAL MEDICINE & OMM

## 2019-10-30 PROCEDURE — 95908 NRV CNDJ TST 3-4 STUDIES: CPT | Mod: 26,S$PBB,, | Performed by: NEUROMUSCULOSKELETAL MEDICINE & OMM

## 2019-10-30 PROCEDURE — 95886 PR EMG COMPLETE, W/ NERVE CONDUCTION STUDIES, 5+ MUSCLES: ICD-10-PCS | Mod: 26,S$PBB,, | Performed by: NEUROMUSCULOSKELETAL MEDICINE & OMM

## 2019-11-06 ENCOUNTER — LAB VISIT (OUTPATIENT)
Dept: LAB | Facility: HOSPITAL | Age: 69
End: 2019-11-06
Attending: INTERNAL MEDICINE
Payer: MEDICARE

## 2019-11-06 DIAGNOSIS — Z94.0 KIDNEY REPLACED BY TRANSPLANT: ICD-10-CM

## 2019-11-06 DIAGNOSIS — E55.9 VITAMIN D DEFICIENCY: ICD-10-CM

## 2019-11-06 LAB
25(OH)D3+25(OH)D2 SERPL-MCNC: 36 NG/ML (ref 30–96)
ALBUMIN SERPL BCP-MCNC: 3.8 G/DL (ref 3.5–5.2)
ANION GAP SERPL CALC-SCNC: 4 MMOL/L (ref 8–16)
BASOPHILS # BLD AUTO: 0.04 K/UL (ref 0–0.2)
BASOPHILS NFR BLD: 0.9 % (ref 0–1.9)
BUN SERPL-MCNC: 17 MG/DL (ref 8–23)
CALCIUM SERPL-MCNC: 9.4 MG/DL (ref 8.7–10.5)
CHLORIDE SERPL-SCNC: 109 MMOL/L (ref 95–110)
CO2 SERPL-SCNC: 27 MMOL/L (ref 23–29)
CREAT SERPL-MCNC: 1.3 MG/DL (ref 0.5–1.4)
DIFFERENTIAL METHOD: ABNORMAL
EOSINOPHIL # BLD AUTO: 0.2 K/UL (ref 0–0.5)
EOSINOPHIL NFR BLD: 3.5 % (ref 0–8)
ERYTHROCYTE [DISTWIDTH] IN BLOOD BY AUTOMATED COUNT: 14 % (ref 11.5–14.5)
EST. GFR  (AFRICAN AMERICAN): 48.4 ML/MIN/1.73 M^2
EST. GFR  (NON AFRICAN AMERICAN): 42 ML/MIN/1.73 M^2
GLUCOSE SERPL-MCNC: 102 MG/DL (ref 70–110)
HCT VFR BLD AUTO: 37.9 % (ref 37–48.5)
HGB BLD-MCNC: 11.8 G/DL (ref 12–16)
IMM GRANULOCYTES # BLD AUTO: 0.06 K/UL (ref 0–0.04)
IMM GRANULOCYTES NFR BLD AUTO: 1.3 % (ref 0–0.5)
LYMPHOCYTES # BLD AUTO: 0.8 K/UL (ref 1–4.8)
LYMPHOCYTES NFR BLD: 18.1 % (ref 18–48)
MAGNESIUM SERPL-MCNC: 1.5 MG/DL (ref 1.6–2.6)
MCH RBC QN AUTO: 28.2 PG (ref 27–31)
MCHC RBC AUTO-ENTMCNC: 31.1 G/DL (ref 32–36)
MCV RBC AUTO: 91 FL (ref 82–98)
MONOCYTES # BLD AUTO: 0.5 K/UL (ref 0.3–1)
MONOCYTES NFR BLD: 10.8 % (ref 4–15)
NEUTROPHILS # BLD AUTO: 3 K/UL (ref 1.8–7.7)
NEUTROPHILS NFR BLD: 65.4 % (ref 38–73)
NRBC BLD-RTO: 0 /100 WBC
PHOSPHATE SERPL-MCNC: 3.5 MG/DL (ref 2.7–4.5)
PLATELET # BLD AUTO: 259 K/UL (ref 150–350)
PMV BLD AUTO: 9.5 FL (ref 9.2–12.9)
POTASSIUM SERPL-SCNC: 4.3 MMOL/L (ref 3.5–5.1)
PTH-INTACT SERPL-MCNC: 148 PG/ML (ref 9–77)
RBC # BLD AUTO: 4.19 M/UL (ref 4–5.4)
SODIUM SERPL-SCNC: 140 MMOL/L (ref 136–145)
TACROLIMUS BLD-MCNC: 8.7 NG/ML (ref 5–15)
WBC # BLD AUTO: 4.52 K/UL (ref 3.9–12.7)

## 2019-11-06 PROCEDURE — 80197 ASSAY OF TACROLIMUS: CPT

## 2019-11-06 PROCEDURE — 82306 VITAMIN D 25 HYDROXY: CPT

## 2019-11-06 PROCEDURE — 80069 RENAL FUNCTION PANEL: CPT

## 2019-11-06 PROCEDURE — 85025 COMPLETE CBC W/AUTO DIFF WBC: CPT

## 2019-11-06 PROCEDURE — 36415 COLL VENOUS BLD VENIPUNCTURE: CPT

## 2019-11-06 PROCEDURE — 83970 ASSAY OF PARATHORMONE: CPT

## 2019-11-06 PROCEDURE — 83735 ASSAY OF MAGNESIUM: CPT

## 2019-11-20 ENCOUNTER — LAB VISIT (OUTPATIENT)
Dept: LAB | Facility: HOSPITAL | Age: 69
End: 2019-11-20
Attending: PSYCHIATRY & NEUROLOGY
Payer: MEDICARE

## 2019-11-20 ENCOUNTER — OFFICE VISIT (OUTPATIENT)
Dept: NEUROLOGY | Facility: CLINIC | Age: 69
End: 2019-11-20
Payer: MEDICARE

## 2019-11-20 VITALS
DIASTOLIC BLOOD PRESSURE: 79 MMHG | BODY MASS INDEX: 30.97 KG/M2 | SYSTOLIC BLOOD PRESSURE: 142 MMHG | HEIGHT: 63 IN | HEART RATE: 81 BPM | WEIGHT: 174.81 LBS

## 2019-11-20 DIAGNOSIS — M54.17 LUMBOSACRAL RADICULOPATHY: ICD-10-CM

## 2019-11-20 DIAGNOSIS — G62.9 PERIPHERAL POLYNEUROPATHY: ICD-10-CM

## 2019-11-20 DIAGNOSIS — G62.9 PERIPHERAL POLYNEUROPATHY: Primary | ICD-10-CM

## 2019-11-20 DIAGNOSIS — F41.9 ANXIETY: ICD-10-CM

## 2019-11-20 DIAGNOSIS — G47.00 INSOMNIA, UNSPECIFIED TYPE: ICD-10-CM

## 2019-11-20 PROCEDURE — 1159F PR MEDICATION LIST DOCUMENTED IN MEDICAL RECORD: ICD-10-PCS | Mod: ,,, | Performed by: PSYCHIATRY & NEUROLOGY

## 2019-11-20 PROCEDURE — 99999 PR PBB SHADOW E&M-EST. PATIENT-LVL V: CPT | Mod: PBBFAC,,, | Performed by: PSYCHIATRY & NEUROLOGY

## 2019-11-20 PROCEDURE — 86334 PATHOLOGIST INTERPRETATION IFE: ICD-10-PCS | Mod: 26,,, | Performed by: PATHOLOGY

## 2019-11-20 PROCEDURE — 86334 IMMUNOFIX E-PHORESIS SERUM: CPT | Mod: 26,,, | Performed by: PATHOLOGY

## 2019-11-20 PROCEDURE — 84165 PATHOLOGIST INTERPRETATION SPE: ICD-10-PCS | Mod: 26,,, | Performed by: PATHOLOGY

## 2019-11-20 PROCEDURE — 99999 PR PBB SHADOW E&M-EST. PATIENT-LVL V: ICD-10-PCS | Mod: PBBFAC,,, | Performed by: PSYCHIATRY & NEUROLOGY

## 2019-11-20 PROCEDURE — 99214 OFFICE O/P EST MOD 30 MIN: CPT | Mod: S$PBB,,, | Performed by: PSYCHIATRY & NEUROLOGY

## 2019-11-20 PROCEDURE — 1159F MED LIST DOCD IN RCRD: CPT | Mod: ,,, | Performed by: PSYCHIATRY & NEUROLOGY

## 2019-11-20 PROCEDURE — 1125F AMNT PAIN NOTED PAIN PRSNT: CPT | Mod: ,,, | Performed by: PSYCHIATRY & NEUROLOGY

## 2019-11-20 PROCEDURE — 84165 PROTEIN E-PHORESIS SERUM: CPT | Mod: 26,,, | Performed by: PATHOLOGY

## 2019-11-20 PROCEDURE — 84425 ASSAY OF VITAMIN B-1: CPT

## 2019-11-20 PROCEDURE — 99215 OFFICE O/P EST HI 40 MIN: CPT | Mod: PBBFAC,PO | Performed by: PSYCHIATRY & NEUROLOGY

## 2019-11-20 PROCEDURE — 99214 PR OFFICE/OUTPT VISIT, EST, LEVL IV, 30-39 MIN: ICD-10-PCS | Mod: S$PBB,,, | Performed by: PSYCHIATRY & NEUROLOGY

## 2019-11-20 PROCEDURE — 1125F PR PAIN SEVERITY QUANTIFIED, PAIN PRESENT: ICD-10-PCS | Mod: ,,, | Performed by: PSYCHIATRY & NEUROLOGY

## 2019-11-20 PROCEDURE — 86334 IMMUNOFIX E-PHORESIS SERUM: CPT

## 2019-11-20 PROCEDURE — 84165 PROTEIN E-PHORESIS SERUM: CPT

## 2019-11-20 PROCEDURE — 36415 COLL VENOUS BLD VENIPUNCTURE: CPT

## 2019-11-20 RX ORDER — AMLODIPINE BESYLATE 10 MG/1
10 TABLET ORAL DAILY
COMMUNITY
End: 2020-01-22 | Stop reason: SDUPTHER

## 2019-11-20 RX ORDER — DULOXETIN HYDROCHLORIDE 20 MG/1
20 CAPSULE, DELAYED RELEASE ORAL 2 TIMES DAILY
Qty: 60 CAPSULE | Refills: 4 | Status: ON HOLD | OUTPATIENT
Start: 2019-11-20 | End: 2021-09-02 | Stop reason: HOSPADM

## 2019-11-20 RX ORDER — UBIDECARENONE 75 MG
500 CAPSULE ORAL DAILY
Qty: 30 TABLET | Refills: 4 | Status: SHIPPED | OUTPATIENT
Start: 2019-11-20

## 2019-11-20 NOTE — PROGRESS NOTES
Neurology Clinic Visit  Primary Care Provider: Destiny Villa MD   Referring Provider: No ref. provider found   Date of Visit: 11/20/2019       chief complaint:   Chief Complaint   Patient presents with    Follow-up       Interval history  Patient is here for follow-up on peripheral neuropathy and lumbar sacral radiculopathy.  She recently had EMG nerve conduction study which showed peripheral neuropathy in El 5 S1 radiculopathy on the left.  She is much improved on the Cymbalta 20 mg twice a day for anxiety as well as for her neuropathic pain.  She does still report difficulty with sleeping at night but states that she primarily sleeps 6 hr in the day and then she is awake all night.     Interval history 10/8/2019  Patient is here for follow-up on anxiety and left leg paresthesias.  Cymbalta was recommended last visit a therefore she visited her PCP who started the medication.  She is currently on 20 mg daily.  She reports that her anxiety is much improved and she is no longer having any bouts of panic. She has not noticed much significant improvement in her left leg pain that she states goes from the back of her left posterior leg down into her foot.  She still reports difficulty with sleep but takes Ambien sometimes which helps however she has noticed that when she takes Ambien her family tells her that she is more forgetful.     Interval history 08/27/2019  Patient is here for followup. Reports pain is better on gabapentin 300mg qhs. However, she reports she has significant anxiety and also feels that sometimes she panics. She was on Cymbalta in the past and states her symptoms were very well control when she was on that. She felt like a completely new person but she has not had cymbalta since change her PCP. Most recent PCP prescribed vistaril but she does not feel like this helps her anxiety.      History of Present Illness 7/18/2019  Paige Summers is a 68 y.o. right handed female I have been asked to  consult for evaluation of paresthesias in left leg. She reports paresthesia in left lateral leg that went from back to foot about 1 year ago. She reports she was told she had sciatica. However, after her transplant in 4/2019 she noticed digits 4-5 and heel on left were numb. She denies lower back pain. She was started on prednisone for her kidney transplant which seems to eased the pain but she still has an uncomfortable feeling on bottom of left foot. She was given a trial of gabapentin in the past but she is unsure if it helped.            Patient Active Problem List    Diagnosis Date Noted    CKD (chronic kidney disease) stage 3, GFR 30-59 ml/min 06/18/2019    Sleep disturbance 06/03/2019    Peripheral neuropathy 06/03/2019    Hypophosphatemia 04/10/2019    Essential hypertension     Presence of surgical incision 04/09/2019    S/P living-donor kidney transplantation     Prophylactic immunotherapy     Long-term use of immunosuppressant medication     At risk for opportunistic infections     Anemia of renal disease     Secondary hyperparathyroidism of renal origin     Antiphospholipid antibody positive 12/05/2018    Mechanical complication of vascular device 09/12/2018     Past Medical History:   Diagnosis Date    Anemia     Anemia of renal disease     Anxiety     Chronic renal failure 01/10/2018    CKD (chronic kidney disease) stage 3, GFR 30-59 ml/min 6/18/2019    Depression     Essential hypertension     GERD (gastroesophageal reflux disease)     Gout     H pylori ulcer     Hyperlipidemia     Hypertension     Obesity     Secondary hyperparathyroidism of renal origin      Past Surgical History:   Procedure Laterality Date    DECLOTTING OF ARTERIOVENOUS GRAFT Left 2/19/2019    Procedure: declot;  Surgeon: Yuriy Gibson MD;  Location: [facility] CATH LAB;  Service: Nephrology;  Laterality: Left;    DIAGNOSTIC ULTRASOUND Left 6/18/2018    Procedure: ULTRASOUND, DIAGNOSTIC;  Surgeon: Yuriy  MD Paige;  Location: [facility] CATH LAB;  Service: Nephrology;  Laterality: Left;    DIAGNOSTIC ULTRASOUND Left 7/9/2018    Procedure: ULTRASOUND, DIAGNOSTIC;  Surgeon: Yuriy Gibson MD;  Location: [facility] CATH LAB;  Service: Nephrology;  Laterality: Left;    DIAGNOSTIC ULTRASOUND Left 8/6/2018    Procedure: ULTRASOUND, DIAGNOSTIC;  Surgeon: Yuriy Gibson MD;  Location: [facility] CATH LAB;  Service: Nephrology;  Laterality: Left;    DIAGNOSTIC ULTRASOUND Left 8/20/2018    Procedure: ULTRASOUND, DIAGNOSTIC;  Surgeon: Yuriy Gibson MD;  Location: [facility] CATH LAB;  Service: Nephrology;  Laterality: Left;    DIAGNOSTIC ULTRASOUND Left 9/19/2018    Procedure: ULTRASOUND, DIAGNOSTIC;  Surgeon: Yuriy Gibson MD;  Location: [facility] CATH LAB;  Service: Nephrology;  Laterality: Left;  suture removal. coming for 10AM    DIAGNOSTIC ULTRASOUND Left 10/10/2018    Procedure: ULTRASOUND, DIAGNOSTIC;  Surgeon: Yuriy Gibson MD;  Location: [facility] CATH LAB;  Service: Nephrology;  Laterality: Left;    DIALYSIS FISTULA CREATION Left 02/2018    FISTULOGRAM Left 12/19/2018    Procedure: FISTULOGRAM;  Surgeon: Javed Lopez MD;  Location: [facility] CATH LAB;  Service: Nephrology;  Laterality: Left;  f/u 1week    FISTULOGRAM Left 3/13/2019    Procedure: FISTULOGRAM;  Surgeon: Irineo Devlin MD;  Location: [facility] CATH LAB;  Service: Nephrology;  Laterality: Left;    HYSTERECTOMY  1971    TVH    KIDNEY TRANSPLANT N/A 4/8/2019    Procedure: TRANSPLANT, KIDNEY;  Surgeon: Mathew Goodwin MD;  Location: [facility] OR [address] [place] [address];  Service: Transplant;  Laterality: N/A;    THROMBECTOMY OF HEMODIALYSIS ACCESS SITE Left 6/11/2018    Procedure: THROMBECTOMY, HEMODIALYSIS GRAFT OR FISTULA;  Surgeon: Yuriy Gibson MD;  Location: [facility] CATH LAB;  Service: Nephrology;  Laterality: Left;    THROMBECTOMY OF HEMODIALYSIS ACCESS SITE Left 6/13/2018    Procedure: THROMBECTOMY, HEMODIALYSIS GRAFT OR FISTULA;  Surgeon: Yuriy Gibson MD;  Location: [facility] CATH LAB;   Service: Nephrology;  Laterality: Left;    THROMBECTOMY OF HEMODIALYSIS ACCESS SITE N/A 9/6/2018    Procedure: THROMBECTOMY, HEMODIALYSIS GRAFT OR FISTULA;  Surgeon: Yuriy Gibson MD;  Location: [facility] CATH LAB;  Service: Nephrology;  Laterality: N/A;  PT coming in for noon says shes NPO    THROMBECTOMY OF HEMODIALYSIS ACCESS SITE Left 9/12/2018    Procedure: THROMBECTOMY, HEMODIALYSIS GRAFT OR FISTULA;  Surgeon: Yuriy Gibson MD;  Location: [facility] CATH LAB;  Service: Nephrology;  Laterality: Left;    THROMBECTOMY OF HEMODIALYSIS ACCESS SITE Left 11/9/2018    Procedure: THROMBECTOMY, HEMODIALYSIS GRAFT OR FISTULA;  Surgeon: Yuriy Gibson MD;  Location: [facility] CATH LAB;  Service: Nephrology;  Laterality: Left;    THROMBECTOMY OF HEMODIALYSIS ACCESS SITE Left 12/13/2018    Procedure: THROMBECTOMY, HEMODIALYSIS GRAFT OR FISTULA;  Surgeon: Javed Lopez MD;  Location: [facility] CATH LAB;  Service: Nephrology;  Laterality: Left;     Family History   Problem Relation Age of Onset    Alcohol abuse Mother     No Known Problems Father     Hypertension Sister     Arthritis Sister     Heart disease Brother     Heart failure Brother     Heart failure Brother     Diabetes Cousin     Breast cancer Neg Hx     Colon cancer Neg Hx     Ovarian cancer Neg Hx          Current Outpatient Medications   Medication Sig    acetaminophen (TYLENOL) 325 mg Cap Take by mouth.    amLODIPine (NORVASC) 10 MG tablet Take 10 mg by mouth once daily.    bisacodyl (DULCOLAX) 5 mg EC tablet Take 2 tablets (10 mg total) by mouth daily as needed for Constipation.    carvedilol (COREG) 12.5 MG tablet Take 2 tablets (25 mg total) by mouth 2 (two) times daily with meals.    cinacalcet (SENSIPAR) 30 MG Tab Take 1 tablet (30 mg total) by mouth daily with breakfast.    cloNIDine (CATAPRES) 0.1 MG tablet Take 1 tablet (0.1 mg total) by mouth 3 (three) times daily.    docusate sodium (COLACE) 100 MG capsule Take 1 capsule (100 mg total) by mouth  2 (two) times daily as needed for Constipation.    DULoxetine (CYMBALTA) 20 MG capsule Take 1 capsule (20 mg total) by mouth 2 (two) times daily.    hydrOXYzine pamoate (VISTARIL) 25 MG Cap Take 1 capsule (25 mg total) by mouth every 8 (eight) hours.    linaCLOtide (LINZESS) 145 mcg Cap capsule Take 1 capsule (145 mcg total) by mouth once daily.    magnesium oxide (MAG-OX) 400 mg (241.3 mg magnesium) tablet Take 2 tablets (800 mg total) by mouth 2 (two) times daily.    multivitamin (THERAGRAN) tablet Take 1 tablet by mouth once daily.    mycophenolate (CELLCEPT) 250 mg Cap Take 2 capsules (500 mg total) by mouth 2 (two) times daily.    sodium bicarbonate 650 MG tablet Take 1 tablet (650 mg total) by mouth 2 (two) times daily.    sulfamethoxazole-trimethoprim 400-80mg (BACTRIM,SEPTRA) 400-80 mg per tablet Take 1 tablet by mouth once daily. Stop: 4/7/2020    tacrolimus (PROGRAF) 1 MG Cap Take 3 capsules (3 mg total) by mouth every morning AND 2 capsules (2 mg total) every evening.    traMADol (ULTRAM) 50 mg tablet Take 1 tablet (50 mg total) by mouth every 8 (eight) hours as needed for Pain.    betamethasone valerate 0.1% (VALISONE) 0.1 % Lotn Apply topically 2 (two) times daily. for 14 days    cyanocobalamin (VITAMIN B-12) 500 MCG tablet Take 1 tablet (500 mcg total) by mouth once daily.     No current facility-administered medications for this visit.        Review of patient's allergies indicates:   Allergen Reactions    Oxycodone Itching    Hydrocodone Itching     Social History     Socioeconomic History    Marital status:      Spouse name: Not on file    Number of children: Not on file    Years of education: Not on file    Highest education level: Not on file   Occupational History    Not on file   Social Needs    Financial resource strain: Not on file    Food insecurity:     Worry: Not on file     Inability: Not on file    Transportation needs:     Medical: Not on file      "Non-medical: Not on file   Tobacco Use    Smoking status: Never Smoker    Smokeless tobacco: Never Used   Substance and Sexual Activity    Alcohol use: No    Drug use: No    Sexual activity: Never     Partners: Male     Comment: Celibate since 2010   Lifestyle    Physical activity:     Days per week: Not on file     Minutes per session: Not on file    Stress: Not on file   Relationships    Social connections:     Talks on phone: Not on file     Gets together: Not on file     Attends Confucianism service: Not on file     Active member of club or organization: Not on file     Attends meetings of clubs or organizations: Not on file     Relationship status: Not on file   Other Topics Concern    Not on file   Social History Narrative    Not on file       Review of Systems    Constitutional: negative  Eyes: negative  Ears, nose, mouth, throat, and face: negative  Respiratory: negative  Cardiovascular: negative  Gastrointestinal: negative  Genitourinary:negative  Integument/breast: negative  Hematologic/lymphatic: negative  Musculoskeletal:negative  Neurological: negative  Behavioral/Psych: negative  Endocrine: negative  Allergic/Immunologic: negative    Objective:  Vital signs in last 24 hours:    Vitals:    11/20/19 1015   BP: (!) 142/79   Pulse: 81   Weight: 79.3 kg (174 lb 13.2 oz)   Height: 5' 3" (1.6 m)       Body mass index is 30.97 kg/m².     General: no acute distress, well nourished, well-groomed  CVS: RRR, no murmur, gallops or rubs  Respiratory: Clear to ausculation  Extremities: no edema     Neurological Examination:     HIGHER INTEGRATIVE FUNCTIONS:  -Normal attention span and concentration; immediately responds to questions and commands  -Oriented to time, place and person  -Recent and remote memory intact  -Language normal (no aphasia or dysarthria)  -Normal fund of knowledge     CN:  -PERRLA, visual fields full, unable to visualize optic discs due to small pupils on fundus exam   -EOMI with normal " saccades and smooth pursuit  -Facial sensation intact bilaterally  -Facial strength/movement intact bilaterally  -Hearing intact to voice  -Palate elevates symmetrically  -Normal shoulder shrug and head turn  -Tongue protrudes midline     MOTOR: (left/right graded 1-5)  -UE: 5/5 deltoids; 5/5 biceps, triceps; 5/5 wrist flexors, extensors; 5/5 interosseous; 5/5   -LEs: 5/5 hip flexion, extension; 5/5 knee flexion, extension; 5/5 ankle flexion, extension  -Tone: normal  -No pronator drift, no orbiting     SENSORY:  -Light touch, temperature sensation intact bilaterally; mild decrease vibration in left foot>right     REFLEXES:  -2+ upper and lower bilaterally; absent reflex in left ankle  -Flexor plantar reflex bilaterally  -No clonus     COORDINATION:  -FNF, HKS, LESLI intact bilaterally     GAIT:  -Normal casual gait      Imaging  X-ray lumbar spine:  July 18, 2019  FINDINGS:  Stable grade 1 anterolisthesis of L4 on L5.  No evidence of dynamic instability.  No evidence of pars defects.  Vacuum disc phenomena at L5-S1.  Multilevel intervertebral disc space height loss most severe at L4-L5 and L5-S1.  Multilevel degenerative endplate changes.  Lower lumbar facet arthrosis.  No acute, displaced fracture or aggressive osseous abnormality.  Vertebral body heights are maintained.  Soft tissues are unremarkable.       Impression         Multilevel lumbar spondylosis with stable grade 1 anterolisthesis of L4 on L5.            Assessment/Plan:     1. Left lumbosacral radiculopathy  2. S/p kidney transplant, per transplant team  3. Anxiety, much improved  4.  Peripheral neuropathy  5.  Insomnia     Plan:  Continue Cymbalta  20 mg b.i.d. for neuropathic pain as well as for anxiety.  Side effects were discussed with the patient. She understood.  Will obtain neuropathy labs.  Will start vitamin B12 500 mcg daily.  Referral to physical therapy  Referral to Sleep disorders Clinic for insomnia.  Referral to Neurology has been  placed transition care.     I discussed assessment and plan with patient and answered the questions that she had.      Part of patient note might have been created using Dragon Dictation.  Any errors in syntax or even information may not have been identified and edited on initial review prior to signing this note.

## 2019-11-21 LAB
ALBUMIN SERPL ELPH-MCNC: 3.93 G/DL (ref 3.35–5.55)
ALPHA1 GLOB SERPL ELPH-MCNC: 0.3 G/DL (ref 0.17–0.41)
ALPHA2 GLOB SERPL ELPH-MCNC: 0.79 G/DL (ref 0.43–0.99)
B-GLOBULIN SERPL ELPH-MCNC: 0.71 G/DL (ref 0.5–1.1)
GAMMA GLOB SERPL ELPH-MCNC: 0.96 G/DL (ref 0.67–1.58)
INTERPRETATION SERPL IFE-IMP: NORMAL
PROT SERPL-MCNC: 6.7 G/DL (ref 6–8.4)

## 2019-11-22 LAB
PATHOLOGIST INTERPRETATION IFE: NORMAL
PATHOLOGIST INTERPRETATION SPE: NORMAL

## 2019-11-23 LAB — VIT B1 BLD-MCNC: 53 UG/L (ref 38–122)

## 2019-12-03 ENCOUNTER — LAB VISIT (OUTPATIENT)
Dept: LAB | Facility: HOSPITAL | Age: 69
End: 2019-12-03
Attending: INTERNAL MEDICINE
Payer: MEDICARE

## 2019-12-03 DIAGNOSIS — Z94.0 KIDNEY REPLACED BY TRANSPLANT: ICD-10-CM

## 2019-12-03 LAB
ALBUMIN SERPL BCP-MCNC: 3.6 G/DL (ref 3.5–5.2)
ANION GAP SERPL CALC-SCNC: 8 MMOL/L (ref 8–16)
BASOPHILS # BLD AUTO: 0.03 K/UL (ref 0–0.2)
BASOPHILS NFR BLD: 0.6 % (ref 0–1.9)
BUN SERPL-MCNC: 20 MG/DL (ref 8–23)
CALCIUM SERPL-MCNC: 9.1 MG/DL (ref 8.7–10.5)
CHLORIDE SERPL-SCNC: 109 MMOL/L (ref 95–110)
CO2 SERPL-SCNC: 25 MMOL/L (ref 23–29)
CREAT SERPL-MCNC: 1.3 MG/DL (ref 0.5–1.4)
DIFFERENTIAL METHOD: ABNORMAL
EOSINOPHIL # BLD AUTO: 0.2 K/UL (ref 0–0.5)
EOSINOPHIL NFR BLD: 3.4 % (ref 0–8)
ERYTHROCYTE [DISTWIDTH] IN BLOOD BY AUTOMATED COUNT: 14 % (ref 11.5–14.5)
EST. GFR  (AFRICAN AMERICAN): 48.4 ML/MIN/1.73 M^2
EST. GFR  (NON AFRICAN AMERICAN): 42 ML/MIN/1.73 M^2
GLUCOSE SERPL-MCNC: 111 MG/DL (ref 70–110)
HCT VFR BLD AUTO: 36.9 % (ref 37–48.5)
HGB BLD-MCNC: 11.8 G/DL (ref 12–16)
IMM GRANULOCYTES # BLD AUTO: 0.05 K/UL (ref 0–0.04)
IMM GRANULOCYTES NFR BLD AUTO: 0.9 % (ref 0–0.5)
LYMPHOCYTES # BLD AUTO: 0.7 K/UL (ref 1–4.8)
LYMPHOCYTES NFR BLD: 13.7 % (ref 18–48)
MAGNESIUM SERPL-MCNC: 1.4 MG/DL (ref 1.6–2.6)
MCH RBC QN AUTO: 28.7 PG (ref 27–31)
MCHC RBC AUTO-ENTMCNC: 32 G/DL (ref 32–36)
MCV RBC AUTO: 90 FL (ref 82–98)
MONOCYTES # BLD AUTO: 0.5 K/UL (ref 0.3–1)
MONOCYTES NFR BLD: 9.8 % (ref 4–15)
NEUTROPHILS # BLD AUTO: 3.8 K/UL (ref 1.8–7.7)
NEUTROPHILS NFR BLD: 71.6 % (ref 38–73)
NRBC BLD-RTO: 0 /100 WBC
PHOSPHATE SERPL-MCNC: 3.1 MG/DL (ref 2.7–4.5)
PLATELET # BLD AUTO: 287 K/UL (ref 150–350)
PMV BLD AUTO: 9.8 FL (ref 9.2–12.9)
POTASSIUM SERPL-SCNC: 4 MMOL/L (ref 3.5–5.1)
RBC # BLD AUTO: 4.11 M/UL (ref 4–5.4)
SODIUM SERPL-SCNC: 142 MMOL/L (ref 136–145)
TACROLIMUS BLD-MCNC: 6.7 NG/ML (ref 5–15)
WBC # BLD AUTO: 5.32 K/UL (ref 3.9–12.7)

## 2019-12-03 PROCEDURE — 36415 COLL VENOUS BLD VENIPUNCTURE: CPT

## 2019-12-03 PROCEDURE — 83735 ASSAY OF MAGNESIUM: CPT

## 2019-12-03 PROCEDURE — 85025 COMPLETE CBC W/AUTO DIFF WBC: CPT

## 2019-12-03 PROCEDURE — 80197 ASSAY OF TACROLIMUS: CPT

## 2019-12-03 PROCEDURE — 80069 RENAL FUNCTION PANEL: CPT

## 2019-12-06 ENCOUNTER — TELEPHONE (OUTPATIENT)
Dept: TRANSPLANT | Facility: CLINIC | Age: 69
End: 2019-12-06

## 2019-12-06 NOTE — TELEPHONE ENCOUNTER
Call placed, labs reviewed by Dr. Guevara with no changes. Patient states understanding. Patient request letter to clear her from jury duty. Renal transplant stable with no documented transplant related indications to warrant dismissal. Patient will speak to Primary care for letter.

## 2019-12-09 ENCOUNTER — TELEPHONE (OUTPATIENT)
Dept: TRANSPLANT | Facility: CLINIC | Age: 69
End: 2019-12-09

## 2019-12-09 ENCOUNTER — CLINICAL SUPPORT (OUTPATIENT)
Dept: REHABILITATION | Facility: HOSPITAL | Age: 69
End: 2019-12-09
Attending: PSYCHIATRY & NEUROLOGY
Payer: MEDICARE

## 2019-12-09 DIAGNOSIS — M62.81 MUSCLE WEAKNESS: ICD-10-CM

## 2019-12-09 DIAGNOSIS — M79.605 LEFT LEG PAIN: ICD-10-CM

## 2019-12-09 DIAGNOSIS — R26.89 DECREASED SPINAL MOBILITY: ICD-10-CM

## 2019-12-09 DIAGNOSIS — Z74.09 IMPAIRED FUNCTIONAL MOBILITY AND ACTIVITY TOLERANCE: ICD-10-CM

## 2019-12-09 PROCEDURE — 97110 THERAPEUTIC EXERCISES: CPT | Mod: PO

## 2019-12-09 PROCEDURE — 97161 PT EVAL LOW COMPLEX 20 MIN: CPT | Mod: PO

## 2019-12-09 NOTE — PLAN OF CARE
"[facility]  Physical Therapy Initial Evaluation    Name: Paige Summers  Clinic Number: [id number]    Therapy Diagnosis:   Encounter Diagnoses   Name Primary?    Muscle weakness     Decreased spinal mobility     Impaired functional mobility and activity tolerance     Left leg pain      Physician: Medardo Leslie MD    Physician Orders: PT Eval and Treat   Medical Diagnosis from Referral: Lumbosacral radiculopathy  Evaluation Date: 12/9/2019  Authorization Period Expiration: 11/19/20  Plan of Care Expiration: 2/7/20  Visit # / Visits authorized: 1/ 1    Time In: 9:20 AM (pt with late arrival)  Time Out: 10:04 AM  Total Billable Time: 44 minutes    Precautions: Standard, Fall and organ transplant    Subjective   Date of onset: April 2019  History of current condition - Paige reports: "I'm here for my left leg." pt states she was told that she has pain due to her sciatic nerve on the left side. Pt states symptoms started around the same time as her kidney transplant. Pt had B kidney transplant on April 8th, 2019.  Pt states she tried coming to PT in the past, but her pain was worse then, and she couldn't come back after the evaluation. Pt reports that she was told she also has neuropathy in her left foot.      Medical History:   Past Medical History:   Diagnosis Date    Anemia     Anemia of renal disease     Anxiety     Chronic renal failure 01/10/2018    CKD (chronic kidney disease) stage 3, GFR 30-59 ml/min 6/18/2019    Depression     Essential hypertension     GERD (gastroesophageal reflux disease)     Gout     H pylori ulcer     Hyperlipidemia     Hypertension     Obesity     Secondary hyperparathyroidism of renal origin        Surgical History:   Paige Summers  has a past surgical history that includes Dialysis fistula creation (Left, 02/2018); Thrombectomy of hemodialysis access site (Left, 6/11/2018); Thrombectomy of hemodialysis access site (Left, " "6/13/2018); Diagnostic ultrasound (Left, 6/18/2018); Diagnostic ultrasound (Left, 7/9/2018); Diagnostic ultrasound (Left, 8/6/2018); Hysterectomy (1971); Diagnostic ultrasound (Left, 8/20/2018); Thrombectomy of hemodialysis access site (N/A, 9/6/2018); Thrombectomy of hemodialysis access site (Left, 9/12/2018); Diagnostic ultrasound (Left, 9/19/2018); Diagnostic ultrasound (Left, 10/10/2018); Thrombectomy of hemodialysis access site (Left, 11/9/2018); Thrombectomy of hemodialysis access site (Left, 12/13/2018); Fistulogram (Left, 12/19/2018); Declotting of arteriovenous graft (Left, 2/19/2019); Fistulogram (Left, 3/13/2019); and Kidney transplant (N/A, 4/8/2019).    Medications:   Paige has a current medication list which includes the following prescription(s): acetaminophen, amlodipine, betamethasone valerate 0.1%, bisacodyl, carvedilol, cinacalcet, clonidine, cyanocobalamin, docusate sodium, duloxetine, hydroxyzine pamoate, linaclotide, magnesium oxide, multivitamin, mycophenolate, sodium bicarbonate, sulfamethoxazole-trimethoprim 400-80mg, tacrolimus, and tramadol.    Allergies:   Review of patient's allergies indicates:   Allergen Reactions    Oxycodone Itching    Hydrocodone Itching        Imaging, bone scan films: Multilevel lumbar spondylosis with stable grade 1 anterolisthesis of L4 on L5.    Prior Therapy: one PT eval in October  Social History: pt lives with their family (her 9 y.o. Daughter) in a house. Pt lives in a private home, 4 steps to enter, + handrail on one side. Pt has a Rollator, quad cane, shower chair, elevated toilet seat.   Occupation: Retired  Prior Level of Function: has been using the walker x 2 years  Current Level of Function: feels like she has to pull herself up the stairs. Sometimes has to use the cane in the house. Pt uses the Rollator and cane in the community.  Denies falls. Endorses left leg pain, denies low back pain. Pt states walking aggravates her symptoms. "If I go grocery " "shopping, and put the groceries away, I can't do anything the next day." pt states she doesn't stand much- can stand 30-45min. Difficult time getting up from low surface or into her car    Pain:  Current 3/10, worst 7/10, best 3/10   Location: left leg (seat, back of thigh and just below knee). Pt endorses numbness in L foot and heel   Description: Uncomfortable  Aggravating Factors: Standing and Walking  Easing Factors: nothing    Pts goals: to have less pain and improved mobility    Objective     Observation: pt received amb independently. Pt with significant B trendelenburg gait, increased JOSE    Posture:  Rounded shoulders, forward head    Lumbar Range of Motion:    Degrees Pain   Flexion 1/2 range   pull        Extension 1/2 range   uncomfortable        Left Side Bending 1/2 range no        Right Side Bending 1/2 range no        Left rotation   1/2 range yes        Right Rotation   1/2 range  yes             Lower Extremity Strength  Right LE  Left LE    Knee extension: 5/5 Knee extension: 5/5   Knee flexion: 5/5 Knee flexion: 3-/5   Hip flexion: 4-/5 Hip flexion: 4+/5*   Hip extension:  3+/5 Hip extension: 3/5   Hip abduction: 5/5 Hip abduction: 3+/5   Hip adduction: 3-/5 Hip adduction 3-/5   Ankle dorsiflexion: 5/5 Ankle dorsiflexion: 5/5   Ankle plantarflexion: 5/5 Ankle plantarflexion: 4+/5       Joint Mobility: hypomobile t/o spine    Palpation: some tenderness to palpation noted across her low back    Sensation: light touch intact, but endorses neuropathy in L foot and heel    Flexibility: TBA         TREATMENT   Treatment Time In: 10:56 AM  Treatment Time Out: 11:04 AM  Total Treatment time separate from Evaluation: 8 minutes    Paige received therapeutic exercises to develop strength, endurance, ROM, posture and core stabilization for 8 minutes including:  Posterior pelvic tilt, 10 x 3s holds  Hip ADD squeeze 10 x 3s  Hip ABD in hook-lying, orange band, 10 reps x 3s      Home Exercises and Patient " Education Provided    Education provided:   - HEP, POC, scheduling    Written Home Exercises Provided: yes.  Exercises were reviewed and Paige was able to demonstrate them prior to the end of the session.  Paige demonstrated good  understanding of the education provided.     See EMR under Patient Instructions for exercises provided 12/9/2019.    Assessment   Paige is a 68 y.o. female referred to outpatient Physical Therapy with a medical diagnosis of Lumbosacral radiculopathy. Pt presents with left leg pain, decreased strength, poor posture, decreased ROM/decreased spinal mobility, impaired gait, decreased endurance, impaired functional mobility, and decreased activity tolerance      Pt prognosis is Good.   Pt will benefit from skilled outpatient Physical Therapy to address the deficits stated above and in the chart below, provide pt/family education, and to maximize pt's level of independence.     Plan of care discussed with patient: Yes  Pt's spiritual, cultural and educational needs considered and patient is agreeable to the plan of care and goals as stated below:     Anticipated Barriers for therapy: chronicity of symptoms    Medical Necessity is demonstrated by the following  History  Co-morbidities and personal factors that may impact the plan of care Co-morbidities:   high BMI and s/p B kidney transplant    Personal Factors:   no deficits     moderate   Examination  Body Structures and Functions, activity limitations and participation restrictions that may impact the plan of care Body Regions:   back  lower extremities  trunk    Body Systems:    ROM  strength  balance  gait  transfers  transitions  posture, endurance    Participation Restrictions:   None anticipated    Activity limitations:   Learning and applying knowledge  no deficits    General Tasks and Commands  no deficits    Communication  no deficits    Mobility  lifting and carrying objects  walking    Self care  no deficits    Domestic  Life  shopping  cooking  doing house work (cleaning house, washing dishes, laundry)    Interactions/Relationships  no deficits    Life Areas  no deficits    Community and Social Life  community life         high   Clinical Presentation stable and uncomplicated low   Decision Making/ Complexity Score: low     Goals:  Short Term Goals: 4 weeks   1. Pt will tolerate HEP for improved strength, functional mobility, ROM, posture, and endurance. (progressing, not met)  2. Pt will report reduced left leg pain to </= 5/10 at worst for improved functional mobility and ability to participate in functional activities/ADL's. (progressing, not met)  3. Pt will demo >/= 4-/5 strength in LLE for improved functional mobility, endurance, and posture. (progressing, not met)  4. Pt will demo >/= 4/5 strength in RLE for improved functional mobility, endurance, and posture. (progressing, not met)  5. Pt will perform spinal ROM in all directions without pain for improved functional mobility and posture. (progressing, not met)  6. Pt will report being able to get in and out of car or low chair with improved ease for increased functional mobility (progressing, not met)      Long Term Goals: 8 weeks   1 Pt will be I with updated HEP for improved functional mobility, posture, strength, and endurance. (progressing, not met)  2. Pt will report reduced left leg pain to </= 3/10 at worst for improved functional mobility and ability to participate in functional activities/ADL's. (progressing, not met)  3. Pt will demo 5/5 strength in BLE's for improved functional mobility, endurance, and posture. (progressing, not met)  4. Pt will report being able to go to grocery store and put away groceries without increase in symptoms for improved functional mobility (progressing, not met)  5. Pt will demo >/= 3/4 range spinal AROM in all directions for improved functional mobility and posture. (progressing, not met)  6. Pt will report being able to stand for  >/=75min without increase in symptoms for increased endurance. (progressing, not met)    Plan   Plan of care Certification: 12/9/2019 to 2/7/20.    Outpatient Physical Therapy 2 times weekly for 8 weeks to include the following interventions: Gait Training, Manual Therapy, Moist Heat/ Ice, Neuromuscular Re-ed, Patient Education, Self Care, Therapeutic Activites, Therapeutic Exercise, Ultrasound and modalities as appropriate.     Yael Wang, PT

## 2019-12-09 NOTE — Clinical Note
Plan- stop norvasc- call for BP readings in few days- stop pepcid- decrease NaHCO3 to 650 mg BID- PTH and vitamin D with next lab
Detail Level: Detailed

## 2019-12-11 ENCOUNTER — TELEPHONE (OUTPATIENT)
Dept: NEUROLOGY | Facility: HOSPITAL | Age: 69
End: 2019-12-11

## 2019-12-11 DIAGNOSIS — R29.898 RIGHT HAND WEAKNESS: Primary | ICD-10-CM

## 2019-12-19 ENCOUNTER — CLINICAL SUPPORT (OUTPATIENT)
Dept: REHABILITATION | Facility: HOSPITAL | Age: 69
End: 2019-12-19
Attending: PSYCHIATRY & NEUROLOGY
Payer: MEDICARE

## 2019-12-19 DIAGNOSIS — Z74.09 IMPAIRED FUNCTIONAL MOBILITY AND ACTIVITY TOLERANCE: ICD-10-CM

## 2019-12-19 DIAGNOSIS — M79.605 LEFT LEG PAIN: ICD-10-CM

## 2019-12-19 DIAGNOSIS — M62.81 MUSCLE WEAKNESS: ICD-10-CM

## 2019-12-19 DIAGNOSIS — R26.89 DECREASED SPINAL MOBILITY: ICD-10-CM

## 2019-12-19 PROCEDURE — 97110 THERAPEUTIC EXERCISES: CPT | Mod: PO

## 2019-12-19 NOTE — PROGRESS NOTES
"  Physical Therapy Daily Treatment Note     Name: Paige Powell [place]  Clinic Number: [id number]    Therapy Diagnosis:   Encounter Diagnoses   Name Primary?    Muscle weakness     Decreased spinal mobility     Impaired functional mobility and activity tolerance     Left leg pain      Physician: Medardo Leslie MD    Visit Date: 12/19/2019    Physician Orders: PT Eval and Treat   Medical Diagnosis from Referral: Lumbosacral radiculopathy  Evaluation Date: 12/9/2019  Authorization Period Expiration: 11/19/20  Plan of Care Expiration: 2/7/20  Visit # / Visits authorized: 2 (1/5 authorized)    Time In: 11:07  Time Out: 12:00  Total Billable Time: 53 minutes    Precautions: Standard, Fall and organ transplant    Subjective     Pt reports: She's feeling a little better. "I've been trying to do the exercises." Pt states she sees OT on 12/31/19.  She was compliant with home exercise program.  Response to previous treatment: no adverse reaction  Functional change: none stated    Pain: 0/10  Location: N/A     Objective     Paige received therapeutic exercises to develop strength, endurance, ROM, posture and core stabilization for 53 minutes including:  Posterior pelvic tilt, 20 x 3s holds  Supine marching, 2 x 10  Hip ADD squeeze 20 x 3s  Lower trunk rotation, 10 reps B  Hip ABD in hook-lying, orange band, 20 reps x 3s  Double knees to chest with physioball, 2 x 10 reps  Bridge with ball,  15 reps   Hamstring stretch, 2 x 30s/side  Figure-4 stretch, 2 x 30s/side  Clamshell, 2 x 10/side, side-lying  Open book with maroon bolster under LE, 15 reps/side  Standing hip ext, 2 x 10 reps  Sit <> stands, 2 x 10 from gold chair    Home Exercises Provided and Patient Education Provided     Education provided:   - exercise technique, scheduling    Written Home Exercises Provided: Patient instructed to cont prior HEP.  Exercises were reviewed and Paige was able to demonstrate them prior to the end of the session.  Paige demonstrated " good  understanding of the education provided.     See EMR under Patient Instructions for exercises provided prior visit.    Assessment     Paige tolerated 1st follow-up well. She reports less pain overall, and she was able to complete all exercises without pain. Pt does need some cues for proper technique. Will progress as tolerated.  Paige is progressing well towards her goals.   Pt prognosis is Good.     Pt will continue to benefit from skilled outpatient physical therapy to address the deficits listed in the problem list box on initial evaluation, provide pt/family education and to maximize pt's level of independence in the home and community environment.     Pt's spiritual, cultural and educational needs considered and pt agreeable to plan of care and goals.     Anticipated barriers to physical therapy: chronicity of symptoms    Goals:  Short Term Goals: 4 weeks   1. Pt will tolerate HEP for improved strength, functional mobility, ROM, posture, and endurance. (progressing, not met)  2. Pt will report reduced left leg pain to </= 5/10 at worst for improved functional mobility and ability to participate in functional activities/ADL's. (progressing, not met)  3. Pt will demo >/= 4-/5 strength in LLE for improved functional mobility, endurance, and posture. (progressing, not met)  4. Pt will demo >/= 4/5 strength in RLE for improved functional mobility, endurance, and posture. (progressing, not met)  5. Pt will perform spinal ROM in all directions without pain for improved functional mobility and posture. (progressing, not met)  6. Pt will report being able to get in and out of car or low chair with improved ease for increased functional mobility (progressing, not met)        Long Term Goals: 8 weeks   1 Pt will be I with updated HEP for improved functional mobility, posture, strength, and endurance. (progressing, not met)  2. Pt will report reduced left leg pain to </= 3/10 at worst for improved functional mobility  and ability to participate in functional activities/ADL's. (progressing, not met)  3. Pt will demo 5/5 strength in BLE's for improved functional mobility, endurance, and posture. (progressing, not met)  4. Pt will report being able to go to grocery store and put away groceries without increase in symptoms for improved functional mobility (progressing, not met)  5. Pt will demo >/= 3/4 range spinal AROM in all directions for improved functional mobility and posture. (progressing, not met)  6. Pt will report being able to stand for >/=75min without increase in symptoms for increased endurance. (progressing, not met)     Plan   Plan of care Certification: 12/9/2019 to 2/7/20.     Outpatient Physical Therapy 2 times weekly for 8 weeks to include the following interventions: Gait Training, Manual Therapy, Moist Heat/ Ice, Neuromuscular Re-ed, Patient Education, Self Care, Therapeutic Activites, Therapeutic Exercise, Ultrasound and modalities as appropriate.       Yael Wang, PT

## 2019-12-21 NOTE — ASSESSMENT & PLAN NOTE
- 9.1, .  Sensipar started.       Bilobed Flap Text: The defect edges were debeveled with a #15 scalpel blade.  Given the location of the defect and the proximity to free margins a bilobe flap was deemed most appropriate.  Using a sterile surgical marker, an appropriate bilobe flap drawn around the defect.    The area thus outlined was incised deep to adipose tissue with a #15 scalpel blade.  The skin margins were undermined to an appropriate distance in all directions utilizing iris scissors.

## 2019-12-31 ENCOUNTER — CLINICAL SUPPORT (OUTPATIENT)
Dept: REHABILITATION | Facility: HOSPITAL | Age: 69
End: 2019-12-31
Attending: PSYCHIATRY & NEUROLOGY
Payer: MEDICARE

## 2019-12-31 DIAGNOSIS — R20.0 NUMBNESS AND TINGLING IN BOTH HANDS: ICD-10-CM

## 2019-12-31 DIAGNOSIS — R26.89 DECREASED SPINAL MOBILITY: ICD-10-CM

## 2019-12-31 DIAGNOSIS — Z74.09 IMPAIRED FUNCTIONAL MOBILITY AND ACTIVITY TOLERANCE: ICD-10-CM

## 2019-12-31 DIAGNOSIS — R20.2 NUMBNESS AND TINGLING IN BOTH HANDS: ICD-10-CM

## 2019-12-31 DIAGNOSIS — M79.605 LEFT LEG PAIN: ICD-10-CM

## 2019-12-31 DIAGNOSIS — M25.641 STIFFNESS OF RIGHT HAND, NOT ELSEWHERE CLASSIFIED: ICD-10-CM

## 2019-12-31 DIAGNOSIS — M62.81 MUSCLE WEAKNESS: ICD-10-CM

## 2019-12-31 DIAGNOSIS — R29.898 WEAKNESS OF BOTH HANDS: ICD-10-CM

## 2019-12-31 PROCEDURE — 97166 OT EVAL MOD COMPLEX 45 MIN: CPT | Mod: PO

## 2019-12-31 PROCEDURE — 97110 THERAPEUTIC EXERCISES: CPT | Mod: PO

## 2019-12-31 NOTE — PROGRESS NOTES
Physical Therapy Daily Treatment Note     Name: Paige Powell [place]  Clinic Number: [id number]    Therapy Diagnosis:   Encounter Diagnoses   Name Primary?    Muscle weakness     Decreased spinal mobility     Impaired functional mobility and activity tolerance     Left leg pain      Physician: Medardo Leslie MD    Visit Date: 12/31/2019    Physician Orders: PT Eval and Treat   Medical Diagnosis from Referral: Lumbosacral radiculopathy  Evaluation Date: 12/9/2019  Authorization Period Expiration: 11/19/20  Plan of Care Expiration: 2/7/20  Visit # / Visits authorized: 3  (1/5 authorized)    Time In: 10:00 am   Time Out: 11:00 am   Total Billable Time: 53 minutes    Precautions: Standard, Fall and organ transplant    Subjective     Pt reports: No back pain reports pain down left leg into the foot    She was compliant with home exercise program.  Response to previous treatment: no adverse reaction  Functional change: none stated    Pain: 3/10  Location: N/A     Objective     Paige received therapeutic exercises to develop strength, endurance, ROM, posture and core stabilization for 55  minutes including:    Posterior pelvic tilt, 20 x 3s holds  Supine marching, 2 x 10  Hip ADD squeeze 20 x 3s  Lower trunk rotation, 10 reps B  Hip ABD in hook-lying, orange band, 20 reps x 3 seconds   Double knees to chest with physioball, 2 x 10 reps  Bridge with ball,  15 reps   Hamstring stretch, 2 x 30 s /side  Figure-4 stretch, 2 x 30s/side  Clamshell, 2 x 10/side, -lying  Open book with maroon bolster under LE, 15 reps/side  Standing hip ext, 2 x 10 reps  Sit <> stands, 2 x 10 from gold chair- with two foam pads     Home Exercises Provided and Patient Education Provided     Education provided:   - exercise technique, scheduling    Written Home Exercises Provided: Patient instructed to cont prior HEP.  Exercises were reviewed and Paige was able to demonstrate them prior to the end of the session.  Paige demonstrated good   understanding of the education provided.     See EMR under Patient Instructions for exercises provided prior visit.    Assessment     Paige tolerated therapy session well. Will continue to benefit from skilled physical therapy to address LE and core weakness and improve fucntional mobility.   Paige is progressing well towards her goals.   Pt prognosis is Good.     Pt will continue to benefit from skilled outpatient physical therapy to address the deficits listed in the problem list box on initial evaluation, provide pt/family education and to maximize pt's level of independence in the home and community environment.     Pt's spiritual, cultural and educational needs considered and pt agreeable to plan of care and goals.     Anticipated barriers to physical therapy: chronicity of symptoms    Goals:  Short Term Goals: 4 weeks   1. Pt will tolerate HEP for improved strength, functional mobility, ROM, posture, and endurance. (progressing, not met)  2. Pt will report reduced left leg pain to </= 5/10 at worst for improved functional mobility and ability to participate in functional activities/ADL's. (progressing, not met)  3. Pt will demo >/= 4-/5 strength in LLE for improved functional mobility, endurance, and posture. (progressing, not met)  4. Pt will demo >/= 4/5 strength in RLE for improved functional mobility, endurance, and posture. (progressing, not met)  5. Pt will perform spinal ROM in all directions without pain for improved functional mobility and posture. (progressing, not met)  6. Pt will report being able to get in and out of car or low chair with improved ease for increased functional mobility (progressing, not met)        Long Term Goals: 8 weeks   1 Pt will be I with updated HEP for improved functional mobility, posture, strength, and endurance. (progressing, not met)  2. Pt will report reduced left leg pain to </= 3/10 at worst for improved functional mobility and ability to participate in functional  activities/ADL's. (progressing, not met)  3. Pt will demo 5/5 strength in BLE's for improved functional mobility, endurance, and posture. (progressing, not met)  4. Pt will report being able to go to grocery store and put away groceries without increase in symptoms for improved functional mobility (progressing, not met)  5. Pt will demo >/= 3/4 range spinal AROM in all directions for improved functional mobility and posture. (progressing, not met)  6. Pt will report being able to stand for >/=75min without increase in symptoms for increased endurance. (progressing, not met)     Plan   Plan of care Certification: 12/9/2019 to 2/7/20.     Outpatient Physical Therapy 2 times weekly for 8 weeks to include the following interventions: Gait Training, Manual Therapy, Moist Heat/ Ice, Neuromuscular Re-ed, Patient Education, Self Care, Therapeutic Activites, Therapeutic Exercise, Ultrasound and modalities as appropriate.       Sia Pablo, PTA

## 2019-12-31 NOTE — PLAN OF CARE
[facility] Occupational Therapy  Initial Evaluation     Date: 12/31/2019  Name: Paige Summers  Clinic Number: [id number]    Therapy Diagnosis:   Encounter Diagnoses   Name Primary?    Weakness of both hands     Numbness and tingling in both hands     Stiffness of right hand, not elsewhere classified      Physician: Medardo Leslie MD    Physician Orders: Eval and treat  Medical Diagnosis: R29.898 (ICD-10-CM) - Right hand weakness  Surgical Procedure and Date: N/a  Evaluation Date: 12/31/2019  Insurance Authorization Period Expiration: 12/10/20  Plan of Care Certification Period: 1/14/20  Date of Return to MD: Not scheduled    Visit # / Visits authorized: 1/ 1   Time In:11AM  Time Out: 11:50AM  Total Billable Time: 50 minutes    Precautions:  Standard and organ transplant    Subjective     Involved Side: Bilateral, R > L  Dominant Side: Right  Date of Onset: April 2019  History of Current Condition/Mechanism of Injury: Pt referred to OT with significant deficits of bilateral hands, including numbness, weakness, and stiffness. Pt reports symptom onset following kidney transplant in April 2019 with worsening. She has not been seen by a hand specialist yet but plans to make an appointment with one.  Imaging: See medical chart  Previous Therapy: Pt currently in PT for left-sided neuropathy    Past Medical History/Physical Systems Review:   Paige Summers  has a past medical history of Anemia, Anemia of renal disease, Anxiety, Chronic renal failure, CKD (chronic kidney disease) stage 3, GFR 30-59 ml/min, Depression, Essential hypertension, GERD (gastroesophageal reflux disease), Gout, H pylori ulcer, Hyperlipidemia, Hypertension, Obesity, and Secondary hyperparathyroidism of renal origin.    Paige Summers  has a past surgical history that includes Dialysis fistula creation (Left, 02/2018); Thrombectomy of hemodialysis access site (Left, 6/11/2018); Thrombectomy of hemodialysis  "access site (Left, 6/13/2018); Diagnostic ultrasound (Left, 6/18/2018); Diagnostic ultrasound (Left, 7/9/2018); Diagnostic ultrasound (Left, 8/6/2018); Hysterectomy (1971); Diagnostic ultrasound (Left, 8/20/2018); Thrombectomy of hemodialysis access site (N/A, 9/6/2018); Thrombectomy of hemodialysis access site (Left, 9/12/2018); Diagnostic ultrasound (Left, 9/19/2018); Diagnostic ultrasound (Left, 10/10/2018); Thrombectomy of hemodialysis access site (Left, 11/9/2018); Thrombectomy of hemodialysis access site (Left, 12/13/2018); Fistulogram (Left, 12/19/2018); Declotting of arteriovenous graft (Left, 2/19/2019); Fistulogram (Left, 3/13/2019); and Kidney transplant (N/A, 4/8/2019).    Paige has a current medication list which includes the following prescription(s): acetaminophen, amlodipine, betamethasone valerate 0.1%, bisacodyl, carvedilol, cinacalcet, clonidine, cyanocobalamin, docusate sodium, duloxetine, hydroxyzine pamoate, linaclotide, magnesium oxide, multivitamin, mycophenolate, sodium bicarbonate, sulfamethoxazole-trimethoprim 400-80mg, tacrolimus, and tramadol.    Review of patient's allergies indicates:   Allergen Reactions    Oxycodone Itching    Hydrocodone Itching        Patient's Goals for Therapy: "Regain functional use of both hands"    Pain:  Functional Pain Scale Rating 0-10:   5/10 on average  Location: both hands  Description: numbness, tingling, aching    Occupation:  Not working      Functional Limitations/Social History:    Previous functional status includes: Independent with all ADLs.     Current FunctionalStatus      Limitation of Functional Status as follows:    ADLs: Dressing, Bathing, Toileting, Eating    IADLs: Cooking, Cleaning, Writing    Objective     Observation: Significant atrophy of thenar musculature noted bilaterally.     AROM:  Left: WNL  Right: 2.5 cm from distal pathak crease    Hennessey-Christy Monofilament Test:   Left/Right   Median Nerve Dim. Protective Sensation " bilaterally   Ulnar Nerve Dim. Protective Sensation bilaterally   Radial Nerve Not tested      Strength: (RACHANA Dynamometer in lbs.) Average 3 trials, Position II:   Left Right   12/31/2019 24 15     Pinch Strength (Measured in psi)  12/31/2019 Left Right   Lateral 7 3   3 Jaw Oz 5 1     CMS Impairment/Limitation/Restriction for FOTO Hand Survey    Therapist reviewed FOTO scores for Paige Summers on 12/31/2019.   FOTO documents entered into [employer] - see Media section.    Limitation Score: 58%  Category: Carrying    Current : CK = at least 40% but < 60% impaired, limited or restricted  Goal: CJ = at least 20% but < 40% impaired, limited or restricted       Treatment     Total Treatment time separate from Evaluation time:0 minutes    Home Exercise Program/Education:  Issued HEP (see patient instructions in EMR) and educated on modality use for pain management . Exercises were reviewed and Paige was able to demonstrate them prior to the end of the session.   Pt received a written copy of exercises to perform at home. Paige demonstrated good  understanding of the education provided.  Pt was advised to perform these exercises free of pain, and to stop performing them if pain occurs.    Patient/Family Education: role of OT, goals for OT, scheduling/cancellations - pt verbalized understanding. Discussed insurance limitations with patient.    Additional Education provided: recommended seeing hand specialist to determine site of nerve compression, recommended using wrist braces at night     Assessment     Paige Summers is a 69 y.o. female referred to outpatient occupational therapy with bilateral hand stiffness, weakness, and numbness resulting in Decreased ROM, Decreased  strength, Decreased pinch strength, Decreased muscle strength, Decreased functional hand use, Increased pain, Joint Stiffness, Diminished/Impaired Sensation and Diminished/Impaired Coordination and demonstrates limitations as described  in the chart below. Following medical record review it is determined that pt will benefit from occupational therapy services in order to maximize pain free and/or functional use of bilateral UE. The following goals were discussed with the patient and patient is in agreement with them as to be addressed in the treatment plan. The patient's rehab potential is Fair.     Anticipated barriers to occupational therapy: nerve compression site affecting UE's not identified  Pt has no cultural, educational or language barriers to learning provided.    Profile and History Assessment of Occupational Performance Level of Clinical Decision Making Complexity Score   Occupational Profile:   Paige Summers is a 69 y.o. female who lives alone and is disabled. Paige Summers has difficulty with  ADLs and IADLs as listed previously, which  affecting his/her daily functional abilities.      Comorbidities:    has a past medical history of Anemia, Anemia of renal disease, Anxiety, Chronic renal failure, CKD (chronic kidney disease) stage 3, GFR 30-59 ml/min, Depression, Essential hypertension, GERD (gastroesophageal reflux disease), Gout, H pylori ulcer, Hyperlipidemia, Hypertension, Obesity, and Secondary hyperparathyroidism of renal origin.    Medical and Therapy History Review:   Brief Performance Deficits    Physical:  Joint Mobility  Muscle Power/Strength  Muscle Endurance   Strength  Pinch Strength  Fine Motor Coordination  Proprioception Functions  Tactile Functions  Pain    Cognitive:  No Deficits    Psychosocial:    s/p kidney transplant     Clinical Decision Making:  moderate    Assessment Process:  Detailed Assessments    Modification/Need for Assistance:  Not Necessary    Intervention Selection:  Several Treatment Options       moderate  Based on PMHX, co morbidities , data from assessments and functional level of assistance required with task and clinical presentation directly impacting function.       The  following goals were discussed with the patient and patient is in agreement with them as to be addressed in the treatment plan.     Goals:   To be completed in 1 week:  1) Patient will be independent with HEP and modalities for pain management   2) Pt will be compliant with wrist brace use a night  To be completed in 2-4 weeks:  3) Increase finger flexion of right sided digits to the palm for functional fist  4) Set appropriate /pinch strength goals when appropriate  To be completed by discharge:  1) Patient will resume previous ADLs and IADLs without report of increased pain  2) Decrease FOTO limitation score to 39%, indicative of improved functional independence    Plan   Certification Period/Plan of care expiration: 12/31/2019 to 1/14/20.    Outpatient Occupational Therapy for 1 time per week for 4 weeks including Paraffin, Fluidotherapy, Therapeutic exercise/activities, Strengthening, Joint Protection      Bianka Lima, OT

## 2020-01-07 ENCOUNTER — LAB VISIT (OUTPATIENT)
Dept: LAB | Facility: HOSPITAL | Age: 70
End: 2020-01-07
Attending: INTERNAL MEDICINE
Payer: MEDICARE

## 2020-01-07 DIAGNOSIS — Z94.0 KIDNEY REPLACED BY TRANSPLANT: ICD-10-CM

## 2020-01-07 LAB
ALBUMIN SERPL BCP-MCNC: 3.7 G/DL (ref 3.5–5.2)
ALBUMIN SERPL BCP-MCNC: 3.7 G/DL (ref 3.5–5.2)
ALP SERPL-CCNC: 124 U/L (ref 55–135)
ALT SERPL W/O P-5'-P-CCNC: 11 U/L (ref 10–44)
ANION GAP SERPL CALC-SCNC: 7 MMOL/L (ref 8–16)
AST SERPL-CCNC: 18 U/L (ref 10–40)
BASOPHILS # BLD AUTO: 0.05 K/UL (ref 0–0.2)
BASOPHILS NFR BLD: 1.1 % (ref 0–1.9)
BILIRUB DIRECT SERPL-MCNC: 0.2 MG/DL (ref 0.1–0.3)
BILIRUB SERPL-MCNC: 0.4 MG/DL (ref 0.1–1)
BUN SERPL-MCNC: 19 MG/DL (ref 8–23)
CALCIUM SERPL-MCNC: 9.7 MG/DL (ref 8.7–10.5)
CHLORIDE SERPL-SCNC: 109 MMOL/L (ref 95–110)
CO2 SERPL-SCNC: 26 MMOL/L (ref 23–29)
CREAT SERPL-MCNC: 1.2 MG/DL (ref 0.5–1.4)
DIFFERENTIAL METHOD: ABNORMAL
EOSINOPHIL # BLD AUTO: 0.1 K/UL (ref 0–0.5)
EOSINOPHIL NFR BLD: 3.2 % (ref 0–8)
ERYTHROCYTE [DISTWIDTH] IN BLOOD BY AUTOMATED COUNT: 14.1 % (ref 11.5–14.5)
EST. GFR  (AFRICAN AMERICAN): 52.9 ML/MIN/1.73 M^2
EST. GFR  (NON AFRICAN AMERICAN): 45.9 ML/MIN/1.73 M^2
GLUCOSE SERPL-MCNC: 99 MG/DL (ref 70–110)
HCT VFR BLD AUTO: 38.2 % (ref 37–48.5)
HGB BLD-MCNC: 11.5 G/DL (ref 12–16)
IMM GRANULOCYTES # BLD AUTO: 0.03 K/UL (ref 0–0.04)
IMM GRANULOCYTES NFR BLD AUTO: 0.7 % (ref 0–0.5)
LYMPHOCYTES # BLD AUTO: 0.9 K/UL (ref 1–4.8)
LYMPHOCYTES NFR BLD: 20.2 % (ref 18–48)
MAGNESIUM SERPL-MCNC: 1.6 MG/DL (ref 1.6–2.6)
MCH RBC QN AUTO: 27.3 PG (ref 27–31)
MCHC RBC AUTO-ENTMCNC: 30.1 G/DL (ref 32–36)
MCV RBC AUTO: 91 FL (ref 82–98)
MONOCYTES # BLD AUTO: 0.5 K/UL (ref 0.3–1)
MONOCYTES NFR BLD: 12.2 % (ref 4–15)
NEUTROPHILS # BLD AUTO: 2.7 K/UL (ref 1.8–7.7)
NEUTROPHILS NFR BLD: 62.6 % (ref 38–73)
NRBC BLD-RTO: 0 /100 WBC
PHOSPHATE SERPL-MCNC: 2.9 MG/DL (ref 2.7–4.5)
PLATELET # BLD AUTO: 265 K/UL (ref 150–350)
PMV BLD AUTO: 10.1 FL (ref 9.2–12.9)
POTASSIUM SERPL-SCNC: 4.3 MMOL/L (ref 3.5–5.1)
PROT SERPL-MCNC: 7 G/DL (ref 6–8.4)
RBC # BLD AUTO: 4.22 M/UL (ref 4–5.4)
SODIUM SERPL-SCNC: 142 MMOL/L (ref 136–145)
TACROLIMUS BLD-MCNC: 6.2 NG/ML (ref 5–15)
WBC # BLD AUTO: 4.35 K/UL (ref 3.9–12.7)

## 2020-01-07 PROCEDURE — 82247 BILIRUBIN TOTAL: CPT

## 2020-01-07 PROCEDURE — 80069 RENAL FUNCTION PANEL: CPT

## 2020-01-07 PROCEDURE — 84075 ASSAY ALKALINE PHOSPHATASE: CPT

## 2020-01-07 PROCEDURE — 85025 COMPLETE CBC W/AUTO DIFF WBC: CPT

## 2020-01-07 PROCEDURE — 83735 ASSAY OF MAGNESIUM: CPT

## 2020-01-07 PROCEDURE — 80197 ASSAY OF TACROLIMUS: CPT

## 2020-01-07 PROCEDURE — 36415 COLL VENOUS BLD VENIPUNCTURE: CPT

## 2020-01-07 PROCEDURE — 87799 DETECT AGENT NOS DNA QUANT: CPT

## 2020-01-09 ENCOUNTER — CLINICAL SUPPORT (OUTPATIENT)
Dept: REHABILITATION | Facility: HOSPITAL | Age: 70
End: 2020-01-09
Attending: PSYCHIATRY & NEUROLOGY
Payer: MEDICARE

## 2020-01-09 DIAGNOSIS — M62.81 MUSCLE WEAKNESS: ICD-10-CM

## 2020-01-09 DIAGNOSIS — R26.89 DECREASED SPINAL MOBILITY: ICD-10-CM

## 2020-01-09 DIAGNOSIS — Z74.09 IMPAIRED FUNCTIONAL MOBILITY AND ACTIVITY TOLERANCE: ICD-10-CM

## 2020-01-09 DIAGNOSIS — M79.605 LEFT LEG PAIN: ICD-10-CM

## 2020-01-09 PROCEDURE — 97110 THERAPEUTIC EXERCISES: CPT | Mod: PO

## 2020-01-09 NOTE — PROGRESS NOTES
Physical Therapy Daily Treatment Note     Name: Paige Powell [place]  Clinic Number: [id number]    Therapy Diagnosis:   Encounter Diagnoses   Name Primary?    Muscle weakness     Decreased spinal mobility     Impaired functional mobility and activity tolerance     Left leg pain      Physician: Medardo Leslie MD    Visit Date: 2020    Physician Orders: PT Eval and Treat   Medical Diagnosis from Referral: Lumbosacral radiculopathy  Evaluation Date: 2019  Authorization Period Expiration: 20  Plan of Care Expiration: 20  Visit # / Visits authorized: 4  ( newly authorized)    Time In: 11:00 am   Time Out: 12:00 pm   Total Billable Time: 20min    Precautions: Standard, Fall and organ transplant    Subjective     Pt reports: No back pain, but she reports pain down left leg into the foot. Pt states overall her pain is better. She endorses R knee swelling x 3 weeks. This has happened in the past and pt also reports a hx of gout.     She was compliant with home exercise program.  Response to previous treatment: no adverse reaction  Functional change: none stated    Pain: 3/10  Location: N/A     Objective     Jannet's sign: negative. Good pedal pulse on R foot. No change in color or temperature in RLE/calf    Paige received therapeutic exercises to develop strength, endurance, ROM, posture and core stabilization for 52 minutes includin:1 for 20min    LLE sciatic nerve glide with orange physioball support, 10 reps  Posterior pelvic tilt, 20 x 3s holds  Supine marching, 2 x 10  Hip ADD squeeze 20 x 3s  Lower trunk rotation, 10 reps B, yellow band  Hip ABD in hook-lying, orange band, 20 reps x 3 seconds   Hip ABD isometric c/ pilates ring, 2 x 10 reps  Ab isometric in hook-lying, squeezing yellow theraball with arms extended at chest height, 2 x 10 reps  Bridge with ball,  15 reps   Hamstring stretch, 2 x 30 s /side  Figure-4 stretch, 2 x 30s/side  Clamshell, 2 x 10/side, -lying, yellow  band    Not performed today:  Double knees to chest with physioball, 2 x 10 reps  Open book with bright lealer under LE, 15 reps/side  Standing hip ext, 2 x 10 reps  Sit <> stands, 2 x 10 from gold chair- with two foam pads     Home Exercises Provided and Patient Education Provided     Education provided:   - exercise technique    Written Home Exercises Provided: Patient instructed to cont prior HEP.  Exercises were reviewed and Paige was able to demonstrate them prior to the end of the session.  Paige demonstrated good  understanding of the education provided.     See EMR under Patient Instructions for exercises provided prior visit.    Assessment     Paige tolerated therapy session well, stating she always feels better after therapy. Pt able to progress amount of exercises. Pt reports some swelling in R knee x 3 weeks. Based on her history of gout and increased weight-bearing on RLE and objective findings today, there was not a red flag for a clot. PT encouraged pt to follow up with MD about it, especially if it does not resolve/improve. Pt educated on signs and symptoms of DVT and encouraged to go to ER should she notice those. Pt with good understanding.  Will continue to benefit from skilled physical therapy to address LE and core weakness and improve fucntional mobility.   Paige is progressing well towards her goals.   Pt prognosis is Good.     Pt will continue to benefit from skilled outpatient physical therapy to address the deficits listed in the problem list box on initial evaluation, provide pt/family education and to maximize pt's level of independence in the home and community environment.     Pt's spiritual, cultural and educational needs considered and pt agreeable to plan of care and goals.     Anticipated barriers to physical therapy: chronicity of symptoms    Goals:  Short Term Goals: 4 weeks   1. Pt will tolerate HEP for improved strength, functional mobility, ROM, posture, and endurance.  (progressing, not met)  2. Pt will report reduced left leg pain to </= 5/10 at worst for improved functional mobility and ability to participate in functional activities/ADL's. (progressing, not met)  3. Pt will demo >/= 4-/5 strength in LLE for improved functional mobility, endurance, and posture. (progressing, not met)  4. Pt will demo >/= 4/5 strength in RLE for improved functional mobility, endurance, and posture. (progressing, not met)  5. Pt will perform spinal ROM in all directions without pain for improved functional mobility and posture. (progressing, not met)  6. Pt will report being able to get in and out of car or low chair with improved ease for increased functional mobility (progressing, not met)        Long Term Goals: 8 weeks   1 Pt will be I with updated HEP for improved functional mobility, posture, strength, and endurance. (progressing, not met)  2. Pt will report reduced left leg pain to </= 3/10 at worst for improved functional mobility and ability to participate in functional activities/ADL's. (progressing, not met)  3. Pt will demo 5/5 strength in BLE's for improved functional mobility, endurance, and posture. (progressing, not met)  4. Pt will report being able to go to grocery store and put away groceries without increase in symptoms for improved functional mobility (progressing, not met)  5. Pt will demo >/= 3/4 range spinal AROM in all directions for improved functional mobility and posture. (progressing, not met)  6. Pt will report being able to stand for >/=75min without increase in symptoms for increased endurance. (progressing, not met)     Plan   Plan of care Certification: 12/9/2019 to 2/7/20.     Outpatient Physical Therapy 2 times weekly for 8 weeks to include the following interventions: Gait Training, Manual Therapy, Moist Heat/ Ice, Neuromuscular Re-ed, Patient Education, Self Care, Therapeutic Activites, Therapeutic Exercise, Ultrasound and modalities as appropriate.        Yael Wang, PT

## 2020-01-13 ENCOUNTER — TELEPHONE (OUTPATIENT)
Dept: TRANSPLANT | Facility: CLINIC | Age: 70
End: 2020-01-13

## 2020-01-14 ENCOUNTER — CLINICAL SUPPORT (OUTPATIENT)
Dept: REHABILITATION | Facility: HOSPITAL | Age: 70
End: 2020-01-14
Attending: PSYCHIATRY & NEUROLOGY
Payer: MEDICARE

## 2020-01-14 DIAGNOSIS — Z74.09 IMPAIRED FUNCTIONAL MOBILITY AND ACTIVITY TOLERANCE: ICD-10-CM

## 2020-01-14 DIAGNOSIS — M79.605 LEFT LEG PAIN: ICD-10-CM

## 2020-01-14 DIAGNOSIS — R26.89 DECREASED SPINAL MOBILITY: ICD-10-CM

## 2020-01-14 DIAGNOSIS — M62.81 MUSCLE WEAKNESS: ICD-10-CM

## 2020-01-14 PROCEDURE — 97110 THERAPEUTIC EXERCISES: CPT | Mod: PO

## 2020-01-14 NOTE — PROGRESS NOTES
Physical Therapy Daily Treatment Note     Name: Paige Powell [place]  Clinic Number: [id number]    Therapy Diagnosis:   Encounter Diagnoses   Name Primary?    Muscle weakness     Decreased spinal mobility     Impaired functional mobility and activity tolerance     Left leg pain      Physician: Medardo Leslie MD    Visit Date: 2020    Physician Orders: PT Eval and Treat   Medical Diagnosis from Referral: Lumbosacral radiculopathy  Evaluation Date: 2019  Authorization Period Expiration: 20  Plan of Care Expiration: 20  Visit # / Visits authorized: 6 ( newly authorized)    Time In: 11:00 am   Time Out: 12:00 pm   Total Billable Time: 10min    Precautions: Standard, Fall and organ transplant    Subjective     Pt reports: No back pain. States the swelling in her knee is better/has improved. Pt states she feels unsteady with walking but denies falls    She was compliant with home exercise program.  Response to previous treatment: no adverse reaction  Functional change: none stated    Pain: 0/10  Location: N/A     Objective       Paige received therapeutic exercises to develop strength, endurance, ROM, posture and core stabilization for 52 minutes includin:1 for 10min    Recumbent bike, 8 min, Level 1  Posterior pelvic tilt, 20 x 3s holds  Supine marching, 2 x 10  Hip ADD squeeze 20 x 3s  Lower trunk rotation, 15 reps B, yellow band  Hip ABD in hook-lying, green band, 20 reps x 3 seconds   Abdominal isometric c/ pilates ring, 2 x 10 reps  Hamstring stretch, 2 x 30 s /side  Figure-4 stretch, 2 x 30s/side  Clamshell, 2 x 10/side, side-lying, yellow band    Lateral walking with hand held assist, 4 laps x 8-10 steps  Retro walking with hand held assist, 4 x 10 steps  Sit <> stands from 2nd from tallest plyobox, 10 reps      Not performed today:  Ab isometric in hook-lying, squeezing yellow theraball with arms extended at chest height, 2 x 10 reps  Bridge with ball,  15 reps   Double knees to  chest with physioball, 2 x 10 reps  Open book with bright giron under LE, 15 reps/side  Standing hip ext, 2 x 10 reps      Home Exercises Provided and Patient Education Provided     Education provided:   - exercise technique    Written Home Exercises Provided: Patient instructed to cont prior HEP.  Exercises were reviewed and Paige was able to demonstrate them prior to the end of the session.  Paige demonstrated good  understanding of the education provided.     See EMR under Patient Instructions for exercises provided prior visit.    Assessment     Paige tolerated therapy session well, stating she always feels better after therapy. Pt able to progress reps and resistance with some exercises. Pt reports reduced R knee swelling.  Will continue to benefit from skilled physical therapy to address LE and core weakness and improve functional mobility.    Paige is progressing well towards her goals.   Pt prognosis is Good.     Pt will continue to benefit from skilled outpatient physical therapy to address the deficits listed in the problem list box on initial evaluation, provide pt/family education and to maximize pt's level of independence in the home and community environment.     Pt's spiritual, cultural and educational needs considered and pt agreeable to plan of care and goals.     Anticipated barriers to physical therapy: chronicity of symptoms    Goals:  Short Term Goals: 4 weeks   1. Pt will tolerate HEP for improved strength, functional mobility, ROM, posture, and endurance. (progressing, not met)  2. Pt will report reduced left leg pain to </= 5/10 at worst for improved functional mobility and ability to participate in functional activities/ADL's. (progressing, not met)  3. Pt will demo >/= 4-/5 strength in LLE for improved functional mobility, endurance, and posture. (progressing, not met)  4. Pt will demo >/= 4/5 strength in RLE for improved functional mobility, endurance, and posture. (progressing, not met)  5.  Pt will perform spinal ROM in all directions without pain for improved functional mobility and posture. (progressing, not met)  6. Pt will report being able to get in and out of car or low chair with improved ease for increased functional mobility (progressing, not met)        Long Term Goals: 8 weeks   1 Pt will be I with updated HEP for improved functional mobility, posture, strength, and endurance. (progressing, not met)  2. Pt will report reduced left leg pain to </= 3/10 at worst for improved functional mobility and ability to participate in functional activities/ADL's. (progressing, not met)  3. Pt will demo 5/5 strength in BLE's for improved functional mobility, endurance, and posture. (progressing, not met)  4. Pt will report being able to go to grocery store and put away groceries without increase in symptoms for improved functional mobility (progressing, not met)  5. Pt will demo >/= 3/4 range spinal AROM in all directions for improved functional mobility and posture. (progressing, not met)  6. Pt will report being able to stand for >/=75min without increase in symptoms for increased endurance. (progressing, not met)     Plan   Plan of care Certification: 12/9/2019 to 2/7/20.     Outpatient Physical Therapy 2 times weekly for 8 weeks to include the following interventions: Gait Training, Manual Therapy, Moist Heat/ Ice, Neuromuscular Re-ed, Patient Education, Self Care, Therapeutic Activites, Therapeutic Exercise, Ultrasound and modalities as appropriate.       Yael Wang, PT

## 2020-01-16 ENCOUNTER — CLINICAL SUPPORT (OUTPATIENT)
Dept: REHABILITATION | Facility: HOSPITAL | Age: 70
End: 2020-01-16
Attending: PSYCHIATRY & NEUROLOGY
Payer: MEDICARE

## 2020-01-16 DIAGNOSIS — M79.605 LEFT LEG PAIN: ICD-10-CM

## 2020-01-16 DIAGNOSIS — Z74.09 IMPAIRED FUNCTIONAL MOBILITY AND ACTIVITY TOLERANCE: ICD-10-CM

## 2020-01-16 DIAGNOSIS — R26.89 DECREASED SPINAL MOBILITY: ICD-10-CM

## 2020-01-16 DIAGNOSIS — M62.81 MUSCLE WEAKNESS: ICD-10-CM

## 2020-01-16 PROCEDURE — 97110 THERAPEUTIC EXERCISES: CPT | Mod: PO

## 2020-01-16 NOTE — PROGRESS NOTES
"  Physical Therapy Daily Treatment Note     Name: Paige Powell [place]  Clinic Number: [id number]    Therapy Diagnosis:   Encounter Diagnoses   Name Primary?    Muscle weakness     Decreased spinal mobility     Impaired functional mobility and activity tolerance     Left leg pain      Physician: Medardo Leslie MD    Visit Date: 2020    Physician Orders: PT Eval and Treat   Medical Diagnosis from Referral: Lumbosacral radiculopathy  Evaluation Date: 2019  Authorization Period Expiration: 20  Plan of Care Expiration: 20  Visit # / Visits authorized: 7 (3/20 newly authorized)    Time In: 11:03 am   Time Out: 12:00 pm   Total Billable Time: 25min    Precautions: Standard, Fall and organ transplant    Subjective     Pt reports: No back pain. "I just have some pain in this knee." pt states she really likes the bike.    She was compliant with home exercise program.  Response to previous treatment: no adverse reaction- always feel better after PT  Functional change: "I feel like I can do things around the house more easily."    Pain: 3-4/10  Location: R knee    Objective     RLE: negative Jannet's; Good pedal pulse on R foot. No change in color or temperature in RLE/calf    Paige received therapeutic exercises to develop strength, endurance, ROM, posture and core stabilization for 49 minutes includin:1 for 25min    Recumbent bike, 8 min, Level 1  Posterior pelvic tilt, 15 x 3s holds  Supine marching, 2 x 10  Hip ADD squeeze 20 x 3s  Lower trunk rotation, 15 reps B, orange  band  Hip ABD in hook-lying, orange band, 30 reps x 3 seconds   Figure-4 stretch, 2 x 30s/side  Bridge with LE's on wedge, 2 x 10 reps  Clamshell, 15/side, side-lying, yellow band  Open book, 10 reps/side        Not performed today:  Ab isometric in hook-lying, squeezing yellow theraball with arms extended at chest height, 2 x 10 reps  Bridge with ball,  15 reps   Double knees to chest with physioball, 2 x 10 reps  Open book " with bright bolster under LE, 15 reps/side  Standing hip ext, 2 x 10 reps  Abdominal isometric c/ pilates ring, 2 x 10 reps  Hamstring stretch, 2 x 30 s /side  Lateral walking with hand held assist, 4 laps x 8-10 steps  Retro walking with hand held assist, 4 x 10 steps  Sit <> stands from 2nd from tallest plyobox, 10 reps    Home Exercises Provided and Patient Education Provided     Education provided:   - exercise technique, pt encouraged to follow-up with MD re swelling in R knee    Written Home Exercises Provided: Patient instructed to cont prior HEP.  Exercises were reviewed and Paige was able to demonstrate them prior to the end of the session.  Paige demonstrated good  understanding of the education provided.     See EMR under Patient Instructions for exercises provided prior visit.    Assessment     Paige tolerated therapy session well, stating she always feels better after therapy. Pt able to progress reps and resistance with some exercises. Pt reports reduced R knee swelling, but that it is still bothersome. Pt states she has an appointment with her transplant MD next week, and will discuss this with her.  Will continue to benefit from skilled physical therapy to address LE and core weakness and improve functional mobility.    Paige is progressing well towards her goals.   Pt prognosis is Good.     Pt will continue to benefit from skilled outpatient physical therapy to address the deficits listed in the problem list box on initial evaluation, provide pt/family education and to maximize pt's level of independence in the home and community environment.     Pt's spiritual, cultural and educational needs considered and pt agreeable to plan of care and goals.     Anticipated barriers to physical therapy: chronicity of symptoms    Goals:  Short Term Goals: 4 weeks   1. Pt will tolerate HEP for improved strength, functional mobility, ROM, posture, and endurance. (progressing, not met)  2. Pt will report reduced left  leg pain to </= 5/10 at worst for improved functional mobility and ability to participate in functional activities/ADL's. (progressing, not met)  3. Pt will demo >/= 4-/5 strength in LLE for improved functional mobility, endurance, and posture. (progressing, not met)  4. Pt will demo >/= 4/5 strength in RLE for improved functional mobility, endurance, and posture. (progressing, not met)  5. Pt will perform spinal ROM in all directions without pain for improved functional mobility and posture. (progressing, not met)  6. Pt will report being able to get in and out of car or low chair with improved ease for increased functional mobility (progressing, not met)        Long Term Goals: 8 weeks   1 Pt will be I with updated HEP for improved functional mobility, posture, strength, and endurance. (progressing, not met)  2. Pt will report reduced left leg pain to </= 3/10 at worst for improved functional mobility and ability to participate in functional activities/ADL's. (progressing, not met)  3. Pt will demo 5/5 strength in BLE's for improved functional mobility, endurance, and posture. (progressing, not met)  4. Pt will report being able to go to grocery store and put away groceries without increase in symptoms for improved functional mobility (progressing, not met)  5. Pt will demo >/= 3/4 range spinal AROM in all directions for improved functional mobility and posture. (progressing, not met)  6. Pt will report being able to stand for >/=75min without increase in symptoms for increased endurance. (progressing, not met)     Plan   Plan of care Certification: 12/9/2019 to 2/7/20.     Outpatient Physical Therapy 2 times weekly for 8 weeks to include the following interventions: Gait Training, Manual Therapy, Moist Heat/ Ice, Neuromuscular Re-ed, Patient Education, Self Care, Therapeutic Activites, Therapeutic Exercise, Ultrasound and modalities as appropriate.       Yael Wang, PT

## 2020-01-22 ENCOUNTER — OFFICE VISIT (OUTPATIENT)
Dept: TRANSPLANT | Facility: CLINIC | Age: 70
End: 2020-01-22
Payer: MEDICARE

## 2020-01-22 VITALS
BODY MASS INDEX: 31.99 KG/M2 | RESPIRATION RATE: 18 BRPM | TEMPERATURE: 98 F | HEIGHT: 63 IN | SYSTOLIC BLOOD PRESSURE: 136 MMHG | OXYGEN SATURATION: 100 % | HEART RATE: 67 BPM | WEIGHT: 180.56 LBS | DIASTOLIC BLOOD PRESSURE: 80 MMHG

## 2020-01-22 DIAGNOSIS — I10 HYPERTENSION, UNSPECIFIED TYPE: Primary | ICD-10-CM

## 2020-01-22 DIAGNOSIS — R55 SYNCOPE, UNSPECIFIED SYNCOPE TYPE: ICD-10-CM

## 2020-01-22 PROCEDURE — 99215 OFFICE O/P EST HI 40 MIN: CPT | Mod: PBBFAC | Performed by: INTERNAL MEDICINE

## 2020-01-22 PROCEDURE — 1126F AMNT PAIN NOTED NONE PRSNT: CPT | Mod: S$GLB,,, | Performed by: INTERNAL MEDICINE

## 2020-01-22 PROCEDURE — 99215 OFFICE O/P EST HI 40 MIN: CPT | Mod: S$GLB,,, | Performed by: INTERNAL MEDICINE

## 2020-01-22 PROCEDURE — 99999 PR PBB SHADOW E&M-EST. PATIENT-LVL V: CPT | Mod: PBBFAC,,, | Performed by: INTERNAL MEDICINE

## 2020-01-22 PROCEDURE — 1159F MED LIST DOCD IN RCRD: CPT | Mod: S$GLB,,, | Performed by: INTERNAL MEDICINE

## 2020-01-22 PROCEDURE — 1159F PR MEDICATION LIST DOCUMENTED IN MEDICAL RECORD: ICD-10-PCS | Mod: S$GLB,,, | Performed by: INTERNAL MEDICINE

## 2020-01-22 PROCEDURE — 99215 PR OFFICE/OUTPT VISIT, EST, LEVL V, 40-54 MIN: ICD-10-PCS | Mod: S$GLB,,, | Performed by: INTERNAL MEDICINE

## 2020-01-22 PROCEDURE — 99999 PR PBB SHADOW E&M-EST. PATIENT-LVL V: ICD-10-PCS | Mod: PBBFAC,,, | Performed by: INTERNAL MEDICINE

## 2020-01-22 PROCEDURE — 1126F PR PAIN SEVERITY QUANTIFIED, NO PAIN PRESENT: ICD-10-PCS | Mod: S$GLB,,, | Performed by: INTERNAL MEDICINE

## 2020-01-22 RX ORDER — AMLODIPINE BESYLATE 10 MG/1
10 TABLET ORAL DAILY
Qty: 30 TABLET | Refills: 11 | Status: SHIPPED | OUTPATIENT
Start: 2020-01-22

## 2020-01-22 RX ORDER — CINACALCET 30 MG/1
TABLET, FILM COATED ORAL
Qty: 30 TABLET | Refills: 6 | Status: SHIPPED | OUTPATIENT
Start: 2020-01-22 | End: 2020-01-22

## 2020-01-22 RX ORDER — CINACALCET 30 MG/1
TABLET, FILM COATED ORAL
Qty: 30 TABLET | Refills: 6 | Status: SHIPPED | OUTPATIENT
Start: 2020-01-22

## 2020-01-22 NOTE — PROGRESS NOTES
Kidney Post-Transplant Assessment    Referring Physician: Jd Carias  Current Nephrologist: Jd Carias    ORGAN: LEFT KIDNEY  Donor Type: living  PHS Increased Risk: no  Cold Ischemia: 189 mins  Induction Medications: campath - alemtuzumab (anti-cd52)    Subjective:     CC:  Reassessment of renal allograft function and management of chronic immunosuppression.    HPI:  Ms. Summers is a 69 y.o. year old Black or  female with ESRD 2/2 Hypertensive Nephrosclerosis who received a living kidney transplant on 4/8/19. Her most recent creatinine is 1.2. She takes mycophenolic acid and tacrolimus for maintenance immunosuppression.      Post transplant course:  - S/p living unrelated kidney transplant 4/8/19, CMV +/+, WIT 30 min, CIT 3h 9 m  - DGF, resolved  - Pt was on Plavix for reoccurring AVF thrombus        *Interval Hx: Still complains of left hip pain- saw neurologist (Dr. Leslie) which referred her for PT (2x week, started on 12/092019). Also reports urinary incontinence.  She denies any fever, chills , ear secretion,  chest pain, shortness of breath, nausea/vomiting, dysuria, frequency, urgency, or pain over the allograft. At home BP readings are around ~140/80. Has had various episodes of witnessed syncopal episodes, witness called 9-1-1 but never was transferred to a hospital. Both occurred between Sept-Oct 2019. She was told it was due to low BP.        Current Outpatient Medications on File Prior to Visit   Medication Sig Dispense Refill    acetaminophen (TYLENOL) 325 mg Cap Take by mouth.      amLODIPine (NORVASC) 10 MG tablet Take 10 mg by mouth once daily.      bisacodyl (DULCOLAX) 5 mg EC tablet Take 2 tablets (10 mg total) by mouth daily as needed for Constipation.  0    carvedilol (COREG) 12.5 MG tablet Take 2 tablets (25 mg total) by mouth 2 (two) times daily with meals. 120 tablet 11    cinacalcet (SENSIPAR) 30 MG Tab Take 1 tablet (30 mg total) by mouth daily with  breakfast. 30 tablet 6    cloNIDine (CATAPRES) 0.1 MG tablet Take 1 tablet (0.1 mg total) by mouth 3 (three) times daily. 90 tablet 11    cyanocobalamin (VITAMIN B-12) 500 MCG tablet Take 1 tablet (500 mcg total) by mouth once daily. 30 tablet 4    docusate sodium (COLACE) 100 MG capsule Take 1 capsule (100 mg total) by mouth 2 (two) times daily as needed for Constipation.  0    DULoxetine (CYMBALTA) 20 MG capsule Take 1 capsule (20 mg total) by mouth 2 (two) times daily. 60 capsule 4    linaCLOtide (LINZESS) 145 mcg Cap capsule Take 1 capsule (145 mcg total) by mouth once daily. 30 capsule 11    magnesium oxide (MAG-OX) 400 mg (241.3 mg magnesium) tablet Take 2 tablets (800 mg total) by mouth 2 (two) times daily. 270 tablet 10    multivitamin (THERAGRAN) tablet Take 1 tablet by mouth once daily.      mycophenolate (CELLCEPT) 250 mg Cap Take 2 capsules (500 mg total) by mouth 2 (two) times daily. 120 capsule 11    sulfamethoxazole-trimethoprim 400-80mg (BACTRIM,SEPTRA) 400-80 mg per tablet Take 1 tablet by mouth once daily. Stop: 4/7/2020 30 tablet 11    tacrolimus (PROGRAF) 1 MG Cap Take 3 capsules (3 mg total) by mouth every morning AND 2 capsules (2 mg total) every evening. 150 capsule 11    betamethasone valerate 0.1% (VALISONE) 0.1 % Lotn Apply topically 2 (two) times daily. for 14 days 60 mL 2    hydrOXYzine pamoate (VISTARIL) 25 MG Cap Take 1 capsule (25 mg total) by mouth every 8 (eight) hours. (Patient not taking: Reported on 1/22/2020) 90 capsule 2    sodium bicarbonate 650 MG tablet Take 1 tablet (650 mg total) by mouth 2 (two) times daily. (Patient not taking: Reported on 1/22/2020) 60 tablet 11    traMADol (ULTRAM) 50 mg tablet Take 1 tablet (50 mg total) by mouth every 8 (eight) hours as needed for Pain. (Patient not taking: Reported on 1/22/2020) 30 tablet 0     No current facility-administered medications on file prior to visit.         Review of Systems     Skin: no skin rash  CNS; no  headaches, blurred vision, seizure, or syncope  ENT: No JVD,  Adenopathies,  nasal congestion. No oral lesions  Cardiac: No chest pain, dyspnea, claudication, edema or palpitations  Respiratory: No SOB, cough, hemoptysis   Gastro-intestinal: No diarrhea, constipation, abdominal pain, nausea, vomit. No ascitis  Genitourinary: no hematuria, dysuria, frequency, frequency  Musculoskeletal: Positive for Left Hip thigh pain. Lower extremity edema bilaterally. Rt. Knee pain  Psych: alert awake, oriented, No cranial nerves deficit.      Objective:       Physical Exam   Vitals:    01/22/20 1301   BP: 136/80   Pulse: 67   Resp: 18   Temp: 98 °F (36.7 °C)         Head: normocephalic  Neck: No JVD, cervical axillary, or femoral adenopathies  Heart: no murmurs, Normal s1 and s2, No gallops, no rubs, No murmurs  Lungs; CTA, good respiratory effort, no crackles  Abdomen: soft, non tender, + surgical scar  Extremities: No edema, skin rash, Left hip pain extending to posterior thigh tender to deep palpation proximal thigh and hip.   SNC: awake, alert oriented. Cranial nerves are intact, no focalized, sensitivity and strength preserved      Labs:  Lab Results   Component Value Date    WBC 4.35 01/07/2020    HGB 11.5 (L) 01/07/2020    HCT 38.2 01/07/2020     01/07/2020    K 4.3 01/07/2020     01/07/2020    CO2 26 01/07/2020    BUN 19 01/07/2020    CREATININE 1.2 01/07/2020    EGFRNONAA 45.9 (A) 01/07/2020    CALCIUM 9.7 01/07/2020    PHOS 2.9 01/07/2020    MG 1.6 01/07/2020    ALBUMIN 3.7 01/07/2020    ALBUMIN 3.7 01/07/2020    AST 18 01/07/2020    ALT 11 01/07/2020    UTPCR 0.22 (H) 10/07/2019    .0 (H) 11/06/2019    TACROLIMUS 6.2 01/07/2020       No results found for: EXTANC, EXTWBC, EXTSEGS, EXTPLATELETS, EXTHEMOGLOBI, EXTHEMATOCRI, EXTCREATININ, EXTSODIUM, EXTPOTASSIUM, EXTBUN, EXTCO2, EXTCALCIUM, EXTPHOSPHORU, EXTGLUCOSE, EXTALBUMIN, EXTAST, EXTALT, EXTBILITOTAL, EXTLIPASE, EXTAMYLASE    No results found for:  EXTCYCLOSLVL, EXTSIROLIMUS, EXTTACROLVL, EXTPROTCRE, EXTPTHINTACT, EXTPROTEINUA, EXTWBCUA, EXTRBCUA    Labs were reviewed with the patient    Assessment:          Plan:        1. CKD stage to be dermine: sCr remaining to  1.3 mg/dL . making good amount of UOP ~ 2  - Infx surveillance: BK neg, CMV PCR neg    2. Immunosuppression: prograf level was addressed  -  mg BID  - Decrease prograf to 3/2 mg    Will closely monitor for toxicities, education provided about adherence to medicines and need to communicate any side effect to the transplant nurse or physician.      3. Hypertension management:  Had 2 low BP readings with presyncope   - Continue with home blood pressure monitoring, low salt and healthy life discussed with the patient.  - Norvasc 10 mg daily on and off, particularly when BP is ~160's(SBP).    - coreg 12.5 mg BID  - clonidine 0.1 mg TID     4. Metabolic Bone Disease/Secondary Hyperparathyroidism:  - On Sensipar 30mg. Current . Recommended Sensipar MWF, explained to patient treatment plan and she expressed understanding.   - Will monitor PTH, Vit D level, calcium.      5. Anemia: stable    6. Low Mg  - on Mg supplement        7. Left hip pain  - Currently receiving PT. Tylenol for pain.     # Recent history of syncope  - Echo and Carotid doppler  - Cardiology and Neurology follow up      Plan  - Continue Sensipar every other day  - Check PTH in 6 months  - Echocardiogram and carotid doppler  - Cardiology consult (multiple episodes of syncope)      Jonah Givens MD  Nephrology Fellow  [facility]    I have reviewed the notes, assessments, and/or procedures performed by Dr Givens

## 2020-01-22 NOTE — Clinical Note
Plan- Continue Sensipar every other day- Check PTH in 6 months- Echocardiogram and carotid doppler- Cardiology consult (multiple episodes of syncope)

## 2020-01-22 NOTE — LETTER
January 22, 2020        Jd Carias  [address]  [address]  [facility] [address]  Phone: [phone]  Fax: [phone]             [facility]  [address]  [facility] [address]  Phone: [phone]   Patient: Paige Summers   MR Number: [id number]   YOB: 1950   Date of Visit: 1/22/2020       Dear Dr. Jd Carias    Thank you for referring Paige Summers to me for evaluation. Attached you will find relevant portions of my assessment and plan of care.    If you have questions, please do not hesitate to call me. I look forward to following Paige Summers along with you.    Sincerely,    Taty Guevara MD    Enclosure    If you would like to receive this communication electronically, please contact [email or web address] or [phone] to request [employer] Link access.    [employer] Link is a tool which provides read-only access to select patient information with whom you have a relationship. Its easy to use and provides real time access to review your patients record including encounter summaries, notes, results, and demographic information.    If you feel you have received this communication in error or would no longer like to receive these types of communications, please e-mail [email or web address]

## 2020-01-22 NOTE — PATIENT INSTRUCTIONS
Thank you for entrusting your transplant care to us, and visiting me today. Please:      - Feel free to call us with any concerns regarding your health care.  - Monitor your blood pressure and aim to keep < 140/90  - Follow a low sodium diet. It is hard to do, but helps keep blood pressure controlled and prevents swelling (edema).   - Nephrology follow up  - cardiology follow up  - Neurology follow up

## 2020-01-23 DIAGNOSIS — R55 SYNCOPE, UNSPECIFIED SYNCOPE TYPE: Primary | ICD-10-CM

## 2020-01-28 ENCOUNTER — CLINICAL SUPPORT (OUTPATIENT)
Dept: REHABILITATION | Facility: HOSPITAL | Age: 70
End: 2020-01-28
Attending: PSYCHIATRY & NEUROLOGY
Payer: MEDICARE

## 2020-01-28 DIAGNOSIS — M79.605 LEFT LEG PAIN: ICD-10-CM

## 2020-01-28 DIAGNOSIS — R26.89 DECREASED SPINAL MOBILITY: ICD-10-CM

## 2020-01-28 DIAGNOSIS — M62.81 MUSCLE WEAKNESS: ICD-10-CM

## 2020-01-28 DIAGNOSIS — Z74.09 IMPAIRED FUNCTIONAL MOBILITY AND ACTIVITY TOLERANCE: ICD-10-CM

## 2020-01-28 PROCEDURE — 97110 THERAPEUTIC EXERCISES: CPT | Mod: PO

## 2020-01-28 NOTE — PROGRESS NOTES
"  Physical Therapy Daily Treatment Note     Name: Paige Powell Rupert  Clinic Number: [id number]    Therapy Diagnosis:   Encounter Diagnoses   Name Primary?    Muscle weakness     Decreased spinal mobility     Impaired functional mobility and activity tolerance     Left leg pain      Physician: Medardo Leslie MD    Visit Date: 2020    Physician Orders: PT Eval and Treat   Medical Diagnosis from Referral: Lumbosacral radiculopathy  Evaluation Date: 2019  Authorization Period Expiration: 20  Plan of Care Expiration: 20  Visit # / Visits authorized: 8 ( newly authorized)    Time In: 11:05 am   Time Out: 12:00 pm   Total Billable Time: 23min    Precautions: Standard, Fall and organ transplant    Subjective     Pt reports: No back pain. Pt states she is going to the cardiologist to get work-up done for her swelling in the R LE. Pt states she can walk or stand for ~30 min before pain is an issue. Pain at worst has been 6/10 in past few weeks.    She was compliant with home exercise program.  Response to previous treatment: no adverse reaction- always feel better after PT  Functional change: "I feel like I can do things around the house more easily."    Pain: 2/10  Location: R knee    Objective       Paige received therapeutic exercises to develop strength, endurance, ROM, posture and core stabilization for 47 minutes includin:1 for 23min    Lumbar Range of Motion:     Degrees Pain   Flexion 2/3 range    no         Extension 2/3 range    no         Left Side Bending 2/3 range no         Right Side Bending 1/2 range no         Left rotation    1/2 range yes         Right Rotation    3/4 range  no            Lower Extremity Strength  Right LE   Left LE     Knee extension: 5/5 Knee extension: 5/5   Knee flexion: 5/5 Knee flexion: 3-/5   Hip flexion: 4+/5 Hip flexion: 4/5   Hip extension:  4+/5 Hip extension: 4/5   Hip abduction: 5/5 Hip abduction: 5/5   Hip adduction: 4/5 Hip adduction 3-/5 "   Ankle dorsiflexion: 5/5 Ankle dorsiflexion: 5/5   Ankle plantarflexion: 5/5 Ankle plantarflexion: 4+/5        Recumbent bike, 8 min, Level 1  Posterior pelvic tilt, 20 x 3s holds  Lower trunk rotation, 20 reps B  Hip flexor stretch off mat, 1 min/LE  Hamstring stretch c/ strap, 2 x1 min/LE  Bridge with LE's on wedge, 2 x 10 reps  SLR, 2 x 10 reps  Supine marching, 2 x 10  Hip ADD squeeze 20 x 5s  Abdominal isometric c/ Pilates ring, 2 x 10 reps  Hip ABD in hook-lying, green  band, 20 reps x 3 seconds       Not performed today:  Figure-4 stretch, 2 x 30s/side  Clamshell, 15/side, side-lying, yellow band  Open book, 10 reps/side  Bridge with ball,  15 reps   Double knees to chest with physioball, 2 x 10 reps  Standing hip ext, 2 x 10 reps  Lateral walking with hand held assist, 4 laps x 8-10 steps  Retro walking with hand held assist, 4 x 10 steps  Sit <> stands from 2nd from tallest plyobox, 10 reps    Home Exercises Provided and Patient Education Provided     Education provided:   - exercise technique, progress to date    Written Home Exercises Provided: Patient instructed to cont prior HEP.  Exercises were reviewed and Paige was able to demonstrate them prior to the end of the session.  Paige demonstrated good  understanding of the education provided.     See EMR under Patient Instructions for exercises provided prior visit.    Assessment     Paige tolerated therapy session well, stating she always feels better after therapy. Pt demo's increased LE strength and reports reduced low back pain overall.  Pt able to progress reps and resistance with some exercises. Pt's standing and walking endurance remains quite limited. Pt has met goals as noted below.   Will continue to benefit from skilled physical therapy to address LE and core weakness and improve functional mobility.    Paige is progressing well towards her goals.   Pt prognosis is Good.     Pt will continue to benefit from skilled outpatient physical therapy to  address the deficits listed in the problem list box on initial evaluation, provide pt/family education and to maximize pt's level of independence in the home and community environment.     Pt's spiritual, cultural and educational needs considered and pt agreeable to plan of care and goals.     Anticipated barriers to physical therapy: chronicity of symptoms    Goals:  Short Term Goals: 4 weeks   1. Pt will tolerate HEP for improved strength, functional mobility, ROM, posture, and endurance. (Met, 1/28)  2. Pt will report reduced left leg pain to </= 5/10 at worst for improved functional mobility and ability to participate in functional activities/ADL's. (progressing, not met)  3. Pt will demo >/= 4-/5 strength in LLE for improved functional mobility, endurance, and posture. (progressing, not met)  4. Pt will demo >/= 4/5 strength in RLE for improved functional mobility, endurance, and posture. (progressing, not met)  5. Pt will perform spinal ROM in all directions without pain for improved functional mobility and posture. (progressing, not met)  6. Pt will report being able to get in and out of car or low chair with improved ease for increased functional mobility (progressing, not met)        Long Term Goals: 8 weeks   1 Pt will be I with updated HEP for improved functional mobility, posture, strength, and endurance. (progressing, not met)  2. Pt will report reduced left leg pain to </= 3/10 at worst for improved functional mobility and ability to participate in functional activities/ADL's. (progressing, not met)  3. Pt will demo 5/5 strength in BLE's for improved functional mobility, endurance, and posture. (progressing, not met)  4. Pt will report being able to go to grocery store and put away groceries without increase in symptoms for improved functional mobility (progressing, not met)  5. Pt will demo >/= 3/4 range spinal AROM in all directions for improved functional mobility and posture. (progressing, not  met)  6. Pt will report being able to stand for >/=75min without increase in symptoms for increased endurance. (progressing, not met)     Plan   Plan of care Certification: 12/9/2019 to 2/7/20.     Outpatient Physical Therapy 2 times weekly for 8 weeks to include the following interventions: Gait Training, Manual Therapy, Moist Heat/ Ice, Neuromuscular Re-ed, Patient Education, Self Care, Therapeutic Activites, Therapeutic Exercise, Ultrasound and modalities as appropriate.       Yael Wang, PT

## 2020-01-29 ENCOUNTER — HOSPITAL ENCOUNTER (OUTPATIENT)
Dept: CARDIOLOGY | Facility: CLINIC | Age: 70
Discharge: HOME | End: 2020-01-29
Attending: INTERNAL MEDICINE
Payer: MEDICARE

## 2020-01-29 VITALS
DIASTOLIC BLOOD PRESSURE: 75 MMHG | HEART RATE: 64 BPM | WEIGHT: 158 LBS | SYSTOLIC BLOOD PRESSURE: 125 MMHG | BODY MASS INDEX: 23.4 KG/M2 | HEIGHT: 69 IN

## 2020-01-29 DIAGNOSIS — R55 SYNCOPE, UNSPECIFIED SYNCOPE TYPE: ICD-10-CM

## 2020-01-29 LAB
ASCENDING AORTA: 3.26 CM
AV INDEX (PROSTH): 0.82
AV MEAN GRADIENT: 4 MMHG
AV PEAK GRADIENT: 7 MMHG
AV VALVE AREA: 3.34 CM2
AV VELOCITY RATIO: 0.88
BSA FOR ECHO PROCEDURE: 1.87 M2
CV ECHO LV RWT: 0.61 CM
DOP CALC AO PEAK VEL: 1.29 M/S
DOP CALC AO VTI: 27.86 CM
DOP CALC LVOT AREA: 4 CM2
DOP CALC LVOT DIAMETER: 2.27 CM
DOP CALC LVOT PEAK VEL: 1.13 M/S
DOP CALC LVOT STROKE VOLUME: 92.91 CM3
DOP CALCLVOT PEAK VEL VTI: 22.97 CM
E WAVE DECELERATION TIME: 133.56 MSEC
E/A RATIO: 0.59
E/E' RATIO: 10.18 M/S
ECHO LV POSTERIOR WALL: 1.1 CM (ref 0.6–1.1)
FRACTIONAL SHORTENING: 34 % (ref 28–44)
INTERVENTRICULAR SEPTUM: 1.2 CM (ref 0.6–1.1)
LA MAJOR: 4.47 CM
LA MINOR: 4.38 CM
LA WIDTH: 3.89 CM
LEFT ATRIUM SIZE: 4 CM
LEFT ATRIUM VOLUME INDEX: 31.3 ML/M2
LEFT ATRIUM VOLUME: 58.52 CM3
LEFT INTERNAL DIMENSION IN SYSTOLE: 2.36 CM (ref 2.1–4)
LEFT VENTRICLE DIASTOLIC VOLUME INDEX: 29.15 ML/M2
LEFT VENTRICLE DIASTOLIC VOLUME: 54.48 ML
LEFT VENTRICLE MASS INDEX: 71 G/M2
LEFT VENTRICLE SYSTOLIC VOLUME INDEX: 10.3 ML/M2
LEFT VENTRICLE SYSTOLIC VOLUME: 19.3 ML
LEFT VENTRICULAR INTERNAL DIMENSION IN DIASTOLE: 3.6 CM (ref 3.5–6)
LEFT VENTRICULAR MASS: 132.66 G
LV LATERAL E/E' RATIO: 8 M/S
LV SEPTAL E/E' RATIO: 14 M/S
MV PEAK A VEL: 0.95 M/S
MV PEAK E VEL: 0.56 M/S
PISA TR MAX VEL: 2 M/S
PULM VEIN S/D RATIO: 0.76
PV PEAK D VEL: 0.51 M/S
PV PEAK S VEL: 0.39 M/S
RA MAJOR: 3.68 CM
RA PRESSURE: 3 MMHG
RA WIDTH: 3.32 CM
RIGHT VENTRICULAR END-DIASTOLIC DIMENSION: 2.99 CM
RV TISSUE DOPPLER FREE WALL SYSTOLIC VELOCITY 1 (APICAL 4 CHAMBER VIEW): 12.15 CM/S
SINUS: 3.12 CM
STJ: 3.05 CM
TDI LATERAL: 0.07 M/S
TDI SEPTAL: 0.04 M/S
TDI: 0.06 M/S
TR MAX PG: 16 MMHG
TRICUSPID ANNULAR PLANE SYSTOLIC EXCURSION: 2.08 CM
TV REST PULMONARY ARTERY PRESSURE: 19 MMHG

## 2020-01-29 PROCEDURE — 93306 ECHO (CUPID ONLY): ICD-10-PCS | Mod: S$GLB,,, | Performed by: INTERNAL MEDICINE

## 2020-01-29 PROCEDURE — 93306 TTE W/DOPPLER COMPLETE: CPT | Mod: PBBFAC | Performed by: INTERNAL MEDICINE

## 2020-01-30 ENCOUNTER — CLINICAL SUPPORT (OUTPATIENT)
Dept: REHABILITATION | Facility: HOSPITAL | Age: 70
End: 2020-01-30
Attending: PSYCHIATRY & NEUROLOGY
Payer: MEDICARE

## 2020-01-30 DIAGNOSIS — R26.89 DECREASED SPINAL MOBILITY: ICD-10-CM

## 2020-01-30 DIAGNOSIS — Z74.09 IMPAIRED FUNCTIONAL MOBILITY AND ACTIVITY TOLERANCE: ICD-10-CM

## 2020-01-30 DIAGNOSIS — M79.605 LEFT LEG PAIN: ICD-10-CM

## 2020-01-30 DIAGNOSIS — M62.81 MUSCLE WEAKNESS: ICD-10-CM

## 2020-01-30 PROCEDURE — 97110 THERAPEUTIC EXERCISES: CPT | Mod: PO

## 2020-01-30 NOTE — PROGRESS NOTES
"  Physical Therapy Daily Treatment Note     Name: Paige Powell Kirkwood  Clinic Number: [id number]    Therapy Diagnosis:   Encounter Diagnoses   Name Primary?    Muscle weakness     Decreased spinal mobility     Impaired functional mobility and activity tolerance     Left leg pain      Physician: Medardo Leslie MD    Visit Date: 1/30/2020    Physician Orders: PT Eval and Treat   Medical Diagnosis from Referral: Lumbosacral radiculopathy  Evaluation Date: 12/9/2019  Authorization Period Expiration: 11/19/20  Plan of Care Expiration: 2/7/20  Visit # / Visits authorized: 9 (5/20 newly authorized)    Time In: 11:03 AM  Time Out: 11:53 AM  Total Billable Time: 49 min (te 3)    Precautions: Standard, Fall and organ transplant    Subjective     Pt reports: No back pain. Pain in both knees. She forgot her cane in the car today so she enters PT with no cane.     She was compliant with home exercise program.  Response to previous treatment: gave her a little more energy   Functional change: more energy, can do things more easily    Pain: 2/10  Location: B knee    Objective       Paige received therapeutic exercises to develop strength, endurance, ROM, posture and core stabilization for 49 minutes including:    Recumbent bike, 10 min, Level 1  Posterior pelvic tilt, 20 x 3s holds  Lower trunk rotation, 20 reps B  Hip flexor stretch off mat, 2x30"/LE  Hamstring stretch c/ strap, 2 x1 min/LE  SLR, 2 x 10 reps  Supine marching, 2 x 10  Hip ADD squeeze 20 x 3s  Abdominal isometric c/ Pilates ring, 2 x 10 reps 3" hold  Hip ABD in hook-lying, green  band, 20 reps x 3 seconds       Not performed today:  Figure-4 stretch, 2 x 30s/side  Clamshell, 15/side, side-lying, yellow band  Open book, 10 reps/side  Bridge with ball,  15 reps   Double knees to chest with physioball, 2 x 10 reps  Standing hip ext, 2 x 10 reps  Lateral walking with hand held assist, 4 laps x 8-10 steps  Retro walking with hand held assist, 4 x 10 steps  Sit <> " stands from 2nd from tallest plyobox, 10 reps  Bridge with LE's on wedge, 2 x 10 reps    Home Exercises Provided and Patient Education Provided     Education provided:   - exercise technique    Written Home Exercises Provided: Patient instructed to cont prior HEP.  Exercises were reviewed and Paige was able to demonstrate them prior to the end of the session.  Paige demonstrated good  understanding of the education provided.     See EMR under Patient Instructions for exercises provided prior visit.    Assessment       Pt presents to PT today with no AD as she left her cane in her car. She completes all exercises well with no adverse effects. She is able to complete 10 minutes on the recumbent bike today but has pain in R knee. Progress as erin.     Paige is progressing well towards her goals.   Pt prognosis is Good.     Pt will continue to benefit from skilled outpatient physical therapy to address the deficits listed in the problem list box on initial evaluation, provide pt/family education and to maximize pt's level of independence in the home and community environment.     Pt's spiritual, cultural and educational needs considered and pt agreeable to plan of care and goals.     Anticipated barriers to physical therapy: chronicity of symptoms    Goals:  Short Term Goals: 4 weeks   1. Pt will tolerate HEP for improved strength, functional mobility, ROM, posture, and endurance. (Met, 1/28)  2. Pt will report reduced left leg pain to </= 5/10 at worst for improved functional mobility and ability to participate in functional activities/ADL's. (progressing, not met)  3. Pt will demo >/= 4-/5 strength in LLE for improved functional mobility, endurance, and posture. (progressing, not met)  4. Pt will demo >/= 4/5 strength in RLE for improved functional mobility, endurance, and posture. (progressing, not met)  5. Pt will perform spinal ROM in all directions without pain for improved functional mobility and posture.  (progressing, not met)  6. Pt will report being able to get in and out of car or low chair with improved ease for increased functional mobility (progressing, not met)        Long Term Goals: 8 weeks   1 Pt will be I with updated HEP for improved functional mobility, posture, strength, and endurance. (progressing, not met)  2. Pt will report reduced left leg pain to </= 3/10 at worst for improved functional mobility and ability to participate in functional activities/ADL's. (progressing, not met)  3. Pt will demo 5/5 strength in BLE's for improved functional mobility, endurance, and posture. (progressing, not met)  4. Pt will report being able to go to grocery store and put away groceries without increase in symptoms for improved functional mobility (progressing, not met)  5. Pt will demo >/= 3/4 range spinal AROM in all directions for improved functional mobility and posture. (progressing, not met)  6. Pt will report being able to stand for >/=75min without increase in symptoms for increased endurance. (progressing, not met)     Plan   Plan of care Certification: 12/9/2019 to 2/7/20.     Outpatient Physical Therapy 2 times weekly for 8 weeks to include the following interventions: Gait Training, Manual Therapy, Moist Heat/ Ice, Neuromuscular Re-ed, Patient Education, Self Care, Therapeutic Activites, Therapeutic Exercise, Ultrasound and modalities as appropriate.   Continue above plan.       Salena Dawson, PT

## 2020-01-31 ENCOUNTER — HOSPITAL ENCOUNTER (OUTPATIENT)
Dept: RADIOLOGY | Facility: HOSPITAL | Age: 70
Discharge: HOME | End: 2020-01-31
Attending: INTERNAL MEDICINE
Payer: MEDICARE

## 2020-01-31 DIAGNOSIS — R55 SYNCOPE, UNSPECIFIED SYNCOPE TYPE: ICD-10-CM

## 2020-01-31 PROCEDURE — 93880 EXTRACRANIAL BILAT STUDY: CPT | Mod: 26,,, | Performed by: RADIOLOGY

## 2020-01-31 PROCEDURE — 93880 EXTRACRANIAL BILAT STUDY: CPT | Mod: TC

## 2020-01-31 PROCEDURE — 93880 US CAROTID BILATERAL: ICD-10-PCS | Mod: 26,,, | Performed by: RADIOLOGY

## 2020-02-04 ENCOUNTER — OFFICE VISIT (OUTPATIENT)
Dept: CARDIOLOGY | Facility: CLINIC | Age: 70
End: 2020-02-04
Payer: MEDICARE

## 2020-02-04 ENCOUNTER — LAB VISIT (OUTPATIENT)
Dept: LAB | Facility: HOSPITAL | Age: 70
End: 2020-02-04
Attending: INTERNAL MEDICINE
Payer: MEDICARE

## 2020-02-04 VITALS
BODY MASS INDEX: 31.48 KG/M2 | DIASTOLIC BLOOD PRESSURE: 79 MMHG | HEART RATE: 81 BPM | WEIGHT: 177.69 LBS | HEIGHT: 63 IN | SYSTOLIC BLOOD PRESSURE: 142 MMHG

## 2020-02-04 DIAGNOSIS — Z94.0 KIDNEY REPLACED BY TRANSPLANT: ICD-10-CM

## 2020-02-04 DIAGNOSIS — I10 ESSENTIAL HYPERTENSION: ICD-10-CM

## 2020-02-04 DIAGNOSIS — R55 SYNCOPE AND COLLAPSE: Primary | ICD-10-CM

## 2020-02-04 DIAGNOSIS — E78.5 HYPERLIPIDEMIA, UNSPECIFIED HYPERLIPIDEMIA TYPE: ICD-10-CM

## 2020-02-04 LAB
ALBUMIN SERPL BCP-MCNC: 3.9 G/DL (ref 3.5–5.2)
ANION GAP SERPL CALC-SCNC: 12 MMOL/L (ref 8–16)
BASOPHILS # BLD AUTO: 0.06 K/UL (ref 0–0.2)
BASOPHILS NFR BLD: 0.9 % (ref 0–1.9)
BUN SERPL-MCNC: 25 MG/DL (ref 8–23)
CALCIUM SERPL-MCNC: 9.9 MG/DL (ref 8.7–10.5)
CHLORIDE SERPL-SCNC: 107 MMOL/L (ref 95–110)
CO2 SERPL-SCNC: 22 MMOL/L (ref 23–29)
CREAT SERPL-MCNC: 1.4 MG/DL (ref 0.5–1.4)
DIFFERENTIAL METHOD: ABNORMAL
EOSINOPHIL # BLD AUTO: 0.2 K/UL (ref 0–0.5)
EOSINOPHIL NFR BLD: 2.7 % (ref 0–8)
ERYTHROCYTE [DISTWIDTH] IN BLOOD BY AUTOMATED COUNT: 13.9 % (ref 11.5–14.5)
EST. GFR  (AFRICAN AMERICAN): 43.9 ML/MIN/1.73 M^2
EST. GFR  (NON AFRICAN AMERICAN): 38.1 ML/MIN/1.73 M^2
GLUCOSE SERPL-MCNC: 91 MG/DL (ref 70–110)
HCT VFR BLD AUTO: 40.1 % (ref 37–48.5)
HGB BLD-MCNC: 12.3 G/DL (ref 12–16)
IMM GRANULOCYTES # BLD AUTO: 0.04 K/UL (ref 0–0.04)
IMM GRANULOCYTES NFR BLD AUTO: 0.6 % (ref 0–0.5)
LYMPHOCYTES # BLD AUTO: 1.1 K/UL (ref 1–4.8)
LYMPHOCYTES NFR BLD: 16.7 % (ref 18–48)
MAGNESIUM SERPL-MCNC: 1.8 MG/DL (ref 1.6–2.6)
MCH RBC QN AUTO: 27.9 PG (ref 27–31)
MCHC RBC AUTO-ENTMCNC: 30.7 G/DL (ref 32–36)
MCV RBC AUTO: 91 FL (ref 82–98)
MONOCYTES # BLD AUTO: 0.7 K/UL (ref 0.3–1)
MONOCYTES NFR BLD: 10.8 % (ref 4–15)
NEUTROPHILS # BLD AUTO: 4.5 K/UL (ref 1.8–7.7)
NEUTROPHILS NFR BLD: 68.3 % (ref 38–73)
NRBC BLD-RTO: 0 /100 WBC
PHOSPHATE SERPL-MCNC: 3.2 MG/DL (ref 2.7–4.5)
PLATELET # BLD AUTO: 287 K/UL (ref 150–350)
PMV BLD AUTO: 10 FL (ref 9.2–12.9)
POTASSIUM SERPL-SCNC: 4.1 MMOL/L (ref 3.5–5.1)
RBC # BLD AUTO: 4.41 M/UL (ref 4–5.4)
SODIUM SERPL-SCNC: 141 MMOL/L (ref 136–145)
TACROLIMUS BLD-MCNC: 7.3 NG/ML (ref 5–15)
WBC # BLD AUTO: 6.64 K/UL (ref 3.9–12.7)

## 2020-02-04 PROCEDURE — 80069 RENAL FUNCTION PANEL: CPT

## 2020-02-04 PROCEDURE — 80197 ASSAY OF TACROLIMUS: CPT

## 2020-02-04 PROCEDURE — 83735 ASSAY OF MAGNESIUM: CPT

## 2020-02-04 PROCEDURE — 99204 OFFICE O/P NEW MOD 45 MIN: CPT | Mod: GC,S$GLB,, | Performed by: INTERNAL MEDICINE

## 2020-02-04 PROCEDURE — 99999 PR PBB SHADOW E&M-EST. PATIENT-LVL IV: CPT | Mod: PBBFAC,GC,, | Performed by: INTERNAL MEDICINE

## 2020-02-04 PROCEDURE — 85025 COMPLETE CBC W/AUTO DIFF WBC: CPT

## 2020-02-04 PROCEDURE — 99999 PR PBB SHADOW E&M-EST. PATIENT-LVL IV: ICD-10-PCS | Mod: PBBFAC,GC,, | Performed by: INTERNAL MEDICINE

## 2020-02-04 PROCEDURE — 36415 COLL VENOUS BLD VENIPUNCTURE: CPT

## 2020-02-04 PROCEDURE — 99204 PR OFFICE/OUTPT VISIT, NEW, LEVL IV, 45-59 MIN: ICD-10-PCS | Mod: GC,S$GLB,, | Performed by: INTERNAL MEDICINE

## 2020-02-04 RX ORDER — ATORVASTATIN CALCIUM 10 MG/1
10 TABLET, FILM COATED ORAL DAILY
Qty: 90 TABLET | Refills: 3 | Status: SHIPPED | OUTPATIENT
Start: 2020-02-04 | End: 2021-04-26 | Stop reason: ALTCHOICE

## 2020-02-04 NOTE — PROGRESS NOTES
Cardiology Clinic Note  Reason for Visit: Syncope, unspecified syncope type    HPI:   Ms. Paieg Summers is a 69 y.o. woman with history of htn, R renal transplant 4/8/19 presenting for evaluation for 1 episode of syncope between 9/2019 and 10/2019.    She describes the episode as follows:  She was in the shower, no lightheadedness or dizziness, sudden complete loss of memory.  She only remembers waking up the next morning with ekg leads on her chest still at home.  Rest of account per 8yo great granddaughter: she states that patient was in the bathroom for too long so she came in with the walker and got patient to bed.  She had not fallen, no vomiting, no LOC as such.  She was walked back to the bed, patient laid down, fell asleep and then was unarousable.  She called EMS when she could not arouse her grandmother.      Echo is completely normal  Carotid U/S shows no evidence of disease    ROS:    Review of Systems   Constitution: Negative.   HENT: Negative.    Eyes: Negative.    Cardiovascular: Negative.    Respiratory: Negative.    Endocrine: Negative.    Skin: Negative.    Musculoskeletal: Negative.    Gastrointestinal: Negative.    Genitourinary: Negative.    Neurological: Negative.      PMH:     Past Medical History:   Diagnosis Date    Anemia     Anemia of renal disease     Anxiety     Chronic renal failure 01/10/2018    CKD (chronic kidney disease) stage 3, GFR 30-59 ml/min 6/18/2019    Depression     Essential hypertension     GERD (gastroesophageal reflux disease)     Gout     H pylori ulcer     Hyperlipidemia     Hypertension     Obesity     Secondary hyperparathyroidism of renal origin      Past Surgical History:   Procedure Laterality Date    DECLOTTING OF ARTERIOVENOUS GRAFT Left 2/19/2019    Procedure: declot;  Surgeon: Yuriy Gibson MD;  Location: [facility] CATH LAB;  Service: Nephrology;  Laterality: Left;    DIAGNOSTIC ULTRASOUND Left 6/18/2018    Procedure: ULTRASOUND,  DIAGNOSTIC;  Surgeon: Yuriy Gibson MD;  Location: [facility] CATH LAB;  Service: Nephrology;  Laterality: Left;    DIAGNOSTIC ULTRASOUND Left 7/9/2018    Procedure: ULTRASOUND, DIAGNOSTIC;  Surgeon: Yuriy Gibson MD;  Location: [facility] CATH LAB;  Service: Nephrology;  Laterality: Left;    DIAGNOSTIC ULTRASOUND Left 8/6/2018    Procedure: ULTRASOUND, DIAGNOSTIC;  Surgeon: Yuriy Gibson MD;  Location: [facility] CATH LAB;  Service: Nephrology;  Laterality: Left;    DIAGNOSTIC ULTRASOUND Left 8/20/2018    Procedure: ULTRASOUND, DIAGNOSTIC;  Surgeon: Yuriy Gibson MD;  Location: [facility] CATH LAB;  Service: Nephrology;  Laterality: Left;    DIAGNOSTIC ULTRASOUND Left 9/19/2018    Procedure: ULTRASOUND, DIAGNOSTIC;  Surgeon: Yuriy Gibson MD;  Location: [facility] CATH LAB;  Service: Nephrology;  Laterality: Left;  suture removal. coming for 10AM    DIAGNOSTIC ULTRASOUND Left 10/10/2018    Procedure: ULTRASOUND, DIAGNOSTIC;  Surgeon: Yuriy Gibson MD;  Location: [facility] CATH LAB;  Service: Nephrology;  Laterality: Left;    DIALYSIS FISTULA CREATION Left 02/2018    FISTULOGRAM Left 12/19/2018    Procedure: FISTULOGRAM;  Surgeon: Javed Lopez MD;  Location: [facility] CATH LAB;  Service: Nephrology;  Laterality: Left;  f/u 1week    FISTULOGRAM Left 3/13/2019    Procedure: FISTULOGRAM;  Surgeon: Irineo Devlin MD;  Location: [facility] CATH LAB;  Service: Nephrology;  Laterality: Left;    HYSTERECTOMY  1971    TVH    KIDNEY TRANSPLANT N/A 4/8/2019    Procedure: TRANSPLANT, KIDNEY;  Surgeon: Mathew Goodwin MD;  Location: [facility] OR [address] [place] [address];  Service: Transplant;  Laterality: N/A;    THROMBECTOMY OF HEMODIALYSIS ACCESS SITE Left 6/11/2018    Procedure: THROMBECTOMY, HEMODIALYSIS GRAFT OR FISTULA;  Surgeon: Yuriy Gibson MD;  Location: [facility] CATH LAB;  Service: Nephrology;  Laterality: Left;    THROMBECTOMY OF HEMODIALYSIS ACCESS SITE Left 6/13/2018    Procedure: THROMBECTOMY, HEMODIALYSIS GRAFT OR FISTULA;  Surgeon: Yuriy Gibson MD;   Location: [facility] CATH LAB;  Service: Nephrology;  Laterality: Left;    THROMBECTOMY OF HEMODIALYSIS ACCESS SITE N/A 9/6/2018    Procedure: THROMBECTOMY, HEMODIALYSIS GRAFT OR FISTULA;  Surgeon: Yuriy Gibson MD;  Location: [facility] CATH LAB;  Service: Nephrology;  Laterality: N/A;  PT coming in for noon says shes NPO    THROMBECTOMY OF HEMODIALYSIS ACCESS SITE Left 9/12/2018    Procedure: THROMBECTOMY, HEMODIALYSIS GRAFT OR FISTULA;  Surgeon: Yuriy Gibson MD;  Location: [facility] CATH LAB;  Service: Nephrology;  Laterality: Left;    THROMBECTOMY OF HEMODIALYSIS ACCESS SITE Left 11/9/2018    Procedure: THROMBECTOMY, HEMODIALYSIS GRAFT OR FISTULA;  Surgeon: Yuriy Gibson MD;  Location: [facility] CATH LAB;  Service: Nephrology;  Laterality: Left;    THROMBECTOMY OF HEMODIALYSIS ACCESS SITE Left 12/13/2018    Procedure: THROMBECTOMY, HEMODIALYSIS GRAFT OR FISTULA;  Surgeon: Javed Lopez MD;  Location: [facility] CATH LAB;  Service: Nephrology;  Laterality: Left;     Allergies:     Review of patient's allergies indicates:   Allergen Reactions    Oxycodone Itching    Hydrocodone Itching     Medications:     Current Outpatient Medications on File Prior to Visit   Medication Sig Dispense Refill    acetaminophen (TYLENOL) 325 mg Cap Take by mouth.      amLODIPine (NORVASC) 10 MG tablet Take 1 tablet (10 mg total) by mouth once daily. 30 tablet 11    betamethasone valerate 0.1% (VALISONE) 0.1 % Lotn Apply topically 2 (two) times daily. for 14 days 60 mL 2    bisacodyl (DULCOLAX) 5 mg EC tablet Take 2 tablets (10 mg total) by mouth daily as needed for Constipation.  0    carvedilol (COREG) 12.5 MG tablet Take 2 tablets (25 mg total) by mouth 2 (two) times daily with meals. 120 tablet 11    cinacalcet (SENSIPAR) 30 MG Tab Take sensipar 30 mg Monday, Wednesday and Friday 30 tablet 6    cloNIDine (CATAPRES) 0.1 MG tablet Take 1 tablet (0.1 mg total) by mouth 3 (three) times daily. 90 tablet 11    cyanocobalamin (VITAMIN B-12) 500  MCG tablet Take 1 tablet (500 mcg total) by mouth once daily. 30 tablet 4    docusate sodium (COLACE) 100 MG capsule Take 1 capsule (100 mg total) by mouth 2 (two) times daily as needed for Constipation.  0    DULoxetine (CYMBALTA) 20 MG capsule Take 1 capsule (20 mg total) by mouth 2 (two) times daily. 60 capsule 4    hydrOXYzine pamoate (VISTARIL) 25 MG Cap Take 1 capsule (25 mg total) by mouth every 8 (eight) hours. 90 capsule 2    linaCLOtide (LINZESS) 145 mcg Cap capsule Take 1 capsule (145 mcg total) by mouth once daily. 30 capsule 11    magnesium oxide (MAG-OX) 400 mg (241.3 mg magnesium) tablet Take 2 tablets (800 mg total) by mouth 2 (two) times daily. 270 tablet 10    multivitamin (THERAGRAN) tablet Take 1 tablet by mouth once daily.      mycophenolate (CELLCEPT) 250 mg Cap Take 2 capsules (500 mg total) by mouth 2 (two) times daily. 120 capsule 11    sodium bicarbonate 650 MG tablet Take 1 tablet (650 mg total) by mouth 2 (two) times daily. 60 tablet 11    sulfamethoxazole-trimethoprim 400-80mg (BACTRIM,SEPTRA) 400-80 mg per tablet Take 1 tablet by mouth once daily. Stop: 4/7/2020 30 tablet 11    tacrolimus (PROGRAF) 1 MG Cap Take 3 capsules (3 mg total) by mouth every morning AND 2 capsules (2 mg total) every evening. 150 capsule 11    traMADol (ULTRAM) 50 mg tablet Take 1 tablet (50 mg total) by mouth every 8 (eight) hours as needed for Pain. 30 tablet 0     No current facility-administered medications on file prior to visit.      Social History:     Social History     Tobacco Use    Smoking status: Never Smoker    Smokeless tobacco: Never Used   Substance Use Topics    Alcohol use: No     Family History:     Family History   Problem Relation Age of Onset    Alcohol abuse Mother     No Known Problems Father     Hypertension Sister     Arthritis Sister     Heart disease Brother     Heart failure Brother     Heart failure Brother     Diabetes Cousin     Breast cancer Neg Hx      "Colon cancer Neg Hx     Ovarian cancer Neg Hx      Physical Exam:   BP (!) 142/79 (BP Location: Right arm, Patient Position: Sitting, BP Method: X-Large (Automatic))   Pulse 81   Ht 5' 3" (1.6 m)   Wt 80.6 kg (177 lb 11.1 oz)   LMP 08/08/1971   BMI 31.48 kg/m²    Physical Exam   Constitutional: She is oriented to person, place, and time. She appears well-developed and well-nourished.   HENT:   Head: Normocephalic and atraumatic.   Eyes: Pupils are equal, round, and reactive to light. Conjunctivae and EOM are normal.   Neck: Normal range of motion. Neck supple. No JVD present.   Cardiovascular: Normal rate, regular rhythm and normal heart sounds. Exam reveals no gallop and no friction rub.   No murmur heard.  Pulmonary/Chest: Effort normal and breath sounds normal. No respiratory distress. She has no wheezes. She has no rales. She exhibits no tenderness.   Abdominal: Soft. Bowel sounds are normal. She exhibits no distension. There is no tenderness.   Musculoskeletal: Normal range of motion. She exhibits no edema or tenderness.   Neurological: She is alert and oriented to person, place, and time.   Skin: Skin is warm and dry. No erythema. No pallor.       Labs:     Lab Results   Component Value Date     01/07/2020    K 4.3 01/07/2020     01/07/2020    CO2 26 01/07/2020    BUN 19 01/07/2020    CREATININE 1.2 01/07/2020    ANIONGAP 7 (L) 01/07/2020     Lab Results   Component Value Date    HGBA1C 5.1 04/10/2019     No results found for: BNP, BNPTRIAGEBLO Lab Results   Component Value Date    WBC 4.35 01/07/2020    HGB 11.5 (L) 01/07/2020    HCT 38.2 01/07/2020    HCT 28 (L) 04/08/2019     01/07/2020    GRAN 2.7 01/07/2020    GRAN 62.6 01/07/2020     Lab Results   Component Value Date    CHOL 152 07/01/2019    HDL 46 07/01/2019    LDLCALC 88.6 07/01/2019    TRIG 87 07/01/2019          Imaging:   Echo reviewed  Carotid u/s reviewed  ecg reviewed  Assessment:     1. Syncope and collapse    2. " Essential hypertension    3. Hyperlipidemia, unspecified hyperlipidemia type          Plan:   Syncope and collapse  This is more reminiscent of a fugue state suggesting a neuro or pysch eval  If this recurs, would consider 30d cardiac event monitor    Essential hypertension  Uncontrolled but would let nephrology titrate meds    Hyperlipidemia, unspecified hyperlipidemia type  Atorvastatin 10mg po daily   Recheck lipid panel and cmp in 3 months    Other orders  -     atorvastatin (LIPITOR) 10 MG tablet; Take 1 tablet (10 mg total) by mouth once daily.  Dispense: 90 tablet; Refill: 3        Discussed with Dr. casper. RTC as needed.     Signed:  Domingo Sneed MD  2/4/2020 9:56 AM

## 2020-02-04 NOTE — PROGRESS NOTES
The results reviewed, no change required.
regular rate and rhythm

## 2020-02-11 ENCOUNTER — CLINICAL SUPPORT (OUTPATIENT)
Dept: REHABILITATION | Facility: HOSPITAL | Age: 70
End: 2020-02-11
Attending: PSYCHIATRY & NEUROLOGY
Payer: MEDICARE

## 2020-02-11 DIAGNOSIS — M62.81 MUSCLE WEAKNESS: ICD-10-CM

## 2020-02-11 DIAGNOSIS — M79.605 LEFT LEG PAIN: ICD-10-CM

## 2020-02-11 DIAGNOSIS — R26.89 DECREASED SPINAL MOBILITY: ICD-10-CM

## 2020-02-11 DIAGNOSIS — Z74.09 IMPAIRED FUNCTIONAL MOBILITY AND ACTIVITY TOLERANCE: ICD-10-CM

## 2020-02-11 PROCEDURE — 97110 THERAPEUTIC EXERCISES: CPT | Mod: PO

## 2020-02-13 ENCOUNTER — CLINICAL SUPPORT (OUTPATIENT)
Dept: REHABILITATION | Facility: HOSPITAL | Age: 70
End: 2020-02-13
Attending: PSYCHIATRY & NEUROLOGY
Payer: MEDICARE

## 2020-02-13 DIAGNOSIS — M79.605 LEFT LEG PAIN: ICD-10-CM

## 2020-02-13 DIAGNOSIS — R26.89 DECREASED SPINAL MOBILITY: ICD-10-CM

## 2020-02-13 DIAGNOSIS — M62.81 MUSCLE WEAKNESS: ICD-10-CM

## 2020-02-13 DIAGNOSIS — Z74.09 IMPAIRED FUNCTIONAL MOBILITY AND ACTIVITY TOLERANCE: ICD-10-CM

## 2020-02-13 PROCEDURE — 97110 THERAPEUTIC EXERCISES: CPT | Mod: PO,CQ

## 2020-02-13 NOTE — PROGRESS NOTES
"  Physical Therapy Daily Treatment Note     Name: Paige Powell Osseo  Clinic Number: [id number]    Therapy Diagnosis:   Encounter Diagnoses   Name Primary?    Muscle weakness     Decreased spinal mobility     Impaired functional mobility and activity tolerance     Left leg pain      Physician: Medardo Leslie MD    Visit Date: 2/13/2020    Physician Orders: PT Eval and Treat   Medical Diagnosis from Referral: Lumbosacral radiculopathy  Evaluation Date: 12/9/2019  Authorization Period Expiration: 11/19/20  Plan of Care Expiration: 2/7/20  Visit # / Visits authorized: 11 (6/20 newly authorized)    Time In: 11:00 AM  Time Out: 11:55  PM  Total Billable Time: 55 min    Precautions: Standard, Fall and organ transplant    Subjective     Pt reports: no pain currently, some stiffness.     She was compliant with home exercise program.  Response to previous treatment: gave her a little more energy   Functional change: can get in and out of the car more easily    Pain: 0/10  Location: B knee    Objective       Paige received therapeutic exercises to develop strength, endurance, ROM, posture and core stabilization for 55 minutes including:    Upright bike, 10 min, Level 2  Posterior pelvic tilt, 20 x 3s holds  Abdominal isometric c/ orange physioball, 2 x 10 reps 3" hold  Bridge on ball, 2 x 10,  s holds  Double knee to chest, orange physioball, 2 x 10  Lower trunk rotation,10 reps B, 5s  Hip ABD in hook-lying, blue band, 20 reps x 3 seconds   SLR to Hiip ABD, 10/LE  Hip flexor stretch off mat, 1min/LE  Hamstring stretch c/ strap,  x1 min/LE  Figure-4 stretch, 1min/side  Double leg press on shuttle, 2.5 to 3 bands, 3 x 10 reps            Not performed today:    Clamshell, 15/side, side-lying, yellow band  Open book, 10 reps/side  Bridge with ball,  15 reps   Double knees to chest with physioball, 2 x 10 reps  Standing hip ext, 2 x 10 reps  Lateral walking with hand held assist, 4 laps x 8-10 steps  Retro walking with hand " held assist, 4 x 10 steps  Sit <> stands from 2nd from tallest plyobox, 10 reps  SLR, 2 x 10 reps  Supine marching, 2 x 10  Hip ADD squeeze 20 x 3s  Home Exercises Provided and Patient Education Provided     Education provided:   - exercise technique    Written Home Exercises Provided: Patient instructed to cont prior HEP.  Exercises were reviewed and Paige was able to demonstrate them prior to the end of the session.  Paige demonstrated good  understanding of the education provided.     See EMR under Patient Instructions for exercises provided prior visit.    Assessment       Patient tolerated therapy session well. No increase in pain or adverse symptoms. Patient is making good progress towards goals.   Paige is progressing well towards her goals.   Pt prognosis is Good.     Pt will continue to benefit from skilled outpatient physical therapy to address the deficits listed in the problem list box on initial evaluation, provide pt/family education and to maximize pt's level of independence in the home and community environment.     Pt's spiritual, cultural and educational needs considered and pt agreeable to plan of care and goals.     Anticipated barriers to physical therapy: chronicity of symptoms    Goals:  Short Term Goals: 4 weeks   1. Pt will tolerate HEP for improved strength, functional mobility, ROM, posture, and endurance. (Met, 1/28)  2. Pt will report reduced left leg pain to </= 5/10 at worst for improved functional mobility and ability to participate in functional activities/ADL's. (progressing, not met)  3. Pt will demo >/= 4-/5 strength in LLE for improved functional mobility, endurance, and posture. (progressing, not met)  4. Pt will demo >/= 4/5 strength in RLE for improved functional mobility, endurance, and posture. (progressing, not met)  5. Pt will perform spinal ROM in all directions without pain for improved functional mobility and posture. (progressing, not met)  6. Pt will report being able to  get in and out of car or low chair with improved ease for increased functional mobility (progressing, not met)        Long Term Goals: 8 weeks   1 Pt will be I with updated HEP for improved functional mobility, posture, strength, and endurance. (progressing, not met)  2. Pt will report reduced left leg pain to </= 3/10 at worst for improved functional mobility and ability to participate in functional activities/ADL's. (progressing, not met)  3. Pt will demo 5/5 strength in BLE's for improved functional mobility, endurance, and posture. (progressing, not met)  4. Pt will report being able to go to grocery store and put away groceries without increase in symptoms for improved functional mobility (progressing, not met)  5. Pt will demo >/= 3/4 range spinal AROM in all directions for improved functional mobility and posture. (progressing, not met)  6. Pt will report being able to stand for >/=75min without increase in symptoms for increased endurance. (progressing, not met)     Plan   Plan of care Certification: 12/9/2019 to 2/7/20.     Outpatient Physical Therapy 2 times weekly for 8 weeks to include the following interventions: Gait Training, Manual Therapy, Moist Heat/ Ice, Neuromuscular Re-ed, Patient Education, Self Care, Therapeutic Activites, Therapeutic Exercise, Ultrasound and modalities as appropriate.   Continue above plan.       Sia Pablo, PTA

## 2020-02-23 NOTE — PROGRESS NOTES
Pablo Luis is a 63year old female. HPI:     CC:  Patient presents with:  Upper Body Exam: LOV 11/5/18. pt presenting tday with upper body skin check.  pt has HX of BCC        Allergies:  Codeine    HISTORY:    Past Medical History:   Diagnosis Date Reports true trough level, will increase PO hydration. Will set up Bx next week pending lab follow up. Alcohol use: Yes      Alcohol/week: 0.0 standard drinks      Comment: once a week    Drug use: No       Current Outpatient Medications   Medication Sig Dispense Refill   • calcium-vitamin D (OSCAL) 250-125 MG-UNIT Oral Tab Take 1 tablet by mouth daily. neg endometrial biopsy   • CHOLECYSTECTOMY     • COLONOSCOPY  03/04/2019   • COLONOSCOPY N/A 3/4/2019    Performed by Cj Wilcox MD at [facility] ENDOSCOPY   • ENDOMETRIAL ABLATION  10/2008    kiana   • FOOT/TOES SURGERY PROC UNLISTED  2001 hamm Yes          2 cups coffee daily        Occupational Exposure: Not Asked        Hobby Hazards: Not Asked        Sleep Concern: Not Asked        Stress Concern: Not Asked        Weight Concern: Not Asked        Special Diet: Not Asked        Back Care: Not sensitivity. No other skin complaints. History, medications, allergies reviewed as noted. Physical Examination:     Well-developed well-nourished patient alert oriented in no acute distress.   Exam total-body performed, including scalp, head, actinic keratoses    Angioma left inframammary crease reassurance cautery previously stable. Multiple nevi over the back observe. No significant changes. Multiple waxy tan papules reassurance. No change lesion left plantar foot.   Left ankle with  Tequila Padilla

## 2020-02-24 ENCOUNTER — OFFICE VISIT (OUTPATIENT)
Dept: SLEEP MEDICINE | Facility: CLINIC | Age: 70
End: 2020-02-24
Payer: MEDICARE

## 2020-02-24 VITALS
SYSTOLIC BLOOD PRESSURE: 144 MMHG | WEIGHT: 181.19 LBS | HEART RATE: 72 BPM | HEIGHT: 63 IN | BODY MASS INDEX: 32.11 KG/M2 | DIASTOLIC BLOOD PRESSURE: 85 MMHG

## 2020-02-24 DIAGNOSIS — G47.30 SLEEP APNEA, UNSPECIFIED TYPE: Primary | ICD-10-CM

## 2020-02-24 PROCEDURE — 1101F PR PT FALLS ASSESS DOC 0-1 FALLS W/OUT INJ PAST YR: ICD-10-PCS | Mod: CPTII,S$GLB,, | Performed by: PSYCHIATRY & NEUROLOGY

## 2020-02-24 PROCEDURE — 1125F PR PAIN SEVERITY QUANTIFIED, PAIN PRESENT: ICD-10-PCS | Mod: S$GLB,,, | Performed by: PSYCHIATRY & NEUROLOGY

## 2020-02-24 PROCEDURE — 1159F MED LIST DOCD IN RCRD: CPT | Mod: S$GLB,,, | Performed by: PSYCHIATRY & NEUROLOGY

## 2020-02-24 PROCEDURE — 3079F PR MOST RECENT DIASTOLIC BLOOD PRESSURE 80-89 MM HG: ICD-10-PCS | Mod: CPTII,S$GLB,, | Performed by: PSYCHIATRY & NEUROLOGY

## 2020-02-24 PROCEDURE — 99204 PR OFFICE/OUTPT VISIT, NEW, LEVL IV, 45-59 MIN: ICD-10-PCS | Mod: S$GLB,,, | Performed by: PSYCHIATRY & NEUROLOGY

## 2020-02-24 PROCEDURE — 99499 UNLISTED E&M SERVICE: CPT | Mod: S$GLB,,, | Performed by: PSYCHIATRY & NEUROLOGY

## 2020-02-24 PROCEDURE — 3079F DIAST BP 80-89 MM HG: CPT | Mod: CPTII,S$GLB,, | Performed by: PSYCHIATRY & NEUROLOGY

## 2020-02-24 PROCEDURE — 99499 RISK ADDL DX/OHS AUDIT: ICD-10-PCS | Mod: S$GLB,,, | Performed by: PSYCHIATRY & NEUROLOGY

## 2020-02-24 PROCEDURE — 99204 OFFICE O/P NEW MOD 45 MIN: CPT | Mod: S$GLB,,, | Performed by: PSYCHIATRY & NEUROLOGY

## 2020-02-24 PROCEDURE — 1159F PR MEDICATION LIST DOCUMENTED IN MEDICAL RECORD: ICD-10-PCS | Mod: S$GLB,,, | Performed by: PSYCHIATRY & NEUROLOGY

## 2020-02-24 PROCEDURE — 99999 PR PBB SHADOW E&M-EST. PATIENT-LVL III: ICD-10-PCS | Mod: PBBFAC,,, | Performed by: PSYCHIATRY & NEUROLOGY

## 2020-02-24 PROCEDURE — 3077F PR MOST RECENT SYSTOLIC BLOOD PRESSURE >= 140 MM HG: ICD-10-PCS | Mod: CPTII,S$GLB,, | Performed by: PSYCHIATRY & NEUROLOGY

## 2020-02-24 PROCEDURE — 99999 PR PBB SHADOW E&M-EST. PATIENT-LVL III: CPT | Mod: PBBFAC,,, | Performed by: PSYCHIATRY & NEUROLOGY

## 2020-02-24 PROCEDURE — 1125F AMNT PAIN NOTED PAIN PRSNT: CPT | Mod: S$GLB,,, | Performed by: PSYCHIATRY & NEUROLOGY

## 2020-02-24 PROCEDURE — 3077F SYST BP >= 140 MM HG: CPT | Mod: CPTII,S$GLB,, | Performed by: PSYCHIATRY & NEUROLOGY

## 2020-02-24 PROCEDURE — 1101F PT FALLS ASSESS-DOCD LE1/YR: CPT | Mod: CPTII,S$GLB,, | Performed by: PSYCHIATRY & NEUROLOGY

## 2020-02-24 NOTE — PROGRESS NOTES
Paige Summers  was seen at the request of  Medardo Leslie MD for sleep evaluation.    02/24/2020 INITIAL HISTORY OF PRESENT ILLNESS:  Paige Summers is a 69 y.o. female is here to be evaluated for a sleep disorder.       CHIEF COMPLAINT:    Paige Summers used to work 11pm -7 AM shifts for many years. She retired in 2005. She has gained some weight around this time.  She was found to have CKD, was on HD, and had kidney transplant in 4/2019.  She has been having difficulty to re-adjust sleeping at night.    The patient's complaints include excessive daytime sleepiness, excessive daytime fatigue, snoring,  witnessed breathing pauses,  gasping for air in sleep and interrupted sleep since  2005.    Denies difficulty falling and staying asleep.     Denies symptoms concerning for parasomnia except for occasional somniloquy.  she Denies cataplexy, sleep paralysis, hallucinations surrounding sleep time.     She has hard to control HTN - on 3 medications.    Paige Summers denied symptoms concerning for RLS.      EPWORTH SLEEPINESS SCALE 2/24/2020   Sitting and reading 1   Watching TV 1   Sitting, inactive in a public place (e.g. a theatre or a meeting) 0   As a passenger in a car for an hour without a break 0   Lying down to rest in the afternoon when circumstances permit 2   Sitting and talking to someone 0   Sitting quietly after a lunch without alcohol 1   In a car, while stopped for a few minutes in traffic 0   Total score 5       PHQ9 2/24/2020   Little interest or pleasure in doing things: Several days   Feeling down, depressed or hopeless: Not at all   Trouble falling asleep, staying asleep, or sleeping too much: Nearly every day   Feeling tired or having little energy: Nearly every day   Poor appetite or overeating: More than half the days   Feeling bad about yourself- or that you are a failure or have let yourself or family down Not at all   Trouble concentrating on things, such as reading the  newspaper or watching television: Not at all   Moving or speaking so slowly that other people could have noticed. Or the opposite- being so fidgety or restless that you have been moving around a lot more than usual: More than half the days   Thoughts that you would be better off dead or hurting yourself in some way: Not at all   If you indicated you have experienced any of the aforementioned problems, how difficult have these problems made it for you to do your work, take care of things at home or get along with other people? Somewhat difficult   Total Score 11     GAD7 2/24/2020   Feeling nervous, anxious, on edge More than half the days   Not being able to stop or control worrying More than half the days   Worrying too much about different things Nearly everyday   Trouble relaxing Nearly everyday   Being so restless that its hard to sit still Not at all   Becoming easily annoyed or irritable Several days   Feeling afraid as if something awful might happen More than half the days   If you marked you are experiencing any of the aforementioned problems, how difficult have these made it for you to do your work, take care of things at home, or get along with other people? Somewhat difficult   FRANCOIS-7 Score 13         SLEEP ROUTINE AND LIFESTYLE 02/24/2020 :    Sleep Clinic New Patient 2/24/2020   What time do you go to bed on a week day? (Give a range) 10:00p-12:00a   What time do you go to bed on a day off? (Give a range) 9:00p-10:00p   How long does it take you to fall asleep? (Give a range) hours   On average, how many times per night do you wake up? 3-4   How long does it take you to fall back into sleep? (Give a range) 2-3 hours   What time do you wake up to start your day on a week day? (Give a range) 7:00a   What time do you wake up to start your day on a day off? (Give a range) 8:30a-9:00a   What time do you get out of bed? (Give a range) 7:30a   On average, how many hours do you sleep? 5-6 hours   On average,  how many naps do you take per day? 1   Rate your sleep quality from 0 to 5 (0-poor, 5-great). 1   Have you experienced:  Weight loss?   How much weight have you lost or gained (in lbs.) in the last year? 33 lbs   On average, how many times per night do you go to the bathroom?  3-4   Have you ever had a sleep study/CPAP machine/surgery for sleep apnea? No   Have you ever had a CPAP machine for sleep apnea? No   Have you ever had surgery for sleep apnea? No       Sleep Clinic ROS  2/24/2020   Difficulty breathing through the nose?  No   Sore throat or dry mouth in the morning? Sometimes   Irregular or very fast heart beat?  No   Shortness of breath?  No   Acid reflux? No   Body aches and pains?  Yes   Morning headaches? Yes   Dizziness? Sometimes   Mood changes?  Sometimes   Do you exercise?  Yes   Do you feel like moving your legs a lot?  No            PREVIOUS SLEEP STUDIES:     none        Social History:  Occupation:  Bed partner: grand daughter is watching her sleep  Exercise routine: yes  Caffeine:    Alcohol:   Smoking:    DME:       PAST MEDICAL HISTORY:    Active Ambulatory Problems     Diagnosis Date Noted    Mechanical complication of vascular device 09/12/2018    Antiphospholipid antibody positive 12/05/2018    Anemia of renal disease     Secondary hyperparathyroidism of renal origin     Presence of surgical incision 04/09/2019    S/P living-donor kidney transplantation     Prophylactic immunotherapy     Long-term use of immunosuppressant medication     At risk for opportunistic infections     Hypophosphatemia 04/10/2019    Essential hypertension     Sleep disturbance 06/03/2019    Peripheral neuropathy 06/03/2019    CKD (chronic kidney disease) stage 3, GFR 30-59 ml/min 06/18/2019    Muscle weakness 12/09/2019    Decreased spinal mobility 12/09/2019    Impaired functional mobility and activity tolerance 12/09/2019    Left leg pain 12/09/2019    Weakness of both hands 12/31/2019     Numbness and tingling in both hands 12/31/2019    Stiffness of right hand, not elsewhere classified 12/31/2019    Syncope 01/22/2020     Resolved Ambulatory Problems     Diagnosis Date Noted    Mechanical complication of arteriovenous fistula surgically created 03/21/2018    Mechanical complication of arteriovenous fistula surgically created 06/11/2018    Thrombosis of renal dialysis arteriovenous graft 06/11/2018    Mechanical complication of vascular device 06/13/2018    ESRD (end stage renal disease) 11/09/2018    History of clotted arteriovenous graft     ESRD (end stage renal disease) on dialysis 12/19/2018    Hypercalcemia     AV fistula thrombosis 04/09/2019    Hypertension, renal disease, stage 5 chronic kidney disease or end stage renal disease 04/09/2019    Acute hyperglycemia 04/09/2019    Thrombocytopenia, unspecified 04/11/2019    Hyponatremia 04/11/2019    Delayed graft function of kidney     Leg weakness, bilateral 07/22/2019     Past Medical History:   Diagnosis Date    Anemia     Anxiety     Chronic renal failure 01/10/2018    Depression     GERD (gastroesophageal reflux disease)     Gout     H pylori ulcer     Hyperlipidemia     Hypertension     Obesity                 PAST SURGICAL HISTORY:    Past Surgical History:   Procedure Laterality Date    DECLOTTING OF ARTERIOVENOUS GRAFT Left 2/19/2019    Procedure: declot;  Surgeon: Yuriy Gibson MD;  Location: [facility] CATH LAB;  Service: Nephrology;  Laterality: Left;    DIAGNOSTIC ULTRASOUND Left 6/18/2018    Procedure: ULTRASOUND, DIAGNOSTIC;  Surgeon: Yuriy Gibson MD;  Location: [facility] CATH LAB;  Service: Nephrology;  Laterality: Left;    DIAGNOSTIC ULTRASOUND Left 7/9/2018    Procedure: ULTRASOUND, DIAGNOSTIC;  Surgeon: Yuriy Gibson MD;  Location: [facility] CATH LAB;  Service: Nephrology;  Laterality: Left;    DIAGNOSTIC ULTRASOUND Left 8/6/2018    Procedure: ULTRASOUND, DIAGNOSTIC;  Surgeon: Yuriy Gibson MD;  Location:  [facility] CATH LAB;  Service: Nephrology;  Laterality: Left;    DIAGNOSTIC ULTRASOUND Left 8/20/2018    Procedure: ULTRASOUND, DIAGNOSTIC;  Surgeon: Yuriy Gibosn MD;  Location: [facility] CATH LAB;  Service: Nephrology;  Laterality: Left;    DIAGNOSTIC ULTRASOUND Left 9/19/2018    Procedure: ULTRASOUND, DIAGNOSTIC;  Surgeon: Yuriy Gibson MD;  Location: [facility] CATH LAB;  Service: Nephrology;  Laterality: Left;  suture removal. coming for 10AM    DIAGNOSTIC ULTRASOUND Left 10/10/2018    Procedure: ULTRASOUND, DIAGNOSTIC;  Surgeon: Yuriy Gibson MD;  Location: [facility] CATH LAB;  Service: Nephrology;  Laterality: Left;    DIALYSIS FISTULA CREATION Left 02/2018    FISTULOGRAM Left 12/19/2018    Procedure: FISTULOGRAM;  Surgeon: Javed Lopez MD;  Location: [facility] CATH LAB;  Service: Nephrology;  Laterality: Left;  f/u 1week    FISTULOGRAM Left 3/13/2019    Procedure: FISTULOGRAM;  Surgeon: Irineo Devlin MD;  Location: [facility] CATH LAB;  Service: Nephrology;  Laterality: Left;    HYSTERECTOMY  1971    TVH    KIDNEY TRANSPLANT N/A 4/8/2019    Procedure: TRANSPLANT, KIDNEY;  Surgeon: Mathew Goodwin MD;  Location: [facility] OR [address] [place] [address];  Service: Transplant;  Laterality: N/A;    THROMBECTOMY OF HEMODIALYSIS ACCESS SITE Left 6/11/2018    Procedure: THROMBECTOMY, HEMODIALYSIS GRAFT OR FISTULA;  Surgeon: Yuriy Gibson MD;  Location: [facility] CATH LAB;  Service: Nephrology;  Laterality: Left;    THROMBECTOMY OF HEMODIALYSIS ACCESS SITE Left 6/13/2018    Procedure: THROMBECTOMY, HEMODIALYSIS GRAFT OR FISTULA;  Surgeon: Yuriy Gibson MD;  Location: [facility] CATH LAB;  Service: Nephrology;  Laterality: Left;    THROMBECTOMY OF HEMODIALYSIS ACCESS SITE N/A 9/6/2018    Procedure: THROMBECTOMY, HEMODIALYSIS GRAFT OR FISTULA;  Surgeon: Yuriy Gibson MD;  Location: [facility] CATH LAB;  Service: Nephrology;  Laterality: N/A;  PT coming in for noon says shes NPO    THROMBECTOMY OF HEMODIALYSIS ACCESS SITE Left 9/12/2018    Procedure: THROMBECTOMY,  HEMODIALYSIS GRAFT OR FISTULA;  Surgeon: Yuriy Gibson MD;  Location: [facility] CATH LAB;  Service: Nephrology;  Laterality: Left;    THROMBECTOMY OF HEMODIALYSIS ACCESS SITE Left 11/9/2018    Procedure: THROMBECTOMY, HEMODIALYSIS GRAFT OR FISTULA;  Surgeon: Yuriy Gibson MD;  Location: [facility] CATH LAB;  Service: Nephrology;  Laterality: Left;    THROMBECTOMY OF HEMODIALYSIS ACCESS SITE Left 12/13/2018    Procedure: THROMBECTOMY, HEMODIALYSIS GRAFT OR FISTULA;  Surgeon: Javed Lopez MD;  Location: [facility] CATH LAB;  Service: Nephrology;  Laterality: Left;         FAMILY HISTORY:                Family History   Problem Relation Age of Onset    Alcohol abuse Mother     No Known Problems Father     Hypertension Sister     Arthritis Sister     Heart disease Brother     Heart failure Brother     Heart failure Brother     Diabetes Cousin     Breast cancer Neg Hx     Colon cancer Neg Hx     Ovarian cancer Neg Hx        SOCIAL HISTORY:          Tobacco:   Social History     Tobacco Use   Smoking Status Never Smoker   Smokeless Tobacco Never Used       alcohol use:    Social History     Substance and Sexual Activity   Alcohol Use No                   ALLERGIES:    Review of patient's allergies indicates:   Allergen Reactions    Oxycodone Itching    Hydrocodone Itching       CURRENT MEDICATIONS:    Current Outpatient Medications   Medication Sig Dispense Refill    acetaminophen (TYLENOL) 325 mg Cap Take by mouth.      amLODIPine (NORVASC) 10 MG tablet Take 1 tablet (10 mg total) by mouth once daily. 30 tablet 11    atorvastatin (LIPITOR) 10 MG tablet Take 1 tablet (10 mg total) by mouth once daily. 90 tablet 3    betamethasone valerate 0.1% (VALISONE) 0.1 % Lotn Apply topically 2 (two) times daily. for 14 days 60 mL 2    bisacodyl (DULCOLAX) 5 mg EC tablet Take 2 tablets (10 mg total) by mouth daily as needed for Constipation.  0    carvedilol (COREG) 12.5 MG tablet Take 2 tablets (25 mg total) by mouth 2  "(two) times daily with meals. 120 tablet 11    cinacalcet (SENSIPAR) 30 MG Tab Take sensipar 30 mg Monday, Wednesday and Friday 30 tablet 6    cloNIDine (CATAPRES) 0.1 MG tablet Take 1 tablet (0.1 mg total) by mouth 3 (three) times daily. 90 tablet 11    cyanocobalamin (VITAMIN B-12) 500 MCG tablet Take 1 tablet (500 mcg total) by mouth once daily. 30 tablet 4    docusate sodium (COLACE) 100 MG capsule Take 1 capsule (100 mg total) by mouth 2 (two) times daily as needed for Constipation.  0    DULoxetine (CYMBALTA) 20 MG capsule Take 1 capsule (20 mg total) by mouth 2 (two) times daily. 60 capsule 4    hydrOXYzine pamoate (VISTARIL) 25 MG Cap Take 1 capsule (25 mg total) by mouth every 8 (eight) hours. 90 capsule 2    linaCLOtide (LINZESS) 145 mcg Cap capsule Take 1 capsule (145 mcg total) by mouth once daily. 30 capsule 11    magnesium oxide (MAG-OX) 400 mg (241.3 mg magnesium) tablet Take 2 tablets (800 mg total) by mouth 2 (two) times daily. 270 tablet 10    multivitamin (THERAGRAN) tablet Take 1 tablet by mouth once daily.      mycophenolate (CELLCEPT) 250 mg Cap Take 2 capsules (500 mg total) by mouth 2 (two) times daily. 120 capsule 11    sodium bicarbonate 650 MG tablet Take 1 tablet (650 mg total) by mouth 2 (two) times daily. 60 tablet 11    sulfamethoxazole-trimethoprim 400-80mg (BACTRIM,SEPTRA) 400-80 mg per tablet Take 1 tablet by mouth once daily. Stop: 4/7/2020 30 tablet 11    tacrolimus (PROGRAF) 1 MG Cap Take 3 capsules (3 mg total) by mouth every morning AND 2 capsules (2 mg total) every evening. 150 capsule 11    traMADol (ULTRAM) 50 mg tablet Take 1 tablet (50 mg total) by mouth every 8 (eight) hours as needed for Pain. 30 tablet 0     No current facility-administered medications for this visit.                           PHYSICAL EXAM:  BP (!) 144/85 (BP Location: Right arm, Patient Position: Sitting, BP Method: Medium (Automatic))   Pulse 72   Ht 5' 3" (1.6 m)   Wt 82.2 kg (181 " "lb 3.5 oz)   LMP 08/08/1971   BMI 32.10 kg/m²   GENERAL: Overweight body habitus, well groomed.  HEENT:   HEENT:  Conjunctivae are non-erythematous; Pupils equal, round, and reactive to light; Nose is symmetrical; Nasal mucosa is pink and moist; Septum is midline; Inferior turbinates are normal; Nasal airflow is normal; Posterior pharynx is pink; Modified Mallampati:II; Posterior palate is low; Tonsils not visualized; Uvula is wide and elongated;Tongue is enlarged; Dentition is fair; No TMJ tenderness; Jaw opening and protrusion without click and without discomfort.  NECK: Supple. Neck circumference is 15 inches. No thyromegaly. No palpable nodes.     SKIN: On face and neck: No abrasions, no rashes, no lesions.  No subcutaneous nodules are palpable.  RESPIRATORY: Chest is clear to auscultation.  Normal chest expansion and non-labored breathing at rest.  CARDIOVASCULAR: Normal S1, S2.  No murmurs, gallops or rubs. No carotid bruits bilaterally.  No edema. No clubbing. No cyanosis.    NEURO: Oriented to time, place and person. Normal attention span and concentration. Gait normal.    PSYCH: Affect is full. Mood is normal  MUSCULOSKELETAL: Moves 4 extremities. Gait normal.         Using [facility]: yes, but not actively      ASSESSMENT:    1. Sleep Apnea NEC. The patient symptomatically has  excessive daytime sleepiness, snoring, excessive daytime fatigue, interrupted sleep and nocturia  with exam findings of "a crowded oral airway and elevated body mass index. The patient has medical co-morbidities of hypertension,  which can be worsened by SALVADOR. This warrants further investigation for possible obstructive sleep apnea.          PLAN:    Diagnostic: Polysomnogram in lab. The nature of this procedure and its indication was discussed with the patient. she would  like to come discuss PSG results.    Weight loss strategies were discussed in detail         More than 15 minutes of this 30 minutes visit was spent in " counseling: and coordination of care.     during our discussion today, we talked about the etiology of  SALVADOR as well as the potential ramifications of untreated sleep apnea, which could include daytime sleepiness, hypertension, heart disease and/or stroke.  We discussed potential treatment options, which could include weight loss, body positioning, continuous positive airway pressure (CPAP), or referral for surgical consideration. Meanwhile, she  is urged to avoid supine sleep, weight gain and alcoholic beverages since all of these can worsen SALVADOR.     Precautions: The patient was advised to abstain from driving should he feel sleepy or drowsy.    Follow up: MD/NP  after the sleep study has been completed.     Thank you for allowing me the opportunity to participate in the care of your patient.    This visit summary will be sent to referring provider via inbasket

## 2020-02-24 NOTE — PATIENT INSTRUCTIONS
SLEEP LAB (Alyce or Y[facility]) will contact you to schedulethe sleep study. Their number is [phone] (ext 2). Please call them if you do not hear from them in 2 weeks from now.  The [facility] Lab is located on 7th floor of the [facility]; [employer] lab is located in [facility].    SLEEP CLINIC (my assistant) will call you when the sleep study results are ready - if you have not heard from us by 2 weeks from the date of the study, please call [phone] (ext 1) or you can [facility] to contact me.    You are advised to abstain from driving should you feel sleepy or drowsy.

## 2020-02-24 NOTE — LETTER
March 9, 2020      Medardo Leslie MD  [address] [facility] [address]           [facility]  [address] [facility] [address]  Phone: [phone]  Fax: [phone]          Patient: Paige Summers   MR Number: [id number]   YOB: 1950   Date of Visit: 2/24/2020       Dear Dr. Medardo Leslie:    Thank you for referring Paige Summers to me for evaluation. Attached you will find relevant portions of my assessment and plan of care.    If you have questions, please do not hesitate to call me. I look forward to following Paige Summers along with you.    Sincerely,    Latisha Romo MD    Enclosure  CC:  No Recipients    If you would like to receive this communication electronically, please contact [email or web address] or [phone] to request more information on "[employer] Link access.    For providers and/or their staff who would like to refer a patient to [facility], please contact us through our one-stop-shop provider referral line, [facility], at [phone].    If you feel you have received this communication in error or would no longer like to receive these types of communications, please e-mail [email or web address]

## 2020-03-04 ENCOUNTER — TELEPHONE (OUTPATIENT)
Dept: SLEEP MEDICINE | Facility: CLINIC | Age: 70
End: 2020-03-04

## 2020-03-04 DIAGNOSIS — G47.30 SLEEP APNEA, UNSPECIFIED TYPE: Primary | ICD-10-CM

## 2020-03-04 NOTE — TELEPHONE ENCOUNTER
CB,    Please inform the patient that I'm changing in lab order for a danielle,e study order    TY!

## 2020-03-06 ENCOUNTER — TELEPHONE (OUTPATIENT)
Dept: SLEEP MEDICINE | Facility: OTHER | Age: 70
End: 2020-03-06

## 2020-03-10 ENCOUNTER — LAB VISIT (OUTPATIENT)
Dept: LAB | Facility: HOSPITAL | Age: 70
End: 2020-03-10
Attending: INTERNAL MEDICINE
Payer: MEDICARE

## 2020-03-10 DIAGNOSIS — Z94.0 KIDNEY REPLACED BY TRANSPLANT: ICD-10-CM

## 2020-03-10 LAB
ALBUMIN SERPL BCP-MCNC: 3.5 G/DL (ref 3.5–5.2)
ANION GAP SERPL CALC-SCNC: 5 MMOL/L (ref 8–16)
BASOPHILS # BLD AUTO: 0.07 K/UL (ref 0–0.2)
BASOPHILS NFR BLD: 1.3 % (ref 0–1.9)
BUN SERPL-MCNC: 18 MG/DL (ref 8–23)
CALCIUM SERPL-MCNC: 9.7 MG/DL (ref 8.7–10.5)
CHLORIDE SERPL-SCNC: 109 MMOL/L (ref 95–110)
CO2 SERPL-SCNC: 27 MMOL/L (ref 23–29)
CREAT SERPL-MCNC: 1.1 MG/DL (ref 0.5–1.4)
DIFFERENTIAL METHOD: ABNORMAL
EOSINOPHIL # BLD AUTO: 0.1 K/UL (ref 0–0.5)
EOSINOPHIL NFR BLD: 2.6 % (ref 0–8)
ERYTHROCYTE [DISTWIDTH] IN BLOOD BY AUTOMATED COUNT: 13.9 % (ref 11.5–14.5)
EST. GFR  (AFRICAN AMERICAN): 58.8 ML/MIN/1.73 M^2
EST. GFR  (NON AFRICAN AMERICAN): 51 ML/MIN/1.73 M^2
GLUCOSE SERPL-MCNC: 96 MG/DL (ref 70–110)
HCT VFR BLD AUTO: 38.9 % (ref 37–48.5)
HGB BLD-MCNC: 11.9 G/DL (ref 12–16)
IMM GRANULOCYTES # BLD AUTO: 0.04 K/UL (ref 0–0.04)
IMM GRANULOCYTES NFR BLD AUTO: 0.8 % (ref 0–0.5)
LYMPHOCYTES # BLD AUTO: 1.1 K/UL (ref 1–4.8)
LYMPHOCYTES NFR BLD: 19.7 % (ref 18–48)
MAGNESIUM SERPL-MCNC: 1.5 MG/DL (ref 1.6–2.6)
MCH RBC QN AUTO: 27.6 PG (ref 27–31)
MCHC RBC AUTO-ENTMCNC: 30.6 G/DL (ref 32–36)
MCV RBC AUTO: 90 FL (ref 82–98)
MONOCYTES # BLD AUTO: 0.6 K/UL (ref 0.3–1)
MONOCYTES NFR BLD: 11.6 % (ref 4–15)
NEUTROPHILS # BLD AUTO: 3.4 K/UL (ref 1.8–7.7)
NEUTROPHILS NFR BLD: 64 % (ref 38–73)
NRBC BLD-RTO: 0 /100 WBC
PHOSPHATE SERPL-MCNC: 2.9 MG/DL (ref 2.7–4.5)
PLATELET # BLD AUTO: 275 K/UL (ref 150–350)
PMV BLD AUTO: 10 FL (ref 9.2–12.9)
POTASSIUM SERPL-SCNC: 4.3 MMOL/L (ref 3.5–5.1)
RBC # BLD AUTO: 4.31 M/UL (ref 4–5.4)
SODIUM SERPL-SCNC: 141 MMOL/L (ref 136–145)
TACROLIMUS BLD-MCNC: 8.4 NG/ML (ref 5–15)
WBC # BLD AUTO: 5.33 K/UL (ref 3.9–12.7)

## 2020-03-10 PROCEDURE — 80197 ASSAY OF TACROLIMUS: CPT

## 2020-03-10 PROCEDURE — 85025 COMPLETE CBC W/AUTO DIFF WBC: CPT

## 2020-03-10 PROCEDURE — 80069 RENAL FUNCTION PANEL: CPT

## 2020-03-10 PROCEDURE — 36415 COLL VENOUS BLD VENIPUNCTURE: CPT

## 2020-03-10 PROCEDURE — 83735 ASSAY OF MAGNESIUM: CPT

## 2020-03-19 ENCOUNTER — PATIENT MESSAGE (OUTPATIENT)
Dept: TRANSPLANT | Facility: CLINIC | Age: 70
End: 2020-03-19

## 2020-03-24 ENCOUNTER — TELEPHONE (OUTPATIENT)
Dept: SLEEP MEDICINE | Facility: CLINIC | Age: 70
End: 2020-03-24

## 2020-03-24 ENCOUNTER — TELEPHONE (OUTPATIENT)
Dept: SLEEP MEDICINE | Facility: OTHER | Age: 70
End: 2020-03-24

## 2020-03-24 NOTE — TELEPHONE ENCOUNTER
----- Message from Sherlyn Locke sent at 3/24/2020  9:39 AM CDT -----  Contact: BECKY DORSEY [[id number]]  Name of Who is Calling : BECKY DORSEY [[id number]]    Patient is requesting a call from staff in regards to rescheduling appointment today at 12:30 pm patient does not want to come in to the office due to the COVID-19 virus  .....Please contact to further discuss and advise.    Can the clinic reply by [facility] : No    What Number to Call Back :  [phone]

## 2020-03-25 ENCOUNTER — TELEPHONE (OUTPATIENT)
Dept: NEUROLOGY | Facility: CLINIC | Age: 70
End: 2020-03-25

## 2020-03-25 NOTE — TELEPHONE ENCOUNTER
----- Message from Barbara Antonio sent at 3/25/2020  2:57 PM CDT -----  Type:  Patient Returning Call    Who Called:self   Who Left Message for Patient:Virginie   Does the patient know what this is regarding?:Appointment   Would the patient rather a call back or a response via [facility]?callback   Best Call Back Number:[phone]  Additional Information:Please call

## 2020-03-25 NOTE — TELEPHONE ENCOUNTER
I called and spoke with the patient , she is canceling the consult with Dr. Rivera in neuro until the coronavirus is over. I put in a letter to be sent to the home .

## 2020-04-08 ENCOUNTER — LAB VISIT (OUTPATIENT)
Dept: LAB | Facility: HOSPITAL | Age: 70
End: 2020-04-08
Attending: INTERNAL MEDICINE
Payer: MEDICARE

## 2020-04-08 DIAGNOSIS — Z94.0 KIDNEY REPLACED BY TRANSPLANT: ICD-10-CM

## 2020-04-08 DIAGNOSIS — E55.9 VITAMIN D DEFICIENCY: ICD-10-CM

## 2020-04-08 LAB
25(OH)D3+25(OH)D2 SERPL-MCNC: 26 NG/ML (ref 30–96)
ALBUMIN SERPL BCP-MCNC: 3.6 G/DL (ref 3.5–5.2)
ALBUMIN SERPL BCP-MCNC: 3.6 G/DL (ref 3.5–5.2)
ALP SERPL-CCNC: 119 U/L (ref 55–135)
ALT SERPL W/O P-5'-P-CCNC: 10 U/L (ref 10–44)
ANION GAP SERPL CALC-SCNC: 6 MMOL/L (ref 8–16)
AST SERPL-CCNC: 18 U/L (ref 10–40)
BASOPHILS # BLD AUTO: 0.05 K/UL (ref 0–0.2)
BASOPHILS NFR BLD: 0.9 % (ref 0–1.9)
BILIRUB DIRECT SERPL-MCNC: 0.1 MG/DL (ref 0.1–0.3)
BILIRUB SERPL-MCNC: 0.3 MG/DL (ref 0.1–1)
BUN SERPL-MCNC: 20 MG/DL (ref 8–23)
CALCIUM SERPL-MCNC: 9.5 MG/DL (ref 8.7–10.5)
CHLORIDE SERPL-SCNC: 107 MMOL/L (ref 95–110)
CO2 SERPL-SCNC: 25 MMOL/L (ref 23–29)
CREAT SERPL-MCNC: 1.2 MG/DL (ref 0.5–1.4)
DIFFERENTIAL METHOD: ABNORMAL
EOSINOPHIL # BLD AUTO: 0.2 K/UL (ref 0–0.5)
EOSINOPHIL NFR BLD: 2.8 % (ref 0–8)
ERYTHROCYTE [DISTWIDTH] IN BLOOD BY AUTOMATED COUNT: 13.8 % (ref 11.5–14.5)
EST. GFR  (AFRICAN AMERICAN): 52.9 ML/MIN/1.73 M^2
EST. GFR  (NON AFRICAN AMERICAN): 45.9 ML/MIN/1.73 M^2
GLUCOSE SERPL-MCNC: 109 MG/DL (ref 70–110)
HCT VFR BLD AUTO: 39.1 % (ref 37–48.5)
HGB BLD-MCNC: 12.1 G/DL (ref 12–16)
IMM GRANULOCYTES # BLD AUTO: 0.05 K/UL (ref 0–0.04)
IMM GRANULOCYTES NFR BLD AUTO: 0.9 % (ref 0–0.5)
LYMPHOCYTES # BLD AUTO: 1.1 K/UL (ref 1–4.8)
LYMPHOCYTES NFR BLD: 20.5 % (ref 18–48)
MAGNESIUM SERPL-MCNC: 1.6 MG/DL (ref 1.6–2.6)
MCH RBC QN AUTO: 27.8 PG (ref 27–31)
MCHC RBC AUTO-ENTMCNC: 30.9 G/DL (ref 32–36)
MCV RBC AUTO: 90 FL (ref 82–98)
MONOCYTES # BLD AUTO: 0.6 K/UL (ref 0.3–1)
MONOCYTES NFR BLD: 11.4 % (ref 4–15)
NEUTROPHILS # BLD AUTO: 3.4 K/UL (ref 1.8–7.7)
NEUTROPHILS NFR BLD: 63.5 % (ref 38–73)
NRBC BLD-RTO: 0 /100 WBC
PHOSPHATE SERPL-MCNC: 2.8 MG/DL (ref 2.7–4.5)
PLATELET # BLD AUTO: 237 K/UL (ref 150–350)
PMV BLD AUTO: 9.8 FL (ref 9.2–12.9)
POTASSIUM SERPL-SCNC: 4.2 MMOL/L (ref 3.5–5.1)
PROT SERPL-MCNC: 6.9 G/DL (ref 6–8.4)
PTH-INTACT SERPL-MCNC: 189 PG/ML (ref 9–77)
RBC # BLD AUTO: 4.36 M/UL (ref 4–5.4)
SODIUM SERPL-SCNC: 138 MMOL/L (ref 136–145)
TACROLIMUS BLD-MCNC: 7.5 NG/ML (ref 5–15)
WBC # BLD AUTO: 5.37 K/UL (ref 3.9–12.7)

## 2020-04-08 PROCEDURE — 84075 ASSAY ALKALINE PHOSPHATASE: CPT

## 2020-04-08 PROCEDURE — 80197 ASSAY OF TACROLIMUS: CPT

## 2020-04-08 PROCEDURE — 82306 VITAMIN D 25 HYDROXY: CPT

## 2020-04-08 PROCEDURE — 87799 DETECT AGENT NOS DNA QUANT: CPT

## 2020-04-08 PROCEDURE — 83735 ASSAY OF MAGNESIUM: CPT

## 2020-04-08 PROCEDURE — 85025 COMPLETE CBC W/AUTO DIFF WBC: CPT

## 2020-04-08 PROCEDURE — 83970 ASSAY OF PARATHORMONE: CPT

## 2020-04-08 PROCEDURE — 80069 RENAL FUNCTION PANEL: CPT

## 2020-04-16 ENCOUNTER — TELEPHONE (OUTPATIENT)
Dept: TRANSPLANT | Facility: CLINIC | Age: 70
End: 2020-04-16

## 2020-04-16 NOTE — TELEPHONE ENCOUNTER
s/w pt she has no access to tablet or smart phone to do video visit. S/W [facility] coordinator and she changed visit to audio visit today.

## 2020-04-17 ENCOUNTER — OFFICE VISIT (OUTPATIENT)
Dept: TRANSPLANT | Facility: CLINIC | Age: 70
End: 2020-04-17
Payer: MEDICARE

## 2020-04-17 ENCOUNTER — TELEPHONE (OUTPATIENT)
Dept: TRANSPLANT | Facility: CLINIC | Age: 70
End: 2020-04-17

## 2020-04-17 DIAGNOSIS — Z94.0 S/P LIVING-DONOR KIDNEY TRANSPLANTATION: ICD-10-CM

## 2020-04-17 DIAGNOSIS — Z29.89 PROPHYLACTIC IMMUNOTHERAPY: ICD-10-CM

## 2020-04-17 DIAGNOSIS — Z79.60 LONG-TERM USE OF IMMUNOSUPPRESSANT MEDICATION: ICD-10-CM

## 2020-04-17 DIAGNOSIS — N18.30 CKD (CHRONIC KIDNEY DISEASE) STAGE 3, GFR 30-59 ML/MIN: ICD-10-CM

## 2020-04-17 DIAGNOSIS — I10 ESSENTIAL HYPERTENSION: Primary | Chronic | ICD-10-CM

## 2020-04-17 PROCEDURE — 99442 PR PHYSICIAN TELEPHONE EVALUATION 11-20 MIN: ICD-10-PCS | Mod: 95,,, | Performed by: INTERNAL MEDICINE

## 2020-04-17 PROCEDURE — 99442 PR PHYSICIAN TELEPHONE EVALUATION 11-20 MIN: CPT | Mod: 95,,, | Performed by: INTERNAL MEDICINE

## 2020-04-17 NOTE — LETTER
April 17, 2020        Jd Carias  [address]  [address]  [facility] [address]  Phone: [phone]  Fax: [phone]             [facility]  [address]  [facility] [address]  Phone: [phone]   Patient: Paige Summers   MR Number: [id number]   YOB: 1950   Date of Visit: 4/17/2020       Dear Dr. Jd Carias    Thank you for referring Paige Summers to me for evaluation. Attached you will find relevant portions of my assessment and plan of care.    If you have questions, please do not hesitate to call me. I look forward to following Paige Summers along with you.    Sincerely,    Taty Guevara MD    Enclosure    If you would like to receive this communication electronically, please contact [email or web address] or [phone] to request [employer] access.    [employer] is a tool which provides read-only access to select patient information with whom you have a relationship. Its easy to use and provides real time access to review your patients record including encounter summaries, notes, results, and demographic information.    If you feel you have received this communication in error or would no longer like to receive these types of communications, please e-mail [email or web address]

## 2020-04-17 NOTE — TELEPHONE ENCOUNTER
"Post Clinic Note:    SW spoke with patient to follow up regarding any needs post transplant and assess coping. Pt is a kidney transplant recipient from 4/8/2019. ,Patient reports that he has been doing well post transplant even in light of the COVID-19 pandemic. Patient reports that she has been mostly staying inside and caring for her 9 year old daughter. Pt reports that her adult grandchildren stop by to check on her and gets the things she needs. Pt's only complaint is that she is "bored in the house". Patient expressed no psychosocial needs or concerns at this time, reports no issues with obtaining medications, and reports receiving medications from [facility]. Patient reports knowing how to contact SW team if any additional needs arise and SW remains available at [phone].     CoVid-19 precaution - Transplant SW update conducted with patient by phone.      "

## 2020-04-17 NOTE — PROGRESS NOTES
Kidney Post-Transplant Assessment    Referring Physician: Jd Carias  Current Nephrologist: Jd Carias    ORGAN: LEFT KIDNEY  Donor Type: living  PHS Increased Risk: no  Cold Ischemia: 189 mins  Induction Medications: campath - alemtuzumab (anti-cd52)    Subjective:     CC:  The patient location is:  Patient Home   The chief complaint leading to consultation is: follow up  Visit type: Virtual visit with synchronous audio and video  Total time spent with patient: 13  Each patient to whom he or she provides medical services by telemedicine is:  (1) informed of the relationship between the physician and patient and the respective role of any other health care provider with respect to management of the patient; and (2) notified that he or she may decline to receive medical services by telemedicine and may withdraw from such care at any time.         HPI:  Ms. Summers is a 69 y.o. year old Black or  female with ESRD 2/2 Hypertensive Nephrosclerosis who received a living kidney transplant on 4/8/19. Her most recent creatinine is 1.2. She takes mycophenolic acid and tacrolimus for maintenance immunosuppression.      Post transplant course:  - S/p living unrelated kidney transplant 4/8/19, CMV +/+, WIT 30 min, CIT 3h 9 m  - DGF, resolved  - Pt was on Plavix for reoccurring AVF thrombus        *Interval Hx: she is doing fine. She reports of losing her daughter- she passed away 2 weeks ago to congestive heart failure.  She also reports urinary incontinence. Since last visit she did not have any episode of syncope..  She denies any fever, chills , ear secretion,  chest pain, shortness of breath, nausea/vomiting, dysuria, frequency, urgency, or pain over the allograft. At home BP readings are around ~130/70.      Current Outpatient Medications on File Prior to Visit   Medication Sig Dispense Refill    acetaminophen (TYLENOL) 325 mg Cap Take by mouth.      amLODIPine (NORVASC) 10 MG tablet Take 1  tablet (10 mg total) by mouth once daily. 30 tablet 11    atorvastatin (LIPITOR) 10 MG tablet Take 1 tablet (10 mg total) by mouth once daily. 90 tablet 3    betamethasone valerate 0.1% (VALISONE) 0.1 % Lotn Apply topically 2 (two) times daily. for 14 days 60 mL 2    bisacodyl (DULCOLAX) 5 mg EC tablet Take 2 tablets (10 mg total) by mouth daily as needed for Constipation.  0    carvedilol (COREG) 12.5 MG tablet Take 2 tablets (25 mg total) by mouth 2 (two) times daily with meals. 120 tablet 11    cinacalcet (SENSIPAR) 30 MG Tab Take sensipar 30 mg Monday, Wednesday and Friday 30 tablet 6    cloNIDine (CATAPRES) 0.1 MG tablet Take 1 tablet (0.1 mg total) by mouth 3 (three) times daily. 90 tablet 11    cyanocobalamin (VITAMIN B-12) 500 MCG tablet Take 1 tablet (500 mcg total) by mouth once daily. 30 tablet 4    docusate sodium (COLACE) 100 MG capsule Take 1 capsule (100 mg total) by mouth 2 (two) times daily as needed for Constipation.  0    DULoxetine (CYMBALTA) 20 MG capsule Take 1 capsule (20 mg total) by mouth 2 (two) times daily. 60 capsule 4    hydrOXYzine pamoate (VISTARIL) 25 MG Cap Take 1 capsule (25 mg total) by mouth every 8 (eight) hours. 90 capsule 2    linaCLOtide (LINZESS) 145 mcg Cap capsule Take 1 capsule (145 mcg total) by mouth once daily. 30 capsule 11    magnesium oxide (MAG-OX) 400 mg (241.3 mg magnesium) tablet Take 2 tablets (800 mg total) by mouth 2 (two) times daily. 270 tablet 10    multivitamin (THERAGRAN) tablet Take 1 tablet by mouth once daily.      mycophenolate (CELLCEPT) 250 mg Cap Take 2 capsules (500 mg total) by mouth 2 (two) times daily. 120 capsule 11    sodium bicarbonate 650 MG tablet Take 1 tablet (650 mg total) by mouth 2 (two) times daily. 60 tablet 11    tacrolimus (PROGRAF) 1 MG Cap Take 3 capsules (3 mg total) by mouth every morning AND 2 capsules (2 mg total) every evening. 150 capsule 11    traMADol (ULTRAM) 50 mg tablet Take 1 tablet (50 mg total) by  mouth every 8 (eight) hours as needed for Pain. 30 tablet 0     No current facility-administered medications on file prior to visit.         Review of Systems     Skin: no skin rash  CNS; no headaches, blurred vision, seizure, or syncope  ENT: No JVD,  Adenopathies,  nasal congestion. No oral lesions  Cardiac: No chest pain, dyspnea, claudication, edema or palpitations  Respiratory: No SOB, cough, hemoptysis   Gastro-intestinal: No diarrhea, constipation, abdominal pain, nausea, vomit. No ascitis  Genitourinary: no hematuria, dysuria, frequency, frequency  Musculoskeletal: Positive for Left Hip thigh pain. Lower extremity edema bilaterally. Rt. Knee pain  Psych: alert awake, oriented, No cranial nerves deficit.      Labs:  Lab Results   Component Value Date    WBC 5.37 04/08/2020    HGB 12.1 04/08/2020    HCT 39.1 04/08/2020     04/08/2020    K 4.2 04/08/2020     04/08/2020    CO2 25 04/08/2020    BUN 20 04/08/2020    CREATININE 1.2 04/08/2020    EGFRNONAA 45.9 (A) 04/08/2020    CALCIUM 9.5 04/08/2020    PHOS 2.8 04/08/2020    MG 1.6 04/08/2020    ALBUMIN 3.6 04/08/2020    ALBUMIN 3.6 04/08/2020    AST 18 04/08/2020    ALT 10 04/08/2020    UTPCR 0.24 (H) 04/08/2020    .0 (H) 04/08/2020    TACROLIMUS 7.5 04/08/2020       No results found for: EXTANC, EXTWBC, EXTSEGS, EXTPLATELETS, EXTHEMOGLOBI, EXTHEMATOCRI, EXTCREATININ, EXTSODIUM, EXTPOTASSIUM, EXTBUN, EXTCO2, EXTCALCIUM, EXTPHOSPHORU, EXTGLUCOSE, EXTALBUMIN, EXTAST, EXTALT, EXTBILITOTAL, EXTLIPASE, EXTAMYLASE    No results found for: EXTCYCLOSLVL, EXTSIROLIMUS, EXTTACROLVL, EXTPROTCRE, EXTPTHINTACT, EXTPROTEINUA, EXTWBCUA, EXTRBCUA    Labs were reviewed with the patient    Assessment:          Plan:        1. CKD stage to be dermine: sCr remaining to  1.3 mg/dL . making good amount of UOP ~ 2  - Infx surveillance: BK neg, CMV PCR neg      2. Immunosuppression: prograf level was addressed  -  mg BID  - Decrease prograf to 3/2 mg    Will  closely monitor for toxicities, education provided about adherence to medicines and need to communicate any side effect to the transplant nurse or physician.      3. Hypertension management:  Had 2 low BP readings with presyncope   - Continue with home blood pressure monitoring, low salt and healthy life discussed with the patient.  - Norvasc 10 mg daily on and off, particularly when BP is ~160's(SBP).    - coreg 12.5 mg BID  - clonidine 0.1 mg TID     4. Metabolic Bone Disease/Secondary Hyperparathyroidism:  - On Sensipar 30mg. Current . Recommended Sensipar MWF, explained to patient treatment plan and she expressed understanding.   - Will monitor PTH, Vit D level, calcium.      5. Anemia: stable    6. Low Mg  - on Mg supplement      7. Left hip pain  - Currently receiving PT. Tylenol for pain.     # History of syncope  - Echo and Carotid doppler: echocardiogram and carotid doppler were normal.   - Cardiology and Neurology follow up      Plan  - Continue Sensipar every other day  - Follow up with cardiology and neurology

## 2020-04-22 NOTE — PROGRESS NOTES
Patient Paige Summers, MRN [id number], was dependent on dialysis (ICD10 Z99.2) at the time of this visit on 2/24/20. This addendum is made to the medical record on 04/22/2020.

## 2020-04-30 RX ORDER — MYCOPHENOLATE MOFETIL 250 MG/1
CAPSULE ORAL
Qty: 120 CAPSULE | Refills: 0 | Status: SHIPPED | OUTPATIENT
Start: 2020-04-30 | End: 2020-05-11 | Stop reason: SDUPTHER

## 2020-05-11 DIAGNOSIS — Z94.0 KIDNEY REPLACED BY TRANSPLANT: Primary | ICD-10-CM

## 2020-05-12 RX ORDER — MYCOPHENOLATE MOFETIL 250 MG/1
500 CAPSULE ORAL 2 TIMES DAILY
Qty: 120 CAPSULE | Refills: 11 | Status: SHIPPED | OUTPATIENT
Start: 2020-05-12 | End: 2021-05-17 | Stop reason: SDUPTHER

## 2020-05-20 ENCOUNTER — TELEPHONE (OUTPATIENT)
Dept: SLEEP MEDICINE | Facility: OTHER | Age: 70
End: 2020-05-20

## 2020-06-23 ENCOUNTER — TELEPHONE (OUTPATIENT)
Dept: SLEEP MEDICINE | Facility: OTHER | Age: 70
End: 2020-06-23

## 2020-06-29 ENCOUNTER — TELEPHONE (OUTPATIENT)
Dept: SLEEP MEDICINE | Facility: OTHER | Age: 70
End: 2020-06-29

## 2020-07-29 ENCOUNTER — OFFICE VISIT (OUTPATIENT)
Dept: URGENT CARE | Facility: CLINIC | Age: 70
End: 2020-07-29
Payer: MEDICARE

## 2020-07-29 VITALS
HEART RATE: 73 BPM | WEIGHT: 189 LBS | OXYGEN SATURATION: 97 % | RESPIRATION RATE: 16 BRPM | TEMPERATURE: 98 F | DIASTOLIC BLOOD PRESSURE: 71 MMHG | SYSTOLIC BLOOD PRESSURE: 113 MMHG | BODY MASS INDEX: 33.49 KG/M2 | HEIGHT: 63 IN

## 2020-07-29 DIAGNOSIS — R06.02 SHORTNESS OF BREATH: ICD-10-CM

## 2020-07-29 DIAGNOSIS — R05.9 COUGH: ICD-10-CM

## 2020-07-29 DIAGNOSIS — J20.8 ACUTE BACTERIAL BRONCHITIS: Primary | ICD-10-CM

## 2020-07-29 DIAGNOSIS — B96.89 ACUTE BACTERIAL BRONCHITIS: Primary | ICD-10-CM

## 2020-07-29 PROCEDURE — 71046 XR CHEST PA AND LATERAL: ICD-10-PCS | Mod: S$GLB,,, | Performed by: RADIOLOGY

## 2020-07-29 PROCEDURE — 99214 OFFICE O/P EST MOD 30 MIN: CPT | Mod: S$GLB,,, | Performed by: PHYSICIAN ASSISTANT

## 2020-07-29 PROCEDURE — 99214 PR OFFICE/OUTPT VISIT, EST, LEVL IV, 30-39 MIN: ICD-10-PCS | Mod: S$GLB,,, | Performed by: PHYSICIAN ASSISTANT

## 2020-07-29 PROCEDURE — U0003 INFECTIOUS AGENT DETECTION BY NUCLEIC ACID (DNA OR RNA); SEVERE ACUTE RESPIRATORY SYNDROME CORONAVIRUS 2 (SARS-COV-2) (CORONAVIRUS DISEASE [COVID-19]), AMPLIFIED PROBE TECHNIQUE, MAKING USE OF HIGH THROUGHPUT TECHNOLOGIES AS DESCRIBED BY CMS-2020-01-R: HCPCS

## 2020-07-29 PROCEDURE — 71046 X-RAY EXAM CHEST 2 VIEWS: CPT | Mod: S$GLB,,, | Performed by: RADIOLOGY

## 2020-07-29 RX ORDER — BENZONATATE 100 MG/1
200 CAPSULE ORAL 3 TIMES DAILY PRN
Qty: 20 CAPSULE | Refills: 0 | Status: SHIPPED | OUTPATIENT
Start: 2020-07-29 | End: 2021-04-26

## 2020-07-29 RX ORDER — DOXYCYCLINE HYCLATE 100 MG
100 TABLET ORAL 2 TIMES DAILY
Qty: 14 TABLET | Refills: 0 | Status: SHIPPED | OUTPATIENT
Start: 2020-07-29 | End: 2020-08-05

## 2020-07-29 NOTE — PATIENT INSTRUCTIONS
We will call you with results  Fluids  Rest  Tylenol for any fever  Quarantine yourself until you receive results  If you have difficulty breathing or shortness of breath please go to the emergency department    Follow up with primary care within 2 days  Go to the emergency department for any worsening    Please arrange follow up with your primary medical clinic as soon as possible. You must understand that you've received an Urgent Care treatment only and that you may be released before all of your medical problems are known or treated. You, the patient, will arrange for follow up as instructed. If your symptoms worsen or fail to improve you should go to the Emergency Room.  WE CANNOT RULE OUT ALL POSSIBLE CAUSES OF YOUR SYMPTOMS IN THE URGENT CARE SETTING PLEASE GO TO THE ER IF YOU FEELS YOUR CONDITION IS WORSENING OR YOU WOULD LIKE EMERGENT EVALUATION.    Please drink plenty of fluids.  Please get plenty of rest.  Please return here or go to the Emergency Department for any concerns or worsening of condition.  If you were given wait & see antibiotics, please wait 3-5 days before taking them, and only take them if your symptoms have worsened or not improved.  If you do begin taking the antibiotics, please take them to completion.  If you were prescribed antibiotics, please take them to completion.  If you were prescribed a narcotic medication, do not drive or operate heavy equipment or machinery while taking these medications.  If you do not have Hypertension or any history of palpitations, it is ok to take over the counter Sudafed or Mucinex D or Allegra-D or Claritin-D or Zyrtec-D.  If you do take one of the above, it is ok to combine that with plain over the counter Mucinex or Allegra or Claritin or Zyrtec.  If for example you are taking Zyrtec -D, you can combine that with Mucinex, but not Mucinex-D.  If you are taking Mucinex-D, you can combine that with plain Allegra or Claritin or Zyrtec.   If you do have  Hypertension or palpitations, it is safe to take Coricidin HBP for relief of sinus symptoms.  We recommend you take over the counter Flonase (Fluticasone) or another nasally inhaled steroid unless you are already taking one.  Nasal irrigation with a saline spray or Netti Pot like device per their directions is also recommended.  If not allergic, please take over the counter Tylenol (Acetaminophen) and/or Motrin (Ibuprofen) as directed for control of pain and/or fever.  Please follow up with your primary care doctor or specialist as needed.    If you  smoke, please stop smoking.        Acute Bronchitis  Your healthcare provider has told you that you have acute bronchitis. Bronchitis is infection or inflammation of the bronchial tubes (airways in the lungs). Normally, air moves easily in and out of the airways. Bronchitis narrows the airways, making it harder for air to flow in and out of the lungs. This causes symptoms such as shortness of breath, coughing up yellow or green mucus, and wheezing. Bronchitis can be acute or chronic. Acute means the condition comes on quickly and goes away in a short time, usually within 3 to 10 days. Chronic means a condition lasts a long time and often comes back.    What causes acute bronchitis?  Acute bronchitis almost always starts as a viral respiratory infection, such as a cold or the flu. Certain factors make it more likely for a cold or flu to turn into bronchitis. These include being very young, being elderly, having a heart or lung problem, or having a weak immune system. Cigarette smoking also makes bronchitis more likely.  When bronchitis develops, the airways become swollen. The airways may also become infected with bacteria. This is known as a secondary infection.  Diagnosing acute bronchitis  Your healthcare provider will examine you and ask about your symptoms and health history. You may also have a sputum culture to test the fluid in your lungs. Chest X-rays may be  done to look for infection in the lungs.  Treating acute bronchitis  Bronchitis usually clears up as the cold or flu goes away. You can help feel better faster by doing the following:  · Take medicine as directed. You may be told to take ibuprofen or other over-the-counter medicines. These help relieve inflammation in your bronchial tubes. Your healthcare provider may prescribe an inhaler to help open up the bronchial tubes. Most of the time, acute bronchitis is caused by a viral infection. Antibiotics are usually not prescribed for viral infections.  · Drink plenty of fluids, such as water, juice, or warm soup. Fluids loosen mucus so that you can cough it up. This helps you breathe more easily. Fluids also prevent dehydration.  · Make sure you get plenty of rest.  · Do not smoke. Do not allow anyone else to smoke in your home.  Recovery and follow-up  Follow up with your doctor as you are told. You will likely feel better in a week or two. But a dry cough can linger beyond that time. Let your doctor know if you still have symptoms (other than a dry cough) after 2 weeks, or if youre prone to getting bronchial infections. Take steps to protect yourself from future infections. These steps include stopping smoking and avoiding tobacco smoke, washing your hands often, and getting a yearly flu shot.  When to call your healthcare provider  Call the healthcare provider if you have any of the following:  · Fever of 100.4°F (38.0°C) or higher, or as advised  · Symptoms that get worse, or new symptoms  · Trouble breathing  · Symptoms that dont start to improve within a week, or within 3 days of taking antibiotics   Date Last Reviewed: 12/1/2016  © 0411-3716 [employer]. [address] [place] [address], [place] [address]. All rights reserved. This information is not intended as a substitute for professional medical care. Always follow your healthcare professional's instructions.

## 2020-07-29 NOTE — PROGRESS NOTES
"Subjective:       Patient ID: Paige Summers is a 69 y.o. female.    Vitals:  height is 5' 3" (1.6 m) and weight is 85.7 kg (189 lb). Her tympanic temperature is 97.6 °F (36.4 °C). Her blood pressure is 113/71 and her pulse is 73. Her respiration is 16 and oxygen saturation is 97%.     Chief Complaint: Cough    Patient reports productive cough for past 4 days, clear sputum. She reports a hx of chronic bronchitis but states has never been on any inhaler. No record of this I can find.   Denies any sob or difficulty breathing. Reports sinus congestion over past 4 days as well. Denies fever or chest pain. Hx of kidney transplant April 2019.     Cough  This is a new problem. Episode onset: 4 days. The problem has been gradually worsening. The problem occurs hourly. The cough is productive of sputum. Associated symptoms include nasal congestion, postnasal drip, rhinorrhea (off and on), a sore throat and wheezing. Pertinent negatives include no chills, ear pain, eye redness, fever, hemoptysis, myalgias, rash or shortness of breath. The symptoms are aggravated by lying down. Treatments tried: Coricidin. The treatment provided mild relief. Her past medical history is significant for bronchitis. There is no history of asthma.       Constitution: Negative for chills, sweating, fatigue and fever.   HENT: Positive for congestion (chest), postnasal drip and sore throat. Negative for ear pain, sinus pain, sinus pressure and voice change.    Neck: Negative for painful lymph nodes.   Eyes: Negative for eye redness.   Respiratory: Positive for cough, sputum production (claer) and wheezing. Negative for chest tightness, bloody sputum, COPD, shortness of breath, stridor and asthma.    Gastrointestinal: Negative for nausea and vomiting.   Musculoskeletal: Negative for muscle ache.   Skin: Negative for rash.   Allergic/Immunologic: Negative for seasonal allergies and asthma.   Hematologic/Lymphatic: Negative for swollen lymph nodes. "       Objective:      Physical Exam   Constitutional: She is oriented to person, place, and time. She appears well-developed. She is cooperative.  Non-toxic appearance. She does not appear ill. No distress.   HENT:   Head: Normocephalic and atraumatic.   Ears:   Right Ear: Hearing, external ear and ear canal normal. Tympanic membrane is bulging.   Left Ear: Hearing, external ear and ear canal normal. Tympanic membrane is bulging.   Nose: Mucosal edema present. No rhinorrhea or nasal deformity. No epistaxis. Right sinus exhibits no maxillary sinus tenderness and no frontal sinus tenderness. Left sinus exhibits no maxillary sinus tenderness and no frontal sinus tenderness.   Mouth/Throat: Uvula is midline, oropharynx is clear and moist and mucous membranes are normal. No trismus in the jaw. Normal dentition. No uvula swelling. No oropharyngeal exudate, posterior oropharyngeal edema or posterior oropharyngeal erythema.   Eyes: Conjunctivae and lids are normal. No scleral icterus.   Neck: Trachea normal, full passive range of motion without pain and phonation normal. Neck supple. No neck rigidity. No edema and no erythema present.   Cardiovascular: Normal rate, regular rhythm, normal heart sounds and normal pulses.   Pulmonary/Chest: Effort normal and breath sounds normal. No respiratory distress. She has no decreased breath sounds. She has no rhonchi.   Abdominal: Normal appearance.   Musculoskeletal: Normal range of motion.         General: No deformity.   Neurological: She is alert and oriented to person, place, and time. She exhibits normal muscle tone. Coordination normal.   Skin: Skin is warm, dry, intact, not diaphoretic and not pale. Psychiatric: Her speech is normal and behavior is normal. Mood, judgment and thought content normal.   Nursing note and vitals reviewed.  X-ray Chest Pa And Lateral    Result Date: 7/29/2020  EXAMINATION: XR CHEST PA AND LATERAL CLINICAL HISTORY: Cough FINDINGS: Two views: Heart  size is normal lungs are clear and the bones show nothing unusual.  There is a left axillary stent.     No acute process seen. Electronically signed by: Sukhwinder Aviles MD Date:    07/29/2020 Time:    10:53        Assessment:       1. Acute bacterial bronchitis    2. Cough    3. Shortness of breath        Plan:       Discussed at home care, emergency room precautions, return precautions, CDC protocol for quarantine.  Follow-up with primary care in 2 days, emergency department for any worsening.  Start antibiotics and Tessalon Perles for cough as needed.  All questions answered, she is happy with this plan    Acute bacterial bronchitis  -     doxycycline (VIBRA-TABS) 100 MG tablet; Take 1 tablet (100 mg total) by mouth 2 (two) times daily. for 7 days  Dispense: 14 tablet; Refill: 0  -     benzonatate (TESSALON PERLES) 100 MG capsule; Take 2 capsules (200 mg total) by mouth 3 (three) times daily as needed for Cough.  Dispense: 20 capsule; Refill: 0    Cough  -     X-Ray Chest PA And Lateral; Future; Expected date: 07/29/2020  -     COVID-19 Routine Screening    Shortness of breath  -     COVID-19 Routine Screening    Labs reviewed, pertinent imaging reviewed, previous medical records, medical history, surgical history, social history, family history reviewed.         Patient Instructions     We will call you with results  Fluids  Rest  Tylenol for any fever  Quarantine yourself until you receive results  If you have difficulty breathing or shortness of breath please go to the emergency department    Follow up with primary care within 2 days  Go to the emergency department for any worsening    Please arrange follow up with your primary medical clinic as soon as possible. You must understand that you've received an Urgent Care treatment only and that you may be released before all of your medical problems are known or treated. You, the patient, will arrange for follow up as instructed. If your symptoms worsen or fail to  improve you should go to the Emergency Room.  WE CANNOT RULE OUT ALL POSSIBLE CAUSES OF YOUR SYMPTOMS IN THE URGENT CARE SETTING PLEASE GO TO THE ER IF YOU FEELS YOUR CONDITION IS WORSENING OR YOU WOULD LIKE EMERGENT EVALUATION.    Please drink plenty of fluids.  Please get plenty of rest.  Please return here or go to the Emergency Department for any concerns or worsening of condition.  If you were given wait & see antibiotics, please wait 3-5 days before taking them, and only take them if your symptoms have worsened or not improved.  If you do begin taking the antibiotics, please take them to completion.  If you were prescribed antibiotics, please take them to completion.  If you were prescribed a narcotic medication, do not drive or operate heavy equipment or machinery while taking these medications.  If you do not have Hypertension or any history of palpitations, it is ok to take over the counter Sudafed or Mucinex D or Allegra-D or Claritin-D or Zyrtec-D.  If you do take one of the above, it is ok to combine that with plain over the counter Mucinex or Allegra or Claritin or Zyrtec.  If for example you are taking Zyrtec -D, you can combine that with Mucinex, but not Mucinex-D.  If you are taking Mucinex-D, you can combine that with plain Allegra or Claritin or Zyrtec.   If you do have Hypertension or palpitations, it is safe to take Coricidin HBP for relief of sinus symptoms.  We recommend you take over the counter Flonase (Fluticasone) or another nasally inhaled steroid unless you are already taking one.  Nasal irrigation with a saline spray or Netti Pot like device per their directions is also recommended.  If not allergic, please take over the counter Tylenol (Acetaminophen) and/or Motrin (Ibuprofen) as directed for control of pain and/or fever.  Please follow up with your primary care doctor or specialist as needed.    If you  smoke, please stop smoking.        Acute Bronchitis  Your healthcare provider has  told you that you have acute bronchitis. Bronchitis is infection or inflammation of the bronchial tubes (airways in the lungs). Normally, air moves easily in and out of the airways. Bronchitis narrows the airways, making it harder for air to flow in and out of the lungs. This causes symptoms such as shortness of breath, coughing up yellow or green mucus, and wheezing. Bronchitis can be acute or chronic. Acute means the condition comes on quickly and goes away in a short time, usually within 3 to 10 days. Chronic means a condition lasts a long time and often comes back.    What causes acute bronchitis?  Acute bronchitis almost always starts as a viral respiratory infection, such as a cold or the flu. Certain factors make it more likely for a cold or flu to turn into bronchitis. These include being very young, being elderly, having a heart or lung problem, or having a weak immune system. Cigarette smoking also makes bronchitis more likely.  When bronchitis develops, the airways become swollen. The airways may also become infected with bacteria. This is known as a secondary infection.  Diagnosing acute bronchitis  Your healthcare provider will examine you and ask about your symptoms and health history. You may also have a sputum culture to test the fluid in your lungs. Chest X-rays may be done to look for infection in the lungs.  Treating acute bronchitis  Bronchitis usually clears up as the cold or flu goes away. You can help feel better faster by doing the following:  · Take medicine as directed. You may be told to take ibuprofen or other over-the-counter medicines. These help relieve inflammation in your bronchial tubes. Your healthcare provider may prescribe an inhaler to help open up the bronchial tubes. Most of the time, acute bronchitis is caused by a viral infection. Antibiotics are usually not prescribed for viral infections.  · Drink plenty of fluids, such as water, juice, or warm soup. Fluids loosen mucus so  that you can cough it up. This helps you breathe more easily. Fluids also prevent dehydration.  · Make sure you get plenty of rest.  · Do not smoke. Do not allow anyone else to smoke in your home.  Recovery and follow-up  Follow up with your doctor as you are told. You will likely feel better in a week or two. But a dry cough can linger beyond that time. Let your doctor know if you still have symptoms (other than a dry cough) after 2 weeks, or if youre prone to getting bronchial infections. Take steps to protect yourself from future infections. These steps include stopping smoking and avoiding tobacco smoke, washing your hands often, and getting a yearly flu shot.  When to call your healthcare provider  Call the healthcare provider if you have any of the following:  · Fever of 100.4°F (38.0°C) or higher, or as advised  · Symptoms that get worse, or new symptoms  · Trouble breathing  · Symptoms that dont start to improve within a week, or within 3 days of taking antibiotics   Date Last Reviewed: 12/1/2016  © 9050-8569 [employer]. [address] [place] [address], [place] [address]. All rights reserved. This information is not intended as a substitute for professional medical care. Always follow your healthcare professional's instructions.

## 2020-07-30 ENCOUNTER — TELEPHONE (OUTPATIENT)
Dept: URGENT CARE | Facility: CLINIC | Age: 70
End: 2020-07-30

## 2020-07-30 LAB — SARS-COV-2 RNA RESP QL NAA+PROBE: NOT DETECTED

## 2020-08-31 ENCOUNTER — OFFICE VISIT (OUTPATIENT)
Dept: NEUROLOGY | Facility: CLINIC | Age: 70
End: 2020-08-31
Payer: MEDICARE

## 2020-08-31 VITALS
DIASTOLIC BLOOD PRESSURE: 93 MMHG | HEART RATE: 86 BPM | BODY MASS INDEX: 35 KG/M2 | HEIGHT: 63 IN | WEIGHT: 197.56 LBS | SYSTOLIC BLOOD PRESSURE: 148 MMHG

## 2020-08-31 DIAGNOSIS — M54.9 DORSALGIA, UNSPECIFIED: ICD-10-CM

## 2020-08-31 DIAGNOSIS — R20.2 NUMBNESS AND TINGLING IN BOTH HANDS: ICD-10-CM

## 2020-08-31 DIAGNOSIS — F22 DELUSION OF INFESTATION: ICD-10-CM

## 2020-08-31 DIAGNOSIS — M54.16 LUMBAR RADICULOPATHY: ICD-10-CM

## 2020-08-31 DIAGNOSIS — R29.898 HAND WEAKNESS: Primary | ICD-10-CM

## 2020-08-31 DIAGNOSIS — R20.0 NUMBNESS AND TINGLING IN BOTH HANDS: ICD-10-CM

## 2020-08-31 PROCEDURE — 99214 OFFICE O/P EST MOD 30 MIN: CPT | Mod: S$GLB,,, | Performed by: PSYCHIATRY & NEUROLOGY

## 2020-08-31 PROCEDURE — 1125F PR PAIN SEVERITY QUANTIFIED, PAIN PRESENT: ICD-10-PCS | Mod: S$GLB,,, | Performed by: PSYCHIATRY & NEUROLOGY

## 2020-08-31 PROCEDURE — 1159F MED LIST DOCD IN RCRD: CPT | Mod: S$GLB,,, | Performed by: PSYCHIATRY & NEUROLOGY

## 2020-08-31 PROCEDURE — 99999 PR PBB SHADOW E&M-EST. PATIENT-LVL IV: ICD-10-PCS | Mod: PBBFAC,,, | Performed by: PSYCHIATRY & NEUROLOGY

## 2020-08-31 PROCEDURE — 1101F PR PT FALLS ASSESS DOC 0-1 FALLS W/OUT INJ PAST YR: ICD-10-PCS | Mod: CPTII,S$GLB,, | Performed by: PSYCHIATRY & NEUROLOGY

## 2020-08-31 PROCEDURE — 99999 PR PBB SHADOW E&M-EST. PATIENT-LVL IV: CPT | Mod: PBBFAC,,, | Performed by: PSYCHIATRY & NEUROLOGY

## 2020-08-31 PROCEDURE — 3077F SYST BP >= 140 MM HG: CPT | Mod: CPTII,S$GLB,, | Performed by: PSYCHIATRY & NEUROLOGY

## 2020-08-31 PROCEDURE — 1125F AMNT PAIN NOTED PAIN PRSNT: CPT | Mod: S$GLB,,, | Performed by: PSYCHIATRY & NEUROLOGY

## 2020-08-31 PROCEDURE — 3008F PR BODY MASS INDEX (BMI) DOCUMENTED: ICD-10-PCS | Mod: CPTII,S$GLB,, | Performed by: PSYCHIATRY & NEUROLOGY

## 2020-08-31 PROCEDURE — 3077F PR MOST RECENT SYSTOLIC BLOOD PRESSURE >= 140 MM HG: ICD-10-PCS | Mod: CPTII,S$GLB,, | Performed by: PSYCHIATRY & NEUROLOGY

## 2020-08-31 PROCEDURE — 1159F PR MEDICATION LIST DOCUMENTED IN MEDICAL RECORD: ICD-10-PCS | Mod: S$GLB,,, | Performed by: PSYCHIATRY & NEUROLOGY

## 2020-08-31 PROCEDURE — 3008F BODY MASS INDEX DOCD: CPT | Mod: CPTII,S$GLB,, | Performed by: PSYCHIATRY & NEUROLOGY

## 2020-08-31 PROCEDURE — 3080F DIAST BP >= 90 MM HG: CPT | Mod: CPTII,S$GLB,, | Performed by: PSYCHIATRY & NEUROLOGY

## 2020-08-31 PROCEDURE — 3080F PR MOST RECENT DIASTOLIC BLOOD PRESSURE >= 90 MM HG: ICD-10-PCS | Mod: CPTII,S$GLB,, | Performed by: PSYCHIATRY & NEUROLOGY

## 2020-08-31 PROCEDURE — 99214 PR OFFICE/OUTPT VISIT, EST, LEVL IV, 30-39 MIN: ICD-10-PCS | Mod: S$GLB,,, | Performed by: PSYCHIATRY & NEUROLOGY

## 2020-08-31 PROCEDURE — 1101F PT FALLS ASSESS-DOCD LE1/YR: CPT | Mod: CPTII,S$GLB,, | Performed by: PSYCHIATRY & NEUROLOGY

## 2020-08-31 RX ORDER — NYSTATIN AND TRIAMCINOLONE ACETONIDE 100000; 1 [USP'U]/G; MG/G
OINTMENT TOPICAL
COMMUNITY
Start: 2020-08-07 | End: 2022-04-04

## 2020-08-31 NOTE — PROGRESS NOTES
[facility] NEUROLOGY  [facility], [place]    Date: 8/31/20  Patient Name: Paige Summers   MRN: [id number]   PCP: Destiny Villa  Referring Provider: Medardo Leslie MD    Assessment:   Paige Summers is a 69 y.o. female Presenting in follow-up for management multiple neurologic complaints.  Obtaining MRI L-spine given chronic radiculopathy seen on EMG (reviewed)..  Patient also has known axonal neuropathy partially controlled with Cymbalta.  Management somewhat limited due to patient's history of kidney transplant.  Suspect significant radicular component and anticipate referral to pain management pending MRI results.  Obtaining EMG/NCS of hands given atrophy and subjective pain.  Patient appears to have fixed delusion regarding an insect living in her ear.  ENT evaluation confirmed that this is not the case.  Reassurance provided today.  Plan:     Problem List Items Addressed This Visit        Psychiatric    Delusion of infestation    Overview     Patient convinced insect is living in her R ear and laying eggs            Other    Numbness and tingling in both hands    Relevant Orders    EMG W/ ULTRASOUND AND NERVE CONDUCTION TEST 2 Extremities      Other Visit Diagnoses     Hand weakness    -  Primary    Relevant Orders    EMG W/ ULTRASOUND AND NERVE CONDUCTION TEST 2 Extremities    Dorsalgia, unspecified        Relevant Orders    MRI Lumbar Spine Without Contrast    Lumbar radiculopathy        Relevant Orders    MRI Lumbar Spine Without Contrast          Reza Rivera MD  [facility]   Department of Neurology    Patient note was created using MModal Dictation.  Any errors in syntax or even information may not have been identified and edited on initial review prior to signing this note.  Subjective:   Patient seen in consultation at the request of Medardo Leslie MD for the evaluation of multiple complaints. A copy of this note will be sent to the referring  "physician.      HPI:   Ms. Paige Summers is a 69 y.o. female presenting in follow-up for management of peripheral neuropathy and radiculopathy.  Patient was previously seen Leslie and is transferring her care today his departure.  Patient reports since her last visit she continues to experience burning and stinging pain radiating down both of her legs.  She continues to experience gait unsteadiness.  She is unable continue with Lyrica and Neurontin due to her history kidney transplant and states the low-dose Cymbalta that she has approved to take has not been effective in controlling her pain.  She is also noting bilateral hand weakness and numbness, worse in her left hand.  She states she is clumsy holding objects and feels burning pain throughout both her palms.  Lastly, today, she complains that there is an insect living in her years.  She states she can feel it and states she feels "feelers" sticking out of her skin.  She has been evaluated by ENT who did not find an insect living in the patient's ear patient is insistent of this, however.  I reassured the patient there was no insect visible in her ear however she maintains that an eggs and feel years are both palpable and visible in the ear.  When presented with a picture of her own ear, she continues to maintain that there are visible in sec parts when there are not.  She has no other psychiatric history.    PAST MEDICAL HISTORY:  Past Medical History:   Diagnosis Date    Anemia     Anemia of renal disease     Anxiety     Chronic renal failure 01/10/2018    CKD (chronic kidney disease) stage 3, GFR 30-59 ml/min 6/18/2019    Depression     Essential hypertension     GERD (gastroesophageal reflux disease)     Gout     H pylori ulcer     Hyperlipidemia     Hypertension     Obesity     Secondary hyperparathyroidism of renal origin        PAST SURGICAL HISTORY:  Past Surgical History:   Procedure Laterality Date    DECLOTTING OF " ARTERIOVENOUS GRAFT Left 2/19/2019    Procedure: declot;  Surgeon: Yuriy Gibson MD;  Location: [facility], [place] CATH LAB;  Service: Nephrology;  Laterality: Left;    DIAGNOSTIC ULTRASOUND Left 6/18/2018    Procedure: ULTRASOUND, DIAGNOSTIC;  Surgeon: Yuriy Gibson MD;  Location: [facility], [place] operated [facility] CATH LAB;  Service: Nephrology;  Laterality: Left;    DIAGNOSTIC ULTRASOUND Left 7/9/2018    Procedure: ULTRASOUND, DIAGNOSTIC;  Surgeon: Yuriy Gibson MD;  Location: [facility], [place] CATH LAB;  Service: Nephrology;  Laterality: Left;    DIAGNOSTIC ULTRASOUND Left 8/6/2018    Procedure: ULTRASOUND, DIAGNOSTIC;  Surgeon: Yuriy Gibson MD;  Location: [facility], [place] CATH LAB;  Service: Nephrology;  Laterality: Left;    DIAGNOSTIC ULTRASOUND Left 8/20/2018    Procedure: ULTRASOUND, DIAGNOSTIC;  Surgeon: Yuriy Gibson MD;  Location: [facility], [place] CATH LAB;  Service: Nephrology;  Laterality: Left;    DIAGNOSTIC ULTRASOUND Left 9/19/2018    Procedure: ULTRASOUND, DIAGNOSTIC;  Surgeon: Yuriy Gibson MD;  Location: [facility], [place] CATH LAB;  Service: Nephrology;  Laterality: Left;  suture removal. coming for 10AM    DIAGNOSTIC ULTRASOUND Left 10/10/2018    Procedure: ULTRASOUND, DIAGNOSTIC;  Surgeon: Yuriy Gibson MD;  Location: [facility], [place] CATH LAB;  Service: Nephrology;  Laterality: Left;    DIALYSIS FISTULA CREATION Left 02/2018    FISTULOGRAM Left 12/19/2018    Procedure: FISTULOGRAM;  Surgeon: Javed Lopez MD;  Location: [facility], [place] CATH LAB;  Service: Nephrology;  Laterality: Left;  f/u 1week    FISTULOGRAM Left 3/13/2019    Procedure: FISTULOGRAM;  Surgeon: Irineo Devlin MD;  Location: [facility], [place] CATH LAB;  Service: Nephrology;  Laterality: Left;    HYSTERECTOMY  1971    TVH    KIDNEY TRANSPLANT N/A 4/8/2019    Procedure: TRANSPLANT, KIDNEY;  Surgeon: Mathew Goodwin MD;  Location: [address];  Service: Transplant;  Laterality: N/A;    THROMBECTOMY OF HEMODIALYSIS ACCESS SITE Left 6/11/2018    Procedure: THROMBECTOMY, HEMODIALYSIS GRAFT OR FISTULA;  Surgeon: Yuriy Gibson MD;   Location: [facility] CATH LAB;  Service: Nephrology;  Laterality: Left;    THROMBECTOMY OF HEMODIALYSIS ACCESS SITE Left 6/13/2018    Procedure: THROMBECTOMY, HEMODIALYSIS GRAFT OR FISTULA;  Surgeon: Yuriy Gibson MD;  Location: [facility] CATH LAB;  Service: Nephrology;  Laterality: Left;    THROMBECTOMY OF HEMODIALYSIS ACCESS SITE N/A 9/6/2018    Procedure: THROMBECTOMY, HEMODIALYSIS GRAFT OR FISTULA;  Surgeon: Yuriy Gibson MD;  Location: [facility] CATH LAB;  Service: Nephrology;  Laterality: N/A;  PT coming in for noon says shes NPO    THROMBECTOMY OF HEMODIALYSIS ACCESS SITE Left 9/12/2018    Procedure: THROMBECTOMY, HEMODIALYSIS GRAFT OR FISTULA;  Surgeon: Yuriy Gibson MD;  Location: [facility] CATH LAB;  Service: Nephrology;  Laterality: Left;    THROMBECTOMY OF HEMODIALYSIS ACCESS SITE Left 11/9/2018    Procedure: THROMBECTOMY, HEMODIALYSIS GRAFT OR FISTULA;  Surgeon: Yuriy Gibson MD;  Location: [facility] CATH LAB;  Service: Nephrology;  Laterality: Left;    THROMBECTOMY OF HEMODIALYSIS ACCESS SITE Left 12/13/2018    Procedure: THROMBECTOMY, HEMODIALYSIS GRAFT OR FISTULA;  Surgeon: Javed Lopez MD;  Location: [facility] CATH LAB;  Service: Nephrology;  Laterality: Left;       CURRENT MEDS:  Current Outpatient Medications   Medication Sig Dispense Refill    acetaminophen (TYLENOL) 325 mg Cap Take by mouth.      amLODIPine (NORVASC) 10 MG tablet Take 1 tablet (10 mg total) by mouth once daily. 30 tablet 11    atorvastatin (LIPITOR) 10 MG tablet Take 1 tablet (10 mg total) by mouth once daily. 90 tablet 3    benzonatate (TESSALON PERLES) 100 MG capsule Take 2 capsules (200 mg total) by mouth 3 (three) times daily as needed for Cough. 20 capsule 0    cinacalcet (SENSIPAR) 30 MG Tab Take sensipar 30 mg Monday, Wednesday and Friday 30 tablet 6    cyanocobalamin (VITAMIN B-12) 500 MCG tablet Take 1 tablet (500 mcg total) by mouth once daily. 30 tablet 4    docusate sodium (COLACE) 100 MG capsule Take 1 capsule (100 mg total)  by mouth 2 (two) times daily as needed for Constipation.  0    DULoxetine (CYMBALTA) 20 MG capsule Take 1 capsule (20 mg total) by mouth 2 (two) times daily. 60 capsule 4    hydrOXYzine pamoate (VISTARIL) 25 MG Cap Take 1 capsule (25 mg total) by mouth every 8 (eight) hours. 90 capsule 2    linaCLOtide (LINZESS) 145 mcg Cap capsule Take 1 capsule (145 mcg total) by mouth once daily. 30 capsule 11    magnesium oxide (MAG-OX) 400 mg (241.3 mg magnesium) tablet Take 2 tablets (800 mg total) by mouth 2 (two) times daily. 270 tablet 10    multivitamin (THERAGRAN) tablet Take 1 tablet by mouth once daily.      mycophenolate (CELLCEPT) 250 mg Cap Take 2 capsules (500 mg total) by mouth 2 (two) times daily. 120 capsule 11    nystatin-triamcinolone (MYCOLOG) ointment APPLY TO RIGHT EAR D      sodium bicarbonate 650 MG tablet Take 1 tablet (650 mg total) by mouth 2 (two) times daily. 60 tablet 11    tacrolimus (PROGRAF) 1 MG Cap Take 2 capsules (2 mg total) by mouth every morning AND 2 capsules (2 mg total) every evening. 120 capsule 11    traMADol (ULTRAM) 50 mg tablet Take 1 tablet (50 mg total) by mouth every 8 (eight) hours as needed for Pain. 30 tablet 0    carvedilol (COREG) 12.5 MG tablet Take 2 tablets (25 mg total) by mouth 2 (two) times daily with meals. 120 tablet 11    cloNIDine (CATAPRES) 0.1 MG tablet Take 1 tablet (0.1 mg total) by mouth 3 (three) times daily. 90 tablet 11     No current facility-administered medications for this visit.        ALLERGIES:  Review of patient's allergies indicates:   Allergen Reactions    Oxycodone Itching    Hydrocodone Itching       FAMILY HISTORY:  Family History   Problem Relation Age of Onset    Alcohol abuse Mother     No Known Problems Father     Hypertension Sister     Arthritis Sister     Heart disease Brother     Heart failure Brother     Heart failure Brother     Diabetes Cousin     Breast cancer Neg Hx     Colon cancer Neg Hx     Ovarian cancer  "Neg Hx        SOCIAL HISTORY:  Social History     Tobacco Use    Smoking status: Never Smoker    Smokeless tobacco: Never Used   Substance Use Topics    Alcohol use: No    Drug use: No       Review of Systems:  12 system review of systems is negative except for the symptoms mentioned in HPI.      Objective:     Vitals:    08/31/20 0958   BP: (!) 148/93   Pulse: 86   Weight: 89.6 kg (197 lb 8.5 oz)   Height: 5' 3" (1.6 m)     General: NAD, well nourished   Eyes: no tearing, discharge, no erythema   ENT: moist mucous membranes of the oral cavity, nares patent    Neck: Supple, full range of motion  Cardiovascular: Warm and well perfused, pulses equal and symmetrical  Lungs: Normal work of breathing, normal chest wall excursions  Skin: No rash, lesions, or breakdown on exposed skin  Psychiatry: Mood and affect are appropriate   Abdomen: soft, non tender, non distended  Extremeties: No cyanosis, clubbing or edema.    Neurological   MENTAL STATUS: Alert and oriented to person, place, and time. Attention and concentration within normal limits. Speech without dysarthria, able to name and repeat without difficulty. Recent and remote memory within normal limits   CRANIAL NERVES: Visual fields intact. PERRL. EOMI. Facial sensation intact. Face symmetrical. Hearing grossly intact. Full shoulder shrug bilaterally. Tongue protrudes midline   SENSORY: Sensation is intact to light touch throughout.    MOTOR: Atrophy of interossei and tone. No pronator drift.  5/5 deltoid, biceps, triceps,4/5 interosseous, hand  bilaterally. 4/5 iliopsoas, knee extension/flexion, foot dorsi/plantarflexion bilaterally.    REFLEXES: Symmetric and 1+ throughout.   CEREBELLAR/COORDINATION/GAIT: Gait steady with normal arm swing and stride length.  Heel to shin intact. Finger to nose intact. Normal rapid alternating movements.       "

## 2020-08-31 NOTE — LETTER
August 31, 2020      Medardo Leslie MD  [address]  [facility] [address]           [facility] Neurology  [address]  [facility] [address]  Phone: [phone]  Fax: [phone]          Patient: Paige Summers   MR Number: [id number]   YOB: 1950   Date of Visit: 8/31/2020       Dear Dr. Medardo Leslie:    Thank you for referring Paige Summers to me for evaluation. Attached you will find relevant portions of my assessment and plan of care.    If you have questions, please do not hesitate to call me. I look forward to following Paige Summers along with you.    Sincerely,    Reza Rivera MD    Enclosure  CC:  No Recipients    If you would like to receive this communication electronically, please contact [email or web address] or [phone] to request more information on [employer] [facility] Link access.    For providers and/or their staff who would like to refer a patient to [facility], please contact us through our one-stop-shop provider referral line, [facility], at [phone].    If you feel you have received this communication in error or would no longer like to receive these types of communications, please e-mail [email or web address]

## 2020-09-15 DIAGNOSIS — Z94.0 KIDNEY REPLACED BY TRANSPLANT: Primary | ICD-10-CM

## 2020-10-06 ENCOUNTER — LAB VISIT (OUTPATIENT)
Dept: LAB | Facility: HOSPITAL | Age: 70
End: 2020-10-06
Attending: INTERNAL MEDICINE
Payer: MEDICARE

## 2020-10-06 DIAGNOSIS — Z94.0 KIDNEY REPLACED BY TRANSPLANT: ICD-10-CM

## 2020-10-06 LAB
ALBUMIN SERPL BCP-MCNC: 3.9 G/DL (ref 3.5–5.2)
ALBUMIN SERPL BCP-MCNC: 3.9 G/DL (ref 3.5–5.2)
ALP SERPL-CCNC: 116 U/L (ref 55–135)
ALT SERPL W/O P-5'-P-CCNC: 15 U/L (ref 10–44)
ANION GAP SERPL CALC-SCNC: 9 MMOL/L (ref 8–16)
AST SERPL-CCNC: 19 U/L (ref 10–40)
BASOPHILS # BLD AUTO: 0.06 K/UL (ref 0–0.2)
BASOPHILS NFR BLD: 0.8 % (ref 0–1.9)
BILIRUB DIRECT SERPL-MCNC: 0.1 MG/DL (ref 0.1–0.3)
BILIRUB SERPL-MCNC: 0.2 MG/DL (ref 0.1–1)
BUN SERPL-MCNC: 20 MG/DL (ref 8–23)
CALCIUM SERPL-MCNC: 9.6 MG/DL (ref 8.7–10.5)
CHLORIDE SERPL-SCNC: 107 MMOL/L (ref 95–110)
CO2 SERPL-SCNC: 25 MMOL/L (ref 23–29)
CREAT SERPL-MCNC: 1.3 MG/DL (ref 0.5–1.4)
DIFFERENTIAL METHOD: ABNORMAL
EOSINOPHIL # BLD AUTO: 0.2 K/UL (ref 0–0.5)
EOSINOPHIL NFR BLD: 2.3 % (ref 0–8)
ERYTHROCYTE [DISTWIDTH] IN BLOOD BY AUTOMATED COUNT: 14.1 % (ref 11.5–14.5)
EST. GFR  (AFRICAN AMERICAN): 48 ML/MIN/1.73 M^2
EST. GFR  (NON AFRICAN AMERICAN): 41.7 ML/MIN/1.73 M^2
GLUCOSE SERPL-MCNC: 132 MG/DL (ref 70–110)
HCT VFR BLD AUTO: 43.4 % (ref 37–48.5)
HGB BLD-MCNC: 13.8 G/DL (ref 12–16)
IMM GRANULOCYTES # BLD AUTO: 0.04 K/UL (ref 0–0.04)
IMM GRANULOCYTES NFR BLD AUTO: 0.5 % (ref 0–0.5)
LYMPHOCYTES # BLD AUTO: 2 K/UL (ref 1–4.8)
LYMPHOCYTES NFR BLD: 26.8 % (ref 18–48)
MAGNESIUM SERPL-MCNC: 1.5 MG/DL (ref 1.6–2.6)
MCH RBC QN AUTO: 27.5 PG (ref 27–31)
MCHC RBC AUTO-ENTMCNC: 31.8 G/DL (ref 32–36)
MCV RBC AUTO: 87 FL (ref 82–98)
MONOCYTES # BLD AUTO: 0.7 K/UL (ref 0.3–1)
MONOCYTES NFR BLD: 9 % (ref 4–15)
NEUTROPHILS # BLD AUTO: 4.4 K/UL (ref 1.8–7.7)
NEUTROPHILS NFR BLD: 60.6 % (ref 38–73)
NRBC BLD-RTO: 0 /100 WBC
PHOSPHATE SERPL-MCNC: 3.1 MG/DL (ref 2.7–4.5)
PLATELET # BLD AUTO: 315 K/UL (ref 150–350)
PMV BLD AUTO: 10.2 FL (ref 9.2–12.9)
POTASSIUM SERPL-SCNC: 4.2 MMOL/L (ref 3.5–5.1)
PROT SERPL-MCNC: 7.5 G/DL (ref 6–8.4)
RBC # BLD AUTO: 5.02 M/UL (ref 4–5.4)
SODIUM SERPL-SCNC: 141 MMOL/L (ref 136–145)
TACROLIMUS BLD-MCNC: 7.1 NG/ML (ref 5–15)
WBC # BLD AUTO: 7.3 K/UL (ref 3.9–12.7)

## 2020-10-06 PROCEDURE — 87799 DETECT AGENT NOS DNA QUANT: CPT

## 2020-10-06 PROCEDURE — 80069 RENAL FUNCTION PANEL: CPT

## 2020-10-06 PROCEDURE — 85025 COMPLETE CBC W/AUTO DIFF WBC: CPT

## 2020-10-06 PROCEDURE — 80197 ASSAY OF TACROLIMUS: CPT

## 2020-10-06 PROCEDURE — 83735 ASSAY OF MAGNESIUM: CPT

## 2020-10-06 PROCEDURE — 84075 ASSAY ALKALINE PHOSPHATASE: CPT

## 2020-10-06 PROCEDURE — 36415 COLL VENOUS BLD VENIPUNCTURE: CPT

## 2020-10-12 ENCOUNTER — OFFICE VISIT (OUTPATIENT)
Dept: TRANSPLANT | Facility: CLINIC | Age: 70
End: 2020-10-12
Payer: MEDICARE

## 2020-10-12 VITALS
SYSTOLIC BLOOD PRESSURE: 148 MMHG | TEMPERATURE: 97 F | DIASTOLIC BLOOD PRESSURE: 74 MMHG | WEIGHT: 196.63 LBS | OXYGEN SATURATION: 97 % | BODY MASS INDEX: 34.84 KG/M2 | HEART RATE: 72 BPM | RESPIRATION RATE: 16 BRPM

## 2020-10-12 DIAGNOSIS — Z91.89 AT RISK FOR OPPORTUNISTIC INFECTIONS: ICD-10-CM

## 2020-10-12 DIAGNOSIS — D63.1 ANEMIA OF RENAL DISEASE: Chronic | ICD-10-CM

## 2020-10-12 DIAGNOSIS — N25.81 SECONDARY HYPERPARATHYROIDISM OF RENAL ORIGIN: Chronic | ICD-10-CM

## 2020-10-12 DIAGNOSIS — Z94.0 S/P LIVING-DONOR KIDNEY TRANSPLANTATION: Primary | ICD-10-CM

## 2020-10-12 DIAGNOSIS — N18.30 STAGE 3 CHRONIC KIDNEY DISEASE, UNSPECIFIED WHETHER STAGE 3A OR 3B CKD: ICD-10-CM

## 2020-10-12 DIAGNOSIS — N18.9 ANEMIA OF RENAL DISEASE: Chronic | ICD-10-CM

## 2020-10-12 DIAGNOSIS — I10 ESSENTIAL HYPERTENSION: Chronic | ICD-10-CM

## 2020-10-12 DIAGNOSIS — Z29.89 PROPHYLACTIC IMMUNOTHERAPY: ICD-10-CM

## 2020-10-12 PROCEDURE — 1159F PR MEDICATION LIST DOCUMENTED IN MEDICAL RECORD: ICD-10-PCS | Mod: S$GLB,,, | Performed by: NURSE PRACTITIONER

## 2020-10-12 PROCEDURE — 1101F PT FALLS ASSESS-DOCD LE1/YR: CPT | Mod: CPTII,S$GLB,, | Performed by: NURSE PRACTITIONER

## 2020-10-12 PROCEDURE — 3008F PR BODY MASS INDEX (BMI) DOCUMENTED: ICD-10-PCS | Mod: CPTII,S$GLB,, | Performed by: NURSE PRACTITIONER

## 2020-10-12 PROCEDURE — 3078F PR MOST RECENT DIASTOLIC BLOOD PRESSURE < 80 MM HG: ICD-10-PCS | Mod: CPTII,S$GLB,, | Performed by: NURSE PRACTITIONER

## 2020-10-12 PROCEDURE — 3078F DIAST BP <80 MM HG: CPT | Mod: CPTII,S$GLB,, | Performed by: NURSE PRACTITIONER

## 2020-10-12 PROCEDURE — 1159F MED LIST DOCD IN RCRD: CPT | Mod: S$GLB,,, | Performed by: NURSE PRACTITIONER

## 2020-10-12 PROCEDURE — 3008F BODY MASS INDEX DOCD: CPT | Mod: CPTII,S$GLB,, | Performed by: NURSE PRACTITIONER

## 2020-10-12 PROCEDURE — 1126F AMNT PAIN NOTED NONE PRSNT: CPT | Mod: S$GLB,,, | Performed by: NURSE PRACTITIONER

## 2020-10-12 PROCEDURE — 99999 PR PBB SHADOW E&M-EST. PATIENT-LVL V: ICD-10-PCS | Mod: PBBFAC,,, | Performed by: NURSE PRACTITIONER

## 2020-10-12 PROCEDURE — 99215 OFFICE O/P EST HI 40 MIN: CPT | Mod: S$GLB,,, | Performed by: NURSE PRACTITIONER

## 2020-10-12 PROCEDURE — 1101F PR PT FALLS ASSESS DOC 0-1 FALLS W/OUT INJ PAST YR: ICD-10-PCS | Mod: CPTII,S$GLB,, | Performed by: NURSE PRACTITIONER

## 2020-10-12 PROCEDURE — 3077F PR MOST RECENT SYSTOLIC BLOOD PRESSURE >= 140 MM HG: ICD-10-PCS | Mod: CPTII,S$GLB,, | Performed by: NURSE PRACTITIONER

## 2020-10-12 PROCEDURE — 99999 PR PBB SHADOW E&M-EST. PATIENT-LVL V: CPT | Mod: PBBFAC,,, | Performed by: NURSE PRACTITIONER

## 2020-10-12 PROCEDURE — 3077F SYST BP >= 140 MM HG: CPT | Mod: CPTII,S$GLB,, | Performed by: NURSE PRACTITIONER

## 2020-10-12 PROCEDURE — 1126F PR PAIN SEVERITY QUANTIFIED, NO PAIN PRESENT: ICD-10-PCS | Mod: S$GLB,,, | Performed by: NURSE PRACTITIONER

## 2020-10-12 PROCEDURE — 99215 PR OFFICE/OUTPT VISIT, EST, LEVL V, 40-54 MIN: ICD-10-PCS | Mod: S$GLB,,, | Performed by: NURSE PRACTITIONER

## 2020-10-12 NOTE — PROGRESS NOTES
Kidney Post-Transplant Assessment    Referring Physician: Jd Carias  Current Nephrologist: Jd Carias    ORGAN: LEFT KIDNEY  Donor Type: living  PHS Increased Risk: no  Cold Ischemia: 189 mins  Induction Medications: campath - alemtuzumab (anti-cd52)    Subjective:     CC:  Reassessment of renal allograft function and management of chronic immunosuppression.    HPI:  Ms. Summers is a 69 y.o. year old Black or  female who received a living kidney transplant on 4/8/19. Her most recent creatinine is 1.8. She takes mycophenolate mofetil and tacrolimus for maintenance immunosuppression. Her post transplant course has been complicated by see notes below.  post transplant course:  - S/p living unrelated kidney transplant 4/8/19, CMV +/+, WIT 30 min, CIT 3h 9 m  - DGF, resolved  - Pt was on Plavix for reoccurring AVF thrombus, Plavix stopped 4/15/2019   5/23 DSA (-)        Interval HX:   HX peripheral neuropathy with sciatic nerve pain on left side , follows with neurology    Has not f/u with general nephrology, has been f/u with PCP  tx for bronchitis in 7/2020  Intake 1.5-2L  UOP no problems   BP  BP Readings from Last 3 Encounters:   10/12/20 (!) 148/74   08/31/20 (!) 148/93   07/29/20 113/71     Peripheral edema- no   Weight stable   Appetite--good     Walks 15 minutes daily on treadmill   Lab /diagnostic results reviewed with patient today.   All questions answered  Over all Is doing well.        Past Medical History:   Diagnosis Date    Anemia     Anemia of renal disease     Anxiety     Chronic renal failure 01/10/2018    CKD (chronic kidney disease) stage 3, GFR 30-59 ml/min 6/18/2019    Depression     Essential hypertension     GERD (gastroesophageal reflux disease)     Gout     H pylori ulcer     Hyperlipidemia     Hypertension     Obesity     Secondary hyperparathyroidism of renal origin        Review of Systems   Constitutional: Negative for activity change, appetite  change, chills, fatigue, fever and unexpected weight change.   HENT: Negative for congestion, facial swelling, postnasal drip, rhinorrhea, sinus pressure, sore throat and trouble swallowing.    Eyes: Negative for pain, redness and visual disturbance.   Respiratory: Negative for cough, chest tightness, shortness of breath and wheezing.    Cardiovascular: Negative for chest pain and palpitations.   Gastrointestinal: Negative for abdominal pain, diarrhea, nausea and vomiting.   Genitourinary: Negative for dysuria, flank pain and urgency.   Musculoskeletal: Positive for arthralgias and gait problem. Negative for neck pain and neck stiffness.        Lumbar radiculopathy, arthritis knees bilaterally   Uses a cane or walker for long distances    Skin: Negative for rash.   Allergic/Immunologic: Positive for immunocompromised state. Negative for environmental allergies and food allergies.   Neurological: Positive for weakness. Negative for dizziness, light-headedness and headaches.        Peripheral neuropathy  Follows with neurology    Psychiatric/Behavioral: Positive for sleep disturbance. Negative for agitation and confusion. The patient is not nervous/anxious.        Objective:   Blood pressure (!) 148/74, pulse 72, temperature 97 °F (36.1 °C), temperature source Oral, resp. rate 16, weight 89.2 kg (196 lb 10.4 oz), last menstrual period 08/08/1971, SpO2 97 %.body mass index is 34.84 kg/m².    Physical Exam  Vitals signs reviewed.   Constitutional:       Appearance: Normal appearance. She is well-developed.   HENT:      Head: Normocephalic.      Mouth/Throat:      Pharynx: No oropharyngeal exudate.   Eyes:      General: No scleral icterus.     Conjunctiva/sclera: Conjunctivae normal.      Pupils: Pupils are equal, round, and reactive to light.   Neck:      Musculoskeletal: Normal range of motion and neck supple.   Cardiovascular:      Rate and Rhythm: Normal rate and regular rhythm.      Heart sounds: Normal heart  sounds.   Pulmonary:      Effort: Pulmonary effort is normal.      Breath sounds: Normal breath sounds.   Abdominal:      General: Bowel sounds are normal. There is no distension.      Palpations: Abdomen is soft. There is no hepatomegaly, splenomegaly or mass.      Tenderness: There is no abdominal tenderness. There is no guarding or rebound. Negative signs include Smith's sign and McBurney's sign.       Musculoskeletal: Normal range of motion.   Lymphadenopathy:      Cervical: No cervical adenopathy.   Skin:     General: Skin is warm and dry.   Neurological:      Mental Status: She is alert and oriented to person, place, and time.      Motor: No abnormal muscle tone.      Coordination: Coordination normal.   Psychiatric:         Behavior: Behavior normal.         Labs:  Lab Results   Component Value Date    WBC 7.30 10/06/2020    HGB 13.8 10/06/2020    HCT 43.4 10/06/2020     10/06/2020    K 4.2 10/06/2020     10/06/2020    CO2 25 10/06/2020    BUN 20 10/06/2020    CREATININE 1.3 10/06/2020    EGFRNONAA 41.7 (A) 10/06/2020    CALCIUM 9.6 10/06/2020    PHOS 3.1 10/06/2020    MG 1.5 (L) 10/06/2020    ALBUMIN 3.9 10/06/2020    ALBUMIN 3.9 10/06/2020    AST 19 10/06/2020    ALT 15 10/06/2020    UTPCR 0.22 (H) 10/06/2020    .0 (H) 04/08/2020    TACROLIMUS 7.1 10/06/2020       No results found for: EXTANC, EXTWBC, EXTSEGS, EXTPLATELETS, EXTHEMOGLOBI, EXTHEMATOCRI, EXTCREATININ, EXTSODIUM, EXTPOTASSIUM, EXTBUN, EXTCO2, EXTCALCIUM, EXTPHOSPHORU, EXTGLUCOSE, EXTALBUMIN, EXTAST, EXTALT, EXTBILITOTAL, EXTLIPASE, EXTAMYLASE    No results found for: EXTCYCLOSLVL, EXTSIROLIMUS, EXTTACROLVL, EXTPROTCRE, EXTPTHINTACT, EXTPROTEINUA, EXTWBCUA, EXTRBCUA    Labs were reviewed with the patient    Assessment:     1. S/P living-donor kidney transplantation    2. Prophylactic immunotherapy    3. At risk for opportunistic infections    4. Stage 3 chronic kidney disease, unspecified whether stage 3a or 3b CKD    5.  Essential hypertension    6. Anemia of renal disease    7. Secondary hyperparathyroidism of renal origin        Plan:     Referral made for General  nephrology per PT request     Follow-up:   1. CKD stage: 3,  improving  2. Immunosuppression:   Prograf trough 7.1, which is therapeutic target  4-7. Continue Prograf 2/2, [id number] Mg BID Will continue to monitor for drug toxicities     3. Allograft Function: improving. Continue good po hydration.   Lab Results   Component Value Date    CREATININE 1.3 10/06/2020           10/6/2020  1yr 5mo   eGFR if African American >60 mL/min/1.73 m^2 48.0 (A)         4. Hypertension management: advise low salt diet and home BP monitoring    [id number] mg QD, Coreg 25 mg BID, Clonidine 0.1 mg TID         5. Metabolic Bone Disease/Secondary Hyperparathyroidism:stable  Will monitor PTH, CA and Vit D/guidelines,     Mg ox 800 mg TID     Lab Results   Component Value Date    .0 (H) 04/08/2020    CALCIUM 9.6 10/06/2020    PHOS 3.1 10/06/2020          10/6/2020  1yr 5mo   Magnesium 1.6 - 2.6 mg/dL 1.5 (A)         6. Electrolytes:  Will monitor /guidelines  Lab Results   Component Value Date     10/06/2020    K 4.2 10/06/2020     10/06/2020    CO2 25 10/06/2020   Sodium bicarb 350 mg BID       7. Anemia: stable. No need for intervention    Will monitor /guidelines  Lab Results   Component Value Date    WBC 7.30 10/06/2020    HGB 13.8 10/06/2020    HCT 43.4 10/06/2020    MCV 87 10/06/2020     10/06/2020       8.  Cytopenias: no significant cytopenias will monitor as per our guidelines. Medicine list reviewed including potential causes of drug-induced cytopenias    9.Proteinuria: continue p/c ratio as per guidelines           10/6/2020  1yr 5mo   Prot/Creat Ratio, Ur 0.00 - 0.20 0.22 (A)         10. BK virus infection screening:  will continue to monitor/ guidelines         10/6/2020  1yr 5mo   BK Virus DNA, Blood Not detected Not detected       10/6/2020  1yr 5mo   BK  Virus DNA PCR, Quant, Blood <125 Copies/mL <125       11. Weight education: provided during the clinic visit   Body mass index is 34.84 kg/m².          12.Patient safety education regarding immunosuppression including prophylaxis posttransplant for CMV, PCP : Education provided about vaccination and prevention of infections            Follow-up:   Clinic: return to transplant clinic weekly for the first month after transplant; every 2 weeks during months 2-3; then at 6-, 9-, 12-, 18-, 24-, and 36- months post-transplant to reassess for complications from immunosuppression toxicity and monitor for rejection.  Annually thereafter.    Labs: since patient remains at high risk for rejection and drug-related complications that warrant close monitoring, labs will be ordered as follows: continue twice weekly CBC, renal panel, and drug level for first month; then same labs once weekly through 3rd month post-transplant.  Urine for UA and protein/creatinine ratio monthly.  Serum BK - PCR at 1-, 3-, 6-, 9-, 12-, 18-, 24-, 36-, 48-, and 60 months post-transplant.  Hepatic panel at 1-, 2-, 3-, 6-, 9-, 12-, 18-, 24-, and 36- months post-transplant.    Tamanna Kemp NP       Education:   Material provided to the patient.  Patient reminded to call with any health changes since these can be early signs of significant complications.  Also, I advised the patient to be sure any new medications or changes of old medications are discussed with either a pharmacist or physician knowledgeable with transplant to avoid rejection/drug toxicity related to significant drug interactions.

## 2020-10-12 NOTE — LETTER
October 12, 2020        Jd Carias  [address]  [address]  [facility] [address]  Phone: [phone]  Fax: [phone]             [facility]- Transplant 1st Fl  [address]  [facility] [address]  Phone: [phone]   Patient: Paige Summers   MR Number: [id number]   YOB: 1950   Date of Visit: 10/12/2020       Dear Dr. Jd Carias    Thank you for referring Paige Summers to me for evaluation. Attached you will find relevant portions of my assessment and plan of care.    If you have questions, please do not hesitate to call me. I look forward to following Paige Summers along with you.    Sincerely,    Tamanna Kemp, NP    Enclosure    If you would like to receive this communication electronically, please contact [email or web address] or [phone] to request [email or web address] Link access.    [email or web address] Link is a tool which provides read-only access to select patient information with whom you have a relationship. Its easy to use and provides real time access to review your patients record including encounter summaries, notes, results, and demographic information.    If you feel you have received this communication in error or would no longer like to receive these types of communications, please e-mail [email or web address]

## 2020-11-11 ENCOUNTER — OFFICE VISIT (OUTPATIENT)
Dept: OPTOMETRY | Facility: CLINIC | Age: 70
End: 2020-11-11
Payer: COMMERCIAL

## 2020-11-11 DIAGNOSIS — H52.03 HYPEROPIA OF BOTH EYES WITH ASTIGMATISM AND PRESBYOPIA: Primary | ICD-10-CM

## 2020-11-11 DIAGNOSIS — H52.4 HYPEROPIA OF BOTH EYES WITH ASTIGMATISM AND PRESBYOPIA: Primary | ICD-10-CM

## 2020-11-11 DIAGNOSIS — H52.203 HYPEROPIA OF BOTH EYES WITH ASTIGMATISM AND PRESBYOPIA: Primary | ICD-10-CM

## 2020-11-11 PROCEDURE — 92015 DETERMINE REFRACTIVE STATE: CPT | Mod: S$GLB,,, | Performed by: OPTOMETRIST

## 2020-11-11 PROCEDURE — 92004 PR EYE EXAM, NEW PATIENT,COMPREHESV: ICD-10-PCS | Mod: S$GLB,,, | Performed by: OPTOMETRIST

## 2020-11-11 PROCEDURE — 92004 COMPRE OPH EXAM NEW PT 1/>: CPT | Mod: S$GLB,,, | Performed by: OPTOMETRIST

## 2020-11-11 PROCEDURE — 99999 PR PBB SHADOW E&M-EST. PATIENT-LVL III: ICD-10-PCS | Mod: PBBFAC,,, | Performed by: OPTOMETRIST

## 2020-11-11 PROCEDURE — 92015 PR REFRACTION: ICD-10-PCS | Mod: S$GLB,,, | Performed by: OPTOMETRIST

## 2020-11-11 PROCEDURE — 99999 PR PBB SHADOW E&M-EST. PATIENT-LVL III: CPT | Mod: PBBFAC,,, | Performed by: OPTOMETRIST

## 2020-11-11 RX ORDER — CALCITRIOL 0.25 UG/1
0.25 CAPSULE ORAL
COMMUNITY
Start: 2020-09-01

## 2020-11-11 NOTE — PROGRESS NOTES
HPI     Pt here for Routine Exam Eye;  Pt states its been a while since her last eye exam. Pt states she's been   noticing vision changes lately mainly close up bothers her. Pt denies any   floaters, flashes or diplopia.    Meds:   No GTTS    Last edited by Anita Gore on 11/11/2020  1:31 PM. (History)            Assessment /Plan     For exam results, see Encounter Report.    Hyperopia of both eyes with astigmatism and presbyopia  -[facility]  Eyeglass Final Rx     Eyeglass Final Rx       Sphere Cylinder Axis Dist VA Add    Right +0.25 +0.50 165 20/30 +2.50    Left [place] +0.50 165 20/30++ +2.50    Type: Bifocal    Expiration Date: 11/12/2021                  RTC 1 yr

## 2021-01-08 ENCOUNTER — PATIENT MESSAGE (OUTPATIENT)
Dept: TRANSPLANT | Facility: CLINIC | Age: 71
End: 2021-01-08

## 2021-04-20 ENCOUNTER — LAB VISIT (OUTPATIENT)
Dept: LAB | Facility: HOSPITAL | Age: 71
End: 2021-04-20
Attending: INTERNAL MEDICINE
Payer: MEDICARE

## 2021-04-20 DIAGNOSIS — Z94.0 KIDNEY REPLACED BY TRANSPLANT: ICD-10-CM

## 2021-04-20 LAB
ALBUMIN SERPL BCP-MCNC: 3.8 G/DL (ref 3.5–5.2)
ALBUMIN SERPL BCP-MCNC: 3.8 G/DL (ref 3.5–5.2)
ALP SERPL-CCNC: 102 U/L (ref 55–135)
ALT SERPL W/O P-5'-P-CCNC: 12 U/L (ref 10–44)
ANION GAP SERPL CALC-SCNC: 7 MMOL/L (ref 8–16)
AST SERPL-CCNC: 17 U/L (ref 10–40)
BASOPHILS # BLD AUTO: 0.04 K/UL (ref 0–0.2)
BASOPHILS NFR BLD: 0.6 % (ref 0–1.9)
BILIRUB DIRECT SERPL-MCNC: 0.1 MG/DL (ref 0.1–0.3)
BILIRUB SERPL-MCNC: 0.3 MG/DL (ref 0.1–1)
BUN SERPL-MCNC: 17 MG/DL (ref 8–23)
CALCIUM SERPL-MCNC: 9.4 MG/DL (ref 8.7–10.5)
CHLORIDE SERPL-SCNC: 106 MMOL/L (ref 95–110)
CO2 SERPL-SCNC: 27 MMOL/L (ref 23–29)
CREAT SERPL-MCNC: 1.1 MG/DL (ref 0.5–1.4)
DIFFERENTIAL METHOD: ABNORMAL
EOSINOPHIL # BLD AUTO: 0.1 K/UL (ref 0–0.5)
EOSINOPHIL NFR BLD: 2.1 % (ref 0–8)
ERYTHROCYTE [DISTWIDTH] IN BLOOD BY AUTOMATED COUNT: 14.3 % (ref 11.5–14.5)
EST. GFR  (AFRICAN AMERICAN): 58.4 ML/MIN/1.73 M^2
EST. GFR  (NON AFRICAN AMERICAN): 50.6 ML/MIN/1.73 M^2
GLUCOSE SERPL-MCNC: 102 MG/DL (ref 70–110)
HCT VFR BLD AUTO: 42.9 % (ref 37–48.5)
HGB BLD-MCNC: 13.8 G/DL (ref 12–16)
IMM GRANULOCYTES # BLD AUTO: 0.05 K/UL (ref 0–0.04)
IMM GRANULOCYTES NFR BLD AUTO: 0.8 % (ref 0–0.5)
LYMPHOCYTES # BLD AUTO: 1.7 K/UL (ref 1–4.8)
LYMPHOCYTES NFR BLD: 26.5 % (ref 18–48)
MAGNESIUM SERPL-MCNC: 1.4 MG/DL (ref 1.6–2.6)
MCH RBC QN AUTO: 28.3 PG (ref 27–31)
MCHC RBC AUTO-ENTMCNC: 32.2 G/DL (ref 32–36)
MCV RBC AUTO: 88 FL (ref 82–98)
MONOCYTES # BLD AUTO: 0.7 K/UL (ref 0.3–1)
MONOCYTES NFR BLD: 11.5 % (ref 4–15)
NEUTROPHILS # BLD AUTO: 3.7 K/UL (ref 1.8–7.7)
NEUTROPHILS NFR BLD: 58.5 % (ref 38–73)
NRBC BLD-RTO: 0 /100 WBC
PHOSPHATE SERPL-MCNC: 3.2 MG/DL (ref 2.7–4.5)
PLATELET # BLD AUTO: 275 K/UL (ref 150–450)
PMV BLD AUTO: 9.9 FL (ref 9.2–12.9)
POTASSIUM SERPL-SCNC: 4.1 MMOL/L (ref 3.5–5.1)
PROT SERPL-MCNC: 7.3 G/DL (ref 6–8.4)
RBC # BLD AUTO: 4.88 M/UL (ref 4–5.4)
SODIUM SERPL-SCNC: 140 MMOL/L (ref 136–145)
TACROLIMUS BLD-MCNC: 9 NG/ML (ref 5–15)
WBC # BLD AUTO: 6.34 K/UL (ref 3.9–12.7)

## 2021-04-20 PROCEDURE — 84460 ALANINE AMINO (ALT) (SGPT): CPT | Performed by: INTERNAL MEDICINE

## 2021-04-20 PROCEDURE — 82247 BILIRUBIN TOTAL: CPT | Performed by: INTERNAL MEDICINE

## 2021-04-20 PROCEDURE — 84075 ASSAY ALKALINE PHOSPHATASE: CPT | Performed by: INTERNAL MEDICINE

## 2021-04-20 PROCEDURE — 85025 COMPLETE CBC W/AUTO DIFF WBC: CPT | Performed by: INTERNAL MEDICINE

## 2021-04-20 PROCEDURE — 80069 RENAL FUNCTION PANEL: CPT | Performed by: INTERNAL MEDICINE

## 2021-04-20 PROCEDURE — 80197 ASSAY OF TACROLIMUS: CPT | Performed by: INTERNAL MEDICINE

## 2021-04-20 PROCEDURE — 83735 ASSAY OF MAGNESIUM: CPT | Performed by: INTERNAL MEDICINE

## 2021-04-20 PROCEDURE — 36415 COLL VENOUS BLD VENIPUNCTURE: CPT | Performed by: INTERNAL MEDICINE

## 2021-04-20 PROCEDURE — 87799 DETECT AGENT NOS DNA QUANT: CPT | Performed by: INTERNAL MEDICINE

## 2021-04-26 ENCOUNTER — OFFICE VISIT (OUTPATIENT)
Dept: TRANSPLANT | Facility: CLINIC | Age: 71
End: 2021-04-26
Payer: MEDICARE

## 2021-04-26 VITALS
HEIGHT: 63 IN | WEIGHT: 200.63 LBS | RESPIRATION RATE: 18 BRPM | TEMPERATURE: 99 F | HEART RATE: 70 BPM | DIASTOLIC BLOOD PRESSURE: 88 MMHG | OXYGEN SATURATION: 96 % | SYSTOLIC BLOOD PRESSURE: 154 MMHG | BODY MASS INDEX: 35.55 KG/M2

## 2021-04-26 DIAGNOSIS — N25.81 SECONDARY HYPERPARATHYROIDISM OF RENAL ORIGIN: Chronic | ICD-10-CM

## 2021-04-26 DIAGNOSIS — D63.1 ANEMIA OF RENAL DISEASE: Chronic | ICD-10-CM

## 2021-04-26 DIAGNOSIS — N18.9 ANEMIA OF RENAL DISEASE: Chronic | ICD-10-CM

## 2021-04-26 DIAGNOSIS — Z79.60 LONG-TERM USE OF IMMUNOSUPPRESSANT MEDICATION: ICD-10-CM

## 2021-04-26 DIAGNOSIS — Z94.0 S/P LIVING-DONOR KIDNEY TRANSPLANTATION: Primary | ICD-10-CM

## 2021-04-26 DIAGNOSIS — N18.30 STAGE 3 CHRONIC KIDNEY DISEASE, UNSPECIFIED WHETHER STAGE 3A OR 3B CKD: ICD-10-CM

## 2021-04-26 PROCEDURE — 1101F PR PT FALLS ASSESS DOC 0-1 FALLS W/OUT INJ PAST YR: ICD-10-PCS | Mod: CPTII,S$GLB,, | Performed by: NURSE PRACTITIONER

## 2021-04-26 PROCEDURE — 3077F SYST BP >= 140 MM HG: CPT | Mod: CPTII,S$GLB,, | Performed by: NURSE PRACTITIONER

## 2021-04-26 PROCEDURE — 1159F MED LIST DOCD IN RCRD: CPT | Mod: S$GLB,,, | Performed by: NURSE PRACTITIONER

## 2021-04-26 PROCEDURE — 99215 OFFICE O/P EST HI 40 MIN: CPT | Mod: S$GLB,,, | Performed by: NURSE PRACTITIONER

## 2021-04-26 PROCEDURE — 99215 PR OFFICE/OUTPT VISIT, EST, LEVL V, 40-54 MIN: ICD-10-PCS | Mod: S$GLB,,, | Performed by: NURSE PRACTITIONER

## 2021-04-26 PROCEDURE — 1126F AMNT PAIN NOTED NONE PRSNT: CPT | Mod: S$GLB,,, | Performed by: NURSE PRACTITIONER

## 2021-04-26 PROCEDURE — 3288F PR FALLS RISK ASSESSMENT DOCUMENTED: ICD-10-PCS | Mod: CPTII,S$GLB,, | Performed by: NURSE PRACTITIONER

## 2021-04-26 PROCEDURE — 99999 PR PBB SHADOW E&M-EST. PATIENT-LVL V: ICD-10-PCS | Mod: PBBFAC,,, | Performed by: NURSE PRACTITIONER

## 2021-04-26 PROCEDURE — 1157F ADVNC CARE PLAN IN RCRD: CPT | Mod: S$GLB,,, | Performed by: NURSE PRACTITIONER

## 2021-04-26 PROCEDURE — 1159F PR MEDICATION LIST DOCUMENTED IN MEDICAL RECORD: ICD-10-PCS | Mod: S$GLB,,, | Performed by: NURSE PRACTITIONER

## 2021-04-26 PROCEDURE — 1126F PR PAIN SEVERITY QUANTIFIED, NO PAIN PRESENT: ICD-10-PCS | Mod: S$GLB,,, | Performed by: NURSE PRACTITIONER

## 2021-04-26 PROCEDURE — 1157F PR ADVANCE CARE PLAN OR EQUIV PRESENT IN MEDICAL RECORD: ICD-10-PCS | Mod: S$GLB,,, | Performed by: NURSE PRACTITIONER

## 2021-04-26 PROCEDURE — 3079F DIAST BP 80-89 MM HG: CPT | Mod: CPTII,S$GLB,, | Performed by: NURSE PRACTITIONER

## 2021-04-26 PROCEDURE — 3288F FALL RISK ASSESSMENT DOCD: CPT | Mod: CPTII,S$GLB,, | Performed by: NURSE PRACTITIONER

## 2021-04-26 PROCEDURE — 3008F PR BODY MASS INDEX (BMI) DOCUMENTED: ICD-10-PCS | Mod: CPTII,S$GLB,, | Performed by: NURSE PRACTITIONER

## 2021-04-26 PROCEDURE — 3077F PR MOST RECENT SYSTOLIC BLOOD PRESSURE >= 140 MM HG: ICD-10-PCS | Mod: CPTII,S$GLB,, | Performed by: NURSE PRACTITIONER

## 2021-04-26 PROCEDURE — 99999 PR PBB SHADOW E&M-EST. PATIENT-LVL V: CPT | Mod: PBBFAC,,, | Performed by: NURSE PRACTITIONER

## 2021-04-26 PROCEDURE — 3008F BODY MASS INDEX DOCD: CPT | Mod: CPTII,S$GLB,, | Performed by: NURSE PRACTITIONER

## 2021-04-26 PROCEDURE — 3079F PR MOST RECENT DIASTOLIC BLOOD PRESSURE 80-89 MM HG: ICD-10-PCS | Mod: CPTII,S$GLB,, | Performed by: NURSE PRACTITIONER

## 2021-04-26 PROCEDURE — 1101F PT FALLS ASSESS-DOCD LE1/YR: CPT | Mod: CPTII,S$GLB,, | Performed by: NURSE PRACTITIONER

## 2021-04-26 RX ORDER — TACROLIMUS 1 MG/1
CAPSULE ORAL
Qty: 120 CAPSULE | Refills: 11 | Status: ON HOLD | OUTPATIENT
Start: 2021-04-26 | End: 2021-09-02 | Stop reason: HOSPADM

## 2021-04-26 RX ORDER — AMOXICILLIN 500 MG
2 CAPSULE ORAL DAILY
COMMUNITY

## 2021-04-28 ENCOUNTER — PATIENT MESSAGE (OUTPATIENT)
Dept: RESEARCH | Facility: HOSPITAL | Age: 71
End: 2021-04-28

## 2021-04-28 DIAGNOSIS — Z79.60 LONG-TERM USE OF IMMUNOSUPPRESSANT MEDICATION: Primary | ICD-10-CM

## 2021-04-28 DIAGNOSIS — Z94.0 S/P LIVING-DONOR KIDNEY TRANSPLANTATION: ICD-10-CM

## 2021-05-13 ENCOUNTER — LAB VISIT (OUTPATIENT)
Dept: LAB | Facility: HOSPITAL | Age: 71
End: 2021-05-13
Payer: MEDICARE

## 2021-05-13 DIAGNOSIS — Z94.0 S/P LIVING-DONOR KIDNEY TRANSPLANTATION: ICD-10-CM

## 2021-05-13 DIAGNOSIS — Z79.60 LONG-TERM USE OF IMMUNOSUPPRESSANT MEDICATION: ICD-10-CM

## 2021-05-13 LAB — TACROLIMUS BLD-MCNC: 7.5 NG/ML (ref 5–15)

## 2021-05-13 PROCEDURE — 36415 COLL VENOUS BLD VENIPUNCTURE: CPT | Performed by: INTERNAL MEDICINE

## 2021-05-13 PROCEDURE — 80197 ASSAY OF TACROLIMUS: CPT | Performed by: INTERNAL MEDICINE

## 2021-05-17 DIAGNOSIS — Z94.0 KIDNEY REPLACED BY TRANSPLANT: ICD-10-CM

## 2021-05-17 RX ORDER — MYCOPHENOLATE MOFETIL 250 MG/1
500 CAPSULE ORAL 2 TIMES DAILY
Qty: 120 CAPSULE | Refills: 11 | Status: ON HOLD | OUTPATIENT
Start: 2021-05-17 | End: 2021-09-02 | Stop reason: HOSPADM

## 2021-08-30 ENCOUNTER — HOSPITAL ENCOUNTER (INPATIENT)
Facility: HOSPITAL | Age: 71
LOS: 1 days | Discharge: HOME | DRG: 177 | End: 2021-09-02
Attending: EMERGENCY MEDICINE | Admitting: EMERGENCY MEDICINE
Payer: MEDICARE

## 2021-08-30 DIAGNOSIS — R65.10 SIRS (SYSTEMIC INFLAMMATORY RESPONSE SYNDROME): ICD-10-CM

## 2021-08-30 DIAGNOSIS — R50.81 FEVER IN OTHER DISEASES: Primary | ICD-10-CM

## 2021-08-30 DIAGNOSIS — U07.1 ACUTE HYPOXEMIC RESPIRATORY FAILURE DUE TO COVID-19: ICD-10-CM

## 2021-08-30 DIAGNOSIS — J96.01 ACUTE HYPOXEMIC RESPIRATORY FAILURE DUE TO COVID-19: ICD-10-CM

## 2021-08-30 DIAGNOSIS — R94.31 EKG ABNORMALITY: ICD-10-CM

## 2021-08-30 DIAGNOSIS — R79.89 ELEVATED TROPONIN: ICD-10-CM

## 2021-08-30 DIAGNOSIS — Z94.0 S/P LIVING-DONOR KIDNEY TRANSPLANTATION: ICD-10-CM

## 2021-08-30 LAB
ALBUMIN SERPL BCP-MCNC: 3.4 G/DL (ref 3.5–5.2)
ALP SERPL-CCNC: 64 U/L (ref 55–135)
ALT SERPL W/O P-5'-P-CCNC: 19 U/L (ref 10–44)
ANION GAP SERPL CALC-SCNC: 14 MMOL/L (ref 8–16)
ANISOCYTOSIS BLD QL SMEAR: SLIGHT
AST SERPL-CCNC: 35 U/L (ref 10–40)
BASOPHILS # BLD AUTO: 0.02 K/UL (ref 0–0.2)
BASOPHILS NFR BLD: 0.4 % (ref 0–1.9)
BILIRUB SERPL-MCNC: 0.4 MG/DL (ref 0.1–1)
BUN SERPL-MCNC: 17 MG/DL (ref 8–23)
CALCIUM SERPL-MCNC: 9.5 MG/DL (ref 8.7–10.5)
CHLORIDE SERPL-SCNC: 103 MMOL/L (ref 95–110)
CK SERPL-CCNC: 164 U/L (ref 20–180)
CO2 SERPL-SCNC: 20 MMOL/L (ref 23–29)
CREAT SERPL-MCNC: 1.5 MG/DL (ref 0.5–1.4)
CRP SERPL-MCNC: 85 MG/L (ref 0–8.2)
CTP QC/QA: YES
DIFFERENTIAL METHOD: NORMAL
EOSINOPHIL # BLD AUTO: 0 K/UL (ref 0–0.5)
EOSINOPHIL NFR BLD: 0 % (ref 0–8)
ERYTHROCYTE [DISTWIDTH] IN BLOOD BY AUTOMATED COUNT: 14.2 % (ref 11.5–14.5)
EST. GFR  (AFRICAN AMERICAN): 40.1 ML/MIN/1.73 M^2
EST. GFR  (NON AFRICAN AMERICAN): 34.8 ML/MIN/1.73 M^2
FERRITIN SERPL-MCNC: 446 NG/ML (ref 20–300)
GLUCOSE SERPL-MCNC: 151 MG/DL (ref 70–110)
HCT VFR BLD AUTO: 43.1 % (ref 37–48.5)
HGB BLD-MCNC: 14.1 G/DL (ref 12–16)
IMM GRANULOCYTES # BLD AUTO: 0.02 K/UL (ref 0–0.04)
IMM GRANULOCYTES NFR BLD AUTO: 0.4 % (ref 0–0.5)
LACTATE SERPL-SCNC: 0.7 MMOL/L (ref 0.5–2.2)
LACTATE SERPL-SCNC: 1 MMOL/L (ref 0.5–2.2)
LDH SERPL L TO P-CCNC: 332 U/L (ref 110–260)
LYMPHOCYTES # BLD AUTO: 1 K/UL (ref 1–4.8)
LYMPHOCYTES NFR BLD: 20.7 % (ref 18–48)
MAGNESIUM SERPL-MCNC: 1.8 MG/DL (ref 1.6–2.6)
MCH RBC QN AUTO: 27.1 PG (ref 27–31)
MCHC RBC AUTO-ENTMCNC: 32.7 G/DL (ref 32–36)
MCV RBC AUTO: 83 FL (ref 82–98)
MONOCYTES # BLD AUTO: 0.3 K/UL (ref 0.3–1)
MONOCYTES NFR BLD: 5.9 % (ref 4–15)
NEUTROPHILS # BLD AUTO: 3.6 K/UL (ref 1.8–7.7)
NEUTROPHILS NFR BLD: 72.6 % (ref 38–73)
NRBC BLD-RTO: 0 /100 WBC
PHOSPHATE SERPL-MCNC: 2.5 MG/DL (ref 2.7–4.5)
PLATELET # BLD AUTO: 199 K/UL (ref 150–450)
PLATELET BLD QL SMEAR: NORMAL
PMV BLD AUTO: 10.3 FL (ref 9.2–12.9)
POCT GLUCOSE: 161 MG/DL (ref 70–110)
POTASSIUM SERPL-SCNC: 4.1 MMOL/L (ref 3.5–5.1)
PROCALCITONIN SERPL IA-MCNC: 0.18 NG/ML
PROT SERPL-MCNC: 7.3 G/DL (ref 6–8.4)
RBC # BLD AUTO: 5.2 M/UL (ref 4–5.4)
SARS-COV-2 RDRP RESP QL NAA+PROBE: POSITIVE
SODIUM SERPL-SCNC: 137 MMOL/L (ref 136–145)
TROPONIN I SERPL DL<=0.01 NG/ML-MCNC: 0.1 NG/ML (ref 0–0.03)
WBC # BLD AUTO: 4.92 K/UL (ref 3.9–12.7)

## 2021-08-30 PROCEDURE — 63600175 PHARM REV CODE 636 W HCPCS: Performed by: STUDENT IN AN ORGANIZED HEALTH CARE EDUCATION/TRAINING PROGRAM

## 2021-08-30 PROCEDURE — 84145 PROCALCITONIN (PCT): CPT | Performed by: STUDENT IN AN ORGANIZED HEALTH CARE EDUCATION/TRAINING PROGRAM

## 2021-08-30 PROCEDURE — 93010 EKG 12-LEAD: ICD-10-PCS | Mod: 76,,, | Performed by: INTERNAL MEDICINE

## 2021-08-30 PROCEDURE — 80197 ASSAY OF TACROLIMUS: CPT | Performed by: STUDENT IN AN ORGANIZED HEALTH CARE EDUCATION/TRAINING PROGRAM

## 2021-08-30 PROCEDURE — 63600175 PHARM REV CODE 636 W HCPCS: Performed by: EMERGENCY MEDICINE

## 2021-08-30 PROCEDURE — 83605 ASSAY OF LACTIC ACID: CPT | Performed by: STUDENT IN AN ORGANIZED HEALTH CARE EDUCATION/TRAINING PROGRAM

## 2021-08-30 PROCEDURE — 87040 BLOOD CULTURE FOR BACTERIA: CPT | Performed by: STUDENT IN AN ORGANIZED HEALTH CARE EDUCATION/TRAINING PROGRAM

## 2021-08-30 PROCEDURE — 63600175 PHARM REV CODE 636 W HCPCS: Performed by: HOSPITALIST

## 2021-08-30 PROCEDURE — 83605 ASSAY OF LACTIC ACID: CPT | Mod: 91 | Performed by: EMERGENCY MEDICINE

## 2021-08-30 PROCEDURE — 84100 ASSAY OF PHOSPHORUS: CPT | Performed by: STUDENT IN AN ORGANIZED HEALTH CARE EDUCATION/TRAINING PROGRAM

## 2021-08-30 PROCEDURE — 96372 THER/PROPH/DIAG INJ SC/IM: CPT | Mod: 59

## 2021-08-30 PROCEDURE — 99220 PR INITIAL OBSERVATION CARE,LEVL III: ICD-10-PCS | Mod: ,,, | Performed by: HOSPITALIST

## 2021-08-30 PROCEDURE — 96361 HYDRATE IV INFUSION ADD-ON: CPT

## 2021-08-30 PROCEDURE — 85025 COMPLETE CBC W/AUTO DIFF WBC: CPT | Performed by: EMERGENCY MEDICINE

## 2021-08-30 PROCEDURE — 93010 ELECTROCARDIOGRAM REPORT: CPT | Mod: ,,, | Performed by: INTERNAL MEDICINE

## 2021-08-30 PROCEDURE — 82962 GLUCOSE BLOOD TEST: CPT

## 2021-08-30 PROCEDURE — 83615 LACTATE (LD) (LDH) ENZYME: CPT | Performed by: STUDENT IN AN ORGANIZED HEALTH CARE EDUCATION/TRAINING PROGRAM

## 2021-08-30 PROCEDURE — 83735 ASSAY OF MAGNESIUM: CPT | Performed by: STUDENT IN AN ORGANIZED HEALTH CARE EDUCATION/TRAINING PROGRAM

## 2021-08-30 PROCEDURE — 99220 PR INITIAL OBSERVATION CARE,LEVL III: CPT | Mod: ,,, | Performed by: HOSPITALIST

## 2021-08-30 PROCEDURE — 25000003 PHARM REV CODE 250: Performed by: HOSPITALIST

## 2021-08-30 PROCEDURE — 25000003 PHARM REV CODE 250: Performed by: STUDENT IN AN ORGANIZED HEALTH CARE EDUCATION/TRAINING PROGRAM

## 2021-08-30 PROCEDURE — U0002 COVID-19 LAB TEST NON-CDC: HCPCS | Performed by: EMERGENCY MEDICINE

## 2021-08-30 PROCEDURE — 99285 EMERGENCY DEPT VISIT HI MDM: CPT | Mod: CR,CS,, | Performed by: EMERGENCY MEDICINE

## 2021-08-30 PROCEDURE — 82728 ASSAY OF FERRITIN: CPT | Performed by: STUDENT IN AN ORGANIZED HEALTH CARE EDUCATION/TRAINING PROGRAM

## 2021-08-30 PROCEDURE — 80053 COMPREHEN METABOLIC PANEL: CPT | Performed by: EMERGENCY MEDICINE

## 2021-08-30 PROCEDURE — 99285 PR EMERGENCY DEPT VISIT,LEVEL V: ICD-10-PCS | Mod: CR,CS,, | Performed by: EMERGENCY MEDICINE

## 2021-08-30 PROCEDURE — G0378 HOSPITAL OBSERVATION PER HR: HCPCS

## 2021-08-30 PROCEDURE — 84484 ASSAY OF TROPONIN QUANT: CPT | Performed by: STUDENT IN AN ORGANIZED HEALTH CARE EDUCATION/TRAINING PROGRAM

## 2021-08-30 PROCEDURE — 86140 C-REACTIVE PROTEIN: CPT | Performed by: STUDENT IN AN ORGANIZED HEALTH CARE EDUCATION/TRAINING PROGRAM

## 2021-08-30 PROCEDURE — 93005 ELECTROCARDIOGRAM TRACING: CPT

## 2021-08-30 PROCEDURE — 96374 THER/PROPH/DIAG INJ IV PUSH: CPT

## 2021-08-30 PROCEDURE — 99285 EMERGENCY DEPT VISIT HI MDM: CPT | Mod: 25

## 2021-08-30 PROCEDURE — 82550 ASSAY OF CK (CPK): CPT | Performed by: STUDENT IN AN ORGANIZED HEALTH CARE EDUCATION/TRAINING PROGRAM

## 2021-08-30 RX ORDER — DULOXETIN HYDROCHLORIDE 30 MG/1
30 CAPSULE, DELAYED RELEASE ORAL DAILY
Status: DISCONTINUED | OUTPATIENT
Start: 2021-08-31 | End: 2021-09-02 | Stop reason: HOSPADM

## 2021-08-30 RX ORDER — SODIUM BICARBONATE 650 MG/1
650 TABLET ORAL 2 TIMES DAILY
Status: DISCONTINUED | OUTPATIENT
Start: 2021-08-31 | End: 2021-09-02 | Stop reason: HOSPADM

## 2021-08-30 RX ORDER — IBUPROFEN 200 MG
16 TABLET ORAL
Status: DISCONTINUED | OUTPATIENT
Start: 2021-08-30 | End: 2021-09-02 | Stop reason: HOSPADM

## 2021-08-30 RX ORDER — SODIUM CHLORIDE 0.9 % (FLUSH) 0.9 %
10 SYRINGE (ML) INJECTION
Status: DISCONTINUED | OUTPATIENT
Start: 2021-08-30 | End: 2021-09-02 | Stop reason: HOSPADM

## 2021-08-30 RX ORDER — ENOXAPARIN SODIUM 100 MG/ML
1 INJECTION SUBCUTANEOUS 2 TIMES DAILY
Status: DISCONTINUED | OUTPATIENT
Start: 2021-08-30 | End: 2021-09-02 | Stop reason: HOSPADM

## 2021-08-30 RX ORDER — MYCOPHENOLATE MOFETIL 250 MG/1
500 CAPSULE ORAL
Status: COMPLETED | OUTPATIENT
Start: 2021-08-30 | End: 2021-08-30

## 2021-08-30 RX ORDER — TALC
6 POWDER (GRAM) TOPICAL NIGHTLY PRN
Status: DISCONTINUED | OUTPATIENT
Start: 2021-08-30 | End: 2021-08-31

## 2021-08-30 RX ORDER — ALBUTEROL SULFATE 90 UG/1
2 AEROSOL, METERED RESPIRATORY (INHALATION) EVERY 6 HOURS PRN
Status: DISCONTINUED | OUTPATIENT
Start: 2021-08-30 | End: 2021-09-02 | Stop reason: HOSPADM

## 2021-08-30 RX ORDER — ACETAMINOPHEN 325 MG/1
650 TABLET ORAL EVERY 4 HOURS PRN
Status: DISCONTINUED | OUTPATIENT
Start: 2021-08-30 | End: 2021-09-02 | Stop reason: HOSPADM

## 2021-08-30 RX ORDER — CARVEDILOL 12.5 MG/1
25 TABLET ORAL 2 TIMES DAILY WITH MEALS
Status: DISCONTINUED | OUTPATIENT
Start: 2021-08-31 | End: 2021-08-30

## 2021-08-30 RX ORDER — ACETAMINOPHEN 325 MG/1
650 TABLET ORAL
Status: COMPLETED | OUTPATIENT
Start: 2021-08-30 | End: 2021-08-30

## 2021-08-30 RX ORDER — TACROLIMUS 1 MG/1
2 CAPSULE ORAL ONCE
Status: COMPLETED | OUTPATIENT
Start: 2021-08-30 | End: 2021-08-30

## 2021-08-30 RX ORDER — ASCORBIC ACID 500 MG
500 TABLET ORAL 2 TIMES DAILY
Status: DISCONTINUED | OUTPATIENT
Start: 2021-08-30 | End: 2021-09-02 | Stop reason: HOSPADM

## 2021-08-30 RX ORDER — TACROLIMUS 1 MG/1
2 CAPSULE ORAL 2 TIMES DAILY
Status: DISCONTINUED | OUTPATIENT
Start: 2021-08-31 | End: 2021-09-01

## 2021-08-30 RX ORDER — AMLODIPINE BESYLATE 10 MG/1
10 TABLET ORAL DAILY
Status: DISCONTINUED | OUTPATIENT
Start: 2021-08-31 | End: 2021-08-30

## 2021-08-30 RX ORDER — BENZONATATE 100 MG/1
100 CAPSULE ORAL 3 TIMES DAILY PRN
Status: DISCONTINUED | OUTPATIENT
Start: 2021-08-30 | End: 2021-09-02 | Stop reason: HOSPADM

## 2021-08-30 RX ORDER — ONDANSETRON 2 MG/ML
4 INJECTION INTRAMUSCULAR; INTRAVENOUS EVERY 8 HOURS PRN
Status: DISCONTINUED | OUTPATIENT
Start: 2021-08-30 | End: 2021-09-02 | Stop reason: HOSPADM

## 2021-08-30 RX ORDER — DEXAMETHASONE SODIUM PHOSPHATE 4 MG/ML
6 INJECTION, SOLUTION INTRA-ARTICULAR; INTRALESIONAL; INTRAMUSCULAR; INTRAVENOUS; SOFT TISSUE
Status: COMPLETED | OUTPATIENT
Start: 2021-08-30 | End: 2021-08-30

## 2021-08-30 RX ORDER — LOPERAMIDE HYDROCHLORIDE 2 MG/1
2 CAPSULE ORAL EVERY 6 HOURS PRN
Status: DISCONTINUED | OUTPATIENT
Start: 2021-08-30 | End: 2021-09-02 | Stop reason: HOSPADM

## 2021-08-30 RX ORDER — CLONIDINE HYDROCHLORIDE 0.1 MG/1
0.1 TABLET ORAL 3 TIMES DAILY
Status: DISCONTINUED | OUTPATIENT
Start: 2021-08-31 | End: 2021-08-30

## 2021-08-30 RX ORDER — IBUPROFEN 200 MG
24 TABLET ORAL
Status: DISCONTINUED | OUTPATIENT
Start: 2021-08-30 | End: 2021-09-02 | Stop reason: HOSPADM

## 2021-08-30 RX ADMIN — MYCOPHENOLATE MOFETIL 500 MG: 250 CAPSULE ORAL at 10:08

## 2021-08-30 RX ADMIN — Medication 500 MG: at 11:08

## 2021-08-30 RX ADMIN — DEXAMETHASONE SODIUM PHOSPHATE 6 MG: 4 INJECTION INTRA-ARTICULAR; INTRALESIONAL; INTRAMUSCULAR; INTRAVENOUS; SOFT TISSUE at 09:08

## 2021-08-30 RX ADMIN — TACROLIMUS 2 MG: 1 CAPSULE ORAL at 10:08

## 2021-08-30 RX ADMIN — SODIUM CHLORIDE 1000 ML: 0.9 INJECTION, SOLUTION INTRAVENOUS at 07:08

## 2021-08-30 RX ADMIN — ACETAMINOPHEN 650 MG: 325 TABLET ORAL at 07:08

## 2021-08-30 RX ADMIN — ENOXAPARIN SODIUM 90 MG: 100 INJECTION SUBCUTANEOUS at 11:08

## 2021-08-31 PROBLEM — R55 SYNCOPE: Status: RESOLVED | Noted: 2020-01-22 | Resolved: 2021-08-31

## 2021-08-31 PROBLEM — N18.30 CKD (CHRONIC KIDNEY DISEASE) STAGE 3, GFR 30-59 ML/MIN: Chronic | Status: ACTIVE | Noted: 2019-06-18

## 2021-08-31 LAB
25(OH)D3+25(OH)D2 SERPL-MCNC: 87 NG/ML (ref 30–96)
ALBUMIN SERPL BCP-MCNC: 3.2 G/DL (ref 3.5–5.2)
ALP SERPL-CCNC: 59 U/L (ref 55–135)
ALT SERPL W/O P-5'-P-CCNC: 19 U/L (ref 10–44)
ANION GAP SERPL CALC-SCNC: 13 MMOL/L (ref 8–16)
AST SERPL-CCNC: 34 U/L (ref 10–40)
BACTERIA #/AREA URNS AUTO: NORMAL /HPF
BASOPHILS # BLD AUTO: 0.02 K/UL (ref 0–0.2)
BASOPHILS NFR BLD: 0.6 % (ref 0–1.9)
BILIRUB SERPL-MCNC: 0.4 MG/DL (ref 0.1–1)
BILIRUB UR QL STRIP: NEGATIVE
BUN SERPL-MCNC: 17 MG/DL (ref 8–23)
CALCIUM SERPL-MCNC: 9.6 MG/DL (ref 8.7–10.5)
CHLORIDE SERPL-SCNC: 104 MMOL/L (ref 95–110)
CLARITY UR REFRACT.AUTO: ABNORMAL
CO2 SERPL-SCNC: 21 MMOL/L (ref 23–29)
COLOR UR AUTO: YELLOW
CREAT SERPL-MCNC: 1.3 MG/DL (ref 0.5–1.4)
DIFFERENTIAL METHOD: ABNORMAL
EOSINOPHIL # BLD AUTO: 0 K/UL (ref 0–0.5)
EOSINOPHIL NFR BLD: 0 % (ref 0–8)
ERYTHROCYTE [DISTWIDTH] IN BLOOD BY AUTOMATED COUNT: 14.3 % (ref 11.5–14.5)
EST. GFR  (AFRICAN AMERICAN): 47.7 ML/MIN/1.73 M^2
EST. GFR  (NON AFRICAN AMERICAN): 41.4 ML/MIN/1.73 M^2
ESTIMATED AVG GLUCOSE: 160 MG/DL (ref 68–131)
GLUCOSE SERPL-MCNC: 161 MG/DL (ref 70–110)
GLUCOSE UR QL STRIP: ABNORMAL
HBA1C MFR BLD: 7.2 % (ref 4–5.6)
HCT VFR BLD AUTO: 42.5 % (ref 37–48.5)
HGB BLD-MCNC: 13.8 G/DL (ref 12–16)
HGB UR QL STRIP: NEGATIVE
HYALINE CASTS UR QL AUTO: 0 /LPF
IMM GRANULOCYTES # BLD AUTO: 0.01 K/UL (ref 0–0.04)
IMM GRANULOCYTES NFR BLD AUTO: 0.3 % (ref 0–0.5)
KETONES UR QL STRIP: ABNORMAL
LEUKOCYTE ESTERASE UR QL STRIP: NEGATIVE
LYMPHOCYTES # BLD AUTO: 0.8 K/UL (ref 1–4.8)
LYMPHOCYTES NFR BLD: 21.8 % (ref 18–48)
MAGNESIUM SERPL-MCNC: 1.9 MG/DL (ref 1.6–2.6)
MCH RBC QN AUTO: 27.2 PG (ref 27–31)
MCHC RBC AUTO-ENTMCNC: 32.5 G/DL (ref 32–36)
MCV RBC AUTO: 84 FL (ref 82–98)
MICROSCOPIC COMMENT: NORMAL
MONOCYTES # BLD AUTO: 0.1 K/UL (ref 0.3–1)
MONOCYTES NFR BLD: 3.1 % (ref 4–15)
NEUTROPHILS # BLD AUTO: 2.7 K/UL (ref 1.8–7.7)
NEUTROPHILS NFR BLD: 74.2 % (ref 38–73)
NITRITE UR QL STRIP: NEGATIVE
NRBC BLD-RTO: 0 /100 WBC
PH UR STRIP: 5 [PH] (ref 5–8)
PHOSPHATE SERPL-MCNC: 3.8 MG/DL (ref 2.7–4.5)
PLATELET # BLD AUTO: 204 K/UL (ref 150–450)
PMV BLD AUTO: 10.8 FL (ref 9.2–12.9)
POTASSIUM SERPL-SCNC: 4.2 MMOL/L (ref 3.5–5.1)
PROT SERPL-MCNC: 6.9 G/DL (ref 6–8.4)
PROT UR QL STRIP: ABNORMAL
RBC # BLD AUTO: 5.07 M/UL (ref 4–5.4)
RBC #/AREA URNS AUTO: 1 /HPF (ref 0–4)
SODIUM SERPL-SCNC: 138 MMOL/L (ref 136–145)
SP GR UR STRIP: 1.01 (ref 1–1.03)
SQUAMOUS #/AREA URNS AUTO: 0 /HPF
TACROLIMUS BLD-MCNC: 13.9 NG/ML (ref 5–15)
TACROLIMUS BLD-MCNC: 3.4 NG/ML (ref 5–15)
TACROLIMUS, NORMALIZED: 13.1 NG/ML (ref 5–15)
TACROLIMUS, NORMALIZED: 3 NG/ML (ref 5–15)
TROPONIN I SERPL DL<=0.01 NG/ML-MCNC: 0.1 NG/ML (ref 0–0.03)
URN SPEC COLLECT METH UR: ABNORMAL
WBC # BLD AUTO: 3.57 K/UL (ref 3.9–12.7)
WBC #/AREA URNS AUTO: 1 /HPF (ref 0–5)

## 2021-08-31 PROCEDURE — 96372 THER/PROPH/DIAG INJ SC/IM: CPT

## 2021-08-31 PROCEDURE — 36415 COLL VENOUS BLD VENIPUNCTURE: CPT | Performed by: HOSPITALIST

## 2021-08-31 PROCEDURE — G0378 HOSPITAL OBSERVATION PER HR: HCPCS

## 2021-08-31 PROCEDURE — 80053 COMPREHEN METABOLIC PANEL: CPT | Performed by: HOSPITALIST

## 2021-08-31 PROCEDURE — 25000003 PHARM REV CODE 250: Performed by: HOSPITALIST

## 2021-08-31 PROCEDURE — 83036 HEMOGLOBIN GLYCOSYLATED A1C: CPT | Performed by: HOSPITALIST

## 2021-08-31 PROCEDURE — 96376 TX/PRO/DX INJ SAME DRUG ADON: CPT

## 2021-08-31 PROCEDURE — 84484 ASSAY OF TROPONIN QUANT: CPT | Performed by: HOSPITALIST

## 2021-08-31 PROCEDURE — 83735 ASSAY OF MAGNESIUM: CPT | Performed by: HOSPITALIST

## 2021-08-31 PROCEDURE — 80197 ASSAY OF TACROLIMUS: CPT | Performed by: HOSPITALIST

## 2021-08-31 PROCEDURE — 63600175 PHARM REV CODE 636 W HCPCS: Performed by: HOSPITALIST

## 2021-08-31 PROCEDURE — 99226 PR SUBSEQUENT OBSERVATION CARE,LEVEL III: ICD-10-PCS | Mod: ,,, | Performed by: HOSPITALIST

## 2021-08-31 PROCEDURE — 85025 COMPLETE CBC W/AUTO DIFF WBC: CPT | Performed by: HOSPITALIST

## 2021-08-31 PROCEDURE — 84100 ASSAY OF PHOSPHORUS: CPT | Performed by: HOSPITALIST

## 2021-08-31 PROCEDURE — 99226 PR SUBSEQUENT OBSERVATION CARE,LEVEL III: CPT | Mod: ,,, | Performed by: HOSPITALIST

## 2021-08-31 PROCEDURE — 82306 VITAMIN D 25 HYDROXY: CPT | Performed by: HOSPITALIST

## 2021-08-31 PROCEDURE — 81001 URINALYSIS AUTO W/SCOPE: CPT | Performed by: HOSPITALIST

## 2021-08-31 RX ORDER — TALC
6 POWDER (GRAM) TOPICAL NIGHTLY
Status: DISCONTINUED | OUTPATIENT
Start: 2021-08-31 | End: 2021-09-02 | Stop reason: HOSPADM

## 2021-08-31 RX ORDER — TALC
6 POWDER (GRAM) TOPICAL NIGHTLY PRN
Status: DISCONTINUED | OUTPATIENT
Start: 2021-08-31 | End: 2021-08-31

## 2021-08-31 RX ORDER — AMLODIPINE BESYLATE 10 MG/1
10 TABLET ORAL DAILY
Status: DISCONTINUED | OUTPATIENT
Start: 2021-08-31 | End: 2021-09-02 | Stop reason: HOSPADM

## 2021-08-31 RX ORDER — SODIUM CHLORIDE 0.9 % (FLUSH) 0.9 %
10 SYRINGE (ML) INJECTION
Status: DISCONTINUED | OUTPATIENT
Start: 2021-08-31 | End: 2021-09-02 | Stop reason: HOSPADM

## 2021-08-31 RX ORDER — AMLODIPINE BESYLATE 10 MG/1
10 TABLET ORAL DAILY
Status: DISCONTINUED | OUTPATIENT
Start: 2021-09-01 | End: 2021-08-31

## 2021-08-31 RX ADMIN — AMLODIPINE BESYLATE 10 MG: 10 TABLET ORAL at 05:08

## 2021-08-31 RX ADMIN — Medication 500 MG: at 09:08

## 2021-08-31 RX ADMIN — DULOXETINE 30 MG: 30 CAPSULE, DELAYED RELEASE ORAL at 09:08

## 2021-08-31 RX ADMIN — TACROLIMUS 2 MG: 1 CAPSULE ORAL at 06:08

## 2021-08-31 RX ADMIN — SODIUM BICARBONATE 650 MG TABLET 650 MG: at 08:08

## 2021-08-31 RX ADMIN — TACROLIMUS 2 MG: 1 CAPSULE ORAL at 09:08

## 2021-08-31 RX ADMIN — DEXAMETHASONE 6 MG: 4 TABLET ORAL at 08:08

## 2021-08-31 RX ADMIN — REMDESIVIR 200 MG: 100 INJECTION, POWDER, LYOPHILIZED, FOR SOLUTION INTRAVENOUS at 01:08

## 2021-08-31 RX ADMIN — ENOXAPARIN SODIUM 90 MG: 100 INJECTION SUBCUTANEOUS at 08:08

## 2021-08-31 RX ADMIN — THERA TABS 1 TABLET: TAB at 09:08

## 2021-08-31 RX ADMIN — MELATONIN TAB 3 MG 6 MG: 3 TAB at 08:08

## 2021-08-31 RX ADMIN — SODIUM BICARBONATE 650 MG TABLET 650 MG: at 09:08

## 2021-08-31 RX ADMIN — ENOXAPARIN SODIUM 90 MG: 100 INJECTION SUBCUTANEOUS at 09:08

## 2021-08-31 RX ADMIN — Medication 500 MG: at 08:08

## 2021-09-01 LAB
ALBUMIN SERPL BCP-MCNC: 3 G/DL (ref 3.5–5.2)
ALP SERPL-CCNC: 59 U/L (ref 55–135)
ALT SERPL W/O P-5'-P-CCNC: 19 U/L (ref 10–44)
ANION GAP SERPL CALC-SCNC: 13 MMOL/L (ref 8–16)
ANISOCYTOSIS BLD QL SMEAR: SLIGHT
AST SERPL-CCNC: 33 U/L (ref 10–40)
BASOPHILS # BLD AUTO: 0.02 K/UL (ref 0–0.2)
BASOPHILS NFR BLD: 0.4 % (ref 0–1.9)
BILIRUB SERPL-MCNC: 0.3 MG/DL (ref 0.1–1)
BUN SERPL-MCNC: 17 MG/DL (ref 8–23)
CALCIUM SERPL-MCNC: 9.9 MG/DL (ref 8.7–10.5)
CHLORIDE SERPL-SCNC: 105 MMOL/L (ref 95–110)
CO2 SERPL-SCNC: 19 MMOL/L (ref 23–29)
CREAT SERPL-MCNC: 0.9 MG/DL (ref 0.5–1.4)
CRP SERPL-MCNC: 51.7 MG/L (ref 0–8.2)
DIFFERENTIAL METHOD: ABNORMAL
EOSINOPHIL # BLD AUTO: 0 K/UL (ref 0–0.5)
EOSINOPHIL NFR BLD: 0 % (ref 0–8)
ERYTHROCYTE [DISTWIDTH] IN BLOOD BY AUTOMATED COUNT: 14.1 % (ref 11.5–14.5)
EST. GFR  (AFRICAN AMERICAN): >60 ML/MIN/1.73 M^2
EST. GFR  (NON AFRICAN AMERICAN): >60 ML/MIN/1.73 M^2
FERRITIN SERPL-MCNC: 634 NG/ML (ref 20–300)
GLUCOSE SERPL-MCNC: 166 MG/DL (ref 70–110)
HCT VFR BLD AUTO: 43.8 % (ref 37–48.5)
HGB BLD-MCNC: 14.4 G/DL (ref 12–16)
HYPOCHROMIA BLD QL SMEAR: ABNORMAL
IMM GRANULOCYTES # BLD AUTO: 0.04 K/UL (ref 0–0.04)
IMM GRANULOCYTES NFR BLD AUTO: 0.7 % (ref 0–0.5)
LYMPHOCYTES # BLD AUTO: 1 K/UL (ref 1–4.8)
LYMPHOCYTES NFR BLD: 17 % (ref 18–48)
MCH RBC QN AUTO: 27.4 PG (ref 27–31)
MCHC RBC AUTO-ENTMCNC: 32.9 G/DL (ref 32–36)
MCV RBC AUTO: 83 FL (ref 82–98)
MONOCYTES # BLD AUTO: 0.4 K/UL (ref 0.3–1)
MONOCYTES NFR BLD: 6.3 % (ref 4–15)
NEUTROPHILS # BLD AUTO: 4.3 K/UL (ref 1.8–7.7)
NEUTROPHILS NFR BLD: 75.6 % (ref 38–73)
NRBC BLD-RTO: 0 /100 WBC
OVALOCYTES BLD QL SMEAR: ABNORMAL
PLATELET # BLD AUTO: 263 K/UL (ref 150–450)
PMV BLD AUTO: 10.8 FL (ref 9.2–12.9)
POIKILOCYTOSIS BLD QL SMEAR: SLIGHT
POLYCHROMASIA BLD QL SMEAR: ABNORMAL
POTASSIUM SERPL-SCNC: 4.3 MMOL/L (ref 3.5–5.1)
PROT SERPL-MCNC: 6.8 G/DL (ref 6–8.4)
RBC # BLD AUTO: 5.26 M/UL (ref 4–5.4)
SCHISTOCYTES BLD QL SMEAR: ABNORMAL
SODIUM SERPL-SCNC: 137 MMOL/L (ref 136–145)
TACROLIMUS BLD-MCNC: 12.7 NG/ML (ref 5–15)
TACROLIMUS, NORMALIZED: 12 NG/ML (ref 5–15)
WBC # BLD AUTO: 5.7 K/UL (ref 3.9–12.7)

## 2021-09-01 PROCEDURE — 99232 PR SUBSEQUENT HOSPITAL CARE,LEVL II: ICD-10-PCS | Mod: ,,, | Performed by: HOSPITALIST

## 2021-09-01 PROCEDURE — 25000003 PHARM REV CODE 250: Performed by: HOSPITALIST

## 2021-09-01 PROCEDURE — 96372 THER/PROPH/DIAG INJ SC/IM: CPT | Mod: 59

## 2021-09-01 PROCEDURE — 82728 ASSAY OF FERRITIN: CPT | Performed by: HOSPITALIST

## 2021-09-01 PROCEDURE — 63600175 PHARM REV CODE 636 W HCPCS: Performed by: HOSPITALIST

## 2021-09-01 PROCEDURE — 85025 COMPLETE CBC W/AUTO DIFF WBC: CPT | Performed by: HOSPITALIST

## 2021-09-01 PROCEDURE — 99232 SBSQ HOSP IP/OBS MODERATE 35: CPT | Mod: ,,, | Performed by: HOSPITALIST

## 2021-09-01 PROCEDURE — 36415 COLL VENOUS BLD VENIPUNCTURE: CPT | Performed by: HOSPITALIST

## 2021-09-01 PROCEDURE — 86140 C-REACTIVE PROTEIN: CPT | Performed by: HOSPITALIST

## 2021-09-01 PROCEDURE — 80053 COMPREHEN METABOLIC PANEL: CPT | Performed by: HOSPITALIST

## 2021-09-01 PROCEDURE — G0378 HOSPITAL OBSERVATION PER HR: HCPCS

## 2021-09-01 PROCEDURE — 80197 ASSAY OF TACROLIMUS: CPT | Performed by: HOSPITALIST

## 2021-09-01 RX ORDER — TACROLIMUS 1 MG/1
1 CAPSULE ORAL 2 TIMES DAILY
Status: DISCONTINUED | OUTPATIENT
Start: 2021-09-01 | End: 2021-09-02 | Stop reason: HOSPADM

## 2021-09-01 RX ADMIN — SODIUM BICARBONATE 650 MG TABLET 650 MG: at 07:09

## 2021-09-01 RX ADMIN — ENOXAPARIN SODIUM 90 MG: 100 INJECTION SUBCUTANEOUS at 08:09

## 2021-09-01 RX ADMIN — REMDESIVIR 100 MG: 100 INJECTION, POWDER, LYOPHILIZED, FOR SOLUTION INTRAVENOUS at 08:09

## 2021-09-01 RX ADMIN — MELATONIN TAB 3 MG 6 MG: 3 TAB at 08:09

## 2021-09-01 RX ADMIN — DULOXETINE 30 MG: 30 CAPSULE, DELAYED RELEASE ORAL at 08:09

## 2021-09-01 RX ADMIN — AMLODIPINE BESYLATE 10 MG: 10 TABLET ORAL at 07:09

## 2021-09-01 RX ADMIN — DEXAMETHASONE 6 MG: 4 TABLET ORAL at 08:09

## 2021-09-01 RX ADMIN — Medication 500 MG: at 07:09

## 2021-09-01 RX ADMIN — THERA TABS 1 TABLET: TAB at 07:09

## 2021-09-01 RX ADMIN — SODIUM BICARBONATE 650 MG TABLET 650 MG: at 08:09

## 2021-09-01 RX ADMIN — Medication 500 MG: at 08:09

## 2021-09-01 RX ADMIN — TACROLIMUS 2 MG: 1 CAPSULE ORAL at 07:09

## 2021-09-02 VITALS
SYSTOLIC BLOOD PRESSURE: 132 MMHG | BODY MASS INDEX: 35.44 KG/M2 | HEART RATE: 103 BPM | TEMPERATURE: 98 F | HEIGHT: 63 IN | RESPIRATION RATE: 18 BRPM | OXYGEN SATURATION: 96 % | DIASTOLIC BLOOD PRESSURE: 84 MMHG | WEIGHT: 200 LBS

## 2021-09-02 PROBLEM — R50.9 FEVER: Status: ACTIVE | Noted: 2021-09-02

## 2021-09-02 LAB
ALBUMIN SERPL BCP-MCNC: 3.3 G/DL (ref 3.5–5.2)
ALP SERPL-CCNC: 60 U/L (ref 55–135)
ALT SERPL W/O P-5'-P-CCNC: 19 U/L (ref 10–44)
ANION GAP SERPL CALC-SCNC: 13 MMOL/L (ref 8–16)
ANISOCYTOSIS BLD QL SMEAR: SLIGHT
AST SERPL-CCNC: 35 U/L (ref 10–40)
BASOPHILS # BLD AUTO: 0.03 K/UL (ref 0–0.2)
BASOPHILS NFR BLD: 0.3 % (ref 0–1.9)
BILIRUB SERPL-MCNC: 0.4 MG/DL (ref 0.1–1)
BUN SERPL-MCNC: 18 MG/DL (ref 8–23)
CALCIUM SERPL-MCNC: 9.8 MG/DL (ref 8.7–10.5)
CHLORIDE SERPL-SCNC: 105 MMOL/L (ref 95–110)
CO2 SERPL-SCNC: 20 MMOL/L (ref 23–29)
CREAT SERPL-MCNC: 1 MG/DL (ref 0.5–1.4)
DIFFERENTIAL METHOD: ABNORMAL
EOSINOPHIL # BLD AUTO: 0 K/UL (ref 0–0.5)
EOSINOPHIL NFR BLD: 0 % (ref 0–8)
ERYTHROCYTE [DISTWIDTH] IN BLOOD BY AUTOMATED COUNT: 14.2 % (ref 11.5–14.5)
EST. GFR  (AFRICAN AMERICAN): >60 ML/MIN/1.73 M^2
EST. GFR  (NON AFRICAN AMERICAN): 56.8 ML/MIN/1.73 M^2
GIANT PLATELETS BLD QL SMEAR: PRESENT
GLUCOSE SERPL-MCNC: 170 MG/DL (ref 70–110)
HCT VFR BLD AUTO: 44.8 % (ref 37–48.5)
HGB BLD-MCNC: 14.8 G/DL (ref 12–16)
HYPOCHROMIA BLD QL SMEAR: ABNORMAL
IMM GRANULOCYTES # BLD AUTO: 0.05 K/UL (ref 0–0.04)
IMM GRANULOCYTES NFR BLD AUTO: 0.5 % (ref 0–0.5)
LYMPHOCYTES # BLD AUTO: 1.1 K/UL (ref 1–4.8)
LYMPHOCYTES NFR BLD: 11.5 % (ref 18–48)
MCH RBC QN AUTO: 28 PG (ref 27–31)
MCHC RBC AUTO-ENTMCNC: 33 G/DL (ref 32–36)
MCV RBC AUTO: 85 FL (ref 82–98)
MONOCYTES # BLD AUTO: 0.5 K/UL (ref 0.3–1)
MONOCYTES NFR BLD: 5.6 % (ref 4–15)
NEUTROPHILS # BLD AUTO: 7.9 K/UL (ref 1.8–7.7)
NEUTROPHILS NFR BLD: 82.1 % (ref 38–73)
NRBC BLD-RTO: 0 /100 WBC
PLATELET # BLD AUTO: 282 K/UL (ref 150–450)
PLATELET BLD QL SMEAR: ABNORMAL
PMV BLD AUTO: 10.5 FL (ref 9.2–12.9)
POTASSIUM SERPL-SCNC: 4.9 MMOL/L (ref 3.5–5.1)
PROT SERPL-MCNC: 7.3 G/DL (ref 6–8.4)
RBC # BLD AUTO: 5.29 M/UL (ref 4–5.4)
SODIUM SERPL-SCNC: 138 MMOL/L (ref 136–145)
TACROLIMUS BLD-MCNC: 8.7 NG/ML (ref 5–15)
TACROLIMUS, NORMALIZED: 8.1 NG/ML (ref 5–15)
WBC # BLD AUTO: 9.64 K/UL (ref 3.9–12.7)

## 2021-09-02 PROCEDURE — 27000207 HC ISOLATION

## 2021-09-02 PROCEDURE — 96365 THER/PROPH/DIAG IV INF INIT: CPT

## 2021-09-02 PROCEDURE — 36415 COLL VENOUS BLD VENIPUNCTURE: CPT | Performed by: HOSPITALIST

## 2021-09-02 PROCEDURE — 85025 COMPLETE CBC W/AUTO DIFF WBC: CPT | Performed by: HOSPITALIST

## 2021-09-02 PROCEDURE — 99239 PR HOSPITAL DISCHARGE DAY,>30 MIN: ICD-10-PCS | Mod: ,,, | Performed by: HOSPITALIST

## 2021-09-02 PROCEDURE — 20600001 HC STEP DOWN PRIVATE ROOM

## 2021-09-02 PROCEDURE — 80197 ASSAY OF TACROLIMUS: CPT | Performed by: HOSPITALIST

## 2021-09-02 PROCEDURE — 99239 HOSP IP/OBS DSCHRG MGMT >30: CPT | Mod: ,,, | Performed by: HOSPITALIST

## 2021-09-02 PROCEDURE — 25000003 PHARM REV CODE 250: Performed by: HOSPITALIST

## 2021-09-02 PROCEDURE — 96372 THER/PROPH/DIAG INJ SC/IM: CPT

## 2021-09-02 PROCEDURE — 63600175 PHARM REV CODE 636 W HCPCS: Performed by: HOSPITALIST

## 2021-09-02 PROCEDURE — 80053 COMPREHEN METABOLIC PANEL: CPT | Performed by: HOSPITALIST

## 2021-09-02 PROCEDURE — 63600175 PHARM REV CODE 636 W HCPCS: Performed by: INTERNAL MEDICINE

## 2021-09-02 RX ORDER — DULOXETIN HYDROCHLORIDE 30 MG/1
30 CAPSULE, DELAYED RELEASE ORAL DAILY
Qty: 30 CAPSULE | Refills: 11 | Status: SHIPPED | OUTPATIENT
Start: 2021-09-03 | End: 2023-04-21

## 2021-09-02 RX ORDER — BENZONATATE 100 MG/1
100 CAPSULE ORAL 3 TIMES DAILY PRN
Qty: 30 CAPSULE | Refills: 0 | Status: SHIPPED | OUTPATIENT
Start: 2021-09-02 | End: 2021-09-12

## 2021-09-02 RX ORDER — ASCORBIC ACID 500 MG
500 TABLET ORAL 2 TIMES DAILY
Qty: 30 TABLET | Refills: 0 | Status: SHIPPED | OUTPATIENT
Start: 2021-09-02

## 2021-09-02 RX ORDER — ALBUTEROL SULFATE 90 UG/1
2 AEROSOL, METERED RESPIRATORY (INHALATION) EVERY 6 HOURS PRN
Qty: 8.5 G | Refills: 0 | Status: SHIPPED | OUTPATIENT
Start: 2021-09-02

## 2021-09-02 RX ORDER — TACROLIMUS 1 MG/1
1 CAPSULE ORAL EVERY 12 HOURS
Qty: 60 CAPSULE | Refills: 0 | Status: SHIPPED | OUTPATIENT
Start: 2021-09-02 | End: 2022-04-04

## 2021-09-02 RX ORDER — ACETAMINOPHEN 325 MG/1
650 TABLET ORAL EVERY 4 HOURS PRN
Qty: 30 TABLET | Refills: 0 | Status: SHIPPED | OUTPATIENT
Start: 2021-09-02

## 2021-09-02 RX ADMIN — DULOXETINE 30 MG: 30 CAPSULE, DELAYED RELEASE ORAL at 08:09

## 2021-09-02 RX ADMIN — TACROLIMUS 1 MG: 1 CAPSULE ORAL at 08:09

## 2021-09-02 RX ADMIN — ENOXAPARIN SODIUM 90 MG: 100 INJECTION SUBCUTANEOUS at 08:09

## 2021-09-02 RX ADMIN — Medication 500 MG: at 08:09

## 2021-09-02 RX ADMIN — AMLODIPINE BESYLATE 10 MG: 10 TABLET ORAL at 08:09

## 2021-09-02 RX ADMIN — THERA TABS 1 TABLET: TAB at 08:09

## 2021-09-02 RX ADMIN — SODIUM BICARBONATE 650 MG TABLET 650 MG: at 08:09

## 2021-09-05 LAB
BACTERIA BLD CULT: NORMAL
BACTERIA BLD CULT: NORMAL

## 2021-09-23 DIAGNOSIS — Z94.0 KIDNEY REPLACED BY TRANSPLANT: Primary | ICD-10-CM

## 2022-02-06 NOTE — TELEPHONE ENCOUNTER
Renal Transplant Attending Recipient Chart Review Note:    67 y.o. female with history of ESRD presumed secondary to HTN who has been on HD since 1/12/18; last seen in initial RR clinic on 6/20/18. She was approved pending in selection committee on 9/28/18 for urology clearance for hyperechoic kidney lesion and repeat anti-coagulation studies. We reviewed this patient on 12/12/18.     She was seen and cleared by urology on 10/17/18.     She had repeat anti-coagulation studies and referred to hem-onc who saw the patient on 12/5/18. 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 Hem-onc cleared patient and felt that she did not have an increased risk of kidney allograft thrombosis.     NM stress negative. TTE with LVEF 60-65% and PA pressure of 15 mmHg.     Labs (11/27/18) --> Hb 12.1; WBC 7.4, platelet count 311; albumin 3.9; potassium 7.0    Repeat potassium 5.1 (12/6/18)    Need to obtain PTH.     Razia Flores MD  Renal Transplant Attending      ----- Message from Vitaly Juarez RN sent at 12/12/2018  3:12 PM CST -----  Per review on 12/12/18:    67 year old AA female with ESRD presumed 2/2 HTN    1.  Started HD 1/2018  2.  Saw Hem/Onc on 12/5/18 for +coag studies and cleared  3.  Saw Urology due to hyperechoic lesion and cleared  4.  11/27/18 labs were ok.  Needs PTH      
Yes

## 2022-02-22 ENCOUNTER — PATIENT MESSAGE (OUTPATIENT)
Dept: RESEARCH | Facility: HOSPITAL | Age: 72
End: 2022-02-22
Payer: MEDICARE

## 2022-03-10 DIAGNOSIS — Z94.0 KIDNEY REPLACED BY TRANSPLANT: Primary | ICD-10-CM

## 2022-03-29 DIAGNOSIS — Z94.0 KIDNEY REPLACED BY TRANSPLANT: Primary | ICD-10-CM

## 2022-03-31 ENCOUNTER — PATIENT MESSAGE (OUTPATIENT)
Dept: TRANSPLANT | Facility: CLINIC | Age: 72
End: 2022-03-31
Payer: MEDICARE

## 2022-03-31 ENCOUNTER — LAB VISIT (OUTPATIENT)
Dept: LAB | Facility: HOSPITAL | Age: 72
End: 2022-03-31
Payer: MEDICARE

## 2022-03-31 DIAGNOSIS — Z94.0 KIDNEY REPLACED BY TRANSPLANT: ICD-10-CM

## 2022-03-31 LAB
ALBUMIN SERPL BCP-MCNC: 3.7 G/DL (ref 3.5–5.2)
ANION GAP SERPL CALC-SCNC: 9 MMOL/L (ref 8–16)
BASOPHILS # BLD AUTO: 0.06 K/UL (ref 0–0.2)
BASOPHILS NFR BLD: 0.9 % (ref 0–1.9)
BUN SERPL-MCNC: 16 MG/DL (ref 8–23)
CALCIUM SERPL-MCNC: 10.3 MG/DL (ref 8.7–10.5)
CHLORIDE SERPL-SCNC: 105 MMOL/L (ref 95–110)
CO2 SERPL-SCNC: 28 MMOL/L (ref 23–29)
CREAT SERPL-MCNC: 1.1 MG/DL (ref 0.5–1.4)
DIFFERENTIAL METHOD: ABNORMAL
EOSINOPHIL # BLD AUTO: 0.1 K/UL (ref 0–0.5)
EOSINOPHIL NFR BLD: 1.8 % (ref 0–8)
ERYTHROCYTE [DISTWIDTH] IN BLOOD BY AUTOMATED COUNT: 16 % (ref 11.5–14.5)
EST. GFR  (AFRICAN AMERICAN): 58 ML/MIN/1.73 M^2
EST. GFR  (NON AFRICAN AMERICAN): 50.3 ML/MIN/1.73 M^2
GLUCOSE SERPL-MCNC: 111 MG/DL (ref 70–110)
HCT VFR BLD AUTO: 46.6 % (ref 37–48.5)
HGB BLD-MCNC: 14.8 G/DL (ref 12–16)
IMM GRANULOCYTES # BLD AUTO: 0.03 K/UL (ref 0–0.04)
IMM GRANULOCYTES NFR BLD AUTO: 0.4 % (ref 0–0.5)
LYMPHOCYTES # BLD AUTO: 2.1 K/UL (ref 1–4.8)
LYMPHOCYTES NFR BLD: 31 % (ref 18–48)
MAGNESIUM SERPL-MCNC: 1.8 MG/DL (ref 1.6–2.6)
MCH RBC QN AUTO: 27.6 PG (ref 27–31)
MCHC RBC AUTO-ENTMCNC: 31.8 G/DL (ref 32–36)
MCV RBC AUTO: 87 FL (ref 82–98)
MONOCYTES # BLD AUTO: 0.6 K/UL (ref 0.3–1)
MONOCYTES NFR BLD: 8.4 % (ref 4–15)
NEUTROPHILS # BLD AUTO: 3.9 K/UL (ref 1.8–7.7)
NEUTROPHILS NFR BLD: 57.5 % (ref 38–73)
NRBC BLD-RTO: 0 /100 WBC
PHOSPHATE SERPL-MCNC: 2.9 MG/DL (ref 2.7–4.5)
PLATELET # BLD AUTO: 300 K/UL (ref 150–450)
PMV BLD AUTO: 10.4 FL (ref 9.2–12.9)
POTASSIUM SERPL-SCNC: 4 MMOL/L (ref 3.5–5.1)
RBC # BLD AUTO: 5.37 M/UL (ref 4–5.4)
SODIUM SERPL-SCNC: 142 MMOL/L (ref 136–145)
TACROLIMUS BLD-MCNC: 3 NG/ML (ref 5–15)
WBC # BLD AUTO: 6.8 K/UL (ref 3.9–12.7)

## 2022-03-31 PROCEDURE — 87799 DETECT AGENT NOS DNA QUANT: CPT | Performed by: INTERNAL MEDICINE

## 2022-03-31 PROCEDURE — 80197 ASSAY OF TACROLIMUS: CPT | Performed by: INTERNAL MEDICINE

## 2022-03-31 PROCEDURE — 80069 RENAL FUNCTION PANEL: CPT | Performed by: INTERNAL MEDICINE

## 2022-03-31 PROCEDURE — 36415 COLL VENOUS BLD VENIPUNCTURE: CPT | Performed by: INTERNAL MEDICINE

## 2022-03-31 PROCEDURE — 85025 COMPLETE CBC W/AUTO DIFF WBC: CPT | Performed by: INTERNAL MEDICINE

## 2022-03-31 PROCEDURE — 83735 ASSAY OF MAGNESIUM: CPT | Performed by: INTERNAL MEDICINE

## 2022-03-31 NOTE — PROGRESS NOTES
Kidney Post-Transplant Assessment    Referring Physician: Jd Carias  Current Nephrologist: Jd Carias    ORGAN: LEFT KIDNEY  Donor Type: living  PHS Increased Risk: no  Cold Ischemia: 189 mins  Induction Medications: campath - alemtuzumab (anti-cd52)    Subjective:     CC:  Reassessment of renal allograft function and management of chronic immunosuppression.    HPI:  Ms. Summers is a 71 y.o. year old Black or  female with history of ESRD secondary to HTN who received a living kidney transplant on 4/8/19.  She has CKD stage 3 - GFR 30-59 and her baseline creatinine is between 0.9-1.2. She takes mycophenolate mofetil and tacrolimus for maintenance immunosuppression.     Hospitalized 10/2021 for progressive SOB which improved following diuresis with lasix. Discharged on PRN lasix, has been taking it for weight gain of 3+ lbs in one day or SOB.     Following with general nephrology Dr. Carias. COVID vaccinated.    Overall feels well. No health concerns today. Uses a rolling walker due to knee arthralgias. Staying very busy raising her 11 year old great grand daughter. Reports good appetite, weight stable. Drinking 3-4 bottles of water daily, no problems with urination. BP at goal. Tolerating IS without issue.    Current Outpatient Medications   Medication Sig Dispense Refill    acetaminophen (TYLENOL) 325 MG tablet Take 2 tablets (650 mg total) by mouth every 4 (four) hours as needed. 30 tablet 0    albuterol (PROVENTIL/VENTOLIN HFA) 90 mcg/actuation inhaler Inhale 2 puffs into the lungs every 6 (six) hours as needed for Wheezing or Shortness of Breath. Rescue 8.5 g 0    alendronate (FOSAMAX) 70 MG tablet Take 70 mg by mouth every 7 days.      amLODIPine (NORVASC) 10 MG tablet Take 1 tablet (10 mg total) by mouth once daily. 30 tablet 11    apixaban (ELIQUIS) 2.5 mg Tab Take by mouth 2 (two) times daily.      ascorbic acid, vitamin C, (VITAMIN C) 500 MG tablet Take 1 tablet (500 mg total)  by mouth 2 (two) times daily. 30 tablet 0    benzonatate (TESSALON) 100 MG capsule Take 100 mg by mouth 3 (three) times daily as needed for Cough.      calcitRIOL (ROCALTROL) 0.25 MCG Cap 0.25 mcg. On MWF      carvedilol (COREG) 12.5 MG tablet Take 2 tablets (25 mg total) by mouth 2 (two) times daily with meals. 120 tablet 11    cinacalcet (SENSIPAR) 30 MG Tab Take sensipar 30 mg Monday, Wednesday and Friday 30 tablet 6    cloNIDine (CATAPRES) 0.1 MG tablet Take 0.1 mg by mouth 3 (three) times daily.      cyanocobalamin (VITAMIN B-12) 500 MCG tablet Take 1 tablet (500 mcg total) by mouth once daily. 30 tablet 4    docusate sodium (COLACE) 100 MG capsule Take 1 capsule (100 mg total) by mouth 2 (two) times daily as needed for Constipation.  0    DULoxetine (CYMBALTA) 30 MG capsule Take 1 capsule (30 mg total) by mouth once daily. 30 capsule 11    furosemide (LASIX) 40 MG tablet Take 40 mg by mouth once daily. PRN      linaCLOtide (LINZESS) 145 mcg Cap capsule Take 145 mcg by mouth before breakfast.      magnesium oxide (MAG-OX) 400 mg (241.3 mg magnesium) tablet Take 400 mg by mouth 2 (two) times daily.      multivitamin Tab Take 1 tablet by mouth once daily. 30 tablet 0    mycophenolate (CELLCEPT) 250 mg Cap Take 500 mg by mouth 2 (two) times a day.      omega-3 fatty acids/fish oil (FISH OIL-OMEGA-3 FATTY ACIDS) 300-1,000 mg capsule Take 2 capsules by mouth once daily.      sodium bicarbonate 650 MG tablet Take 1 tablet (650 mg total) by mouth 2 (two) times daily. (Patient taking differently: Take 650 mg by mouth 2 (two) times daily. 3 tabs bid) 60 tablet 11    tacrolimus (PROGRAF) 1 MG Cap Take 1 capsule (1 mg total) by mouth every 12 (twelve) hours. 60 capsule 0    nystatin-triamcinolone (MYCOLOG) ointment APPLY TO RIGHT EAR D       No current facility-administered medications for this visit.       Past Medical History:   Diagnosis Date    Anemia     Anemia of renal disease     Anxiety      "Chronic renal failure 01/10/2018    CKD (chronic kidney disease) stage 3, GFR 30-59 ml/min 6/18/2019    Depression     Essential hypertension     GERD (gastroesophageal reflux disease)     Gout     H pylori ulcer     Hyperlipidemia     Hypertension     Obesity     Secondary hyperparathyroidism of renal origin        Review of Systems   Constitutional: Negative for activity change, appetite change and fever.   HENT: Negative for congestion, mouth sores and sore throat.    Eyes: Negative for visual disturbance.   Respiratory: Negative for cough, chest tightness and shortness of breath.    Cardiovascular: Negative for chest pain, palpitations and leg swelling.   Gastrointestinal: Negative for abdominal distention, constipation, diarrhea and nausea.   Genitourinary: Negative for difficulty urinating, frequency and hematuria.   Musculoskeletal: Positive for arthralgias. Negative for gait problem and myalgias.   Skin: Negative for wound.   Allergic/Immunologic: Positive for immunocompromised state. Negative for environmental allergies and food allergies.   Neurological: Negative for dizziness, weakness and numbness.   Psychiatric/Behavioral: Negative for sleep disturbance. The patient is not nervous/anxious.        Objective:   Blood pressure 138/73, pulse 72, temperature 97.2 °F (36.2 °C), temperature source Temporal, resp. rate 16, height 5' 3" (1.6 m), weight 92.6 kg (204 lb 2.3 oz), last menstrual period 08/08/1971, SpO2 (!) 94 %.body mass index is 36.16 kg/m².    Physical Exam  Vitals and nursing note reviewed.   Constitutional:       Appearance: Normal appearance.   HENT:      Head: Normocephalic.   Cardiovascular:      Rate and Rhythm: Normal rate and regular rhythm.      Heart sounds: Normal heart sounds.   Pulmonary:      Effort: Pulmonary effort is normal.      Breath sounds: Normal breath sounds.   Abdominal:      General: Bowel sounds are normal. There is no distension.      Palpations: Abdomen is " soft.      Tenderness: There is no abdominal tenderness.   Musculoskeletal:         General: Normal range of motion.      Right lower leg: Edema (1+) present.      Left lower leg: Edema (1+) present.   Skin:     General: Skin is warm and dry.   Neurological:      General: No focal deficit present.      Mental Status: She is alert.   Psychiatric:         Behavior: Behavior normal.         Labs:  Lab Results   Component Value Date    WBC 6.80 03/31/2022    HGB 14.8 03/31/2022    HCT 46.6 03/31/2022     03/31/2022    K 4.0 03/31/2022     03/31/2022    CO2 28 03/31/2022    BUN 16 03/31/2022    CREATININE 1.1 03/31/2022    EGFRNONAA 50.3 (A) 03/31/2022    CALCIUM 10.3 03/31/2022    PHOS 2.9 03/31/2022    MG 1.8 03/31/2022    ALBUMIN 3.7 03/31/2022    AST 35 09/02/2021    ALT 19 09/02/2021       Labs were reviewed with the patient.    Assessment:     1. S/P living-donor kidney transplantation    2. Stage 3a chronic kidney disease    3. Long-term use of immunosuppressant medication    4. Essential hypertension    5. At risk for opportunistic infections        Plan:   Repeat prograf level in 2 weeks    Follow-up:   1. CKD stage: 3 stable      2. Immunosuppression: Prograf trough 3.0, which is  SUBtherapeutic (target 5-7). Continue Prograf 2/1 (dose increased 3/31/2022) and  mg BID. Will continue to monitor for drug toxicities    3. Allograft Function: Stable. Cr within baseline 0.9-1.2. Continue good po hydration.   Lab Results   Component Value Date    CREATININE 1.1 03/31/2022       3/31/2022  2yr 11mo   eGFR if African American >60 mL/min/1.73 m^2 58.0 (A)       4. Hypertension management: advise low salt diet and home BP monitoring      5. Metabolic Bone Disease/Secondary Hyperparathyroidism:stable  Will monitor PTH, CA and Vit D/guidelines  Lab Results   Component Value Date    .0 (H) 04/08/2020    CALCIUM 10.3 03/31/2022    PHOS 2.9 03/31/2022       3/31/2022  2yr 11mo   Magnesium 1.6 - 2.6  mg/dL 1.8       6. Electrolytes:  Will monitor /guidelines  Lab Results   Component Value Date     03/31/2022    K 4.0 03/31/2022     03/31/2022    CO2 28 03/31/2022       7. Anemia: stable. No need for intervention   Lab Results   Component Value Date    WBC 6.80 03/31/2022    HGB 14.8 03/31/2022    HCT 46.6 03/31/2022    MCV 87 03/31/2022     03/31/2022         8.  Cytopenias: no significant cytopenias will monitor as per our guidelines. Medicine list reviewed including potential causes of drug-induced cytopenias    9.Proteinuria: continue p/c ratio as per guidelines       3/31/2022  2yr 11mo   Prot/Creat Ratio, Urine 0.00 - 0.20 0.16       10. BK virus infection screening:  will continue to monitor/ guidelines  BK PCR 3/31/2022 pending      11. Weight education: provided during the clinic visit   Body mass index is 36.16 kg/m².     12.Patient safety education regarding immunosuppression including prophylaxis posttransplant for CMV, PCP : Education provided about vaccination and prevention of infections            Follow-up:   Annual follow-up with kidney transplant clinic per written guidelines.  Patient also reminded to follow-up with general nephrologist.    Labs: since patient remains at high risk for rejection and drug-related complications that warrant close monitoring, labs will be ordered as follows: continue twice weekly CBC, renal panel, and drug level for first month; then same labs once weekly through 3rd month post-transplant.  Urine for UA and protein/creatinine ratio monthly.  Serum BK - PCR at 1-, 3-, 6-, 9-, 12-, 18-, 24-, 36-, 48-, and 60 months post-transplant.  Hepatic panel at 1-, 2-, 3-, 6-, 9-, 12-, 18-, 24-, and 36- months post-transplant.    Leidy Givens NP       Education:   Material provided to the patient.  Patient reminded to call with any health changes since these can be early signs of significant complications.  Also, I advised the patient to be sure any new  medications or changes of old medications are discussed with either a pharmacist or physician knowledgeable with transplant to avoid rejection/drug toxicity related to significant drug interactions.    Patient advised that it is recommended that all transplant candidates, and their close contacts and household members receive Covid vaccination.

## 2022-04-04 ENCOUNTER — OFFICE VISIT (OUTPATIENT)
Dept: TRANSPLANT | Facility: CLINIC | Age: 72
End: 2022-04-04
Payer: MEDICARE

## 2022-04-04 VITALS
RESPIRATION RATE: 16 BRPM | OXYGEN SATURATION: 94 % | HEART RATE: 72 BPM | HEIGHT: 63 IN | SYSTOLIC BLOOD PRESSURE: 138 MMHG | TEMPERATURE: 97 F | DIASTOLIC BLOOD PRESSURE: 73 MMHG | WEIGHT: 204.13 LBS | BODY MASS INDEX: 36.17 KG/M2

## 2022-04-04 DIAGNOSIS — I10 ESSENTIAL HYPERTENSION: ICD-10-CM

## 2022-04-04 DIAGNOSIS — Z94.0 S/P LIVING-DONOR KIDNEY TRANSPLANTATION: Primary | ICD-10-CM

## 2022-04-04 DIAGNOSIS — Z91.89 AT RISK FOR OPPORTUNISTIC INFECTIONS: ICD-10-CM

## 2022-04-04 DIAGNOSIS — Z79.60 LONG-TERM USE OF IMMUNOSUPPRESSANT MEDICATION: ICD-10-CM

## 2022-04-04 DIAGNOSIS — N18.31 STAGE 3A CHRONIC KIDNEY DISEASE: ICD-10-CM

## 2022-04-04 PROCEDURE — 3008F BODY MASS INDEX DOCD: CPT | Mod: CPTII,S$GLB,, | Performed by: NURSE PRACTITIONER

## 2022-04-04 PROCEDURE — 1125F AMNT PAIN NOTED PAIN PRSNT: CPT | Mod: CPTII,S$GLB,, | Performed by: NURSE PRACTITIONER

## 2022-04-04 PROCEDURE — 99215 OFFICE O/P EST HI 40 MIN: CPT | Mod: S$GLB,,, | Performed by: NURSE PRACTITIONER

## 2022-04-04 PROCEDURE — 1159F PR MEDICATION LIST DOCUMENTED IN MEDICAL RECORD: ICD-10-PCS | Mod: CPTII,S$GLB,, | Performed by: NURSE PRACTITIONER

## 2022-04-04 PROCEDURE — 1100F PTFALLS ASSESS-DOCD GE2>/YR: CPT | Mod: CPTII,S$GLB,, | Performed by: NURSE PRACTITIONER

## 2022-04-04 PROCEDURE — 3288F PR FALLS RISK ASSESSMENT DOCUMENTED: ICD-10-PCS | Mod: CPTII,S$GLB,, | Performed by: NURSE PRACTITIONER

## 2022-04-04 PROCEDURE — 1125F PR PAIN SEVERITY QUANTIFIED, PAIN PRESENT: ICD-10-PCS | Mod: CPTII,S$GLB,, | Performed by: NURSE PRACTITIONER

## 2022-04-04 PROCEDURE — 3288F FALL RISK ASSESSMENT DOCD: CPT | Mod: CPTII,S$GLB,, | Performed by: NURSE PRACTITIONER

## 2022-04-04 PROCEDURE — 3075F SYST BP GE 130 - 139MM HG: CPT | Mod: CPTII,S$GLB,, | Performed by: NURSE PRACTITIONER

## 2022-04-04 PROCEDURE — 1157F PR ADVANCE CARE PLAN OR EQUIV PRESENT IN MEDICAL RECORD: ICD-10-PCS | Mod: CPTII,S$GLB,, | Performed by: NURSE PRACTITIONER

## 2022-04-04 PROCEDURE — 1157F ADVNC CARE PLAN IN RCRD: CPT | Mod: CPTII,S$GLB,, | Performed by: NURSE PRACTITIONER

## 2022-04-04 PROCEDURE — 1159F MED LIST DOCD IN RCRD: CPT | Mod: CPTII,S$GLB,, | Performed by: NURSE PRACTITIONER

## 2022-04-04 PROCEDURE — 3078F PR MOST RECENT DIASTOLIC BLOOD PRESSURE < 80 MM HG: ICD-10-PCS | Mod: CPTII,S$GLB,, | Performed by: NURSE PRACTITIONER

## 2022-04-04 PROCEDURE — 99215 PR OFFICE/OUTPT VISIT, EST, LEVL V, 40-54 MIN: ICD-10-PCS | Mod: S$GLB,,, | Performed by: NURSE PRACTITIONER

## 2022-04-04 PROCEDURE — 3078F DIAST BP <80 MM HG: CPT | Mod: CPTII,S$GLB,, | Performed by: NURSE PRACTITIONER

## 2022-04-04 PROCEDURE — 1100F PR PT FALLS ASSESS DOC 2+ FALLS/FALL W/INJURY/YR: ICD-10-PCS | Mod: CPTII,S$GLB,, | Performed by: NURSE PRACTITIONER

## 2022-04-04 PROCEDURE — 3008F PR BODY MASS INDEX (BMI) DOCUMENTED: ICD-10-PCS | Mod: CPTII,S$GLB,, | Performed by: NURSE PRACTITIONER

## 2022-04-04 PROCEDURE — 99999 PR PBB SHADOW E&M-EST. PATIENT-LVL V: ICD-10-PCS | Mod: PBBFAC,,, | Performed by: NURSE PRACTITIONER

## 2022-04-04 PROCEDURE — 3075F PR MOST RECENT SYSTOLIC BLOOD PRESS GE 130-139MM HG: ICD-10-PCS | Mod: CPTII,S$GLB,, | Performed by: NURSE PRACTITIONER

## 2022-04-04 PROCEDURE — 99999 PR PBB SHADOW E&M-EST. PATIENT-LVL V: CPT | Mod: PBBFAC,,, | Performed by: NURSE PRACTITIONER

## 2022-04-04 RX ORDER — LANOLIN ALCOHOL/MO/W.PET/CERES
400 CREAM (GRAM) TOPICAL 2 TIMES DAILY
COMMUNITY

## 2022-04-04 RX ORDER — CLONIDINE HYDROCHLORIDE 0.1 MG/1
0.1 TABLET ORAL 3 TIMES DAILY
COMMUNITY

## 2022-04-04 RX ORDER — FUROSEMIDE 40 MG/1
40 TABLET ORAL DAILY
COMMUNITY

## 2022-04-04 RX ORDER — ALENDRONATE SODIUM 70 MG/1
70 TABLET ORAL
COMMUNITY

## 2022-04-04 RX ORDER — TACROLIMUS 1 MG/1
CAPSULE ORAL
Qty: 90 CAPSULE | Refills: 11 | Status: SHIPPED | OUTPATIENT
Start: 2022-04-04 | End: 2023-04-21

## 2022-04-04 RX ORDER — MYCOPHENOLATE MOFETIL 250 MG/1
500 CAPSULE ORAL 2 TIMES DAILY
COMMUNITY
Start: 2021-10-12 | End: 2022-05-31

## 2022-04-04 RX ORDER — BENZONATATE 100 MG/1
100 CAPSULE ORAL 3 TIMES DAILY PRN
COMMUNITY

## 2022-04-04 NOTE — LETTER
April 4, 2022        Jd Carias  [address]  [address]  [facility] [address]  Phone: [phone]  Fax: [phone]             [facility]- Transplant 1st Fl  [address]  [facility] [address]  Phone: [phone]   Patient: Paige Summers   MR Number: [id number]   YOB: 1950   Date of Visit: 4/4/2022       Dear Dr. Jd Carias    Thank you for referring Paige Summers to me for evaluation. Attached you will find relevant portions of my assessment and plan of care.    If you have questions, please do not hesitate to call me. I look forward to following Paige Summers along with you.    Sincerely,    Leidy Givens, NP    Enclosure    If you would like to receive this communication electronically, please contact [email or web address] or [phone] to request [employer] Link access.    [employer] Link is a tool which provides read-only access to select patient information with whom you have a relationship. Its easy to use and provides real time access to review your patients record including encounter summaries, notes, results, and demographic information.    If you feel you have received this communication in error or would no longer like to receive these types of communications, please e-mail [email or web address]

## 2022-04-04 NOTE — PATIENT INSTRUCTIONS
It is my privilege to participate in your transplant care! Please be sure to let us know if you have any questions or concerns about your health care - we cannot help you if we do not know. Don't forget we are on call 24/7 for any emergencies.      Best Wishes,  Leidy Givens NP-C

## 2022-04-05 DIAGNOSIS — Z94.0 KIDNEY REPLACED BY TRANSPLANT: Primary | ICD-10-CM

## 2022-04-21 ENCOUNTER — LAB VISIT (OUTPATIENT)
Dept: LAB | Facility: HOSPITAL | Age: 72
End: 2022-04-21
Payer: MEDICARE

## 2022-04-21 DIAGNOSIS — Z94.0 KIDNEY REPLACED BY TRANSPLANT: ICD-10-CM

## 2022-04-21 LAB — TACROLIMUS BLD-MCNC: 9.2 NG/ML (ref 5–15)

## 2022-04-21 PROCEDURE — 36415 COLL VENOUS BLD VENIPUNCTURE: CPT | Performed by: INTERNAL MEDICINE

## 2022-04-21 PROCEDURE — 80197 ASSAY OF TACROLIMUS: CPT | Performed by: INTERNAL MEDICINE

## 2022-04-22 DIAGNOSIS — Z94.0 KIDNEY REPLACED BY TRANSPLANT: Primary | ICD-10-CM

## 2022-05-11 ENCOUNTER — PATIENT MESSAGE (OUTPATIENT)
Dept: RESEARCH | Facility: CLINIC | Age: 72
End: 2022-05-11
Payer: MEDICARE

## 2022-05-12 ENCOUNTER — LAB VISIT (OUTPATIENT)
Dept: LAB | Facility: HOSPITAL | Age: 72
End: 2022-05-12
Payer: MEDICARE

## 2022-05-12 DIAGNOSIS — Z94.0 KIDNEY REPLACED BY TRANSPLANT: ICD-10-CM

## 2022-05-12 LAB — TACROLIMUS BLD-MCNC: 5.8 NG/ML (ref 5–15)

## 2022-05-12 PROCEDURE — 36415 COLL VENOUS BLD VENIPUNCTURE: CPT | Performed by: INTERNAL MEDICINE

## 2022-05-12 PROCEDURE — 80197 ASSAY OF TACROLIMUS: CPT | Performed by: INTERNAL MEDICINE

## 2022-07-29 DIAGNOSIS — Z94.0 KIDNEY REPLACED BY TRANSPLANT: Primary | ICD-10-CM

## 2022-07-29 RX ORDER — MYCOPHENOLATE MOFETIL 250 MG/1
500 CAPSULE ORAL 2 TIMES DAILY
Qty: 120 CAPSULE | Refills: 11 | Status: SHIPPED | OUTPATIENT
Start: 2022-07-29 | End: 2023-04-21 | Stop reason: SDUPTHER

## 2022-08-29 NOTE — PROGRESS NOTES
"  Physical Therapy Daily Treatment Note     Name: Paige Powell [place]  Clinic Number: [id number]    Therapy Diagnosis:   Encounter Diagnoses   Name Primary?    Muscle weakness     Decreased spinal mobility     Impaired functional mobility and activity tolerance     Left leg pain      Physician: Medardo Leslie MD    Visit Date: 2/11/2020    Physician Orders: PT Eval and Treat   Medical Diagnosis from Referral: Lumbosacral radiculopathy  Evaluation Date: 12/9/2019  Authorization Period Expiration: 11/19/20  Plan of Care Expiration: 2/7/20  Visit # / Visits authorized: 10 (6/20 newly authorized)    Time In: 11:05 AM  Time Out: 12:05 PM  Total Billable Time: 50 min    Precautions: Standard, Fall and organ transplant    Subjective     Pt reports: minimal pain today. Swelling in her R knee is better. Feels stiff in the mornings    She was compliant with home exercise program.  Response to previous treatment: gave her a little more energy   Functional change: can get in and out of the car more easily    Pain: 0/10  Location: B knee    Objective       Paige received therapeutic exercises to develop strength, endurance, ROM, posture and core stabilization for 50 minutes including:    Upright bike, 10 min, Level 2  Posterior pelvic tilt, 20 x 3s holds  Abdominal isometric c/ orange physioball, 2 x 10 reps 3" hold  Bridge on ball, 2 x 10,  s holds  Double knee to chest, orange physioball, 2 x 10  Lower trunk rotation,10 reps B, 5s  Hip ABD in hook-lying, blue band, 20 reps x 3 seconds   SLR to Hiip ABD, 10/LE  Hip flexor stretch off mat, 1min/LE  Hamstring stretch c/ strap,  x1 min/LE  Figure-4 stretch, 1min/side  Double leg press on shuttle, 2.5 to 3 bands, 3 x 10 reps            Not performed today:    Clamshell, 15/side, side-lying, yellow band  Open book, 10 reps/side  Bridge with ball,  15 reps   Double knees to chest with physioball, 2 x 10 reps  Standing hip ext, 2 x 10 reps  Lateral walking with hand held assist, " 4 laps x 8-10 steps  Retro walking with hand held assist, 4 x 10 steps  Sit <> stands from 2nd from tallest plyobox, 10 reps  SLR, 2 x 10 reps  Supine marching, 2 x 10  Hip ADD squeeze 20 x 3s  Home Exercises Provided and Patient Education Provided     Education provided:   - exercise technique    Written Home Exercises Provided: Patient instructed to cont prior HEP.  Exercises were reviewed and Paige was able to demonstrate them prior to the end of the session.  Paige demonstrated good  understanding of the education provided.     See EMR under Patient Instructions for exercises provided prior visit.    Assessment       Pt presents to PT today with no AD. She denies pain today, and states she only gets pain sometimes with walking. Pt was able ro porgress multiple exercises today, as well as initiate Shuttle leg press. Will include increased machines, as appropriate as pt states she is starting a gym membership. Pt making good progress, endorsing, improved ability to get in/out of car. Progress as erin.     Paige is progressing well towards her goals.   Pt prognosis is Good.     Pt will continue to benefit from skilled outpatient physical therapy to address the deficits listed in the problem list box on initial evaluation, provide pt/family education and to maximize pt's level of independence in the home and community environment.     Pt's spiritual, cultural and educational needs considered and pt agreeable to plan of care and goals.     Anticipated barriers to physical therapy: chronicity of symptoms    Goals:  Short Term Goals: 4 weeks   1. Pt will tolerate HEP for improved strength, functional mobility, ROM, posture, and endurance. (Met, 1/28)  2. Pt will report reduced left leg pain to </= 5/10 at worst for improved functional mobility and ability to participate in functional activities/ADL's. (progressing, not met)  3. Pt will demo >/= 4-/5 strength in LLE for improved functional mobility, endurance, and posture.  (progressing, not met)  4. Pt will demo >/= 4/5 strength in RLE for improved functional mobility, endurance, and posture. (progressing, not met)  5. Pt will perform spinal ROM in all directions without pain for improved functional mobility and posture. (progressing, not met)  6. Pt will report being able to get in and out of car or low chair with improved ease for increased functional mobility (progressing, not met)        Long Term Goals: 8 weeks   1 Pt will be I with updated HEP for improved functional mobility, posture, strength, and endurance. (progressing, not met)  2. Pt will report reduced left leg pain to </= 3/10 at worst for improved functional mobility and ability to participate in functional activities/ADL's. (progressing, not met)  3. Pt will demo 5/5 strength in BLE's for improved functional mobility, endurance, and posture. (progressing, not met)  4. Pt will report being able to go to grocery store and put away groceries without increase in symptoms for improved functional mobility (progressing, not met)  5. Pt will demo >/= 3/4 range spinal AROM in all directions for improved functional mobility and posture. (progressing, not met)  6. Pt will report being able to stand for >/=75min without increase in symptoms for increased endurance. (progressing, not met)     Plan   Plan of care Certification: 12/9/2019 to 2/7/20.     Outpatient Physical Therapy 2 times weekly for 8 weeks to include the following interventions: Gait Training, Manual Therapy, Moist Heat/ Ice, Neuromuscular Re-ed, Patient Education, Self Care, Therapeutic Activites, Therapeutic Exercise, Ultrasound and modalities as appropriate.   Continue above plan.       Yael Wang, PT    Dosing wt 253.5 pounds used for calorie needs calculation; IBW+10% (BMI 32.9) 198 pounds for protein needs calculations. Defer fluids for team per [facility] and +2 edema

## 2023-03-30 DIAGNOSIS — Z94.0 KIDNEY REPLACED BY TRANSPLANT: Primary | ICD-10-CM

## 2023-03-30 DIAGNOSIS — E83.42 HYPOMAGNESEMIA: ICD-10-CM

## 2023-04-17 ENCOUNTER — LAB VISIT (OUTPATIENT)
Dept: LAB | Facility: HOSPITAL | Age: 73
End: 2023-04-17
Attending: INTERNAL MEDICINE
Payer: MEDICARE

## 2023-04-17 DIAGNOSIS — Z94.0 KIDNEY REPLACED BY TRANSPLANT: ICD-10-CM

## 2023-04-17 DIAGNOSIS — E83.42 HYPOMAGNESEMIA: ICD-10-CM

## 2023-04-17 LAB
ALBUMIN SERPL BCP-MCNC: 3.7 G/DL (ref 3.5–5.2)
ALBUMIN SERPL BCP-MCNC: 3.7 G/DL (ref 3.5–5.2)
ALP SERPL-CCNC: 89 U/L (ref 55–135)
ALT SERPL W/O P-5'-P-CCNC: 14 U/L (ref 10–44)
ANION GAP SERPL CALC-SCNC: 9 MMOL/L (ref 8–16)
AST SERPL-CCNC: 19 U/L (ref 10–40)
BASOPHILS # BLD AUTO: 0.04 K/UL (ref 0–0.2)
BASOPHILS NFR BLD: 0.6 % (ref 0–1.9)
BILIRUB DIRECT SERPL-MCNC: 0.1 MG/DL (ref 0.1–0.3)
BILIRUB SERPL-MCNC: 0.3 MG/DL (ref 0.1–1)
BUN SERPL-MCNC: 16 MG/DL (ref 8–23)
CALCIUM SERPL-MCNC: 10.1 MG/DL (ref 8.7–10.5)
CHLORIDE SERPL-SCNC: 107 MMOL/L (ref 95–110)
CO2 SERPL-SCNC: 25 MMOL/L (ref 23–29)
CREAT SERPL-MCNC: 1.1 MG/DL (ref 0.5–1.4)
DIFFERENTIAL METHOD: ABNORMAL
EOSINOPHIL # BLD AUTO: 0.2 K/UL (ref 0–0.5)
EOSINOPHIL NFR BLD: 3 % (ref 0–8)
ERYTHROCYTE [DISTWIDTH] IN BLOOD BY AUTOMATED COUNT: 14.6 % (ref 11.5–14.5)
EST. GFR  (NO RACE VARIABLE): 53.1 ML/MIN/1.73 M^2
GLUCOSE SERPL-MCNC: 117 MG/DL (ref 70–110)
HCT VFR BLD AUTO: 47.9 % (ref 37–48.5)
HGB BLD-MCNC: 15.1 G/DL (ref 12–16)
IMM GRANULOCYTES # BLD AUTO: 0.02 K/UL (ref 0–0.04)
IMM GRANULOCYTES NFR BLD AUTO: 0.3 % (ref 0–0.5)
LYMPHOCYTES # BLD AUTO: 2.1 K/UL (ref 1–4.8)
LYMPHOCYTES NFR BLD: 31.7 % (ref 18–48)
MAGNESIUM SERPL-MCNC: 2.1 MG/DL (ref 1.6–2.6)
MCH RBC QN AUTO: 27.7 PG (ref 27–31)
MCHC RBC AUTO-ENTMCNC: 31.5 G/DL (ref 32–36)
MCV RBC AUTO: 88 FL (ref 82–98)
MONOCYTES # BLD AUTO: 0.8 K/UL (ref 0.3–1)
MONOCYTES NFR BLD: 11.5 % (ref 4–15)
NEUTROPHILS # BLD AUTO: 3.6 K/UL (ref 1.8–7.7)
NEUTROPHILS NFR BLD: 52.9 % (ref 38–73)
NRBC BLD-RTO: 0 /100 WBC
PHOSPHATE SERPL-MCNC: 3.6 MG/DL (ref 2.7–4.5)
PLATELET # BLD AUTO: 265 K/UL (ref 150–450)
PMV BLD AUTO: 9.7 FL (ref 9.2–12.9)
POTASSIUM SERPL-SCNC: 4.1 MMOL/L (ref 3.5–5.1)
PROT SERPL-MCNC: 7.5 G/DL (ref 6–8.4)
RBC # BLD AUTO: 5.45 M/UL (ref 4–5.4)
SODIUM SERPL-SCNC: 141 MMOL/L (ref 136–145)
TACROLIMUS BLD-MCNC: 4.6 NG/ML (ref 5–15)
WBC # BLD AUTO: 6.72 K/UL (ref 3.9–12.7)

## 2023-04-17 PROCEDURE — 84075 ASSAY ALKALINE PHOSPHATASE: CPT | Performed by: INTERNAL MEDICINE

## 2023-04-17 PROCEDURE — 87799 DETECT AGENT NOS DNA QUANT: CPT | Performed by: INTERNAL MEDICINE

## 2023-04-17 PROCEDURE — 80197 ASSAY OF TACROLIMUS: CPT | Performed by: INTERNAL MEDICINE

## 2023-04-17 PROCEDURE — 83735 ASSAY OF MAGNESIUM: CPT | Performed by: INTERNAL MEDICINE

## 2023-04-17 PROCEDURE — 80069 RENAL FUNCTION PANEL: CPT | Performed by: INTERNAL MEDICINE

## 2023-04-17 PROCEDURE — 85025 COMPLETE CBC W/AUTO DIFF WBC: CPT | Performed by: INTERNAL MEDICINE

## 2023-04-18 NOTE — PROGRESS NOTES
Kidney Post-Transplant Assessment    Referring Physician: Jd Carias  Current Nephrologist: Jd Carias    ORGAN: LEFT KIDNEY  Donor Type: living  PHS Increased Risk: no  Cold Ischemia: 189 mins  Induction Medications: campath - alemtuzumab (anti-cd52)    Subjective:     CC:  Reassessment of renal allograft function and management of chronic immunosuppression.    HPI:  Ms. Summers is a 72 y.o. year old Black or  female with history of ESRD secondary to HTN who received a living kidney transplant on 4/8/19.  She has CKD stage 3 - GFR 30-59 and her baseline creatinine is between 0.9-1.2. She takes mycophenolate mofetil and tacrolimus for maintenance immunosuppression.     Following with general nephrology Dr. Carias.     Feels well, no concerns. Recently had her MMG done. Still with knee arthritis, did get a steroid injection but was not very helpful. She is always on the go with her rolling walker. Drinking water throughout the day, no problems with urination. Is now taking jardiance for her blood sugars, has follow up visit scheduled to re-check A1c.    Stays busy taking care of her 12 year old special needs great granddaughter. No CP or SOB. Tolerating IS without issues, no diarrhea or vomiting.       Current Outpatient Medications   Medication Sig Dispense Refill    acetaminophen (TYLENOL) 325 MG tablet Take 2 tablets (650 mg total) by mouth every 4 (four) hours as needed. 30 tablet 0    albuterol (PROVENTIL/VENTOLIN HFA) 90 mcg/actuation inhaler Inhale 2 puffs into the lungs every 6 (six) hours as needed for Wheezing or Shortness of Breath. Rescue 8.5 g 0    alendronate (FOSAMAX) 70 MG tablet Take 70 mg by mouth every 7 days.      amLODIPine (NORVASC) 10 MG tablet Take 1 tablet (10 mg total) by mouth once daily. 30 tablet 11    ascorbic acid, vitamin C, (VITAMIN C) 500 MG tablet Take 1 tablet (500 mg total) by mouth 2 (two) times daily. 30 tablet 0    calcitRIOL (ROCALTROL) 0.25 MCG Cap  0.25 mcg. On MWF      cloNIDine (CATAPRES) 0.1 MG tablet Take 0.1 mg by mouth 3 (three) times daily.      cyanocobalamin (VITAMIN B-12) 500 MCG tablet Take 1 tablet (500 mcg total) by mouth once daily. 30 tablet 4    docusate sodium (COLACE) 100 MG capsule Take 1 capsule (100 mg total) by mouth 2 (two) times daily as needed for Constipation.  0    furosemide (LASIX) 40 MG tablet Take 40 mg by mouth once daily. PRN      JARDIANCE 25 mg tablet       linaCLOtide (LINZESS) 145 mcg Cap capsule Take 145 mcg by mouth before breakfast.      magnesium oxide (MAG-OX) 400 mg (241.3 mg magnesium) tablet Take 400 mg by mouth 2 (two) times daily.      multivitamin Tab Take 1 tablet by mouth once daily. 30 tablet 0    omega-3 fatty acids/fish oil (FISH OIL-OMEGA-3 FATTY ACIDS) 300-1,000 mg capsule Take 2 capsules by mouth once daily.      PRENATAL VITAMIN PLUS LOW IRON 27 mg iron- 1 mg Tab       tacrolimus (PROGRAF) 1 MG Cap tacrolimus 1 mg capsule, immediate-release      apixaban (ELIQUIS) 2.5 mg Tab Take by mouth 2 (two) times daily.      benzonatate (TESSALON) 100 MG capsule Take 100 mg by mouth 3 (three) times daily as needed for Cough.      cinacalcet (SENSIPAR) 30 MG Tab Take sensipar 30 mg Monday, Wednesday and Friday 30 tablet 6    mycophenolate (CELLCEPT) 250 mg Cap Take 2 capsules (500 mg total) by mouth 2 (two) times daily. 120 capsule 11    tacrolimus (PROGRAF) 1 MG Cap Take 2 capsules (2 mg total) by mouth every morning AND 1 capsule (1 mg total) every evening. 90 capsule 11     No current facility-administered medications for this visit.       Past Medical History:   Diagnosis Date    Anemia     Anemia of renal disease     Anxiety     Chronic renal failure 01/10/2018    CKD (chronic kidney disease) stage 3, GFR 30-59 ml/min 6/18/2019    Depression     Essential hypertension     GERD (gastroesophageal reflux disease)     Gout     H pylori ulcer     Hyperlipidemia     Hypertension     Obesity     Secondary  "hyperparathyroidism of renal origin        Review of Systems   Constitutional:  Negative for activity change, appetite change and fever.   HENT:  Negative for congestion, mouth sores and sore throat.    Eyes:  Negative for visual disturbance.   Respiratory:  Negative for cough, chest tightness and shortness of breath.    Cardiovascular:  Negative for chest pain, palpitations and leg swelling.   Gastrointestinal:  Negative for abdominal distention, constipation, diarrhea and nausea.   Genitourinary:  Negative for difficulty urinating, frequency and hematuria.   Musculoskeletal:  Positive for arthralgias. Negative for gait problem and myalgias.   Skin:  Negative for wound.   Allergic/Immunologic: Positive for immunocompromised state. Negative for environmental allergies and food allergies.   Neurological:  Negative for dizziness, weakness and numbness.   Psychiatric/Behavioral:  Negative for sleep disturbance. The patient is not nervous/anxious.      Objective:   Blood pressure (!) 142/79, pulse 76, temperature 97.7 °F (36.5 °C), temperature source Skin, resp. rate 16, height 5' 3" (1.6 m), weight 88.5 kg (195 lb 1.7 oz), last menstrual period 08/08/1971, SpO2 96 %.body mass index is 34.56 kg/m².    Physical Exam  Vitals and nursing note reviewed.   Constitutional:       Appearance: Normal appearance.   HENT:      Head: Normocephalic.   Cardiovascular:      Rate and Rhythm: Normal rate and regular rhythm.      Heart sounds: Normal heart sounds.   Pulmonary:      Effort: Pulmonary effort is normal.      Breath sounds: Normal breath sounds.   Abdominal:      General: Bowel sounds are normal. There is no distension.      Palpations: Abdomen is soft.      Tenderness: There is no abdominal tenderness.   Musculoskeletal:         General: Normal range of motion.   Skin:     General: Skin is warm and dry.   Neurological:      General: No focal deficit present.      Mental Status: She is alert.   Psychiatric:         Behavior: " Behavior normal.       Labs:  Lab Results   Component Value Date    WBC 6.72 04/17/2023    HGB 15.1 04/17/2023    HCT 47.9 04/17/2023     04/17/2023    K 4.1 04/17/2023     04/17/2023    CO2 25 04/17/2023    BUN 16 04/17/2023    CREATININE 1.1 04/17/2023    EGFRNONAA 50.3 (A) 03/31/2022    CALCIUM 10.1 04/17/2023    PHOS 3.6 04/17/2023    MG 2.1 04/17/2023    ALBUMIN 3.7 04/17/2023    ALBUMIN 3.7 04/17/2023    AST 19 04/17/2023    ALT 14 04/17/2023       Labs were reviewed with the patient.    Assessment:     1. S/P living-donor kidney transplantation    2. Stage 3a chronic kidney disease    3. Long-term use of immunosuppressant medication    4. Essential hypertension    5. At risk for opportunistic infections        Plan:   Mrs. Summers is now 4 years post transplant with stable allograft function. She will continue following with general nephrology with general nephrologist Dr. Carias for kidney care. IS refills provided for 1 year. Stressed importance of good HTN and DM control as well as staying up to date on routine cancer screenings. Also encouraged skin checks with dermatologist while on IS. She understands that transplant will re-open her chart if issues arise in the future with her allograft.    Follow-up:   1. CKD stage: 3a    2. Immunosuppression: Prograf trough 4.6. Continue Prograf 2/1 and  mg BID. Will continue to monitor for drug toxicities    3. Allograft Function: Stable. Cr within baseline 0.9-1.2. Continue good po hydration.   Lab Results   Component Value Date    CREATININE 1.1 04/17/2023 4/17/2023  4yr 0mo   eGFR >60 mL/min/1.73 m^2 53.1 (A)       4. Hypertension management: advise low salt diet and home BP monitoring      5. Metabolic Bone Disease/Secondary Hyperparathyroidism:stable  Lab Results   Component Value Date    .0 (H) 04/08/2020    CALCIUM 10.1 04/17/2023    PHOS 3.6 04/17/2023 4/17/2023  4yr 0mo   Magnesium 1.6 - 2.6 mg/dL 2.1       6.  Electrolytes:  Will monitor /guidelines  Lab Results   Component Value Date     04/17/2023    K 4.1 04/17/2023     04/17/2023    CO2 25 04/17/2023       7. Anemia: stable. No need for intervention   Lab Results   Component Value Date    WBC 6.72 04/17/2023    HGB 15.1 04/17/2023    HCT 47.9 04/17/2023    MCV 88 04/17/2023     04/17/2023         8.  Cytopenias: no significant cytopenias will monitor as per our guidelines. Medicine list reviewed including potential causes of drug-induced cytopenias    9.Proteinuria: continue p/c ratio as per guidelines     10. BK virus infection screening:  will continue to monitor/ guidelines   4/17/2023  4yr 0mo   BK Virus DNA, Blood Not detected Not detected   BK Virus DNA PCR, Quant, Blood <125 Copies/mL <125         11. Weight education: provided during the clinic visit   Body mass index is 34.56 kg/m².     12.Patient safety education regarding immunosuppression including prophylaxis posttransplant for CMV, PCP : Education provided about vaccination and prevention of infections            Follow-up:   Annual follow-up with kidney transplant clinic per written guidelines.  Patient also reminded to follow-up with general nephrologist.    Labs: since patient remains at high risk for rejection and drug-related complications that warrant close monitoring, labs will be ordered as follows: continue twice weekly CBC, renal panel, and drug level for first month; then same labs once weekly through 3rd month post-transplant.  Urine for UA and protein/creatinine ratio monthly.  Serum BK - PCR at 1-, 3-, 6-, 9-, 12-, 18-, 24-, 36-, 48-, and 60 months post-transplant.  Hepatic panel at 1-, 2-, 3-, 6-, 9-, 12-, 18-, 24-, and 36- months post-transplant.    Leidy Givens NP       Education:   Material provided to the patient.  Patient reminded to call with any health changes since these can be early signs of significant complications.  Also, I advised the patient to be sure  any new medications or changes of old medications are discussed with either a pharmacist or physician knowledgeable with transplant to avoid rejection/drug toxicity related to significant drug interactions.    Patient advised that it is recommended that all transplant candidates, and their close contacts and household members receive Covid vaccination.

## 2023-04-19 NOTE — PROGRESS NOTES
If she actively sees her local nephrologist, would could close her encounter. Kidney function stable. Tac level is acceptable. We are always available to see the pt if needed.

## 2023-04-21 ENCOUNTER — LAB VISIT (OUTPATIENT)
Dept: LAB | Facility: HOSPITAL | Age: 73
End: 2023-04-21
Payer: MEDICARE

## 2023-04-21 ENCOUNTER — OFFICE VISIT (OUTPATIENT)
Dept: TRANSPLANT | Facility: CLINIC | Age: 73
End: 2023-04-21
Payer: MEDICARE

## 2023-04-21 VITALS
OXYGEN SATURATION: 96 % | WEIGHT: 195.13 LBS | SYSTOLIC BLOOD PRESSURE: 142 MMHG | TEMPERATURE: 98 F | DIASTOLIC BLOOD PRESSURE: 79 MMHG | BODY MASS INDEX: 34.57 KG/M2 | RESPIRATION RATE: 16 BRPM | HEIGHT: 63 IN | HEART RATE: 76 BPM

## 2023-04-21 DIAGNOSIS — Z94.0 KIDNEY REPLACED BY TRANSPLANT: ICD-10-CM

## 2023-04-21 DIAGNOSIS — N18.31 STAGE 3A CHRONIC KIDNEY DISEASE: ICD-10-CM

## 2023-04-21 DIAGNOSIS — I10 ESSENTIAL HYPERTENSION: Chronic | ICD-10-CM

## 2023-04-21 DIAGNOSIS — Z79.60 LONG-TERM USE OF IMMUNOSUPPRESSANT MEDICATION: Chronic | ICD-10-CM

## 2023-04-21 DIAGNOSIS — Z91.89 AT RISK FOR OPPORTUNISTIC INFECTIONS: ICD-10-CM

## 2023-04-21 DIAGNOSIS — Z94.0 S/P LIVING-DONOR KIDNEY TRANSPLANTATION: Primary | Chronic | ICD-10-CM

## 2023-04-21 LAB
BACTERIA #/AREA URNS AUTO: ABNORMAL /HPF
BILIRUB UR QL STRIP: NEGATIVE
CLARITY UR REFRACT.AUTO: CLEAR
COLOR UR AUTO: YELLOW
CREAT UR-MCNC: 57 MG/DL (ref 15–325)
GLUCOSE UR QL STRIP: ABNORMAL
HGB UR QL STRIP: NEGATIVE
KETONES UR QL STRIP: NEGATIVE
LEUKOCYTE ESTERASE UR QL STRIP: NEGATIVE
MICROSCOPIC COMMENT: ABNORMAL
NITRITE UR QL STRIP: POSITIVE
PH UR STRIP: 5 [PH] (ref 5–8)
PROT UR QL STRIP: NEGATIVE
PROT UR-MCNC: 7 MG/DL (ref 0–15)
PROT/CREAT UR: 0.12 MG/G{CREAT} (ref 0–0.2)
SP GR UR STRIP: 1.02 (ref 1–1.03)
SQUAMOUS #/AREA URNS AUTO: 4 /HPF
URN SPEC COLLECT METH UR: ABNORMAL
WBC #/AREA URNS AUTO: 10 /HPF (ref 0–5)
YEAST UR QL AUTO: ABNORMAL

## 2023-04-21 PROCEDURE — 3077F PR MOST RECENT SYSTOLIC BLOOD PRESSURE >= 140 MM HG: ICD-10-PCS | Mod: CPTII,S$GLB,, | Performed by: NURSE PRACTITIONER

## 2023-04-21 PROCEDURE — 3077F SYST BP >= 140 MM HG: CPT | Mod: CPTII,S$GLB,, | Performed by: NURSE PRACTITIONER

## 2023-04-21 PROCEDURE — 3008F BODY MASS INDEX DOCD: CPT | Mod: CPTII,S$GLB,, | Performed by: NURSE PRACTITIONER

## 2023-04-21 PROCEDURE — 3078F DIAST BP <80 MM HG: CPT | Mod: CPTII,S$GLB,, | Performed by: NURSE PRACTITIONER

## 2023-04-21 PROCEDURE — 3078F PR MOST RECENT DIASTOLIC BLOOD PRESSURE < 80 MM HG: ICD-10-PCS | Mod: CPTII,S$GLB,, | Performed by: NURSE PRACTITIONER

## 2023-04-21 PROCEDURE — 81001 URINALYSIS AUTO W/SCOPE: CPT | Performed by: INTERNAL MEDICINE

## 2023-04-21 PROCEDURE — 1126F AMNT PAIN NOTED NONE PRSNT: CPT | Mod: CPTII,S$GLB,, | Performed by: NURSE PRACTITIONER

## 2023-04-21 PROCEDURE — 3008F PR BODY MASS INDEX (BMI) DOCUMENTED: ICD-10-PCS | Mod: CPTII,S$GLB,, | Performed by: NURSE PRACTITIONER

## 2023-04-21 PROCEDURE — 1157F ADVNC CARE PLAN IN RCRD: CPT | Mod: CPTII,S$GLB,, | Performed by: NURSE PRACTITIONER

## 2023-04-21 PROCEDURE — 1126F PR PAIN SEVERITY QUANTIFIED, NO PAIN PRESENT: ICD-10-PCS | Mod: CPTII,S$GLB,, | Performed by: NURSE PRACTITIONER

## 2023-04-21 PROCEDURE — 99999 PR PBB SHADOW E&M-EST. PATIENT-LVL IV: ICD-10-PCS | Mod: PBBFAC,,, | Performed by: NURSE PRACTITIONER

## 2023-04-21 PROCEDURE — 1159F MED LIST DOCD IN RCRD: CPT | Mod: CPTII,S$GLB,, | Performed by: NURSE PRACTITIONER

## 2023-04-21 PROCEDURE — 99215 OFFICE O/P EST HI 40 MIN: CPT | Mod: S$GLB,,, | Performed by: NURSE PRACTITIONER

## 2023-04-21 PROCEDURE — 84156 ASSAY OF PROTEIN URINE: CPT | Performed by: INTERNAL MEDICINE

## 2023-04-21 PROCEDURE — 99215 PR OFFICE/OUTPT VISIT, EST, LEVL V, 40-54 MIN: ICD-10-PCS | Mod: S$GLB,,, | Performed by: NURSE PRACTITIONER

## 2023-04-21 PROCEDURE — 1157F PR ADVANCE CARE PLAN OR EQUIV PRESENT IN MEDICAL RECORD: ICD-10-PCS | Mod: CPTII,S$GLB,, | Performed by: NURSE PRACTITIONER

## 2023-04-21 PROCEDURE — 1159F PR MEDICATION LIST DOCUMENTED IN MEDICAL RECORD: ICD-10-PCS | Mod: CPTII,S$GLB,, | Performed by: NURSE PRACTITIONER

## 2023-04-21 PROCEDURE — 99999 PR PBB SHADOW E&M-EST. PATIENT-LVL IV: CPT | Mod: PBBFAC,,, | Performed by: NURSE PRACTITIONER

## 2023-04-21 RX ORDER — EMPAGLIFLOZIN 25 MG/1
TABLET, FILM COATED ORAL
COMMUNITY
Start: 2023-03-22

## 2023-04-21 RX ORDER — PRENATAL WITH FERROUS FUM AND FOLIC ACID 3080; 920; 120; 400; 22; 1.84; 3; 20; 10; 1; 12; 200; 27; 25; 2 [IU]/1; [IU]/1; MG/1; [IU]/1; MG/1; MG/1; MG/1; MG/1; MG/1; MG/1; UG/1; MG/1; MG/1; MG/1; MG/1
TABLET ORAL
COMMUNITY
Start: 2023-04-05

## 2023-04-21 RX ORDER — MYCOPHENOLATE MOFETIL 250 MG/1
500 CAPSULE ORAL 2 TIMES DAILY
Qty: 120 CAPSULE | Refills: 11 | Status: SHIPPED | OUTPATIENT
Start: 2023-04-21

## 2023-04-21 RX ORDER — TACROLIMUS 1 MG/1
CAPSULE ORAL
COMMUNITY

## 2023-04-21 RX ORDER — TACROLIMUS 1 MG/1
CAPSULE ORAL
Qty: 90 CAPSULE | Refills: 11 | Status: SHIPPED | OUTPATIENT
Start: 2023-04-21 | End: 2024-04-20

## 2023-04-21 NOTE — LETTER
April 21, 2023        Jd Carias  [address]  [address]  [facility] [address]  Phone: [phone]  Fax: [phone]             [facility]st [facility]  [address]  [facility] [address]  Phone: [phone]   Patient: Paige Summers   MR Number: [id number]   YOB: 1950   Date of Visit: 4/21/2023       Dear Dr. Jd Carias    Thank you for referring Paige Summers to me for evaluation. Attached you will find relevant portions of my assessment and plan of care.    If you have questions, please do not hesitate to call me. I look forward to following Paige Summers along with you.    Sincerely,    Leidy Givens, NP    Enclosure    If you would like to receive this communication electronically, please contact [email or web address] or [phone] to request [email or web address] access.    [email or web address] is a tool which provides read-only access to select patient information with whom you have a relationship. Its easy to use and provides real time access to review your patients record including encounter summaries, notes, results, and demographic information.    If you feel you have received this communication in error or would no longer like to receive these types of communications, please e-mail [email or web address]

## 2023-04-26 ENCOUNTER — DOCUMENTATION ONLY (OUTPATIENT)
Dept: TRANSPLANT | Facility: CLINIC | Age: 73
End: 2023-04-26
Payer: MEDICARE

## 2023-04-28 NOTE — Clinical Note
Plan- coreg 25 mg daily- Mg oxide 800 mg BID- check b12 and folate with next lab IV discontinued, cath removed intact

## 2024-03-26 NOTE — PROGRESS NOTES
Note addended    U Interventional Nephrology Follow-up Exam    Date:   03/20/2019 2:59 PM    HPI:  68 year old woman with ESRD (TTS at [facility] with J[facility] Lazaro), well known to the service, required extensive interventions in the past to maintain patency of her LUE straight arm AV graft.  Last underwent declot on 2/19/19 and fistulogram 3/13/19 and underwent a stent placement in the venous outflow.  Here for follow up exam, ultrasound and suture removal.  She has had no recent issues with prolonged bleeding, difficulty in cannulation or increased frequency of pressure alarms while on dialysis.  She plans on getting transplant in April.    There were no vitals filed for this visit.    Exam:   ACCESS:  LUE straight arm AV graft with soft thrill and minimal pulsatility, appropriate pulse augmentation and partial collapse with arm elevation.  One suture removed without any complication.    Ultrasound:  - arterial anastomosis patent without stenosis, although there is some shadowing under her scar near the anastomosis   - mid-AVF patent with no stenosis seen, vessel diameter measured at 6-7 mm  - AVF outflow tract patent with no stenosis at the venous anastomosis, stent was visualized.  - BFV approximately 550-670 ml/min    Impression/Plan:  Patent AV graft following fistulogram with angioplasty and stent placement in the venous outflow.  Plan for Ultrasound in 2 months to check for any issues.      Irineo Devlin MD  [phone]

## 2024-11-07 NOTE — H&P
[facility]  History & Physical    Subjective:      Chief Complaint/Reason for Admission: Here for thrombectomy of her LUE straight arm AVG    Paige Summers is a 67 y.o. female with ESRD (TTS at [facility] with Dr. Carias) who presents today with clotted graft.  Of note, she last had declot done 6 days prior, then showed up to dialysis on Friday, worked well, then was clotted on Saturday when she showed up again.  She had a significant episode of hypotension (symptomatic) while on HD that Friday.    Past Medical History:   Diagnosis Date    Anemia     Anxiety     Chronic renal failure 01/10/2018    Depression     GERD (gastroesophageal reflux disease)     Gout     H pylori ulcer     Hyperlipidemia     Hypertension     Obesity      Past Surgical History:   Procedure Laterality Date    DIAGNOSTIC ULTRASOUND Left 6/18/2018    Procedure: ULTRASOUND, DIAGNOSTIC;  Surgeon: Yuriy Gibson MD;  Location: [facility] CATH LAB;  Service: Nephrology;  Laterality: Left;    DIAGNOSTIC ULTRASOUND Left 7/9/2018    Procedure: ULTRASOUND, DIAGNOSTIC;  Surgeon: Yuriy Gibson MD;  Location: [facility] CATH LAB;  Service: Nephrology;  Laterality: Left;    DIAGNOSTIC ULTRASOUND Left 8/6/2018    Procedure: ULTRASOUND, DIAGNOSTIC;  Surgeon: Yuriy Gibson MD;  Location: [facility] CATH LAB;  Service: Nephrology;  Laterality: Left;    DIAGNOSTIC ULTRASOUND Left 8/20/2018    Procedure: ULTRASOUND, DIAGNOSTIC;  Surgeon: Yuriy Gibson MD;  Location: [facility] CATH LAB;  Service: Nephrology;  Laterality: Left;    DIALYSIS FISTULA CREATION Left 02/2018    HYSTERECTOMY  1971    TVH    PERMCATH REMOVAL-TUNNELED CVC REMOVAL N/A 4/23/2018    Performed by Yuriy Gibson MD at [facility] CATH LAB    PERMCATH REWIRE- TUNNELED CATH REWIRE N/A 3/21/2018    Performed by Yuriy Gibson MD at [facility] CATH LAB    THROMBECTOMY OF HEMODIALYSIS ACCESS SITE Left 6/11/2018    Procedure: THROMBECTOMY, HEMODIALYSIS GRAFT OR FISTULA;  Surgeon:  Patient: NADIA ESCOBEDO  MR# [id number]  Time of Service: 11/6/2024  9:19 PM      History  Chief Complaint   Patient presents with    Abdominal Pain    Back Pain       History of Present Illness  Patient is a 48 year old male with a history of HTN, HLD, T2DM, CHF, CKD, chronic abdominal pain who presents to the emergency department with complaints of lower abdominal pain. Reports pain is present across lower abdomen. Has boucher cather in place due to recurrent infections when he was performing straight caths and is concerned that boucher may be malfunctioning. Also reports burning pain in chest. States he has prior history of heart burn and was previously on medication for this but currently is not.          Past Medical History:   Diagnosis Date    Anemia in chronic kidney disease 01/10/2024    Anxiety     Benign prostatic hyperplasia with urinary obstruction 02/20/2024    Chronic diastolic (congestive) heart failure (CMD) 02/20/2024    Chronic kidney disease     Stage 3    Chronic kidney disease, stage 3a  (CMD) 12/05/2023    Chronic low back pain 06/04/2024    Chronic prescription opiate use 06/12/2024    Congestive cardiac failure  (CMD)     Depressive disorder     Diabetes mellitus  (CMD)     Diabetic peripheral neuropathy associated with type 2 diabetes mellitus  (CMD) 02/20/2024    Diabetic retinopathy  (CMD)     Essential (primary) hypertension     Hyperlipidemia 06/04/2024    Hyperparathyroidism, secondary renal  (CMD) 02/20/2024    Hypertension 06/04/2024    Hypertension associated with stage 3 chronic kidney disease due to type 2 diabetes mellitus  (CMD) 02/20/2024    Moderate episode of recurrent major depressive disorder (CMD) 06/04/2024    Type 2 diabetes mellitus with diabetic neuropathy, with long-term current use of insulin (CMD) 09/15/2022       Family History   Problem Relation Age of Onset    Diabetes Mother     Cancer Father         pancreas    Other Sister         prostetic leg, hearing aids     Patient is unaware of any medical problems Sister     Patient is unaware of any medical problems Sister     Patient is unaware of any medical problems Sister     Other Brother         ETOH related death       Social History     Tobacco Use    Smoking status: Some Days     Current packs/day: 0.25     Average packs/day: 0.3 packs/day for 0.6 years (0.2 ttl pk-yrs)     Types: Cigarettes     Start date: 4/1/2024     Last attempt to quit: 2008    Smokeless tobacco: Never   Vaping Use    Vaping status: never used   Substance Use Topics    Alcohol use: Never    Drug use: Not Currently       Past Surgical History:   Procedure Laterality Date    Cystoscopy      Lasik surgery Bilateral        Current Medications:  Reviewed in chart.    Allergies:  ALLERGIES:  No Known Allergies      =============================================================    Physical Exam  Vitals:    11/06/24 1628   BP: (!) 166/99   Pulse: 67   Resp: 16   Temp: 97.9 °F (36.6 °C)   SpO2: 98%   Weight: 86 kg (189 lb 9.5 oz)         Physical Exam  Vitals and nursing note reviewed.   HENT:      Head: Normocephalic and atraumatic.   Cardiovascular:      Rate and Rhythm: Normal rate and regular rhythm.      Pulses: Normal pulses.   Pulmonary:      Effort: Pulmonary effort is normal. No respiratory distress.      Breath sounds: Normal breath sounds.   Abdominal:      General: There is distension.      Palpations: Abdomen is soft.      Tenderness: There is abdominal tenderness in the suprapubic area and left lower quadrant. There is no guarding or rebound. Negative signs include McBurney's sign.   Skin:     General: Skin is warm and dry.   Neurological:      General: No focal deficit present.      Mental Status: He is alert and oriented to person, place, and time.   Psychiatric:         Mood and Affect: Affect normal.              =============================================================    ED Course/Medical Decision  Yuriy Gibson MD;  Location: [facility] CATH LAB;  Service: Nephrology;  Laterality: Left;    THROMBECTOMY OF HEMODIALYSIS ACCESS SITE Left 6/13/2018    Procedure: THROMBECTOMY, HEMODIALYSIS GRAFT OR FISTULA;  Surgeon: Yuriy Gibson MD;  Location: [facility] CATH LAB;  Service: Nephrology;  Laterality: Left;    THROMBECTOMY OF HEMODIALYSIS ACCESS SITE N/A 9/6/2018    Procedure: THROMBECTOMY, HEMODIALYSIS GRAFT OR FISTULA;  Surgeon: Yuriy Gibson MD;  Location: [facility] CATH LAB;  Service: Nephrology;  Laterality: N/A;  PT coming in for noon says shes NPO    THROMBECTOMY, HEMODIALYSIS GRAFT OR FISTULA N/A 9/6/2018    Performed by Yuriy Gibson MD at [facility] CATH LAB    THROMBECTOMY, HEMODIALYSIS GRAFT OR FISTULA Left 6/13/2018    Performed by Yuriy Gibson MD at [facility] CATH LAB    THROMBECTOMY, HEMODIALYSIS GRAFT OR FISTULA Left 6/11/2018    Performed by Yuriy Gibson MD at [facility] CATH LAB    ULTRASOUND, DIAGNOSTIC Left 8/20/2018    Performed by Yuriy Gibson MD at [facility] CATH LAB    ULTRASOUND, DIAGNOSTIC Left 8/6/2018    Performed by Yuriy Gibson MD at [facility] CATH LAB    ULTRASOUND, DIAGNOSTIC Left 7/9/2018    Performed by Yuriy Gibson MD at [facility] CATH LAB    ULTRASOUND, DIAGNOSTIC Left 6/18/2018    Performed by Yuriy Gibson MD at [facility] CATH LAB     Family History   Problem Relation Age of Onset    Alcohol abuse Mother     No Known Problems Father     Hypertension Sister     Arthritis Sister     Heart disease Brother     Heart failure Brother     Heart failure Brother     Diabetes Cousin     Breast cancer Neg Hx     Colon cancer Neg Hx     Ovarian cancer Neg Hx      Social History     Tobacco Use    Smoking status: Never Smoker    Smokeless tobacco: Never Used   Substance Use Topics    Alcohol use: No    Drug use: No       PTA Medications   Medication Sig    amlodipine-valsartan (EXFORGE)  mg per tablet Take 1 tablet by mouth once daily.    calcium acetate (PHOSLO) 667 mg capsule Take 1,334 mg by  Making    Diagnostics    =============================================================    Laboratory summary    Labs Reviewed   COMPREHENSIVE METABOLIC PANEL - Abnormal; Notable for the following components:       Result Value    Anion Gap 4 (*)     Glucose 264 (*)     BUN 29 (*)     Creatinine 2.00 (*)     Glomerular Filtration Rate 40 (*)     Calcium 8.1 (*)     Bilirubin, Total <0.2 (*)     Albumin 1.3 (*)     Protein, Total 4.7 (*)     A/G Ratio 0.4 (*)     All other components within normal limits   CBC WITH AUTOMATED DIFFERENTIAL (PERFORMABLE ONLY) - Abnormal; Notable for the following components:    RBC 3.45 (*)     HGB 10.0 (*)     HCT 31.3 (*)     MCHC 31.9 (*)     All other components within normal limits    Narrative:     This is an appended report.  These results have been appended to a previously verified report.   LIPASE - Normal   TROPONIN I, HIGH SENSITIVITY - Normal   CBC WITH DIFFERENTIAL    Narrative:     The following orders were created for panel order CBC with Automated Differential.  Procedure                               Abnormality         Status                     ---------                               -----------         ------                     CBC with Automated Dif...[[id number]]  Abnormal            Final result                 Please view results for these tests on the individual orders.   COVID/FLU/RSV PANEL   RAINBOW DRAW    Narrative:     The following orders were created for panel order [place] Light Blue Top Collected? 1 Label; Red Top Collected? No Labels; Light Green Top Collected? No Labels; Lavender Top Collected? No Labels; Gold Top Collected? 1 Label; Pink Top Collected? No Labels; Grey Top Collected? No Labels.  Procedure                               Abnormality         Status                     ---------                               -----------         ------                     Light Blue Top[[id number]]                                 Final result          mouth 3 (three) times daily with meals.    carvedilol (COREG) 6.25 MG tablet Take 6.25 mg by mouth 2 (two) times daily with meals.    cloNIDine 0.1 mg/24 hr td ptwk (CATAPRES) 0.1 mg/24 hr Place 1 patch onto the skin every 7 days.    clopidogrel (PLAVIX) 75 mg tablet Take 75 mg by mouth every other day.     cyclobenzaprine (FLEXERIL) 10 MG tablet Take 10 mg by mouth 3 (three) times daily as needed for Muscle spasms.    docusate sodium (COLACE) 100 MG capsule Take 100 mg by mouth 2 (two) times daily as needed for Constipation.    linaclotide (LINZESS) 145 mcg Cap capsule Take 145 mcg by mouth once daily. Take one po qd before 1st meal of the day on empty stomach     pantoprazole (PROTONIX) 40 MG tablet Take 40 mg by mouth once daily.    VOLTAREN 1 % Gel APPLY 2 GRAMS TO AFFECTED AREA QID    zolpidem (AMBIEN) 10 mg Tab Take 10 mg by mouth nightly as needed.    fluconazole (DIFLUCAN) 150 MG Tab     hydrOXYzine pamoate (VISTARIL) 25 MG Cap Take 25 mg by mouth 3 (three) times daily.    metroNIDAZOLE (FLAGYL) 500 MG tablet TK 1 T PO  BID     Review of patient's allergies indicates:  No Known Allergies     Review of Systems   Constitutional: Negative.    Respiratory: Negative.    Cardiovascular: Negative.        Objective:      Vital Signs (Most Recent)  Temp: 97.9 °F (36.6 °C) (09/12/18 1013)  Pulse: 75 (09/12/18 1013)  Resp: 16 (09/12/18 1013)  BP: (!) 162/77 (09/12/18 1013)  SpO2: 99 % (09/12/18 1013)    Vital Signs Range (Last 24H):  Temp:  [97.9 °F (36.6 °C)]   Pulse:  [75]   Resp:  [16]   BP: (162)/(77)   SpO2:  [99 %]     Physical Exam   Constitutional: She is oriented to person, place, and time. She appears well-developed and well-nourished. No distress.   HENT:   Head: Normocephalic and atraumatic.   Eyes: EOM are normal.   Neck: Neck supple.   Pulmonary/Chest: Effort normal. No respiratory distress.   Musculoskeletal:   LUE straight arm AVG with no thrill.  Two sutures in place, and some ecchymoses        Gold Top[[id number]]                                       Final result                 Please view results for these tests on the individual orders.   LIGHT BLUE TOP   GOLD TOP   RAINBOW DRAW    Narrative:     The following orders were created for panel order [place] Draw Light Blue Top Collected? 1 Label; Red Top Collected? No Labels; Light Green Top Collected? No Labels; Lavender Top Collected? No Labels; Gold Top Collected? 1 Label; Pink Top Collected? No Labels; Grey Top Collected? No Labels.  Procedure                               Abnormality         Status                     ---------                               -----------         ------                     Light Blue Top[[id number]]                                 Final result               Gold Top[[id number]]                                       Final result                 Please view results for these tests on the individual orders.   LIGHT BLUE TOP   GOLD TOP       =============================================================    Radiography/Imaging      CT ABDOMEN PELVIS WO CONTRAST   Final Result   1.   Small bilateral pleural effusions.   2.   Diffuse abdominal pelvic wall, retroperitoneal and mesenteric fat   stranding is again noted, suggestive of volume third spacing. Moderate   ascites. This limits evaluation for abdominal pelvic inflammatory process.   3.   Diffuse gastric wall thickness prominence is noted, possible edema.   4.   Mild rectosigmoid colonic wall thickness prominence is noted, possible   accentuation due to incomplete distention. Several other colonic segment   mild wall thickness prominence is also noted. Several small bowel loops   demonstrate questionable bowel wall thickening, also suggestive of bowel   wall edema. No evidence of bowel obstruction. Evaluation of bowel loops is   limited due to ascites.   5.   Mildly prominent gas-filled appendix is noted measuring up to 9 mm in   diameter. Allowing for  (from our procedure).   Neurological: She is alert and oriented to person, place, and time.   Skin: Skin is warm and dry. She is not diaphoretic.   Psychiatric: She has a normal mood and affect. Her behavior is normal.       Assessment:      Active Hospital Problems    Diagnosis  POA    Mechanical complication of vascular device [T81.420Z]  Yes      Resolved Hospital Problems   No resolved problems to display.       Plan:      Declot today.  Risks explained, consent signed.     surrounding mesenteric edema and ascites, no   significant appendiceal wall thickening is noted to suggest appendicitis.   6.   Right renal 1 mm calyceal nonobstructive stone.   7.   Urinary bladder is incompletely distended with related wall thickness   prominence. Left posterior inferior urinary bladder wall diverticulum is   noted.   8.   Right middle lobe faint 3 mm groundglass nodule is noted. No further   investigation recommended (Per Fleischner Society guidelines).            Electronically Signed by: PETER LIPSCOMB M.D.    Signed on: 11/7/2024 1:01 AM    Workstation ID: [id number]      XR CHEST PA OR AP 1 VIEW   Final Result   1. No acute findings. Clear lungs.      Electronically Signed by: MANAV CARDOSO M.D.    Signed on: 11/6/2024 10:16 PM    Workstation ID: [id number]          =============================================================    Medical Decision Making    Labs studies were ordered and reviewed as above.   Radiographic studies ordered and reviewed as above.  Past medical records were reviewed and significant findings indicated above.    MEDICATIONS ADMINISTERED IN EMERGENCY DEPARTMENT:  Medications   pantoprazole (PROTONIX INJECT) injection 40 mg (40 mg Intravenous Given 11/6/24 2153)   morphine injection 4 mg (4 mg Intravenous Given 11/6/24 2153)   ondansetron (ZOFRAN) injection 4 mg (4 mg Intravenous Given 11/6/24 2153)   morphine injection 4 mg (4 mg Intravenous Given 11/7/24 0016)   morphine injection 4 mg (4 mg Intravenous Given 11/7/24 0229)           =============================================================  Assessment:  Rex Ndiaye is a 48 year old male who presents for acute on chronic abdominal pain and chest pain      Treatment Plan:    Will obtain: CBC, CMP, troponin, lipase, CXR, CT Abdomen/pelvis, EKG    Will give: protonix, morphine, zofran      Labs show  CMP continues to indicate poor nutritional status, Alb 1.3  Hgb 10.0, stable for patient    CT abdomen/pelvis:  small bilateral pleural effusions, volume third spacing in abdomen, edema of stomach and bowel wall, ascites present; questionable changes in appendix      Physical exam is not consistent with appendicitis as no RLQ tenderness      Overall, I think CT results reflect worsening anasarca in the setting of poor nutritional status, CKD and CHF      Pain control was achieved in the ED.         =============================================================    Disposition      The patient was discharged to home from the Emergency Department in stable condition.      Recommended follow up with their Primary Care Provider and nephrologist for further evaluation/management.      Patient instructed and strongly encouraged to return immediately to the ED Department for persistent, recurrent or worsening symptoms.  Written aftercare and follow-up instructions were discussed with and verbally acknowledged by the patient.        CLINICAL IMPRESSION  1. Chronic abdominal pain    2. Generalized edema        Shayna Veronica MD  Emergency Medicine      Portions of this note were created using dictation software.          Shayna Veronica MD  11/07/24 [id number]

## (undated) DEVICE — GLOVE PROTEXIS PI CLASSIC 7.0

## (undated) DEVICE — SEE MEDLINE ITEM 156918

## (undated) DEVICE — CATH ANGIO SOFT VU 5FR 65CM

## (undated) DEVICE — PLUG CATHETER STERILE FOLEY

## (undated) DEVICE — CATH EMBO FOGARTY 5.5FRX80CM

## (undated) DEVICE — SUT ETHILON 3-0 FS-1 30

## (undated) DEVICE — CHLORAPREP 10.5 ML APPLICATOR

## (undated) DEVICE — SET MICPUNC ACC STIFF CANNULA

## (undated) DEVICE — CATH CONQUEST 40 6X7X4X75

## (undated) DEVICE — SEE MEDLINE ITEM 156900

## (undated) DEVICE — APPLICATOR CHLORAPREP CLR 10.5

## (undated) DEVICE — GLOVE PROTEXIS PI CLASSIC 7.5

## (undated) DEVICE — SUT SILK 2-0 STRANDS 30IN

## (undated) DEVICE — STAPLER SKIN PROXIMATE WIDE

## (undated) DEVICE — GUIDEWIRE STD .035X180CM ANG

## (undated) DEVICE — CATH CONQUEST 40 8 X 80 X 75

## (undated) DEVICE — SHEATH PINNACLE 6FR HIFLO

## (undated) DEVICE — PRESTO INFLATION DEVICE

## (undated) DEVICE — SUT ETHILON 3-0

## (undated) DEVICE — GOWN SMART IMP BREATHABLE XXLG

## (undated) DEVICE — GUIDEWIRE LAUREATE 035IN 180CM

## (undated) DEVICE — SYR ONLY LUER LOCK 20CC

## (undated) DEVICE — TRAY FOLEY 16FR INFECTION CONT

## (undated) DEVICE — FLOWSWITCH HP 1-W W/O LL

## (undated) DEVICE — SOL NS 1000CC

## (undated) DEVICE — TUBING CONTRAST INJCTN F-M 10

## (undated) DEVICE — SUT 1 48IN PDS II VIO MONO

## (undated) DEVICE — PUNCH AORTIC 4.0MM 6/CASE

## (undated) DEVICE — CATH DORADO 7X8X80

## (undated) DEVICE — SUT PROLENE 6-0 BV-1 30IN

## (undated) DEVICE — SUT 4-0 12-18IN SILK BLACK

## (undated) DEVICE — GLOVE PROTEXIS PI CLASSIC 9.0

## (undated) DEVICE — SUT PROLENE 5-0 36IN C-1

## (undated) DEVICE — PUNCH AORTIC 4.8MM

## (undated) DEVICE — GLOVE PROTEXIS PI CLASSIC 8.0

## (undated) DEVICE — SEE MEDLINE ITEM 146417

## (undated) DEVICE — SUT PDS BV 6-0

## (undated) DEVICE — SET IRR URLGY 2LINE UNIV SPIKE

## (undated) DEVICE — SET DECANTER MEDICHOICE

## (undated) DEVICE — STOCKINETTE 2INX36

## (undated) DEVICE — TOWEL OR XRAY WHITE 17X26IN

## (undated) DEVICE — CATH ULTRAVERSE 035 7X80X75

## (undated) DEVICE — GLOVE PROTEXIS PI CLASSIC 8.5

## (undated) DEVICE — SUT 2-0 12-18IN SILK

## (undated) DEVICE — CATH CONQUEST 40 7X8X4X75

## (undated) DEVICE — SUT SILK 3-0 SH 18IN BLACK

## (undated) DEVICE — SUT SILK 3-0 STRANDS 30IN

## (undated) DEVICE — HEMOSTAT SURGICEL NU-KNIT 6X9

## (undated) DEVICE — INTRODUCER CATH 8F 11CM

## (undated) DEVICE — GUIDEWIRE LAUREATE .035X260CM

## (undated) DEVICE — DRESSING MEPORE PRO 6 X 7 CM

## (undated) DEVICE — GUIDEWIRE UNIGLIDE .035 X 180

## (undated) DEVICE — CLIPPER BLADE MOD 4406 (CAREF)

## (undated) DEVICE — FOLEY BLLN 20FR 3WAY 5CC

## (undated) DEVICE — CATH CONQUEST 40 7 X 80 X 75

## (undated) DEVICE — ELECTRODE REM PLYHSV RETURN 9